# Patient Record
Sex: MALE | Race: WHITE | Employment: OTHER | URBAN - METROPOLITAN AREA
[De-identification: names, ages, dates, MRNs, and addresses within clinical notes are randomized per-mention and may not be internally consistent; named-entity substitution may affect disease eponyms.]

---

## 2017-01-29 RX ORDER — METOPROLOL TARTRATE 25 MG/1
TABLET, FILM COATED ORAL
Qty: 30 TAB | Refills: 4 | Status: SHIPPED | OUTPATIENT
Start: 2017-01-29 | End: 2017-06-24 | Stop reason: SDUPTHER

## 2017-04-25 DIAGNOSIS — E78.5 DYSLIPIDEMIA: ICD-10-CM

## 2017-04-29 RX ORDER — PRAVASTATIN SODIUM 20 MG/1
TABLET ORAL
Qty: 90 TAB | Refills: 0 | Status: SHIPPED | OUTPATIENT
Start: 2017-04-29 | End: 2017-07-27 | Stop reason: SDUPTHER

## 2017-05-23 ENCOUNTER — OFFICE VISIT (OUTPATIENT)
Dept: FAMILY MEDICINE CLINIC | Age: 82
End: 2017-05-23

## 2017-05-23 VITALS
WEIGHT: 150.2 LBS | HEIGHT: 63 IN | BODY MASS INDEX: 26.61 KG/M2 | DIASTOLIC BLOOD PRESSURE: 80 MMHG | HEART RATE: 57 BPM | OXYGEN SATURATION: 98 % | TEMPERATURE: 98.3 F | RESPIRATION RATE: 18 BRPM | SYSTOLIC BLOOD PRESSURE: 119 MMHG

## 2017-05-23 DIAGNOSIS — H90.3 SENSORINEURAL HEARING LOSS (SNHL) OF BOTH EARS: ICD-10-CM

## 2017-05-23 DIAGNOSIS — R00.1 BRADYCARDIA WITH 51-60 BEATS PER MINUTE: ICD-10-CM

## 2017-05-23 DIAGNOSIS — R41.3 MEMORY DISTURBANCE: ICD-10-CM

## 2017-05-23 DIAGNOSIS — D69.6 THROMBOCYTOPENIA (HCC): ICD-10-CM

## 2017-05-23 DIAGNOSIS — I25.10 CORONARY ARTERY DISEASE INVOLVING NATIVE CORONARY ARTERY OF NATIVE HEART WITHOUT ANGINA PECTORIS: ICD-10-CM

## 2017-05-23 DIAGNOSIS — G89.29 CHRONIC PAIN OF LEFT KNEE: ICD-10-CM

## 2017-05-23 DIAGNOSIS — I63.89 CEREBROVASCULAR ACCIDENT (CVA) DUE TO OTHER MECHANISM (HCC): ICD-10-CM

## 2017-05-23 DIAGNOSIS — M51.36 DDD (DEGENERATIVE DISC DISEASE), LUMBAR: ICD-10-CM

## 2017-05-23 DIAGNOSIS — H61.23 BILATERAL IMPACTED CERUMEN: ICD-10-CM

## 2017-05-23 DIAGNOSIS — I10 ESSENTIAL HYPERTENSION: ICD-10-CM

## 2017-05-23 DIAGNOSIS — N40.1 BENIGN PROSTATIC HYPERPLASIA WITH LOWER URINARY TRACT SYMPTOMS, UNSPECIFIED MORPHOLOGY: ICD-10-CM

## 2017-05-23 DIAGNOSIS — M79.18 RIGHT BUTTOCK PAIN: ICD-10-CM

## 2017-05-23 DIAGNOSIS — E78.5 DYSLIPIDEMIA: ICD-10-CM

## 2017-05-23 DIAGNOSIS — Z13.31 DEPRESSION SCREENING: ICD-10-CM

## 2017-05-23 DIAGNOSIS — M16.0 BILATERAL HIP JOINT ARTHRITIS: ICD-10-CM

## 2017-05-23 DIAGNOSIS — M25.562 CHRONIC PAIN OF LEFT KNEE: ICD-10-CM

## 2017-05-23 DIAGNOSIS — E55.9 VITAMIN D DEFICIENCY: ICD-10-CM

## 2017-05-23 DIAGNOSIS — Z00.00 PREVENTATIVE HEALTH CARE: Primary | ICD-10-CM

## 2017-05-23 RX ORDER — DICLOFENAC SODIUM 50 MG/1
TABLET, DELAYED RELEASE ORAL
Refills: 0 | COMMUNITY
Start: 2017-02-28 | End: 2017-11-21 | Stop reason: ALTCHOICE

## 2017-05-23 NOTE — PROGRESS NOTES
Date of visit: 5/23/2017       This is an Subsequent Medicare Annual Wellness Visit (AWV), (Performed more than 12 months after effective date of Medicare Part B enrollment and 12 months after last preventive visit)    I have reviewed the patient's medical history in detail and updated the computerized patient record. Geri Ellis is a 80 y.o. male   History obtained from: the patient and his wife, Femi Young    History     Patient Active Problem List   Diagnosis Code    GERD (gastroesophageal reflux disease) K21.9    Slipped intervertebral disc [id number]    Hemorrhoids, internal K64.8    DJD (degenerative joint disease) of cervical spine M47.812    Scoliosis M41.9    Diverticulosis K57.90    BPH (benign prostatic hyperplasia) N40.0    PVD (peripheral vascular disease) ([facility] I73.9    Varicose vein of leg I83.90    History of small bowel obstruction Z87.19    [place] (hard of hearing) H91.90    CAD (coronary artery disease) I25.10    Memory disturbance R41.3    Left acoustic neuroma ([facility] D33.3    Dyslipidemia E78.5    Advance directive in chart Z78.9    Stroke ([facility] I63.9    Essential hypertension I10    Chronic constipation K59.09    Benign prostatic hyperplasia N40.0    Bradycardia with 51-60 beats per minute R00.1     Past Medical History:   Diagnosis Date    Back pain     due to DDD and scoliosis    BPH (benign prostatic hyperplasia)     Dr. Olayinka Horton    CAD (coronary artery disease) 10/30/13    30%LAD, 80% ostal stenosis. Dr. Mason Curling.  Chest pain 10/04/04, 10/30/13    Dr. Francy Marcano. Negative Stress Cardiolite Test.  Dr. Michelle Steward.  Chronic venous insufficiency 06/15/09    Dr. Fallon Moore due to fall    DDD (degenerative disc disease), lumbar     L3-S1    Diverticulosis 02/2004    sigmoid.   Dr. Pratima Patel DJD (degenerative joint disease) of cervical spine     C 4-5 ;5-6    Dyslipidemia     Fracture 1980 facial/nasal    Hearing loss     wears hearing aid    Heartburn     Hemorrhoids, internal 1987    Dr. Rutherford Bias of unspecified site of abdominal cavity without mention of obstruction or gangrene     inguinal  Lt    Hypertension     Impotence     Left [facility] neuroma ([facility] 10/28/14    17 x 9 mm schwannoma.  Pes planus of both feet     PVD (peripheral vascular disease) ([facility] 2009    Scoliosis     LS    Slipped intervertebral disc 1954    chiropractor    SOB (shortness of breath) 08/07/13    Dr. Bianka Ventura.  Stroke ([facility] 10/28/2014    small Left [place]. Dr. Markie Conway. Dr. Holger Buckner.  Urinary incontinence     due to BPH. Dr. Jennifer Bird    Varicose vein 06/15/09    Dr. Gumaro Loredo Vertigo 2003    due to inner ear. Dr. Roldan Greenberg. Dr. Phill Daly. Dr. Love Kruger Visual loss     Dr. Jeana Hunter, [facility]. Past Surgical History:   Procedure Laterality Date    APPENDECTOMY      due to gangrene   [facility], 02/2002    Dr. Jerome Mercedes. benign.  COLONOSCOPY  02/2004    Dr. Justus Lopez. due q 10 yrs    CYSTOSCOPY  10/17/03    Dr. Nicola Kemp  10/30/13    Dr. Jose Ang. 80% ostal stenosis. 30% LAD.  HX HEMORRHOIDECTOMY      HX KNEE ARTHROSCOPY  1990 Left , 2007 Right    Dr. Petty Bruno HX LAPAROTOMY  05/14/07    Dr. Maria Victoria Morales. due to Bowel Obstruction    HX POLYPECTOMY  02/2002    Anal.     HX TONSILLECTOMY      OR COLSC FLX W/RMVL OF TUMOR POLYP LESION SNARE TQ  5/23/2011    Dr. Anna Briceño.      Allergies   Allergen Reactions    Betadine [Povidone-Iodine] Hives    Phenergan [Promethazine] Other (comments)     hallucinations    Seafood [Shellfish Containing Products] Rash and Swelling     crabmeat    Xylocaine [Lidocaine Hcl] Shortness of Breath     Current Outpatient Prescriptions   Medication Sig Dispense Refill    pravastatin (PRAVACHOL) 20 mg tablet take 1 tablet by mouth at bedtime 90 Tab 0    metoprolol tartrate (LOPRESSOR) 25 mg tablet TAKE A HALF TABLET BY MOUTH TWICE A DAY 30 Tab 4    isosorbide mononitrate ER (IMDUR) 30 mg tablet take 1 tablet by mouth once daily 30 Tab 11    GUAIFENESIN (MUCINEX PO) Take 1 Tab by mouth daily.  docusate sodium (COLACE) 100 mg capsule Take 100 mg by mouth daily. Instructed to take daily with plenty of liquid unless otherwise directed- as per 11/7/14 [facility] discharge med list      cholecalciferol, vitamin D3, (VITAMIN D3) 2,000 unit tab Take 1 Tab by mouth daily. As per 11/07/14 [facility] discharge med list      aspirin (ASPIRIN) 325 mg tablet Take 1 tablet by mouth daily. 30 tablet 3    tamsulosin (FLOMAX) 0.4 mg capsule Take 0.4 mg by mouth daily. For prostate       diclofenac EC (VOLTAREN) 50 mg EC tablet take 1 tablet by mouth twice a day if needed for pain  0    HYDROcodone-acetaminophen (LORTAB 5-325) 5-325 mg per tablet Take 1 Tab by mouth every six (6) hours as needed for Pain. Max Daily Amount: 4 Tabs. Indications: causes drowsiness;do not drink,drive, or use machinery while taking; take with a stool softener 12 Tab 0    capsaicin-methyl sal-menthol 0.035-25-10 % crlo 1 Inch by Apply Externally route three (3) times daily as needed. 1 Bottle 0    meclizine (ANTIVERT) 25 mg tablet Take 1 Tab by mouth three (3) times daily as needed for Dizziness. 20 Tab 0    etodolac (LODINE) 400 mg tablet Take 400 mg by mouth daily as needed.   0     Family History   Problem Relation Age of Onset    Heart Attack Mother     Other Mother      brain aneurysm/AAA/varicose veins    Stroke Mother     Heart Attack Sister     Cancer Maternal Grandmother      ovarian     Social History   Substance Use Topics    Smoking status: Never Smoker    Smokeless tobacco: Never Used    Alcohol use No       Specialists/Care Team   Marleny Portillo has established care with the following healthcare providers:  Patient Care Team:  Trevor Sarah DO as PCP - General Radha Gan Jude Noe MD (Physical Medicine and Rehab)  Giselle Parham MD (Urology)  Conception Andersen, [place] (Physician Assistant)  Rosey Stewart MD (Cardiology)  LOVELY Enrique OD (Optometry)  Tobias Carrasco RN as Ambulatory Care Navigator ([facility])      [address]     Demographics   male  80 y.o. General Health Questions   -During the past 4 weeks:   -how would you rate your health in general? Good   -how often have you been bothered by feeling dizzy when standing up? never   -how much have you been bothered by bodily pain? Not at all   -Have you noticed any hearing difficulties? no   -has your physical and emotional health limited your social activities with family or friends? no    Emotional Health Questions   -Do you have a history of depression, anxiety, or emotional problems? no  -Over the past 2 weeks, have you felt down, depressed or hopeless? no  -Over the past 2 weeks, have you felt little interest or pleasure in doing things? no    Health Habits   Please describe your diet habits: healthy  Do you get 5 servings of fruits or vegetables daily? yes  Do you exercise regularly? Yes - goes to gym 3x week    Activities of Daily Living and Functional Status   -Do you need help with eating, walking, dressing, bathing, toileting, the phone, transportation, shopping, preparing meals, housework, laundry, medications or managing money? no  -In the past four weeks, was someone available to help you if you needed and wanted help with anything? Yes - wife  -Are you confident are you that you can control and manage most of your health problems? yes  -Have you been given information to help you keep track of your medications? Yes - wife puts in weekly med box and he takes them  -How often do you have trouble taking your medications as prescribed? never    Fall Risk and Home Safety   Have you fallen 2 or more times in the past year?  no  Does your home have rugs in the hallway, lack grab bars in the bathroom, lack handrails on the stairs or have poor lighting? no  Do you have smoke detectors and check them regularly? yes  Do you have difficulties driving a car? No - DMV has restricted pt from driving on highway or at night  Do you always fasten your seat belt when you are in a car? yes      Vitals:    05/23/17 0838   BP: 119/80   Pulse: (!) 57   Resp: 18   Temp: 98.3 °F (36.8 °C)   TempSrc: Oral   SpO2: 98%   Weight: 150 lb 3.2 oz (68.1 kg)   Height: 5' 2.99\" (1.6 m)     Body mass index is 26.61 kg/(m^2). No exam data present  Was the patient's timed Up & Go test unsteady or longer than 30 seconds? no    Evaluation of Cognitive Function   Mood/affect:  happy  Orientation: Person, Place, Time and Situation  Appearance: age appropriate and casually dressed  Family member/caregiver input: wife      Preventive Services     (Preventive care checklist to be included in patient instructions)  Discussed today Done Previously Not Needed     X  Pneumococcal vaccines    8/12/2016  Flu vaccine      Hepatitis B vaccine (if at risk)    9/8/2015  Shingles vaccine    7/2/2016  TDAP vaccine     X Mammogram     X Pap smear     X Abdominal ultrasound for AAA     X Colorectal cancer screening     X Low-dose CT for lung cancer screening     X Bone density test      Glaucoma screening    5/23/2017  Cholesterol test    2014  Diabetes screening test      X Diabetes self-management class     X Nutritionist referral for diabetes or renal disease     Discussion of Advance Directive    Radha Elise verbalized his ACP (signed 10/14/2014 and on file) still reflects his wishes and is in effect. Assessment/Plan   Z00.00    ICD-10-CM ICD-9-CM    1. Preventative health care Z00.00 V70.0    2. Essential hypertension I10 401.9 LIPID PANEL      METABOLIC PANEL, COMPREHENSIVE      TSH 3RD GENERATION      MICROALBUMIN, UR, RAND W/ MICROALBUMIN/CREA RATIO      URINALYSIS W/ RFLX MICROSCOPIC   3.  Chronic pain of left knee M25.562 719.46     G89.29 338.29     intermittently, resolved after Voltaren    4. Memory disturbance R41.3 780.93 TSH 3RD GENERATION      VITAMIN B12 & FOLATE      RPR      REFERRAL TO NEUROPSYCHOLOGY   5. Thrombocytopenia (HCC) D69.6 287.5 CBC W/O DIFF   6. Vitamin D deficiency E55.9 268.9 VITAMIN D, 25 HYDROXY   7. Bilateral impacted cerumen H61.23 380.4    8. Cerebrovascular accident (CVA) due to other mechanism ([facility] I63.8 434.91    9. Depression screening Z13.89 V79.0    10. Dyslipidemia E78.5 272.4 LIPID PANEL      METABOLIC PANEL, COMPREHENSIVE      URINALYSIS W/ RFLX MICROSCOPIC   11. Coronary artery disease involving native coronary artery of native heart without angina pectoris I25.10 414.01 LIPID PANEL      METABOLIC PANEL, COMPREHENSIVE   12. Sensorineural hearing loss (SNHL) of both ears H90.3 389.18    13. DDD (degenerative disc disease), lumbar M51.36 722.52    14. Bilateral hip joint arthritis M16.0 716.95    15. Right buttock pain M79.1 729.1     resolved after Medrol dose jabari   16. Benign prostatic hyperplasia with lower urinary tract symptoms, unspecified morphology N40.1 600.01     stable on Tamsulosin   17. Bradycardia with 51-60 beats per minute R00.1 427.89     Asymptomatic, due to hx of long distance running       Orders Placed This Encounter    LIPID PANEL    METABOLIC PANEL, COMPREHENSIVE    TSH 3RD GENERATION    VITAMIN D, 25 HYDROXY    MICROALBUMIN, UR, RAND W/ MICROALBUMIN/CREA RATIO    URINALYSIS W/ RFLX MICROSCOPIC    CBC W/O DIFF    VITAMIN B12 & FOLATE    RPR    REFERRAL TO NEUROPSYCHOLOGY    diclofenac EC (VOLTAREN) 50 mg EC tablet       Syed Perez NP      The Annual Medicare Wellness Visit was performed by Godfrey Santos NP in our office today. I agree with documentation reflected in the chart. A summary was given to patient.

## 2017-05-23 NOTE — PATIENT INSTRUCTIONS
Schedule of Personalized Health Plan    The best way to stay healthy is to live a healthy lifestyle. A healthy lifestyle includes regular exercise, eating a well-balanced diet, keeping a healthy weight and not smoking. Regular physical exams and screening tests are another important way to take care of yourself. Preventive exams provided by health care providers can find health problems early when treatment works best and can keep you from getting certain diseases or illnesses. Preventive services include exams, lab tests, screening tests, shots, and learning information to help you take care of your own health. The CDC recommends pneumonia vaccines for anyone 72 years and older. These vaccines are usually only needed once in a lifetime unless your healthcare provider decides differently. The 2 pneumonia shots available presently are PCV 13 (Prevnar 13) and PPSV23 (Pneumovax 23). Adults 72 years or older who have not previously received PCV13, should receive a dose of PCV13 first, followed 1 year later by a dose of PPSV23. All people over 65 should have a yearly flu vaccine. People over 65 are at medium to high risk for Hepatitis B. Hepatitis B is transmitted through body fluids with a common source being sexual activity or IV drug use. Three shots are needed for complete protection. Three shots are needed for complete protection. The CDC recommends the herpes zoster (shingles) vaccine for all adults 61 and older, regardless if a prior episode of zoster has been reported. In addition to your physical exam, some screening tests are recommended:    Osteoporosis screening -There are no signs or symptoms of osteoporosis (weakening of bones). You might not know you have the disease until you break a bone. Thats why its so important to get a bone density test to measure your bone strength.  Bone mass measurement is taken with a Dexa scan and is recommended every two years after 72years old or if you have certain risk factors, such as personal history of vertebral fracture or chronic steroid medication use. Diabetes Mellitus screening is recommended every year. This is a blood test, called a hemoglobin A1c, which measures the average blood sugar over a 3 month period. Glaucoma is an eye disease caused by high pressure in the eye. An eye exam is recommended every year. Cardiovascular screening tests that check your cholesterol and other blood fat (lipid) levels are recommended every five years or yearly if you are on medications for cardiovascular disease. Colorectal Cancer screening tests help to find pre-cancerous polyps (growths in the colon) so they can be removed before they turn into cancer. Screening tests are recommended starting at age 48 or earlier if you have a certain risk factors, such as a family history of colon cancer. Prostate Cancer screening is currently not recommended by the [facility] Task Force unless there is a history or symptoms of prostate cancer.   (Symptoms include urinary problems such as weak stream, problem starting or stopping urination, blood in urine, erectile dysfunction)    Here is a list of your current Health Maintenance items including a date when each one is due next:  Health Maintenance   Topic Date Due    MEDICARE YEARLY EXAM  04/28/2017    INFLUENZA AGE 9 TO ADULT  08/01/2017    GLAUCOMA SCREENING Q2Y  12/07/2017    DTaP/Tdap/Td series (2 - Td) 07/02/2026    ZOSTER VACCINE AGE 60>  Completed    Pneumococcal 65+ Low/Medium Risk  Completed

## 2017-05-23 NOTE — MR AVS SNAPSHOT
Visit Information Date & Time Provider Department Dept. Phone Encounter #  
 5/23/2017  8:45 AM Reyes Cho DO [facility] [phone] [id number] Follow-up Instructions Return in about 6 months (around 11/23/2017) for bp, results, referral follow up. Upcoming Health Maintenance Date Due  
 MEDICARE YEARLY EXAM 4/28/2017 INFLUENZA AGE 9 TO ADULT 8/1/2017 GLAUCOMA SCREENING Q2Y 12/7/2017 DTaP/Tdap/Td series (2 - Td) 7/2/2026 Allergies as of 5/23/2017  Review Complete On: 5/23/2017 By: Toñito Nowak Severity Noted Reaction Type Reactions Betadine [Povidone-iodine] High 11/12/2009    Hives Phenergan [Promethazine] High 11/12/2009    Other (comments)  
 hallucinations Seafood [Shellfish Containing Products] High 11/12/2009    Rash, Swelling  
 crabmeat Xylocaine [Lidocaine Hcl] High 01/06/2012    Shortness of Breath Current Immunizations  Reviewed on 5/23/2017 Name Date Influenza High Dose Vaccine PF 8/12/2016, 9/8/2015, 10/29/2013 Influenza Vaccine 10/1/2014 Influenza Vaccine Split 10/8/2012, 10/20/2011, 10/15/2010 Influenza Vaccine Whole 10/1/2009 Pneumococcal Conjugate (PCV-13) 6/3/2015 Pneumococcal Polysaccharide (PPSV-23) 7/2/2016 Pneumococcal Vaccine (Unspecified Type) 2/8/2002 TD Vaccine 4/27/2004 Tdap 7/2/2016 Zoster Vaccine, Live 9/8/2015 Reviewed by Reyes Cho DO on 5/23/2017 at  9:31 AM  
You Were Diagnosed With   
  
 Codes Comments Essential hypertension    -  Primary ICD-10-CM: I10 
ICD-9-CM: 401.9 Chronic pain of left knee     ICD-10-CM: M25.562, G89.29 ICD-9-CM: 719.46, 338.29 intermittently, resolved after Voltaren Memory disturbance     ICD-10-CM: R41.3 ICD-9-CM: 780.93 Thrombocytopenia ([address]     ICD-10-CM: D69.6 ICD-9-CM: 287.5 Vitamin D deficiency     ICD-10-CM: E55.9 ICD-9-CM: 268.9  Bilateral impacted cerumen     ICD-10-CM: S42.31 
 ICD-9-CM: 380.4 Cerebrovascular accident (CVA) due to other mechanism [facility])     ICD-10-CM: I59.8 ICD-9-CM: 434.91 Dyslipidemia     ICD-10-CM: E78.5 ICD-9-CM: 272.4 Coronary artery disease involving native coronary artery of native heart without angina pectoris     ICD-10-CM: I25.10 ICD-9-CM: 414.01 Sensorineural hearing loss (SNHL) of both ears     ICD-10-CM: H90.3 ICD-9-CM: 389.18   
 DDD (degenerative disc disease), lumbar     ICD-10-CM: M51.36 
ICD-9-CM: 722.52 Bilateral hip joint arthritis     ICD-10-CM: M16.0 ICD-9-CM: 716.95 Right buttock pain     ICD-10-CM: M79.1 ICD-9-CM: 729.1 resolved after Medrol dose jabari Benign prostatic hyperplasia with lower urinary tract symptoms, unspecified morphology     ICD-10-CM: N40.1 ICD-9-CM: 600.01 stable on Tamsulosin Bradycardia with 51-60 beats per minute     ICD-10-CM: R00.1 ICD-9-CM: 427.89 Asymptomatic, due to hx of long distance running Vitals BP Pulse Temp Resp Height(growth percentile) Weight(growth percentile) 119/80 (BP 1 Location: Right arm, BP Patient Position: Sitting) (!) 57 98.3 °F (36.8 °C) (Oral) 18 5' 2.99\" (1.6 m) 150 lb 3.2 oz (68.1 kg) SpO2 BMI Smoking Status 98% 26.61 kg/m2 Never Smoker BMI and BSA Data Body Mass Index Body Surface Area  
 26.61 kg/m 2 1.74 m 2 Preferred Pharmacy Pharmacy Name Phone [id number] [facility], [address] [place] [address] Your Updated Medication List  
  
   
This list is accurate as of: 5/23/17  9:48 AM.  Always use your most recent med list.  
  
  
  
  
 aspirin 325 mg tablet Commonly known as:  ASPIRIN Take 1 tablet by mouth daily. capsaicin-methyl sal-menthol 0.035-25-10 % Crlo  
1 Inch by Apply Externally route three (3) times daily as needed. diclofenac EC 50 mg EC tablet Commonly known as:  VOLTAREN  
take 1 tablet by mouth twice a day if needed for pain docusate sodium 100 mg capsule Commonly known as:  Franky Hem Take 100 mg by mouth daily. Instructed to take daily with plenty of liquid unless otherwise directed- as per 11/7/14 [facility] discharge med list  
  
 etodolac 400 mg tablet Commonly known as:  LODINE Take 400 mg by mouth daily as needed. FLOMAX 0.4 mg capsule Generic drug:  tamsulosin Take 0.4 mg by mouth daily. For prostate HYDROcodone-acetaminophen 5-325 mg per tablet Commonly known as:  LORTAB 5-325 Take 1 Tab by mouth every six (6) hours as needed for Pain. Max Daily Amount: 4 Tabs. Indications: causes drowsiness;do not drink,drive, or use machinery while taking; take with a stool softener  
  
 isosorbide mononitrate ER 30 mg tablet Commonly known as:  IMDUR  
take 1 tablet by mouth once daily  
  
 meclizine 25 mg tablet Commonly known as:  ANTIVERT Take 1 Tab by mouth three (3) times daily as needed for Dizziness. metoprolol tartrate 25 mg tablet Commonly known as:  LOPRESSOR  
TAKE A HALF TABLET BY MOUTH TWICE A DAY  
  
 MUCINEX PO Take 1 Tab by mouth daily. pravastatin 20 mg tablet Commonly known as:  PRAVACHOL  
take 1 tablet by mouth at bedtime VITAMIN D3 2,000 unit Tab Generic drug:  cholecalciferol (vitamin D3) Take 1 Tab by mouth daily. As per 11/07/14 [facility] discharge med list  
  
  
  
  
We Performed the Following CBC W/O DIFF [38816 CPT(R)] LIPID PANEL [17221 CPT(R)] METABOLIC PANEL, COMPREHENSIVE [60077 CPT(R)] MICROALBUMIN, UR, RAND W/ MICROALBUMIN/CREA RATIO [id number] CPT(R)] REFERRAL TO NEUROPSYCHOLOGY [JFJ68 Custom] Comments:  
 Please evaluate patient for memory disturbance worsening RPR [91341 CPT(R)] TSH 3RD GENERATION [42263 CPT(R)] URINALYSIS W/ RFLX MICROSCOPIC [44803 CPT(R)] VITAMIN B12 & FOLATE [73964 CPT(R)] VITAMIN D, 25 HYDROXY [id number] CPT(R)] Follow-up Instructions Return in about 6 months (around 11/23/2017) for bp, results, referral follow up. Referral Information Referral ID Referred By Referred To  
  
 [id number] [facility] [place] [address] [facility] [place] [address] [facility] Phone: [phone] Fax: [phone] Visits Status Start Date End Date 1 New Request 5/23/17 5/23/18 If your referral has a status of pending review or denied, additional information will be sent to support the outcome of this decision. Introducing [facility] [place] [facility]! Keily Rubio introduces [employer] patient portal. Now you can access parts of your medical record, email your doctor's office, and request medication refills online. 1. In your internet browser, go to [email or web address] 2. Click on the First Time User? Click Here link in the Sign In box. You will see the New Member Sign Up page. 3. Enter your [employer] Access Code exactly as it appears below. You will not need to use this code after youve completed the sign-up process. If you do not sign up before the expiration date, you must request a new code. · [employer] Access Code: [email or web address] Expires: 8/21/2017  8:19 AM 
 
4. Enter the last four digits of your Social Security Number (xxxx) and Date of Birth (mm/dd/yyyy) as indicated and click Submit. You will be taken to the next sign-up page. 5. Create a [employer] ID. This will be your [employer] login ID and cannot be changed, so think of one that is secure and easy to remember. 6. Create a [employer] password. You can change your password at any time. 7. Enter your Password Reset Question and Answer. This can be used at a later time if you forget your password. 8. Enter your e-mail address. You will receive e-mail notification when new information is available in [address] 9. Click Sign Up.  You can now view and download portions of your medical record. 10. Click the Download Summary menu link to download a portable copy of your medical information. If you have questions, please visit the Frequently Asked Questions section of the [employer] website. Remember, [employer] is NOT to be used for urgent needs. For medical emergencies, dial 911. Now available from your iPhone and Android! Please provide this summary of care documentation to your next provider. Your primary care clinician is listed as Liudmila Eid. If you have any questions after today's visit, please call [phone].

## 2017-05-23 NOTE — PROGRESS NOTES
Chief Complaint   Patient presents with    Blood Pressure Check    Referral Follow Up    Results    ED Follow-up     [facility]; arthritis    Request For New Medication     PRN pain pill for arthritis     1. Have you been to the ER, urgent care clinic since your last visit? Hospitalized since your last visit? Yes, Pt went to ED and [facility], ED note in encounters and [facility] Note in media. 2. Have you seen or consulted any other health care providers outside of the [address] [place] [address] since your last visit? Include any pap smears or colon screening. No      Pt's wife is with him today; wife answered most of questions for pt. Pt's wife brought note from [facility]; note is also in media as well. Per Pt's wife, [facility] stated that pt had arthritis and wanted to know if he could have a PRN pain pill for when pt is hurting.

## 2017-05-23 NOTE — Clinical Note
Super job, John Paul Carter! I just changed 2 of your dates to 2016 and 2015 (typo) hope you don't mind. So happy you are here! Hope you had a great holiday weekend.   Adrien Coronado

## 2017-05-23 NOTE — PROGRESS NOTES
HISTORY OF PRESENT ILLNESS  Marleny Portillo is a 80 y.o. male presents with Blood Pressure Check; Referral Follow Up; ED Follow-up ([facility]; arthritis); Request For New Medication (PRN pain pill for arthritis); Annual Wellness Visit; and Labs    Agree with nurse note. [place] pt with hx of CVA and memory disturbance. His wife is here today and helps provide the hx. His wife has noticed he is forgetting more and he agrees. No recent head trauma, bladder symptoms or other associated symptoms. He has an appointment to see a neurologist in 10/2016. He feels well today and has no complaints. Hypertensive pt with dyslipidemia and CAD presents to the office with a BP of 119/80 and HR of 57 bpm. He was a long distance runner for many years and his wife recalls his HR dropping into the 40s. Patient denies vision changes, headaches, dizziness, chest pain, SOB, or swelling today. He weighs 150 lbs, gained 1 lb since last ov. He drinks about 3 glass of water daily. Pt with Vit D deficiency and hx of thrombocytopenia. On 10/31/14, platelets were 80. On 12/04/16, Platelets were 424. Pt went to the ER on 12/04/06 for hip pain. He had L buttock pain that progressed to the R buttock. D'd low back strain. Tx'd with Norco 5-325 mg 1 po orally and Decadron 8 mg 1 po orally. Rx'd Lortab 5-25 mg x12 tablets and Medrol Dosepak. Xrays showed multilevel DDD in hip joints BL and BL hip OA. He did take the [address] but not Lortab. x     [facility] to [facility] for L knee pain and ears clogged. Dx'd BL impacted cerumen and unilateral primary OA, L knee. Xrays showed joint arthritis. Rx'd Voltaren 50 mg x14 tablets. Pt with SNHL of BL ears. He wears hearing aids which he adjusts depending on his environment. Pt with hx of BPH. He sees urologist, Dr. Vern Kemp yearly for his PSA check. Last visit was on 11/09/16. He follows up in 1 year. He takes Tamsulosin 0.4 mg daily, tolerating well.      Health Maintenance    Pt's most recent colonoscopy was on 05/23/11 with GI, Dr. Criselda Bernstein. Written by lakesha Riggs, as dictated by Dr. Andriy Bardales DO.    ROS    Review of Systems negative except as noted above in HPI. ALLERGIES:    Allergies   Allergen Reactions    Betadine [Povidone-Iodine] Hives    Phenergan [Promethazine] Other (comments)     hallucinations    Seafood [Shellfish Containing Products] Rash and Swelling     crabmeat    Xylocaine [Lidocaine Hcl] Shortness of Breath       CURRENT MEDICATIONS:    Outpatient Prescriptions Marked as Taking for the 5/23/17 encounter (Office Visit) with Lynda Harper DO   Medication Sig Dispense Refill    pravastatin (PRAVACHOL) 20 mg tablet take 1 tablet by mouth at bedtime 90 Tab 0    metoprolol tartrate (LOPRESSOR) 25 mg tablet TAKE A HALF TABLET BY MOUTH TWICE A DAY 30 Tab 4    isosorbide mononitrate ER (IMDUR) 30 mg tablet take 1 tablet by mouth once daily 30 Tab 11    GUAIFENESIN (MUCINEX PO) Take 1 Tab by mouth daily.  docusate sodium (COLACE) 100 mg capsule Take 100 mg by mouth daily. Instructed to take daily with plenty of liquid unless otherwise directed- as per 11/7/14 [facility] discharge med list      cholecalciferol, vitamin D3, (VITAMIN D3) 2,000 unit tab Take 1 Tab by mouth daily. As per 11/07/14 [facility] discharge med list      aspirin (ASPIRIN) 325 mg tablet Take 1 tablet by mouth daily. 30 tablet 3    tamsulosin (FLOMAX) 0.4 mg capsule Take 0.4 mg by mouth daily. For prostate          PAST MEDICAL HISTORY:    Past Medical History:   Diagnosis Date    Back pain     due to DDD and scoliosis    BPH (benign prostatic hyperplasia)     Dr. Shannon Edwards CAD (coronary artery disease) 10/30/13    30%LAD, 80% ostal stenosis. Dr. Loco Pathak.  Chest pain 10/04/04, 10/30/13    Dr. Conrad Mota. Negative Stress Cardiolite Test.  Dr. Jeremi Walker.     Chronic venous insufficiency 06/15/09    Dr. Roland Santos fracture 1981    Lt due to fall    DDD (degenerative disc disease), lumbar     L3-S1    Diverticulosis 02/2004    sigmoid. Dr. Melody Boothe DJD (degenerative joint disease) of cervical spine     C 4-5 ;5-6    Dyslipidemia     Fracture 1980    facial/nasal    Hearing loss     wears hearing aid    Heartburn     Hemorrhoids, internal 1987    Dr. Rutherford Bias of unspecified site of abdominal cavity without mention of obstruction or gangrene     inguinal  Lt    Hypertension     Impotence     Left acoustic neuroma ([facility] [address] 10/28/14    17 x 9 mm schwannoma.  Pes planus of both feet     PVD (peripheral vascular disease) ([facility] 2009    Scoliosis     LS    Slipped intervertebral disc 1954    chiropractor    SOB (shortness of breath) 08/07/13    Dr. Bianka Ventura.  Stroke ([facility] 10/28/2014    small Left Thalamus. Dr. Markie Conway. Dr. Holger Buckner.  Urinary incontinence     due to BPH. Dr. Jennifer Bird    Varicose vein 06/15/09    Dr. Gumaro Loredo Vertigo 2003    due to inner ear. Dr. Roldan Greenberg. Dr. Phill Daly. Dr. Love Kruger Visual loss     Dr. Jeana Hunter, [facility]. PAST SURGICAL HISTORY:    Past Surgical History:   Procedure Laterality Date    APPENDECTOMY      due to gangrene   [facility], 02/2002    Dr. Jerome Mercedes. benign.  COLONOSCOPY  02/2004    Dr. Justus Lopez. due q 10 yrs    CYSTOSCOPY  10/17/03    Dr. Nicola Kemp  10/30/13    Dr. Garcia Factor. 80% ostal stenosis. 30% LAD.  HX HEMORRHOIDECTOMY      HX KNEE ARTHROSCOPY  1990 Left , 2007 Right    Dr. Petty Bruno HX LAPAROTOMY  05/14/07    Dr. Maria Victoria Morales. due to Bowel Obstruction    HX POLYPECTOMY  02/2002    Anal.     HX TONSILLECTOMY      IA COLSC FLX W/RMVL OF TUMOR POLYP LESION SNARE TQ  5/23/2011    Dr. Anna Briceño.        FAMILY HISTORY:    Family History   Problem Relation Age of Onset    Heart Attack Mother     Other Mother      brain aneurysm/AAA/varicose veins    Stroke Mother     Heart Attack Sister     Cancer Maternal Grandmother      ovarian       SOCIAL HISTORY:    Social History     Social History    Marital status:      Spouse name: N/A    Number of children: N/A    Years of education: N/A     Social History Main Topics    Smoking status: Never Smoker    Smokeless tobacco: Never Used    Alcohol use No    Drug use: No    Sexual activity: Not Asked     Other Topics Concern    None     Social History Narrative       IMMUNIZATIONS:    Immunization History   Administered Date(s) Administered    Influenza High Dose Vaccine PF 10/29/2013, 09/08/2015, 08/12/2016    Influenza Vaccine 10/01/2014    Influenza Vaccine Split 10/15/2010, 10/20/2011, 10/08/2012    Influenza Vaccine Whole 10/01/2009    Pneumococcal Conjugate (PCV-13) 06/03/2015    Pneumococcal Polysaccharide (PPSV-23) 07/02/2016    Pneumococcal Vaccine (Unspecified Type) 02/08/2002    TD Vaccine 04/27/2004    Tdap 07/02/2016    Zoster Vaccine, Live 09/08/2015         PHYSICAL EXAMINATION    Vital Signs    Visit Vitals    /80 (BP 1 Location: Right arm, BP Patient Position: Sitting)    Pulse (!) 57    Temp 98.3 °F (36.8 °C) (Oral)    Resp 18    Ht 5' 2.99\" (1.6 m)    Wt 150 lb 3.2 oz (68.1 kg)    SpO2 98%    BMI 26.61 kg/m2       Weight Metrics 5/23/2017 10/7/2016 9/15/2016 8/9/2016 6/7/2016 5/29/2016 4/27/2016   Weight 150 lb 3.2 oz 149 lb 1.6 oz 149 lb 4 oz 147 lb 8 oz 146 lb 1.6 oz 146 lb 13.2 oz 149 lb   BMI 26.61 kg/m2 26.41 kg/m2 26.44 kg/m2 26.14 kg/m2 25.89 kg/m2 26.02 kg/m2 25.56 kg/m2       General appearance - Well nourished. Well appearing. Well developed. No acute distress. Overweight. Present with wife. Head - Normocephalic. Atraumatic. Eyes - pupils equal and reactive. Extraocular eye movements intact. Sclera anicteric. Mildly injected sclera. Ears - Hearing is grossly abnormal bilaterally. Hearing aids intact. Nose - normal and patent. No polyps noted. No erythema. No discharge. Mouth - mucous membranes with adequate moisture. Posterior pharynx normal with cobblestone appearance. No erythema, white exudate or obstruction. Neck - supple. Midline trachea. No carotid bruits noted bilaterally. No thyromegaly noted. Chest - clear to auscultation bilaterally anteriorly and posteriorly. No wheezes. No rales or rhonchi. Breath sounds are symmetrical bilaterally. Unlabored respirations. Heart - normal rate. Regular rhythm. Normal S1, S2. No murmur noted. No rubs, clicks or gallops noted. Abdomen - soft and distended. No masses or organomegaly. No rebound, rigidity or guarding. Bowel sounds normal x 4 quadrants. No tenderness noted. Neurological - awake, alert and oriented to person, place, and time and event. Cranial nerves II through XII intact. Clear speech. Muscle strength is +5/5 x 4 extremities. Sensation is intact to light touch bilaterally. Steady gait. Heme/Lymph - peripheral pulses normal x 4 extremities. No peripheral edema is noted. Musculoskeletal - Intact x 4 extremities. Full ROM x 4 extremities. No pain with movement. Back exam - normal range of motion. No pain on palpation of the spinous processes in the cervical, thoracic, lumbar, sacral regions. No CVA tenderness. Skin - no rashes, erythema, ecchymosis, lacerations, abrasions, suspicious moles noted  Psychological -   normal behavior, dress and thought processes. Occasional good insight. Good eye contact. Normal affect. Witty mood. Normal speech.       DATA REVIEWED    Lab Results   Component Value Date/Time    WBC 6.1 12/04/2016 06:30 AM    Hemoglobin (POC) 12.9 09/15/2016 04:44 PM    HGB 13.5 12/04/2016 06:30 AM    Hematocrit (POC) 38 09/15/2016 04:44 PM    HCT 39.8 12/04/2016 06:30 AM    PLATELET 202 84/50/9852 06:30 AM    MCV 93.6 12/04/2016 06:30 AM     Lab Results   Component Value Date/Time    Sodium 141 12/04/2016 07:29 AM Potassium 4.1 12/04/2016 07:29 AM    Chloride 105 12/04/2016 07:29 AM    CO2 27 12/04/2016 07:29 AM    Anion gap 9 12/04/2016 07:29 AM    Glucose 80 12/04/2016 07:29 AM    BUN 15 12/04/2016 07:29 AM    Creatinine 0.93 12/04/2016 07:29 AM    BUN/Creatinine ratio 16 12/04/2016 07:29 AM    GFR est AA >60 12/04/2016 07:29 AM    GFR est non-AA >60 12/04/2016 07:29 AM    Calcium 8.5 12/04/2016 07:29 AM    Bilirubin, total 0.5 12/04/2016 07:29 AM    AST (SGOT) 18 12/04/2016 07:29 AM    Alk. phosphatase 102 12/04/2016 07:29 AM    Protein, total 6.9 12/04/2016 07:29 AM    Albumin 3.2 12/04/2016 07:29 AM    Globulin 3.7 12/04/2016 07:29 AM    A-G Ratio 0.9 12/04/2016 07:29 AM    ALT (SGPT) 22 12/04/2016 07:29 AM     Lab Results   Component Value Date/Time    Cholesterol, total 138 04/07/2016 10:04 AM    HDL Cholesterol 53 04/07/2016 10:04 AM    LDL, calculated 75 04/07/2016 10:04 AM    VLDL, calculated 10 04/07/2016 10:04 AM    Triglyceride 48 04/07/2016 10:04 AM    CHOL/HDL Ratio 2.2 10/29/2014 03:45 AM     Lab Results   Component Value Date/Time    Vitamin D 25-Hydroxy 21.5 10/31/2014 06:00 AM       Lab Results   Component Value Date/Time    Hemoglobin A1c 5.5 10/29/2014 03:45 AM     Lab Results   Component Value Date/Time    TSH 3.480 04/07/2016 10:04 AM     Lab Results   Component Value Date/Time    Microalb/Creat ratio (ug/mg creat.) 20.1 04/07/2016 10:14 AM    Microalbumin, urine 41.9 04/07/2016 10:14 AM       ASSESSMENT and PLAN      ICD-10-CM ICD-9-CM    1. Preventative health care Z00.00 V70.0    2. Essential hypertension I10 401.9 LIPID PANEL      METABOLIC PANEL, COMPREHENSIVE      TSH 3RD GENERATION      MICROALBUMIN, UR, RAND W/ MICROALBUMIN/CREA RATIO      URINALYSIS W/ RFLX MICROSCOPIC   3. Chronic pain of left knee M25.562 719.46     G89.29 338.29     intermittently, resolved after Voltaren    4.  Memory disturbance R41.3 780.93 TSH 3RD GENERATION      VITAMIN B12 & FOLATE      RPR      REFERRAL TO NEUROPSYCHOLOGY   5. Thrombocytopenia (HCC) D69.6 287.5 CBC W/O DIFF   6. Vitamin D deficiency E55.9 268.9 VITAMIN D, 25 HYDROXY   7. Bilateral impacted cerumen H61.23 380.4    8. Cerebrovascular accident (CVA) due to other mechanism ([facility] [address] I63.8 434.91    9. Depression screening Z13.89 V79.0    10. Dyslipidemia E78.5 272.4 LIPID PANEL      METABOLIC PANEL, COMPREHENSIVE      URINALYSIS W/ RFLX MICROSCOPIC   11. Coronary artery disease involving native coronary artery of native heart without angina pectoris I25.10 414.01 LIPID PANEL      METABOLIC PANEL, COMPREHENSIVE   12. Sensorineural hearing loss (SNHL) of both ears H90.3 389.18    13. DDD (degenerative disc disease), lumbar M51.36 722.52    14. Bilateral hip joint arthritis M16.0 716.95    15. Right buttock pain M79.1 729.1     resolved after Medrol dose jabari   16. Benign prostatic hyperplasia with lower urinary tract symptoms, unspecified morphology N40.1 600.01     stable on Tamsulosin   17. Bradycardia with 51-60 beats per minute R00.1 427.89     Asymptomatic, due to hx of long distance running       Discussed the patient's BMI with him. The BMI follow up plan is as follows: I have counseled this patient on diet and exercise regimens. Addressed weight, diet and exercise with patient. Decrease carbohydrates (white foods, sweet foods, sweet drinks and alcohol) since sugar has been found to contribute to dementia, increase green leafy vegetables and protein (lean meats and beans) with each meal.  Avoid fried foods. Eat 3-5 small meals daily. Do not skip meals. Increase water intake. Increase physical activity to 30 minutes daily for health benefit as tolerated. Get 7-8 hours uninterrupted sleep nightly. Chart reviewed and updated. Continue current medications and care. Most recent tests reviewed from 2016. Recheck pertinent labs today while fasting. Recent office visit notes from Dr. [place] and [facility] reviewed.   Referrals given; patient urged to keep appointments with specialists. Neuropsych. Pt and wife very hesistant to have pt \"put on meds to alter his thinking. \"  Counseled patient on health concerns:  BP, bradycardia, arthritis, and memory changes. Immunizations noted. Offered empathy, support, legitimation, prayers, partnership to patient. Praised patient for progress. Subsequent Medicare Wellness exam kindly performed by NP Katherine Kawasaki today. ACP on file. Follow-up Disposition:  Return in about 6 months (around 11/23/2017) for bp, results, referral follow up. Patient was offered a choice/choices in the treatment plan today. Patient expresses understanding of the plan and agrees with recommendations. Patient declines any additional handouts. Patient is satisfied with previous handouts received from our office      Written by lakesha Ware, as dictated by Dr. Luís Hillman DO. Documentation True and Accepted by Ag Hernández.  Courtney Denton.

## 2017-05-24 LAB
25(OH)D3+25(OH)D2 SERPL-MCNC: 34.2 NG/ML (ref 30–100)
ALBUMIN SERPL-MCNC: 4.4 G/DL (ref 3.5–4.7)
ALBUMIN/CREAT UR: 37.6 MG/G CREAT (ref 0–30)
ALBUMIN/GLOB SERPL: 1.8 {RATIO} (ref 1.2–2.2)
ALP SERPL-CCNC: 97 IU/L (ref 39–117)
ALT SERPL-CCNC: 16 IU/L (ref 0–44)
APPEARANCE UR: CLEAR
AST SERPL-CCNC: 23 IU/L (ref 0–40)
BILIRUB SERPL-MCNC: 0.5 MG/DL (ref 0–1.2)
BILIRUB UR QL STRIP: NEGATIVE
BUN SERPL-MCNC: 14 MG/DL (ref 8–27)
BUN/CREAT SERPL: 12 (ref 10–24)
CALCIUM SERPL-MCNC: 9.6 MG/DL (ref 8.6–10.2)
CHLORIDE SERPL-SCNC: 100 MMOL/L (ref 96–106)
CHOLEST SERPL-MCNC: 166 MG/DL (ref 100–199)
CO2 SERPL-SCNC: 26 MMOL/L (ref 18–29)
COLOR UR: YELLOW
CREAT SERPL-MCNC: 1.17 MG/DL (ref 0.76–1.27)
CREAT UR-MCNC: 128.3 MG/DL
ERYTHROCYTE [DISTWIDTH] IN BLOOD BY AUTOMATED COUNT: 13.9 % (ref 12.3–15.4)
FOLATE SERPL-MCNC: 8.4 NG/ML
GLOBULIN SER CALC-MCNC: 2.4 G/DL (ref 1.5–4.5)
GLUCOSE SERPL-MCNC: 103 MG/DL (ref 65–99)
GLUCOSE UR QL: NEGATIVE
HCT VFR BLD AUTO: 40.6 % (ref 37.5–51)
HDLC SERPL-MCNC: 53 MG/DL
HGB BLD-MCNC: 13.6 G/DL (ref 12.6–17.7)
HGB UR QL STRIP: NEGATIVE
INTERPRETATION, 910389: NORMAL
INTERPRETATION: NORMAL
KETONES UR QL STRIP: NEGATIVE
LDLC SERPL CALC-MCNC: 97 MG/DL (ref 0–99)
LEUKOCYTE ESTERASE UR QL STRIP: NEGATIVE
MCH RBC QN AUTO: 31.3 PG (ref 26.6–33)
MCHC RBC AUTO-ENTMCNC: 33.5 G/DL (ref 31.5–35.7)
MCV RBC AUTO: 94 FL (ref 79–97)
MICRO URNS: NORMAL
MICROALBUMIN UR-MCNC: 48.2 UG/ML
NITRITE UR QL STRIP: NEGATIVE
PDF IMAGE, 910387: NORMAL
PH UR STRIP: 6.5 [PH] (ref 5–7.5)
PLATELET # BLD AUTO: 192 X10E3/UL (ref 150–379)
POTASSIUM SERPL-SCNC: 5.2 MMOL/L (ref 3.5–5.2)
PROT SERPL-MCNC: 6.8 G/DL (ref 6–8.5)
PROT UR QL STRIP: NEGATIVE
RBC # BLD AUTO: 4.34 X10E6/UL (ref 4.14–5.8)
RPR SER QL: NON REACTIVE
SODIUM SERPL-SCNC: 141 MMOL/L (ref 134–144)
SP GR UR: 1.02 (ref 1–1.03)
TRIGL SERPL-MCNC: 78 MG/DL (ref 0–149)
TSH SERPL DL<=0.005 MIU/L-ACNC: 3.43 UIU/ML (ref 0.45–4.5)
UROBILINOGEN UR STRIP-MCNC: 0.2 MG/DL (ref 0.2–1)
VIT B12 SERPL-MCNC: 384 PG/ML (ref 211–946)
VLDLC SERPL CALC-MCNC: 16 MG/DL (ref 5–40)
WBC # BLD AUTO: 5.4 X10E3/UL (ref 3.4–10.8)

## 2017-10-11 ENCOUNTER — OFFICE VISIT (OUTPATIENT)
Dept: NEUROLOGY | Age: 82
End: 2017-10-11

## 2017-10-11 VITALS
OXYGEN SATURATION: 98 % | BODY MASS INDEX: 27.97 KG/M2 | SYSTOLIC BLOOD PRESSURE: 122 MMHG | DIASTOLIC BLOOD PRESSURE: 62 MMHG | HEART RATE: 60 BPM | WEIGHT: 152 LBS | HEIGHT: 62 IN

## 2017-10-11 DIAGNOSIS — R41.3 MEMORY LOSS: Primary | ICD-10-CM

## 2017-10-11 DIAGNOSIS — R41.0 DISORIENTATION: ICD-10-CM

## 2017-10-11 DIAGNOSIS — I10 ESSENTIAL HYPERTENSION: ICD-10-CM

## 2017-10-11 DIAGNOSIS — E53.8 B12 DEFICIENCY: ICD-10-CM

## 2017-10-11 DIAGNOSIS — I63.00 CEREBROVASCULAR ACCIDENT (CVA) DUE TO THROMBOSIS OF PRECEREBRAL ARTERY (HCC): ICD-10-CM

## 2017-10-11 DIAGNOSIS — I25.10 CORONARY ARTERY DISEASE INVOLVING NATIVE CORONARY ARTERY OF NATIVE HEART WITHOUT ANGINA PECTORIS: ICD-10-CM

## 2017-10-11 DIAGNOSIS — E11.51 DIABETES TYPE II WITH ATHEROSCLEROSIS OF ARTERIES OF EXTREMITIES (HCC): ICD-10-CM

## 2017-10-11 DIAGNOSIS — I70.209 DIABETES TYPE II WITH ATHEROSCLEROSIS OF ARTERIES OF EXTREMITIES (HCC): ICD-10-CM

## 2017-10-11 DIAGNOSIS — E03.9 ACQUIRED HYPOTHYROIDISM: ICD-10-CM

## 2017-10-11 NOTE — PATIENT INSTRUCTIONS
[facility]   Statement to Patients  April 1, 2014      In an effort to ensure the large volume of patient prescription refills is processed in the most efficient and expeditious manner, we are asking our patients to assist us by calling your Pharmacy for all prescription refills, this will include also your  Mail Order Pharmacy. The pharmacy will contact our office electronically to continue the refill process. Please do not wait until the last minute to call your pharmacy. We need at least 48 hours (2days) to fill prescriptions. We also encourage you to call your pharmacy before going to  your prescription to make sure it is ready. With regard to controlled substance prescription refill requests (narcotic refills) that need to be picked up at our office, we ask your cooperation by providing us with at least 72 hours (3days) notice that you will need a refill. We will not refill narcotic prescription refill requests after 4:00pm on any weekday, Monday through Thursday, or after 2:00pm on Fridays, or on the weekends. We encourage everyone to explore another way of getting your prescription refill request processed using [employer], our patient web portal through our electronic medical record system. [employer] is an efficient and effective way to communicate your medication request directly to the office and  downloadable as an chintan on your smart phone . [employer] also features a review functionality that allows you to view your medication list as well as leave messages for your physician. Are you ready to get connected? If so please review the attatched instructions or speak to any of our staff to get you set up right away! Thank you so much for your cooperation. Should you have any questions please contact our Practice Administrator.     The Physicians and Staff,  [address] [place] [address] Neurology Clinic          A Healthy Lifestyle: Care Instructions  Your Care Instructions  A healthy lifestyle can help you feel good, stay at a healthy weight, and have plenty of energy for both work and play. A healthy lifestyle is something you can share with your whole family. A healthy lifestyle also can lower your risk for serious health problems, such as high blood pressure, heart disease, and diabetes. You can follow a few steps listed below to improve your health and the health of your family. Follow-up care is a key part of your treatment and safety. Be sure to make and go to all appointments, and call your doctor if you are having problems. Its also a good idea to know your test results and keep a list of the medicines you take. How can you care for yourself at home? · Do not eat too much sugar, fat, or fast foods. You can still have dessert and treats now and then. The goal is moderation. · Start small to improve your eating habits. Pay attention to portion sizes, drink less juice and soda pop, and eat more fruits and vegetables. ¨ Eat a healthy amount of food. A 3-ounce serving of meat, for example, is about the size of a deck of cards. Fill the rest of your plate with vegetables and whole grains. ¨ Limit the amount of soda and sports drinks you have every day. Drink more water when you are thirsty. ¨ Eat at least 5 servings of fruits and vegetables every day. It may seem like a lot, but it is not hard to reach this goal. A serving or helping is 1 piece of fruit, 1 cup of vegetables, or 2 cups of leafy, raw vegetables. Have an apple or some carrot sticks as an afternoon snack instead of a candy bar. Try to have fruits and/or vegetables at every meal.  · Make exercise part of your daily routine. You may want to start with simple activities, such as walking, bicycling, or slow swimming. Try to be active 30 to 60 minutes every day. You do not need to do all 30 to 60 minutes all at once. For example, you can exercise 3 times a day for 10 or 20 minutes.  Moderate exercise is safe for most people, but it is always a good idea to talk to your doctor before starting an exercise program.  · Keep moving. Vreena Knuckles the lawn, work in the garden, or [place]. Take the stairs instead of the elevator at work. · If you smoke, quit. People who smoke have an increased risk for heart attack, stroke, cancer, and other lung illnesses. Quitting is hard, but there are ways to boost your chance of quitting tobacco for good. ¨ Use nicotine gum, patches, or lozenges. ¨ Ask your doctor about stop-smoking programs and medicines. ¨ Keep trying. In addition to reducing your risk of diseases in the future, you will notice some benefits soon after you stop using tobacco. If you have shortness of breath or asthma symptoms, they will likely get better within a few weeks after you quit. · Limit how much alcohol you drink. Moderate amounts of alcohol (up to 2 drinks a day for men, 1 drink a day for women) are okay. But drinking too much can lead to liver problems, high blood pressure, and other health problems. Family health  If you have a family, there are many things you can do together to improve your health. · Eat meals together as a family as often as possible. · Eat healthy foods. This includes fruits, vegetables, lean meats and dairy, and whole grains. · Include your family in your fitness plan. Most people think of activities such as jogging or tennis as the way to fitness, but there are many ways you and your family can be more active. Anything that makes you breathe hard and gets your heart pumping is exercise. Here are some tips:  ¨ Walk to do errands or to take your child to school or the bus. ¨ Go for a family bike ride after dinner instead of watching TV. Where can you learn more? Go to http://diamond-donna.info/. Enter N129 in the search box to learn more about \"A Healthy Lifestyle: Care Instructions. \"  Current as of: July 26, 2016  Content Version: 11.3  © 6886-3366 [employer]. Care instructions adapted under license by [employer] (which disclaims liability or warranty for this information). If you have questions about a medical condition or this instruction, always ask your healthcare professional. [address] any warranty or liability for your use of this information.

## 2017-10-11 NOTE — PROGRESS NOTES
NEUROLOGY HISTORY AND PHYSICAL    Name Ceasar Mckeon Age 80 y.o. MRN [id number]  10/24/1928     Referring Physician: Shabnam Cassidy DO      Chief Complaint:  Memory loss      This is a 80 y.o. with a history of stroke in . His wife believes that after the stroke he had progressive memory loss. He has trouble remembering following a story or completing one. Gets yearly evaluations for a driving. Assessment and Plan     1. Memory loss    - MRI BRAIN WO CONT; Future    2. Cerebrovascular accident (CVA) due to thrombosis of precerebral artery ([facility] [address]      3. Essential hypertension      4. Coronary artery disease involving native coronary artery of native heart without angina pectoris      5. 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 Acquired hypothyroidism    - TSH 3RD GENERATION    7. B12 deficiency    - VITAMIN B12 & FOLATE    8. Disorientation    - EEG; Future            Patient Allergies    Betadine [povidone-iodine]; Phenergan [promethazine]; Seafood [shellfish containing products]; and Xylocaine [lidocaine hcl]     Current Outpatient Prescriptions   Medication Sig    pravastatin (PRAVACHOL) 20 mg tablet take 1 tablet by mouth at bedtime    metoprolol tartrate (LOPRESSOR) 25 mg tablet take 1/2 tablet twice a day    diclofenac EC (VOLTAREN) 50 mg EC tablet take 1 tablet by mouth twice a day if needed for pain    HYDROcodone-acetaminophen (LORTAB 5-325) 5-325 mg per tablet Take 1 Tab by mouth every six (6) hours as needed for Pain. Max Daily Amount: 4 Tabs. Indications: causes drowsiness;do not drink,drive, or use machinery while taking; take with a stool softener    isosorbide mononitrate ER (IMDUR) 30 mg tablet take 1 tablet by mouth once daily    capsaicin-methyl sal-menthol 0.035-25-10 % crlo 1 Inch by Apply Externally route three (3) times daily as needed.     meclizine (ANTIVERT) 25 mg tablet Take 1 Tab by mouth three (3) times daily as needed for Dizziness.  GUAIFENESIN (MUCINEX PO) Take 1 Tab by mouth daily.  etodolac (LODINE) 400 mg tablet Take 400 mg by mouth daily as needed.  docusate sodium (COLACE) 100 mg capsule Take 100 mg by mouth daily. Instructed to take daily with plenty of liquid unless otherwise directed- as per 11/7/14 [facility] discharge med list    cholecalciferol, vitamin D3, (VITAMIN D3) 2,000 unit tab Take 1 Tab by mouth daily. As per 11/07/14 [facility] discharge med list    aspirin (ASPIRIN) 325 mg tablet Take 1 tablet by mouth daily.  tamsulosin (FLOMAX) 0.4 mg capsule Take 0.4 mg by mouth daily. For prostate      No current facility-administered medications for this visit. Past Medical History:   Diagnosis Date    Back pain     due to DDD and scoliosis    BPH (benign prostatic hyperplasia)     Dr. Isaacs Butt CAD (coronary artery disease) 10/30/13    30%LAD, 80% ostal stenosis. Dr. Masoud Green.  Chest pain 10/04/04, 10/30/13    Dr. Nikhil Chou. Negative Stress Cardiolite Test.  Dr. Michael Vitale.  Chronic venous insufficiency 06/15/09    Dr. Darden Section due to fall    DDD (degenerative disc disease), lumbar     L3-S1    Diverticulosis 02/2004    sigmoid. Dr. Alma Montalvo DJD (degenerative joint disease) of cervical spine     C 4-5 ;5-6    Dyslipidemia     Fracture 1980    facial/nasal    Hearing loss     wears hearing aid    Heartburn     Hemorrhoids, internal 1987    Dr. Heriberto Vitale of unspecified site of abdominal cavity without mention of obstruction or gangrene     inguinal  Lt    Hypertension     Impotence     Left acoustic neuroma ([address] 10/28/14    17 x 9 mm schwannoma.  Pes planus of both feet     PVD (peripheral vascular disease) ([address] 2009    Scoliosis     LS    Slipped intervertebral disc 1954    chiropractor    SOB (shortness of breath) 08/07/13    Dr. Marcie Sneed.     Stroke ([facility] [address] 10/28/2014    small Left Thalamus. Dr. Scottie Robbins. Dr. Maxx Pham.  Urinary incontinence     due to BPH. Dr. Christa Rao    Varicose vein 06/15/09    Dr. Marco Antonio Oconnor Vertigo 2003    due to inner ear. Dr. Javad Hearn. Dr. Jaye Melendrez. Dr. Georgia Tamayo Visual loss     Dr. Danna Biggs, ophth. Social History   Substance Use Topics    Smoking status: Never Smoker    Smokeless tobacco: Never Used    Alcohol use No       Family History   Problem Relation Age of Onset    Heart Attack Mother     Other Mother      brain aneurysm/AAA/varicose veins    Stroke Mother     Heart Attack Sister     Cancer Maternal Grandmother      ovarian     Review of Systems   Constitutional: Negative for chills and fever. HENT: Negative for ear pain. Eyes: Negative for pain and discharge. Respiratory: Negative for cough and hemoptysis. Cardiovascular: Negative for chest pain and claudication. Gastrointestinal: Negative for constipation and diarrhea. Genitourinary: Negative for flank pain and hematuria. Musculoskeletal: Negative for back pain and myalgias. Skin: Negative for itching and rash. Neurological: Negative for headaches. Endo/Heme/Allergies: Negative for environmental allergies. Does not bruise/bleed easily. Psychiatric/Behavioral: Positive for memory loss. Negative for depression and hallucinations. The patient has insomnia. Visit Vitals    /62    Pulse 60    Ht 5' 2\" (1.575 m)    Wt 152 lb (68.9 kg)    SpO2 98%    BMI 27.8 kg/m2      Physical Exam   Constitutional: He is oriented to person, place, and time and well-developed, well-nourished, and in no distress. HENT:   Head: Normocephalic and atraumatic. Eyes: EOM are normal. Pupils are equal, round, and reactive to light. Neck: Normal range of motion. Neck supple. No JVD present. Carotid bruit is not present. No thyromegaly present. Cardiovascular: Normal rate, regular rhythm, normal heart sounds and intact distal pulses. Pulmonary/Chest: Effort normal and breath sounds normal.   Abdominal: Soft. Bowel sounds are normal.   Neurological: He is alert and oriented to person, place, and time. He has normal strength and intact cranial nerves. Gait is wide based  Symmetric distal reduction in sensory perception affecting all modalities  +1/4 over the proximal tendons of the upper and lower extremities. +0/4 over distal tendons. Essential tremor. Psychiatric: Affect and judgment normal.         Max Score Patient Score Question   5 4  What is the year? Season? Date? Day? Month?    5 3  Where are we now? State? County? Town/city? Hospital? Floor? 3 3  Repeat names of three objects after 3 seconds   5 5 (four in serial 7s)  Ask patient for serial 7's or spell \"world\" backwards   3 1  Repeat same three objects after three minutes   2 1   Ask patient to name two common objects   1 1  Ask patient to say \"No ifs and or buts\"   3 3  Ask patient to do a 3 step command   1 1  Ask patient to write a sentence   1 1  Copy intersecting polygons   1 1  Read and do what is on the paper \"Close your eyes   24  Imaging    MRI Results (most recent):    Results from Hospital Encounter encounter on 10/28/14   MRA BRAIN WO CONT   Narrative **Final Report**      ICD Codes / Adm. Diagnosis: 434.91  781.2 / Unspecified cerebral artery oc    Abnormality of gait  Examination:  MR HEAD MRA WO CON  - [id number] - Oct 29 2014  7:49PM  Accession No:  [id number]  Reason:  ? stroke      REPORT:  INDICATION:   Abnormal gait    COMPARISON:  None    TECHNIQUE:    3-D time-of-flight MRA of the brain was performed. Multiplanar   reconstructions were obtained. FINDINGS:  The vertebral arteries are codominant. Basilar artery is markedly ectatic. This results in loss of the normal flow-void. The internal carotid,   anterior cerebral, and middle cerebral arteries are patent. There is no   flow-limiting intracranial stenosis. There is no aneurysm.  There are no   sizable posterior communicating arteries. IMPRESSION:    1. No flow-limiting stenosis. 2. Evaluation of the basilar is difficult due to its marked ectatic course. Signing/Reading Doctor: Apolinar Pope ([id number])    ApprovedCarmela Arango ([id number])  Oct 29 2014  8:03PM                                   CT Results (most recent):    Results from [place] encounter on 05/29/16   CT HEAD WO CONT   Narrative **Final Report**      ICD Codes / Adm. Diagnosis: 728273  200558 / Constipation  Dizziness  Examination:  CT HEAD WO CON  - [id number] - May 29 2016  3:19PM  Accession No:  [id number]  Reason:  Pain      REPORT:  HEAD CT WITHOUT CONTRAST: 5/29/2016 3:19 PM    INDICATION: Pain, constipation, dizziness. COMPARISON: 10/28/2014. PROCEDURE: Axial images of the head were obtained without contrast. Coronal   and sagittal reformats were performed. FINDINGS: Periventricular white matter hypodensity is nonspecific, but   consistent with mild chronic small vessel ischemic disease. The ventricles   and sulci are appropriate in size and configuration for age. No loss of   gray-white differentiation to suggest late acute or early subacute   infarction. No mass effect or intracranial hemorrhage. IMPRESSION: No acute intracranial abnormality.            Signing/Reading Doctor: Lynda Velarde ([id number])    Approved: Lynda Velarde ([id number])  May 29 2016  3:32PM                                Lab Review  Lab Results   Component Value Date/Time    WBC 5.4 05/23/2017 11:08 AM    HCT 40.6 05/23/2017 11:08 AM    HGB 13.6 05/23/2017 11:08 AM    PLATELET 115 20/72/8345 11:08 AM     Lab Results   Component Value Date/Time    Sodium 141 05/23/2017 11:08 AM    Potassium 5.2 05/23/2017 11:08 AM    Chloride 100 05/23/2017 11:08 AM    CO2 26 05/23/2017 11:08 AM    Glucose 103 05/23/2017 11:08 AM    BUN 14 05/23/2017 11:08 AM    Creatinine 1.17 05/23/2017 11:08 AM    Calcium 9.6 05/23/2017 11:08 AM     Lab Results   Component Value Date/Time    Vitamin B12 384 05/23/2017 11:08 AM    Folate 8.4 05/23/2017 11:08 AM     Lab Results   Component Value Date/Time    LDL, calculated 97 05/23/2017 11:08 AM     Lab Results   Component Value Date/Time    Hemoglobin A1c 5.5 10/29/2014 03:45 AM     No components found for: TROPQUANT  No results found for: KELI

## 2017-10-11 NOTE — MR AVS SNAPSHOT
Visit Information Date & Time Provider Department Dept. Phone Encounter #  
 10/11/2017  8:00 AM Julián Reyes MD Neurology Clinic at [facility] [phone] [id number] Follow-up Instructions Return in about 2 months (around 12/11/2017) for DEMENTIA. Your Appointments 11/21/2017  9:30 AM  
ROUTINE CARE with DO Jaylen Soriano (AGNES [place]) Appt Note: Routine f/up check., (30 min.) - lp  
 [address] 
[address] [facility] [address]  
[phone]  
  
   
 [address] [facility] [address] [place] [address] Upcoming Health Maintenance Date Due INFLUENZA AGE 9 TO ADULT 8/1/2017 GLAUCOMA SCREENING Q2Y 12/7/2017 MEDICARE YEARLY EXAM 5/24/2018 DTaP/Tdap/Td series (2 - Td) 7/2/2026 Allergies as of 10/11/2017  Review Complete On: 10/11/2017 By: Julián Reyes MD  
  
 Severity Noted Reaction Type Reactions Betadine [Povidone-iodine] High 11/12/2009    Hives Phenergan [Promethazine] High 11/12/2009    Other (comments)  
 hallucinations Seafood [Shellfish Containing Products] High 11/12/2009    Rash, Swelling  
 crabmeat Xylocaine [Lidocaine Hcl] High 01/06/2012    Shortness of Breath Current Immunizations  Reviewed on 5/23/2017 Name Date Influenza High Dose Vaccine PF 8/12/2016, 9/8/2015, 10/29/2013 Influenza Vaccine 10/1/2014 Influenza Vaccine Split 10/8/2012, 10/20/2011, 10/15/2010 Influenza Vaccine Whole 10/1/2009 Pneumococcal Conjugate (PCV-13) 6/3/2015 Pneumococcal Polysaccharide (PPSV-23) 7/2/2016 TD Vaccine 4/27/2004 Tdap 7/2/2016 ZZZ-RETIRED (DO NOT USE) Pneumococcal Vaccine (Unspecified Type) 2/8/2002 Zoster Vaccine, Live 9/8/2015 Not reviewed this visit You Were Diagnosed With   
  
 Codes Comments Memory loss    -  Primary ICD-10-CM: R41.3 ICD-9-CM: 780.93   
 Cerebrovascular accident (CVA) due to thrombosis of precerebral artery ([facility] [address]     ICD-10-CM: I63.00 ICD-9-CM: 433.91 Essential hypertension     ICD-10-CM: I10 
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  
 122/62 60 5' 2\" (1.575 m) 152 lb (68.9 kg) 98% 27.8 kg/m2 Smoking Status Never Smoker BMI and BSA Data Body Mass Index Body Surface Area  
 27.8 kg/m 2 1.74 m 2 Preferred Pharmacy Pharmacy Name Phone [id number] [facility] [address] [facility] Your Updated Medication List  
  
   
This list is accurate as of: 10/11/17  9:25 AM.  Always use your most recent med list.  
  
  
  
  
 aspirin 325 mg tablet Commonly known as:  ASPIRIN Take 1 tablet by mouth daily. capsaicin-methyl sal-menthol 0.035-25-10 % Crlo  
1 Inch by Apply Externally route three (3) times daily as needed. diclofenac EC 50 mg EC tablet Commonly known as:  VOLTAREN  
take 1 tablet by mouth twice a day if needed for pain  
  
 docusate sodium 100 mg capsule Commonly known as:  Federico El Take 100 mg by mouth daily. Instructed to take daily with plenty of liquid unless otherwise directed- as per 11/7/14 [facility] discharge med list  
  
 etodolac 400 mg tablet Commonly known as:  LODINE Take 400 mg by mouth daily as needed. FLOMAX 0.4 mg capsule Generic drug:  tamsulosin Take 0.4 mg by mouth daily. For prostate HYDROcodone-acetaminophen 5-325 mg per tablet Commonly known as:  LORTAB 5-325 Take 1 Tab by mouth every six (6) hours as needed for Pain. Max Daily Amount: 4 Tabs. Indications: causes drowsiness;do not drink,drive, or use machinery while taking; take with a stool softener  
  
 isosorbide mononitrate ER 30 mg tablet Commonly known as:  IMDUR  
take 1 tablet by mouth once daily  
  
 meclizine 25 mg tablet Commonly known as:  ANTIVERT Take 1 Tab by mouth three (3) times daily as needed for Dizziness. metoprolol tartrate 25 mg tablet Commonly known as:  LOPRESSOR  
take 1/2 tablet twice a day MUCINEX PO Take 1 Tab by mouth daily. pravastatin 20 mg tablet Commonly known as:  PRAVACHOL  
take 1 tablet by mouth at bedtime  
  
 vit B12-levomefolate calcium-vit B6 2-1.13-25 mg tablet Commonly known as:  Bernette Schlein Take 1 Tab by mouth daily. Indications: Vitamin Deficiency VITAMIN D3 2,000 unit Tab Generic drug:  cholecalciferol (vitamin D3) Take 1 Tab by mouth daily. As per 11/07/14 [facility] discharge med list  
  
  
  
  
Prescriptions Sent to Pharmacy Refills  
 vit B12-levomefolate calcium-vit B6 (FOLTX) 2-1.13-25 mg tablet 5 Sig: Take 1 Tab by mouth daily. Indications: Vitamin Deficiency Class: Normal  
 [facility] [address] #: [phone] Route: Oral  
  
We Performed the Following HEMOGLOBIN A1C WITH EAG [29868 CPT(R)] TSH 3RD GENERATION [08214 CPT(R)] VITAMIN B12 & FOLATE [38893 CPT(R)] Follow-up Instructions Return in about 2 months (around 12/11/2017) for DEMENTIA. To-Do List   
 10/11/2017 Neurology:  EEG   
  
 10/11/2017 Imaging:  MRI BRAIN WO CONT Patient Instructions PRESCRIPTION REFILL POLICY [facility] Statement to Patients April 1, 2014 In an effort to ensure the large volume of patient prescription refills is processed in the most efficient and expeditious manner, we are asking our patients to assist us by calling your Pharmacy for all prescription refills, this will include also your  Mail Order Pharmacy. The pharmacy will contact our office electronically to continue the refill process. Please do not wait until the last minute to call your pharmacy. We need at least 48 hours (2days) to fill prescriptions. We also encourage you to call your pharmacy before going to  your prescription to make sure it is ready. With regard to controlled substance prescription refill requests (narcotic refills) that need to be picked up at our office, we ask your cooperation by providing us with at least 72 hours (3days) notice that you will need a refill. We will not refill narcotic prescription refill requests after 4:00pm on any weekday, Monday through Thursday, or after 2:00pm on Fridays, or on the weekends. We encourage everyone to explore another way of getting your prescription refill request processed using e-channel, our patient web portal through our electronic medical record system. e-channel is an efficient and effective way to communicate your medication request directly to the office and  downloadable as an chintan on your smart phone . e-channel also features a review functionality that allows you to view your medication list as well as leave messages for your physician. Are you ready to get connected? If so please review the attatched instructions or speak to any of our staff to get you set up right away! Thank you so much for your cooperation. Should you have any questions please contact our Practice Administrator. The Physicians and Staff,  [facility] A Healthy Lifestyle: Care Instructions Your Care Instructions A healthy lifestyle can help you feel good, stay at a healthy weight, and have plenty of energy for both work and play.  A healthy lifestyle is something you can share with your whole family. A healthy lifestyle also can lower your risk for serious health problems, such as high blood pressure, heart disease, and diabetes. You can follow a few steps listed below to improve your health and the health of your family. Follow-up care is a key part of your treatment and safety. Be sure to make and go to all appointments, and call your doctor if you are having problems. Its also a good idea to know your test results and keep a list of the medicines you take. How can you care for yourself at home? · Do not eat too much sugar, fat, or fast foods. You can still have dessert and treats now and then. The goal is moderation. · Start small to improve your eating habits. Pay attention to portion sizes, drink less juice and soda pop, and eat more fruits and vegetables. ¨ Eat a healthy amount of food. A 3-ounce serving of meat, for example, is about the size of a deck of cards. Fill the rest of your plate with vegetables and whole grains. ¨ Limit the amount of soda and sports drinks you have every day. Drink more water when you are thirsty. ¨ Eat at least 5 servings of fruits and vegetables every day. It may seem like a lot, but it is not hard to reach this goal. A serving or helping is 1 piece of fruit, 1 cup of vegetables, or 2 cups of leafy, raw vegetables. Have an apple or some carrot sticks as an afternoon snack instead of a candy bar. Try to have fruits and/or vegetables at every meal. 
· Make exercise part of your daily routine. You may want to start with simple activities, such as walking, bicycling, or slow swimming. Try to be active 30 to 60 minutes every day. You do not need to do all 30 to 60 minutes all at once. For example, you can exercise 3 times a day for 10 or 20 minutes.  Moderate exercise is safe for most people, but it is always a good idea to talk to your doctor before starting an exercise program. 
 · Keep moving. Jaylin Boys the lawn, work in the garden, or LIFT12. Take the stairs instead of the elevator at work. · If you smoke, quit. People who smoke have an increased risk for heart attack, stroke, cancer, and other lung illnesses. Quitting is hard, but there are ways to boost your chance of quitting tobacco for good. ¨ Use nicotine gum, patches, or lozenges. ¨ Ask your doctor about stop-smoking programs and medicines. ¨ Keep trying. In addition to reducing your risk of diseases in the future, you will notice some benefits soon after you stop using tobacco. If you have shortness of breath or asthma symptoms, they will likely get better within a few weeks after you quit. · Limit how much alcohol you drink. Moderate amounts of alcohol (up to 2 drinks a day for men, 1 drink a day for women) are okay. But drinking too much can lead to liver problems, high blood pressure, and other health problems. Family health If you have a family, there are many things you can do together to improve your health. · Eat meals together as a family as often as possible. · Eat healthy foods. This includes fruits, vegetables, lean meats and dairy, and whole grains. · Include your family in your fitness plan. Most people think of activities such as jogging or tennis as the way to fitness, but there are many ways you and your family can be more active. Anything that makes you breathe hard and gets your heart pumping is exercise. Here are some tips: 
¨ Walk to do errands or to take your child to school or the bus. ¨ Go for a family bike ride after dinner instead of watching TV. Where can you learn more? Go to http://diamond-donna.info/. Enter M673 in the search box to learn more about \"A Healthy Lifestyle: Care Instructions. \" Current as of: July 26, 2016 Content Version: 11.3 © 4004-4178 [employer].  Care instructions adapted under license by [address] (which disclaims liability or warranty for this information). If you have questions about a medical condition or this instruction, always ask your healthcare professional. [address] any warranty or liability for your use of this information. Introducing [facility] & HE[facility]! Yanique Escobar introduces [facility] patient portal. Now you can access parts of your medical record, email your doctor's office, and request medication refills online. 1. In your internet browser, go to [email or web address] 2. Click on the First Time User? Click Here link in the Sign In box. You will see the New Member Sign Up page. 3. Enter your [facility] Access Code exactly as it appears below. You will not need to use this code after youve completed the sign-up process. If you do not sign up before the expiration date, you must request a new code. · [facility] Access Code: [email or web address] Expires: 1/9/2018  7:48 AM 
 
4. Enter the last four digits of your Social Security Number (xxxx) and Date of Birth (mm/dd/yyyy) as indicated and click Submit. You will be taken to the next sign-up page. 5. Create a [facility] ID. This will be your [facility] login ID and cannot be changed, so think of one that is secure and easy to remember. 6. Create a [facility] password. You can change your password at any time. 7. Enter your Password Reset Question and Answer. This can be used at a later time if you forget your password. 8. Enter your e-mail address. You will receive e-mail notification when new information is available in [address] 9. Click Sign Up. You can now view and download portions of your medical record. 10. Click the Download Summary menu link to download a portable copy of your medical information. If you have questions, please visit the Frequently Asked Questions section of the [facility] website.  Remember, [facility] is NOT to be used for urgent needs. For medical emergencies, dial 911. Now available from your iPhone and Android! Please provide this summary of care documentation to your next provider. Your primary care clinician is listed as Baltazar Burks. If you have any questions after today's visit, please call [phone].

## 2017-10-12 LAB
EST. AVERAGE GLUCOSE BLD GHB EST-MCNC: 108 MG/DL
FOLATE SERPL-MCNC: 8.6 NG/ML
HBA1C MFR BLD: 5.4 % (ref 4.8–5.6)
TSH SERPL DL<=0.005 MIU/L-ACNC: 3.95 UIU/ML (ref 0.45–4.5)
VIT B12 SERPL-MCNC: 361 PG/ML (ref 211–946)

## 2017-10-23 ENCOUNTER — HOSPITAL ENCOUNTER (OUTPATIENT)
Dept: NEUROLOGY | Age: 82
Discharge: HOME | End: 2017-10-23
Attending: PSYCHIATRY & NEUROLOGY
Payer: MEDICARE

## 2017-10-23 ENCOUNTER — HOSPITAL ENCOUNTER (OUTPATIENT)
Dept: MRI IMAGING | Age: 82
Discharge: HOME | End: 2017-10-23
Attending: PSYCHIATRY & NEUROLOGY
Payer: MEDICARE

## 2017-10-23 DIAGNOSIS — K59.09 CHRONIC CONSTIPATION: ICD-10-CM

## 2017-10-23 DIAGNOSIS — R00.1 BRADYCARDIA WITH 51-60 BEATS PER MINUTE: ICD-10-CM

## 2017-10-23 DIAGNOSIS — R41.0 DISORIENTATION: ICD-10-CM

## 2017-10-23 DIAGNOSIS — E78.5 DYSLIPIDEMIA: ICD-10-CM

## 2017-10-23 DIAGNOSIS — R41.3 MEMORY LOSS: ICD-10-CM

## 2017-10-23 PROCEDURE — 70551 MRI BRAIN STEM W/O DYE: CPT

## 2017-10-23 PROCEDURE — 95816 EEG AWAKE AND DROWSY: CPT

## 2017-10-24 NOTE — PROCEDURES
[facility]   [address]   [place], [address]   EEG       Name:  Cady Marie   MR#:  [id number]   :  10/24/1928   Account #:  [de-identified]    Date of Procedure:  10/23/2017   Date of Adm:  10/23/2017       DATE OF SERVICE: 10/23/2017     EXAM NUMBER: [id number]. INDICATION: Altered mental status    DESCRIPTION OF PROCEDURE: Electrodes were applied in   accordance with the International 10-20 system electrode placement. EEG was reviewed in both bipolar and referential montages. In   addition to EEG data, EKG data was also recorded. DESCRIPTION OF FINDINGS: Background consists of symmetric 9 to   11 Hz activity. This activity attenuated with eye opening. Photic   stimulation, there was symmetric occipital drive. No sleep was   recorded on the study. No seizures or interictal hallmarks of epilepsy   were noted on the study. IMPRESSION: This is a normal electroencephalogram.     The absence of seizures on the study does not exclude the diagnosis   of epilepsy. Clinical correlation is advised.         Amie Lin MD      [facility]   D:  10/24/2017   09:15   T:  10/24/2017   14:12   Job #:  [id number]

## 2017-10-25 ENCOUNTER — TELEPHONE (OUTPATIENT)
Dept: NEUROLOGY | Age: 82
End: 2017-10-25

## 2017-10-25 NOTE — TELEPHONE ENCOUNTER
----- Message from Mike Urbano MD sent at 10/16/2017  6:19 PM EDT -----  B12 is low. Prescription was sent in for the B12.

## 2017-10-25 NOTE — TELEPHONE ENCOUNTER
Provided the results of the labs and also advised that the medication for the B-12 has been sent to the pharmacy

## 2017-10-27 RX ORDER — ISOSORBIDE MONONITRATE 30 MG/1
TABLET, EXTENDED RELEASE ORAL
Qty: 30 TAB | Refills: 11 | Status: SHIPPED | OUTPATIENT
Start: 2017-10-27 | End: 2018-09-10 | Stop reason: SDUPTHER

## 2017-11-21 ENCOUNTER — OFFICE VISIT (OUTPATIENT)
Dept: FAMILY MEDICINE CLINIC | Age: 82
End: 2017-11-21

## 2017-11-21 VITALS
HEART RATE: 60 BPM | OXYGEN SATURATION: 95 % | RESPIRATION RATE: 18 BRPM | WEIGHT: 153.2 LBS | BODY MASS INDEX: 28.19 KG/M2 | SYSTOLIC BLOOD PRESSURE: 128 MMHG | TEMPERATURE: 97.6 F | HEIGHT: 62 IN | DIASTOLIC BLOOD PRESSURE: 78 MMHG

## 2017-11-21 DIAGNOSIS — K59.09 CHRONIC CONSTIPATION: ICD-10-CM

## 2017-11-21 DIAGNOSIS — E78.5 DYSLIPIDEMIA: Primary | ICD-10-CM

## 2017-11-21 DIAGNOSIS — N40.1 BENIGN PROSTATIC HYPERPLASIA WITH LOWER URINARY TRACT SYMPTOMS, SYMPTOM DETAILS UNSPECIFIED: ICD-10-CM

## 2017-11-21 DIAGNOSIS — R00.1 BRADYCARDIA WITH 51-60 BEATS PER MINUTE: ICD-10-CM

## 2017-11-21 DIAGNOSIS — I10 ESSENTIAL HYPERTENSION: ICD-10-CM

## 2017-11-21 DIAGNOSIS — R41.3 MEMORY DISTURBANCE: ICD-10-CM

## 2017-11-21 DIAGNOSIS — I25.10 CORONARY ARTERY DISEASE INVOLVING NATIVE CORONARY ARTERY OF NATIVE HEART WITHOUT ANGINA PECTORIS: ICD-10-CM

## 2017-11-21 DIAGNOSIS — H90.3 SENSORINEURAL HEARING LOSS (SNHL) OF BOTH EARS: ICD-10-CM

## 2017-11-21 DIAGNOSIS — I63.09 CEREBROVASCULAR ACCIDENT (CVA) DUE TO THROMBOSIS OF OTHER PRECEREBRAL ARTERY (HCC): ICD-10-CM

## 2017-11-21 NOTE — PROGRESS NOTES
HISTORY OF PRESENT ILLNESS  Adarsh Tejeda is a 80 y.o. male presents with Blood Pressure Check (follow up); Results; and Referral Follow Up    Agree with nurse note. His wife is present today and provides the history. [place], hypertensive pt with CAD, dyslipidemia, and bradycardia presents to the office with a BP of 148/81 and HR of 60 bpm. For BP, he takes Lopressor 25 mg 1/2 tab BID and Imdur 30 mg daily, tolerating well. He takes Pravachol 20 mg daily and Aspirin 325 mg daily. He weighs 153 lbs, gained 3 lbs since 05/2017. He goes to the gym 3 days weekly. Patient denies vision changes, headaches, dizziness, chest pain, SOB, or swelling. He had labs on 10/11/17 and 05/23/17, TSH 3.95. A1c 5.4. Vit D 34.2. LDL 97. Cr 1.17. CBC WNL. He saw neurologist, Dr. Govind Morrissey on 10/11/17. for memory loss. Dx'd memory loss, CVA, essential HTN, CAD, DM2, acquired hypothyroidism, Vit B12 deficiency, and disorientation. He ordered a brain MRI, labs, and EEG. He was started on Vit B12 last month. Pt with BPH. He saw his urologist, Dr. Hali Hogan last week. His PSA has been stable x5+ years and at this point Dr. Addison Shelley will no longer be checking PSA. Per wife, his urine was checked and came back normal.    His wife reports that he saw Dr. Brie Jeff for his hx of stroke. His license was suspended unless he goes for extensive training. Pt and wife agreed not to go through the training. Constipation has improved with eating prunes daily. Health Maintenance    He has seen optometrist, Dr. Gary Lincoln for a yearly exam exam.     Written by lakesha Sandoval, as dictated by DO. MILDRED Miller    Review of Systems negative except as noted above in HPI.     ALLERGIES:    Allergies   Allergen Reactions    Betadine [Povidone-Iodine] Hives    Phenergan [Promethazine] Other (comments)     hallucinations    Seafood [Shellfish Containing Products] Rash and Swelling     crabmeat    Xylocaine [Lidocaine Hcl] Shortness of Breath       CURRENT MEDICATIONS:    Outpatient Prescriptions Marked as Taking for the 11/21/17 encounter (Office Visit) with Frankyamanda Oliva, DO   Medication Sig Dispense Refill    isosorbide mononitrate ER (IMDUR) 30 mg tablet take 1 tablet by mouth once daily 30 Tab 11    vit B12-levomefolate calcium-vit B6 (FOLTX) 2-1.13-25 mg tablet Take 1 Tab by mouth daily. Indications: Vitamin Deficiency 30 Tab 5    pravastatin (PRAVACHOL) 20 mg tablet take 1 tablet by mouth at bedtime 90 Tab 3    metoprolol tartrate (LOPRESSOR) 25 mg tablet take 1/2 tablet twice a day 30 Tab 11    meclizine (ANTIVERT) 25 mg tablet Take 1 Tab by mouth three (3) times daily as needed for Dizziness. 20 Tab 0    GUAIFENESIN (MUCINEX PO) Take 1 Tab by mouth daily.  docusate sodium (COLACE) 100 mg capsule Take 100 mg by mouth daily. Instructed to take daily with plenty of liquid unless otherwise directed- as per 11/7/14 [address] discharge med list      cholecalciferol, vitamin D3, (VITAMIN D3) 2,000 unit tab Take 1 Tab by mouth daily. As per 11/07/14 [address] discharge med list      aspirin (ASPIRIN) 325 mg tablet Take 1 tablet by mouth daily. 30 tablet 3    tamsulosin (FLOMAX) 0.4 mg capsule Take 0.4 mg by mouth daily. For prostate          PAST MEDICAL HISTORY:    Past Medical History:   Diagnosis Date    Back pain     due to DDD and scoliosis    BPH (benign prostatic hyperplasia)     Dr. Eduardo Schulz CAD (coronary artery disease) 10/30/13    30%LAD, 80% ostal stenosis. Dr. Isak Dumont.  Chest pain 10/04/04, 10/30/13    Dr. Won Bolton. Negative Stress Cardiolite Test.  Dr. Shanelle Ko.  Chronic venous insufficiency 06/15/09    Dr. Janine Ortiz due to fall    DDD (degenerative disc disease), lumbar     L3-S1    Diverticulosis 02/2004    sigmoid.   Dr. Gloria Severance DJD (degenerative joint disease) of cervical spine     C 4-5 ;5-6    Dyslipidemia     Fracture 1980    facial/nasal    Hearing loss     wears hearing aid    Heartburn     Hemorrhoids, internal 1987    Dr. Yo Beck of unspecified site of abdominal cavity without mention of obstruction or gangrene     inguinal  Lt    Hypertension     Impotence     Left acoustic neuroma ([facility] 10/28/14    17 x 9 mm schwannoma.  Pes planus of both feet     PVD (peripheral vascular disease) ([facility] 2009    Scoliosis     LS    Slipped intervertebral disc 1954    chiropractor    SOB (shortness of breath) 08/07/13    Dr. Suzie Rausch.  Stroke ([facility] 10/28/2014    small Left Thalamus. Dr. Maisie Councilman. Dr. Tobi Diallo.  Urinary incontinence     due to BPH. Dr. Almanza Koosharem    Varicose vein 06/15/09    Dr. Lorne Jacobson Vertigo 2003    due to inner ear. Dr. Godfrey Galo. Dr. Stefany Schulte. Dr. Sandra Gonzalez Visual loss     Dr. Jamari Rivera, [facility]. PAST SURGICAL HISTORY:    Past Surgical History:   Procedure Laterality Date    APPENDECTOMY      due to gangrene   [facility], 02/2002    Dr. Sparks Second. benign.  COLONOSCOPY  02/2004    Dr. Tiffanie Allen. due q 10 yrs    CYSTOSCOPY  10/17/03    Dr. Brett Mckenzie  10/30/13    Dr. Estrella Levi. 80% ostal stenosis. 30% LAD.  HX HEMORRHOIDECTOMY      HX KNEE ARTHROSCOPY  1990 Left , 2007 Right    Dr. Rigoberto De La Garza HX LAPAROTOMY  05/14/07    Dr. Siva Rich. due to Bowel Obstruction    HX POLYPECTOMY  02/2002    Anal.     HX TONSILLECTOMY      MS COLSC FLX W/RMVL OF TUMOR POLYP LESION SNARE TQ  5/23/2011    Dr. Kalyn Chou.        FAMILY HISTORY:    Family History   Problem Relation Age of Onset    Heart Attack Mother     Other Mother      brain aneurysm/AAA/varicose veins    Stroke Mother     Heart Attack Sister     Cancer Maternal Grandmother      ovarian       SOCIAL HISTORY:    Social History     Social History    Marital status:      Spouse name: N/A    Number of children: N/A    Years of education: N/A     Social History Main Topics    Smoking status: Never Smoker    Smokeless tobacco: Never Used    Alcohol use No    Drug use: No    Sexual activity: Not Asked     Other Topics Concern    None     Social History Narrative       IMMUNIZATIONS:    Immunization History   Administered Date(s) Administered    Influenza High Dose Vaccine PF 10/29/2013, 09/08/2015, 08/12/2016    Influenza Vaccine 10/01/2014    Influenza Vaccine Split 10/15/2010, 10/20/2011, 10/08/2012    Influenza Vaccine Whole 10/01/2009    Pneumococcal Conjugate (PCV-13) 06/03/2015    Pneumococcal Polysaccharide (PPSV-23) 07/02/2016    TD Vaccine 04/27/2004    Tdap 07/02/2016    ZZZ-RETIRED (DO NOT USE) Pneumococcal Vaccine (Unspecified Type) 02/08/2002    Zoster Vaccine, Live 09/08/2015         PHYSICAL EXAMINATION    Vital Signs    Visit Vitals    /81 (BP 1 Location: Right arm, BP Patient Position: Sitting)    Pulse 60    Temp 97.6 °F (36.4 °C) (Oral)    Resp 18    Ht 5' 2\" (1.575 m)    Wt 153 lb 3.2 oz (69.5 kg)    SpO2 95%    BMI 28.02 kg/m2       Weight Metrics 11/21/2017 10/11/2017 5/23/2017 10/7/2016 9/15/2016 8/9/2016 6/7/2016   Weight 153 lb 3.2 oz 152 lb 150 lb 3.2 oz 149 lb 1.6 oz 149 lb 4 oz 147 lb 8 oz 146 lb 1.6 oz   BMI 28.02 kg/m2 27.8 kg/m2 26.61 kg/m2 26.41 kg/m2 26.44 kg/m2 26.14 kg/m2 25.89 kg/m2       General appearance - Well nourished. Well appearing. Well developed. No acute distress. Overweight. Present with wife. Head - Normocephalic. Atraumatic. Eyes - pupils equal and reactive. Extraocular eye movements intact. Sclera anicteric. Mildly injected sclera. Ears - Hearing is grossly normal with intact BL hearing aids. Nose - normal and patent. No polyps noted. No erythema. No discharge. Mouth - mucous membranes with adequate moisture. Posterior pharynx normal with cobblestone appearance. No erythema, white exudate or obstruction.   Neck - supple. Midline trachea. No carotid bruits noted bilaterally. No thyromegaly noted. Chest - clear to auscultation bilaterally anteriorly and posteriorly. No wheezes. No rales or rhonchi. Breath sounds are symmetrical bilaterally. Unlabored respirations. Heart - Slow rate. Regular rhythm. Normal S1, S2. No murmur noted. No rubs, clicks or gallops noted. Abdomen - soft and distended. No masses or organomegaly. No rebound, rigidity or guarding. Bowel sounds normal x 4 quadrants. No tenderness noted. Neurological - awake, alert and oriented to person, place, and time and event. Cranial nerves II through XII intact. Clear speech. Muscle strength is +5/5 x 4 extremities. Sensation is intact to light touch bilaterally. Steady gait. Heme/Lymph - peripheral pulses normal x 4 extremities. No peripheral edema is noted. Musculoskeletal - Intact x 4 extremities. Full ROM x 4 extremities. No pain with movement. Back exam - normal range of motion. No pain on palpation of the spinous processes in the cervical, thoracic, lumbar, sacral regions. No CVA tenderness. Skin - no rashes, erythema, ecchymosis, lacerations, abrasions, suspicious moles noted  Psychological -   normal behavior, dress and thought processes. Occasional good insight. Good eye contact. Normal affect. Appropriate mood. Normal speech.       DATA REVIEWED    Lab Results   Component Value Date/Time    WBC 5.4 05/23/2017 11:08 AM    Hemoglobin (POC) 12.9 09/15/2016 04:44 PM    HGB 13.6 05/23/2017 11:08 AM    Hematocrit (POC) 38 09/15/2016 04:44 PM    HCT 40.6 05/23/2017 11:08 AM    PLATELET 544 29/32/8451 11:08 AM    MCV 94 05/23/2017 11:08 AM     Lab Results   Component Value Date/Time    Sodium 141 05/23/2017 11:08 AM    Potassium 5.2 05/23/2017 11:08 AM    Chloride 100 05/23/2017 11:08 AM    CO2 26 05/23/2017 11:08 AM    Anion gap 9 12/04/2016 07:29 AM    Glucose 103 05/23/2017 11:08 AM    BUN 14 05/23/2017 11:08 AM Creatinine 1.17 05/23/2017 11:08 AM    BUN/Creatinine ratio 12 05/23/2017 11:08 AM    GFR est AA 64 05/23/2017 11:08 AM    GFR est non-AA 55 05/23/2017 11:08 AM    Calcium 9.6 05/23/2017 11:08 AM    Bilirubin, total 0.5 05/23/2017 11:08 AM    AST (SGOT) 23 05/23/2017 11:08 AM    Alk. phosphatase 97 05/23/2017 11:08 AM    Protein, total 6.8 05/23/2017 11:08 AM    Albumin 4.4 05/23/2017 11:08 AM    Globulin 3.7 12/04/2016 07:29 AM    A-G Ratio 1.8 05/23/2017 11:08 AM    ALT (SGPT) 16 05/23/2017 11:08 AM     Lab Results   Component Value Date/Time    Cholesterol, total 166 05/23/2017 11:08 AM    HDL Cholesterol 53 05/23/2017 11:08 AM    LDL, calculated 97 05/23/2017 11:08 AM    VLDL, calculated 16 05/23/2017 11:08 AM    Triglyceride 78 05/23/2017 11:08 AM    CHOL/HDL Ratio 2.2 10/29/2014 03:45 AM     Lab Results   Component Value Date/Time    Vitamin D 25-Hydroxy 21.5 10/31/2014 06:00 AM    VITAMIN D, 25-HYDROXY 34.2 05/23/2017 11:08 AM       Lab Results   Component Value Date/Time    Hemoglobin A1c 5.4 10/11/2017 10:01 AM     Lab Results   Component Value Date/Time    TSH 3.950 10/11/2017 10:01 AM     Lab Results   Component Value Date/Time    Vitamin B12 361 10/11/2017 10:01 AM    Folate 8.6 10/11/2017 10:01 AM     Lab Results   Component Value Date/Time    Microalb/Creat ratio (ug/mg creat.) 37.6 05/23/2017 11:41 AM       ASSESSMENT and PLAN      ICD-10-CM ICD-9-CM    1. Dyslipidemia E78.5 272.4     improving   2. Cerebrovascular accident (CVA) due to thrombosis of other precerebral artery ([facility] I63.09 433.81    3. Bradycardia with 51-60 beats per minute R00.1 427.89    4. Chronic constipation K59.09 564.00     stable with prunes   5. Essential hypertension I10 401.9    6. Memory disturbance R41.3 780.93     stable without driving license   7. Coronary artery disease involving native coronary artery of native heart without angina pectoris I25.10 414.01     stable on  mg daily   8.  Sensorineural hearing loss (SNHL) of both ears H90.3 389.18     stable with BL hearing aids   9. Benign prostatic hyperplasia with lower urinary tract symptoms, symptom details unspecified N40.1 600.01        Discussed the patient's BMI with him. The BMI follow up plan is as follows: I have counseled this patient on diet and exercise regimens. Decrease carbohydrates (white foods, sweet foods, sweet drinks and alcohol), increase green leafy vegetables and protein (lean meats and beans) with each meal.  Avoid fried foods. Eat 3-5 small meals daily. Do not skip meals. Increase water intake. Increase physical activity to 30 minutes daily for health benefit or 60 minutes daily to prevent weight regain, as tolerated. Get 7-8 hours uninterrupted sleep nightly. Chart reviewed and updated. Continue current medications and care. Most recent tests reviewed from 2017. Recent office visit notes from Dr. Riya Giraldo reviewed. Get recent office visit notes from Dr. Wero Vallecillo, Dr. Haroon Vital, Dr. Leonela Alvarez, and [facility]. Advised pt to sign release. Counseled patient on health concerns: BP. Immunizations noted. Offered empathy, support, legitimation, prayers, partnership to patient. Praised patient for progress. Follow-up Disposition:  Return in about 6 months (around 5/21/2018) for bp, referral follow up. Patient was offered a choice/choices in the treatment plan today. Patient expresses understanding of the plan and agrees with recommendations. Patient declines any additional handouts. Patient is satisfied with previous handouts received from our office    Written by lakesha Jonas, as dictated by Dr. Lavon Rolon DO. Documentation True and Accepted by Mary Ann Norton.  Tata Pires.

## 2017-11-21 NOTE — PROGRESS NOTES
Chief Complaint   Patient presents with    Hypertension     follow up     1. Have you been to the ER, urgent care clinic since your last visit? Hospitalized since your last visit? No    2. Have you seen or consulted any other health care providers outside of the [address] [place] [address] since your last visit? Include any pap smears or colon screening.  No     Patient went to referral appointment

## 2017-11-21 NOTE — MR AVS SNAPSHOT
Visit Information Date & Time Provider Department Dept. Phone Encounter #  
 11/21/2017  9:30 AM Jorge Hinkle DO [place] [phone] [id number] Follow-up Instructions Return in about 6 months (around 5/21/2018) for bp, referral follow up. Your Appointments 1/23/2018 12:00 PM  
Follow Up with Rebecca Tripp MD  
[facility] [address] [facility]) Appt Note: f/u memory loss, lsw,10/11/17  
 [facility], 
[address] [place] [address], [phone]  
[address] [facility], [address] [place] [address], [address] [place] [address] [phone] Upcoming Health Maintenance Date Due  
 GLAUCOMA SCREENING Q2Y 12/7/2017 MEDICARE YEARLY EXAM 5/24/2018 DTaP/Tdap/Td series (2 - Td) 7/2/2026 Allergies as of 11/21/2017  Review Complete On: 11/21/2017 By: Jorge Hinkle DO Severity Noted Reaction Type Reactions Betadine [Povidone-iodine] High 11/12/2009    Hives Phenergan [Promethazine] High 11/12/2009    Other (comments)  
 hallucinations Seafood [Shellfish Containing Products] High 11/12/2009    Rash, Swelling  
 crabmeat Xylocaine [Lidocaine Hcl] High 01/06/2012    Shortness of Breath Current Immunizations  Reviewed on 11/21/2017 Name Date Influenza High Dose Vaccine PF 8/12/2016, 9/8/2015, 10/29/2013 Influenza Vaccine 10/1/2014 Influenza Vaccine Split 10/8/2012, 10/20/2011, 10/15/2010 Influenza Vaccine Whole 10/1/2009 Pneumococcal Conjugate (PCV-13) 6/3/2015 Pneumococcal Polysaccharide (PPSV-23) 7/2/2016 TD Vaccine 4/27/2004 Tdap 7/2/2016 ZZZ-RETIRED (DO NOT USE) Pneumococcal Vaccine (Unspecified Type) 2/8/2002 Zoster Vaccine, Live 9/8/2015 Reviewed by Jorge Hinkle DO on 11/21/2017 at 11:25 AM  
You Were Diagnosed With   
  
 Codes Comments  Cerebrovascular accident (CVA) due to thrombosis of other precerebral artery ([facility]    -  Primary ICD-10-CM: I63.09 
ICD-9-CM: 433.81 Dyslipidemia     ICD-10-CM: E78.5 ICD-9-CM: 272.4 improving Bradycardia with 51-60 beats per minute     ICD-10-CM: R00.1 ICD-9-CM: 427.89 Chronic constipation     ICD-10-CM: K59.09 
ICD-9-CM: 564.00 stable with prunes Essential hypertension     ICD-10-CM: I10 
ICD-9-CM: 401.9 Memory disturbance     ICD-10-CM: R41.3 ICD-9-CM: 780.93 stable without driving license Coronary artery disease involving native coronary artery of native heart without angina pectoris     ICD-10-CM: I25.10 ICD-9-CM: 414.01 stable on  mg daily Sensorineural hearing loss (SNHL) of both ears     ICD-10-CM: H90.3 ICD-9-CM: 389.18 stable with BL hearing aids Benign prostatic hyperplasia with lower urinary tract symptoms, symptom details unspecified     ICD-10-CM: N40.1 ICD-9-CM: 600.01 Vitals BP Pulse Temp Resp Height(growth percentile) Weight(growth percentile) 148/81 (BP 1 Location: Right arm, BP Patient Position: Sitting) 60 97.6 °F (36.4 °C) (Oral) 18 5' 2\" (1.575 m) 153 lb 3.2 oz (69.5 kg) SpO2 BMI Smoking Status 95% 28.02 kg/m2 Never Smoker BMI and BSA Data Body Mass Index Body Surface Area 28.02 kg/m 2 1.74 m 2 Preferred Pharmacy Pharmacy Name Phone [id number] [facility], [address] [facility] Your Updated Medication List  
  
   
This list is accurate as of: 11/21/17 11:43 AM.  Always use your most recent med list.  
  
  
  
  
 aspirin 325 mg tablet Commonly known as:  ASPIRIN Take 1 tablet by mouth daily. docusate sodium 100 mg capsule Commonly known as:  Marleen Lowers Take 100 mg by mouth daily. Instructed to take daily with plenty of liquid unless otherwise directed- as per 11/7/14 [facility] discharge med list  
  
 FLOMAX 0.4 mg capsule Generic drug:  tamsulosin Take 0.4 mg by mouth daily. For prostate isosorbide mononitrate ER 30 mg tablet Commonly known as:  IMDUR  
take 1 tablet by mouth once daily  
  
 meclizine 25 mg tablet Commonly known as:  ANTIVERT Take 1 Tab by mouth three (3) times daily as needed for Dizziness. metoprolol tartrate 25 mg tablet Commonly known as:  LOPRESSOR  
take 1/2 tablet twice a day MUCINEX PO Take 1 Tab by mouth daily. pravastatin 20 mg tablet Commonly known as:  PRAVACHOL  
take 1 tablet by mouth at bedtime  
  
 vit B12-levomefolate calcium-vit B6 2-1.13-25 mg tablet Commonly known as:  Budd Ray Take 1 Tab by mouth daily. Indications: Vitamin Deficiency VITAMIN D3 2,000 unit Tab Generic drug:  cholecalciferol (vitamin D3) Take 1 Tab by mouth daily. As per 11/07/14 [facility] discharge med list  
  
  
  
  
Follow-up Instructions Return in about 6 months (around 5/21/2018) for bp, referral follow up. Introducing [facility]! [employer] introduces [place] patient portal. Now you can access parts of your medical record, email your doctor's office, and request medication refills online. 1. In your internet browser, go to [email or web address] 2. Click on the First Time User? Click Here link in the Sign In box. You will see the New Member Sign Up page. 3. Enter your [place] Access Code exactly as it appears below. You will not need to use this code after youve completed the sign-up process. If you do not sign up before the expiration date, you must request a new code. · [place] Access Code: [email or web address] Expires: 1/9/2018  6:48 AM 
 
4. Enter the last four digits of your Social Security Number (xxxx) and Date of Birth (mm/dd/yyyy) as indicated and click Submit. You will be taken to the next sign-up page. 5. Create a Notifot ID. This will be your [employer] login ID and cannot be changed, so think of one that is secure and easy to remember. 6. Create a [employer] password. You can change your password at any time. 7. Enter your Password Reset Question and Answer. This can be used at a later time if you forget your password. 8. Enter your e-mail address. You will receive e-mail notification when new information is available in [address] 9. Click Sign Up. You can now view and download portions of your medical record. 10. Click the Download Summary menu link to download a portable copy of your medical information. If you have questions, please visit the Frequently Asked Questions section of the [employer] website. Remember, [employer] Information is NOT to be used for urgent needs. For medical emergencies, dial 911. Now available from your iPhone and Android! Please provide this summary of care documentation to your next provider. Your primary care clinician is listed as Alexa Pedesren. If you have any questions after today's visit, please call [phone].

## 2017-11-30 NOTE — ACP (ADVANCE CARE PLANNING)
Discussed ACP with patient. Patient already has an Advanced Directive on file. No changes to the documentation or further support from the [facility] Choices team requested at this time.

## 2017-12-16 ENCOUNTER — APPOINTMENT (OUTPATIENT)
Dept: CT IMAGING | Age: 82
DRG: 640 | End: 2017-12-16
Attending: EMERGENCY MEDICINE
Payer: MEDICARE

## 2017-12-16 ENCOUNTER — HOSPITAL ENCOUNTER (INPATIENT)
Age: 82
LOS: 5 days | Discharge: HOME HEALTH | DRG: 640 | End: 2017-12-21
Attending: EMERGENCY MEDICINE | Admitting: INTERNAL MEDICINE
Payer: MEDICARE

## 2017-12-16 ENCOUNTER — APPOINTMENT (OUTPATIENT)
Dept: GENERAL RADIOLOGY | Age: 82
DRG: 640 | End: 2017-12-16
Attending: EMERGENCY MEDICINE
Payer: MEDICARE

## 2017-12-16 DIAGNOSIS — R53.1 WEAKNESS: ICD-10-CM

## 2017-12-16 DIAGNOSIS — Z71.89 GOALS OF CARE, COUNSELING/DISCUSSION: ICD-10-CM

## 2017-12-16 DIAGNOSIS — F03.91 DEMENTIA WITH BEHAVIORAL DISTURBANCE, UNSPECIFIED DEMENTIA TYPE: ICD-10-CM

## 2017-12-16 DIAGNOSIS — R41.0 CONFUSION: ICD-10-CM

## 2017-12-16 DIAGNOSIS — J18.9 COMMUNITY ACQUIRED PNEUMONIA, UNSPECIFIED LATERALITY: Primary | ICD-10-CM

## 2017-12-16 DIAGNOSIS — F02.818 DEMENTIA ASSOCIATED WITH OTHER UNDERLYING DISEASE WITH BEHAVIORAL DISTURBANCE: ICD-10-CM

## 2017-12-16 PROBLEM — R41.82 ALTERED MENTAL STATE: Status: ACTIVE | Noted: 2017-12-16

## 2017-12-16 LAB
ALBUMIN SERPL-MCNC: 3.5 G/DL (ref 3.5–5)
ALBUMIN/GLOB SERPL: 1 {RATIO} (ref 1.1–2.2)
ALP SERPL-CCNC: 91 U/L (ref 45–117)
ALT SERPL-CCNC: 19 U/L (ref 12–78)
AMPHET UR QL SCN: NEGATIVE
ANION GAP SERPL CALC-SCNC: 7 MMOL/L (ref 5–15)
APPEARANCE UR: CLEAR
AST SERPL-CCNC: 25 U/L (ref 15–37)
BACTERIA URNS QL MICRO: NEGATIVE /HPF
BARBITURATES UR QL SCN: NEGATIVE
BASOPHILS # BLD: 0 K/UL (ref 0–0.1)
BASOPHILS NFR BLD: 0 % (ref 0–1)
BENZODIAZ UR QL: NEGATIVE
BILIRUB SERPL-MCNC: 0.6 MG/DL (ref 0.2–1)
BILIRUB UR QL: NEGATIVE
BNP SERPL-MCNC: 850 PG/ML (ref 0–450)
BUN SERPL-MCNC: 17 MG/DL (ref 6–20)
BUN/CREAT SERPL: 13 (ref 12–20)
CALCIUM SERPL-MCNC: 8.7 MG/DL (ref 8.5–10.1)
CANNABINOIDS UR QL SCN: NEGATIVE
CHLORIDE SERPL-SCNC: 102 MMOL/L (ref 97–108)
CO2 SERPL-SCNC: 28 MMOL/L (ref 21–32)
COCAINE UR QL SCN: NEGATIVE
COLOR UR: ABNORMAL
CREAT SERPL-MCNC: 1.26 MG/DL (ref 0.7–1.3)
DRUG SCRN COMMENT,DRGCM: NORMAL
EOSINOPHIL # BLD: 0 K/UL (ref 0–0.4)
EOSINOPHIL NFR BLD: 0 % (ref 0–7)
EPITH CASTS URNS QL MICRO: ABNORMAL /LPF
ERYTHROCYTE [DISTWIDTH] IN BLOOD BY AUTOMATED COUNT: 13.9 % (ref 11.5–14.5)
FLUAV AG NPH QL IA: NEGATIVE
FLUBV AG NOSE QL IA: NEGATIVE
GLOBULIN SER CALC-MCNC: 3.6 G/DL (ref 2–4)
GLUCOSE SERPL-MCNC: 117 MG/DL (ref 65–100)
GLUCOSE UR STRIP.AUTO-MCNC: NEGATIVE MG/DL
HCT VFR BLD AUTO: 38 % (ref 36.6–50.3)
HGB BLD-MCNC: 12.7 G/DL (ref 12.1–17)
HGB UR QL STRIP: NEGATIVE
HYALINE CASTS URNS QL MICRO: ABNORMAL /LPF (ref 0–5)
INR PPP: 1.2 (ref 0.9–1.1)
KETONES UR QL STRIP.AUTO: NEGATIVE MG/DL
LACTATE SERPL-SCNC: 1.4 MMOL/L (ref 0.4–2)
LEUKOCYTE ESTERASE UR QL STRIP.AUTO: NEGATIVE
LYMPHOCYTES # BLD: 0.8 K/UL (ref 0.8–3.5)
LYMPHOCYTES NFR BLD: 7 % (ref 12–49)
MCH RBC QN AUTO: 31 PG (ref 26–34)
MCHC RBC AUTO-ENTMCNC: 33.4 G/DL (ref 30–36.5)
MCV RBC AUTO: 92.7 FL (ref 80–99)
METHADONE UR QL: NEGATIVE
MONOCYTES # BLD: 1.3 K/UL (ref 0–1)
MONOCYTES NFR BLD: 12 % (ref 5–13)
NEUTS SEG # BLD: 9 K/UL (ref 1.8–8)
NEUTS SEG NFR BLD: 81 % (ref 32–75)
NITRITE UR QL STRIP.AUTO: NEGATIVE
OPIATES UR QL: NEGATIVE
PCP UR QL: NEGATIVE
PH UR STRIP: 5 [PH] (ref 5–8)
PLATELET # BLD AUTO: 170 K/UL (ref 150–400)
POTASSIUM SERPL-SCNC: 4.5 MMOL/L (ref 3.5–5.1)
PROT SERPL-MCNC: 7.1 G/DL (ref 6.4–8.2)
PROT UR STRIP-MCNC: 30 MG/DL
PROTHROMBIN TIME: 12.5 SEC (ref 9–11.1)
RBC # BLD AUTO: 4.1 M/UL (ref 4.1–5.7)
RBC #/AREA URNS HPF: ABNORMAL /HPF (ref 0–5)
SODIUM SERPL-SCNC: 137 MMOL/L (ref 136–145)
SP GR UR REFRACTOMETRY: 1.02 (ref 1–1.03)
UA: UC IF INDICATED,UAUC: ABNORMAL
UROBILINOGEN UR QL STRIP.AUTO: 0.2 EU/DL (ref 0.2–1)
WBC # BLD AUTO: 11.2 K/UL (ref 4.1–11.1)
WBC URNS QL MICRO: ABNORMAL /HPF (ref 0–4)

## 2017-12-16 PROCEDURE — 77030029684 HC NEB SM VOL KT MONA -A

## 2017-12-16 PROCEDURE — 74011250636 HC RX REV CODE- 250/636: Performed by: INTERNAL MEDICINE

## 2017-12-16 PROCEDURE — 87804 INFLUENZA ASSAY W/OPTIC: CPT | Performed by: EMERGENCY MEDICINE

## 2017-12-16 PROCEDURE — 94640 AIRWAY INHALATION TREATMENT: CPT

## 2017-12-16 PROCEDURE — 74011000250 HC RX REV CODE- 250: Performed by: INTERNAL MEDICINE

## 2017-12-16 PROCEDURE — 94664 DEMO&/EVAL PT USE INHALER: CPT

## 2017-12-16 PROCEDURE — 80053 COMPREHEN METABOLIC PANEL: CPT | Performed by: EMERGENCY MEDICINE

## 2017-12-16 PROCEDURE — 74011250636 HC RX REV CODE- 250/636: Performed by: EMERGENCY MEDICINE

## 2017-12-16 PROCEDURE — 74011000250 HC RX REV CODE- 250: Performed by: EMERGENCY MEDICINE

## 2017-12-16 PROCEDURE — 96365 THER/PROPH/DIAG IV INF INIT: CPT

## 2017-12-16 PROCEDURE — 83605 ASSAY OF LACTIC ACID: CPT | Performed by: EMERGENCY MEDICINE

## 2017-12-16 PROCEDURE — 80307 DRUG TEST PRSMV CHEM ANLYZR: CPT | Performed by: EMERGENCY MEDICINE

## 2017-12-16 PROCEDURE — 85610 PROTHROMBIN TIME: CPT | Performed by: EMERGENCY MEDICINE

## 2017-12-16 PROCEDURE — 85025 COMPLETE CBC W/AUTO DIFF WBC: CPT | Performed by: EMERGENCY MEDICINE

## 2017-12-16 PROCEDURE — 83880 ASSAY OF NATRIURETIC PEPTIDE: CPT | Performed by: EMERGENCY MEDICINE

## 2017-12-16 PROCEDURE — 96361 HYDRATE IV INFUSION ADD-ON: CPT

## 2017-12-16 PROCEDURE — 36415 COLL VENOUS BLD VENIPUNCTURE: CPT | Performed by: EMERGENCY MEDICINE

## 2017-12-16 PROCEDURE — 65660000000 HC RM CCU STEPDOWN

## 2017-12-16 PROCEDURE — 71250 CT THORAX DX C-: CPT

## 2017-12-16 PROCEDURE — 81001 URINALYSIS AUTO W/SCOPE: CPT | Performed by: EMERGENCY MEDICINE

## 2017-12-16 PROCEDURE — 99285 EMERGENCY DEPT VISIT HI MDM: CPT

## 2017-12-16 PROCEDURE — 71010 XR CHEST PORT: CPT

## 2017-12-16 PROCEDURE — 74011000258 HC RX REV CODE- 258: Performed by: EMERGENCY MEDICINE

## 2017-12-16 PROCEDURE — 70450 CT HEAD/BRAIN W/O DYE: CPT

## 2017-12-16 RX ORDER — TAMSULOSIN HYDROCHLORIDE 0.4 MG/1
0.4 CAPSULE ORAL DAILY
Status: DISCONTINUED | OUTPATIENT
Start: 2017-12-17 | End: 2017-12-21 | Stop reason: HOSPADM

## 2017-12-16 RX ORDER — ACETAMINOPHEN 325 MG/1
650 TABLET ORAL
Status: DISCONTINUED | OUTPATIENT
Start: 2017-12-16 | End: 2017-12-21 | Stop reason: HOSPADM

## 2017-12-16 RX ORDER — SODIUM CHLORIDE 0.9 % (FLUSH) 0.9 %
5-10 SYRINGE (ML) INJECTION EVERY 8 HOURS
Status: DISCONTINUED | OUTPATIENT
Start: 2017-12-16 | End: 2017-12-21 | Stop reason: HOSPADM

## 2017-12-16 RX ORDER — GUAIFENESIN 600 MG/1
600 TABLET, EXTENDED RELEASE ORAL EVERY 12 HOURS
Status: DISCONTINUED | OUTPATIENT
Start: 2017-12-16 | End: 2017-12-21 | Stop reason: HOSPADM

## 2017-12-16 RX ORDER — ASPIRIN 325 MG
325 TABLET ORAL DAILY
Status: DISCONTINUED | OUTPATIENT
Start: 2017-12-17 | End: 2017-12-21 | Stop reason: HOSPADM

## 2017-12-16 RX ORDER — ISOSORBIDE MONONITRATE 30 MG/1
30 TABLET, EXTENDED RELEASE ORAL DAILY
Status: DISCONTINUED | OUTPATIENT
Start: 2017-12-17 | End: 2017-12-21 | Stop reason: HOSPADM

## 2017-12-16 RX ORDER — MELATONIN
2000 DAILY
Status: DISCONTINUED | OUTPATIENT
Start: 2017-12-17 | End: 2017-12-21 | Stop reason: HOSPADM

## 2017-12-16 RX ORDER — SODIUM CHLORIDE 9 MG/ML
75 INJECTION, SOLUTION INTRAVENOUS CONTINUOUS
Status: DISCONTINUED | OUTPATIENT
Start: 2017-12-16 | End: 2017-12-21 | Stop reason: HOSPADM

## 2017-12-16 RX ORDER — METOPROLOL TARTRATE 25 MG/1
25 TABLET, FILM COATED ORAL 2 TIMES DAILY
Status: DISCONTINUED | OUTPATIENT
Start: 2017-12-16 | End: 2017-12-17

## 2017-12-16 RX ORDER — IPRATROPIUM BROMIDE AND ALBUTEROL SULFATE 2.5; .5 MG/3ML; MG/3ML
3 SOLUTION RESPIRATORY (INHALATION)
Status: COMPLETED | OUTPATIENT
Start: 2017-12-16 | End: 2017-12-16

## 2017-12-16 RX ORDER — AZITHROMYCIN 250 MG/1
500 TABLET, FILM COATED ORAL DAILY
Status: DISCONTINUED | OUTPATIENT
Start: 2017-12-17 | End: 2017-12-21 | Stop reason: HOSPADM

## 2017-12-16 RX ORDER — IPRATROPIUM BROMIDE AND ALBUTEROL SULFATE 2.5; .5 MG/3ML; MG/3ML
3 SOLUTION RESPIRATORY (INHALATION)
Status: DISCONTINUED | OUTPATIENT
Start: 2017-12-17 | End: 2017-12-19

## 2017-12-16 RX ORDER — DOCUSATE SODIUM 100 MG/1
100 CAPSULE, LIQUID FILLED ORAL
Status: DISCONTINUED | OUTPATIENT
Start: 2017-12-16 | End: 2017-12-21 | Stop reason: HOSPADM

## 2017-12-16 RX ORDER — SODIUM CHLORIDE 0.9 % (FLUSH) 0.9 %
5-10 SYRINGE (ML) INJECTION AS NEEDED
Status: DISCONTINUED | OUTPATIENT
Start: 2017-12-16 | End: 2017-12-21 | Stop reason: HOSPADM

## 2017-12-16 RX ORDER — ONDANSETRON 2 MG/ML
4 INJECTION INTRAMUSCULAR; INTRAVENOUS
Status: DISCONTINUED | OUTPATIENT
Start: 2017-12-16 | End: 2017-12-21 | Stop reason: HOSPADM

## 2017-12-16 RX ORDER — PRAVASTATIN SODIUM 10 MG/1
20 TABLET ORAL DAILY
Status: DISCONTINUED | OUTPATIENT
Start: 2017-12-17 | End: 2017-12-21 | Stop reason: HOSPADM

## 2017-12-16 RX ADMIN — AZITHROMYCIN MONOHYDRATE 500 MG: 500 INJECTION, POWDER, LYOPHILIZED, FOR SOLUTION INTRAVENOUS at 18:52

## 2017-12-16 RX ADMIN — Medication 10 ML: at 21:38

## 2017-12-16 RX ADMIN — IPRATROPIUM BROMIDE AND ALBUTEROL SULFATE 3 ML: .5; 3 SOLUTION RESPIRATORY (INHALATION) at 23:45

## 2017-12-16 RX ADMIN — SODIUM CHLORIDE 75 ML/HR: 900 INJECTION, SOLUTION INTRAVENOUS at 21:38

## 2017-12-16 RX ADMIN — SODIUM CHLORIDE 1000 ML: 900 INJECTION, SOLUTION INTRAVENOUS at 16:21

## 2017-12-16 RX ADMIN — IPRATROPIUM BROMIDE AND ALBUTEROL SULFATE 3 ML: .5; 3 SOLUTION RESPIRATORY (INHALATION) at 18:52

## 2017-12-16 RX ADMIN — IPRATROPIUM BROMIDE AND ALBUTEROL SULFATE 3 ML: .5; 3 SOLUTION RESPIRATORY (INHALATION) at 16:57

## 2017-12-16 RX ADMIN — CEFTRIAXONE 1 G: 1 INJECTION, POWDER, FOR SOLUTION INTRAMUSCULAR; INTRAVENOUS at 17:54

## 2017-12-16 NOTE — ED NOTES
Bedside and Verbal shift change report given to Christo Dennis RN (oncoming nurse) by Luis Gonzalez RN (offgoing nurse). Report included the following information SBAR, Kardex, ED Summary, MAR, Med Rec Status and Cardiac Rhythm Sinus Rhythm.

## 2017-12-16 NOTE — ED NOTES
Patient reports to ED via EMS with new onset of AMS. Patient is on day 3 of a sore throat. He went to [facility] yesterday and his strep test was negative. Per wife, patient is usually A&Ox4, however this morning he woke up confused and was \"very slow\" and having a hard time walking around the house. Patient is usually ambulatory with a steady gait on his own (and was able to do so yesterday), however, she states this morning, he was unable to ambulate without support. Wife at bedside. Patient placed on the monitor x3. Call bell within reach.

## 2017-12-16 NOTE — ED PROVIDER NOTES
EMERGENCY DEPARTMENT HISTORY AND PHYSICAL EXAM      Date: 12/16/2017  Patient Name: Adarsh Tejeda    History of Presenting Illness     Chief Complaint   Patient presents with    Altered mental status     New onset upon waking up this morning       History Provided By: Patient and spouse and EMS    HPI: Adarsh Tejeda, 80 y.o. male with PMHx significant for CAD s/p catheterization, stroke, and HTN, presents per EMS to the ED with cc of constant scratchy sore throat x three days and AMS x this morning. Wife reports associated dry cough and generalized weakness. Wife endorses she took the pt to [facility] yesterday where he had a negative strep test and was Rx'd Amoxicillin. She notes she gave the pt his first dose of Amoxicillin last night. Wife states she noticed pt's AMS when she awoke him today, noting he is only alert to himself and his birthday and needed help ambulating, both of which are not baseline. She states he was ambulating without assistance and mentating normally yesterday. Wife notes she has been giving the pt sips of water today. She denies pt has history of UTI or CHF. Wife notes pt's history of HTN, for which he had his medication this morning. She denies other recent medication changes. Wife specifically denies pt has urinary symptoms, N/V, unilateral weakness, diarrhea, or abdominal pain. PCP: Leoncio Ferreira DO    Pt's history is limited secondary to AMS. Current Facility-Administered Medications   Medication Dose Route Frequency Provider Last Rate Last Dose    sodium chloride 0.9 % bolus infusion 1,000 mL  1,000 mL IntraVENous NOW Mukul Sood MD         Current Outpatient Prescriptions   Medication Sig Dispense Refill    AMOXICILLIN PO Take  by mouth.  DEXTROMETHORPHAN HBR (DELSYM PO) Take  by mouth.       isosorbide mononitrate ER (IMDUR) 30 mg tablet take 1 tablet by mouth once daily 30 Tab 11    vit B12-levomefolate calcium-vit B6 (FOLTX) 2-1.13-25 mg tablet Take 1 Tab by mouth daily. Indications: Vitamin Deficiency 30 Tab 5    pravastatin (PRAVACHOL) 20 mg tablet take 1 tablet by mouth at bedtime 90 Tab 3    metoprolol tartrate (LOPRESSOR) 25 mg tablet take 1/2 tablet twice a day 30 Tab 11    docusate sodium (COLACE) 100 mg capsule Take 100 mg by mouth daily. Instructed to take daily with plenty of liquid unless otherwise directed- as per 11/7/14 [facility] discharge med list      cholecalciferol, vitamin D3, (VITAMIN D3) 2,000 unit tab Take 1 Tab by mouth daily. As per 11/07/14 [facility] discharge med list      aspirin (ASPIRIN) 325 mg tablet Take 1 tablet by mouth daily. 30 tablet 3    tamsulosin (FLOMAX) 0.4 mg capsule Take 0.4 mg by mouth daily. For prostate       meclizine (ANTIVERT) 25 mg tablet Take 1 Tab by mouth three (3) times daily as needed for Dizziness. 20 Tab 0    GUAIFENESIN (MUCINEX PO) Take 1 Tab by mouth daily. Past History     Past Medical History:  Past Medical History:   Diagnosis Date    Back pain     due to DDD and scoliosis    BPH (benign prostatic hyperplasia)     Dr. Isaacs Butt CAD (coronary artery disease) 10/30/13    30%LAD, 80% ostal stenosis. Dr. Masoud Green.  Chest pain 10/04/04, 10/30/13    Dr. Nikhil Chou. Negative Stress Cardiolite Test.  Dr. Michael Vitale.  Chronic venous insufficiency 06/15/09    Dr. Darden Section due to fall    DDD (degenerative disc disease), lumbar     L3-S1    Diverticulosis 02/2004    sigmoid. Dr. Alma Montalvo DJD (degenerative joint disease) of cervical spine     C 4-5 ;5-6    Dyslipidemia     Fracture 1980    facial/nasal    Hearing loss     wears hearing aid    Heartburn     Hemorrhoids, internal 1987    Dr. Heriberto Vitale of unspecified site of abdominal cavity without mention of obstruction or gangrene     inguinal  Lt    Hypertension     Impotence     Left acoustic neuroma ([facility] 10/28/14    17 x 9 mm schwannoma.       Pes planus of both feet     PVD (peripheral vascular disease) ([facility] 2009    Scoliosis     LS    Slipped intervertebral disc 1954    chiropractor    SOB (shortness of breath) 08/07/13    Dr. Verito Miles.  Stroke ([facility] 10/28/2014    small Left Thalamus. Dr. Meek Galan. Dr. Gabriele Elizalde.  Urinary incontinence     due to BPH. Dr. Sandrita Berumen    Varicose vein 06/15/09    Dr. Paty Murray Vertigo 2003    due to inner ear. Dr. Jeremie Harley. Dr. Js Mata. Dr. Thompson Coffee Visual loss     Dr. Ingrid Torres, ophth. Past Surgical History:  Past Surgical History:   Procedure Laterality Date    APPENDECTOMY      due to gangrene   [facility], 02/2002    Dr. Debby Pierce. benign.  COLONOSCOPY  02/2004    Dr. Christoph Baires. due q 10 yrs    CYSTOSCOPY  10/17/03    Dr. Askew Norberto  10/30/13    Dr. Nidhi East. 80% ostal stenosis. 30% LAD.  HX HEMORRHOIDECTOMY      HX KNEE ARTHROSCOPY  1990 Left , 2007 Right    Dr. Sagar Gaitan HX LAPAROTOMY  05/14/07    Dr. Kiki Foster. due to Bowel Obstruction    HX POLYPECTOMY  02/2002    Anal.     HX TONSILLECTOMY      VT COLSC FLX W/RMVL OF TUMOR POLYP LESION SNARE TQ  5/23/2011    Dr. Guillermina Pearson. Family History:  Family History   Problem Relation Age of Onset    Heart Attack Mother     Other Mother      brain aneurysm/AAA/varicose veins    Stroke Mother     Heart Attack Sister     Cancer Maternal Grandmother      ovarian       Social History:  Social History   Substance Use Topics    Smoking status: Never Smoker    Smokeless tobacco: Never Used    Alcohol use No       Allergies:   Allergies   Allergen Reactions    Betadine [Povidone-Iodine] Hives    Phenergan [Promethazine] Other (comments)     hallucinations    Seafood [Shellfish Containing Products] Rash and Swelling     crabmeat    Xylocaine [Lidocaine Hcl] Shortness of Breath         Review of Systems   Review of Systems   Unable to perform ROS: Mental status change       Physical Exam   Physical Exam   Constitutional: He appears well-developed and well-nourished. Appears tired   HENT:   Head: Normocephalic and atraumatic. DMM; mild erythema to posterior oropharynx, no exudate or swelling   Eyes: Conjunctivae and EOM are normal.   Neck: Normal range of motion. Neck supple. Cardiovascular: Normal rate and regular rhythm. Pulmonary/Chest: Effort normal. No respiratory distress. Wet cough, Coarse BS   Abdominal: Soft. He exhibits no distension. There is no tenderness. Musculoskeletal: Normal range of motion. Neurological: He is alert. Oriented to self and birthday; following commands   Skin: Skin is warm and dry. Nursing note and vitals reviewed. Diagnostic Study Results     Labs -     Recent Results (from the past 12 hour(s))   CBC WITH AUTOMATED DIFF    Collection Time: 12/16/17  3:51 PM   Result Value Ref Range    WBC 11.2 (H) 4.1 - 11.1 K/uL    RBC 4.10 4. 10 - 5.70 M/uL    HGB 12.7 12.1 - 17.0 g/dL    HCT 38.0 36.6 - 50.3 %    MCV 92.7 80.0 - 99.0 FL    MCH 31.0 26.0 - 34.0 PG    MCHC 33.4 30.0 - 36.5 g/dL    RDW 13.9 11.5 - 14.5 %    PLATELET 773 341 - 436 K/uL    NEUTROPHILS 81 (H) 32 - 75 %    LYMPHOCYTES 7 (L) 12 - 49 %    MONOCYTES 12 5 - 13 %    EOSINOPHILS 0 0 - 7 %    BASOPHILS 0 0 - 1 %    ABS. NEUTROPHILS 9.0 (H) 1.8 - 8.0 K/UL    ABS. LYMPHOCYTES 0.8 0.8 - 3.5 K/UL    ABS. MONOCYTES 1.3 (H) 0.0 - 1.0 K/UL    ABS. EOSINOPHILS 0.0 0.0 - 0.4 K/UL    ABS.  BASOPHILS 0.0 0.0 - 0.1 K/UL   METABOLIC PANEL, COMPREHENSIVE    Collection Time: 12/16/17  3:51 PM   Result Value Ref Range    Sodium 137 136 - 145 mmol/L    Potassium 4.5 3.5 - 5.1 mmol/L    Chloride 102 97 - 108 mmol/L    CO2 28 21 - 32 mmol/L    Anion gap 7 5 - 15 mmol/L    Glucose 117 (H) 65 - 100 mg/dL    BUN 17 6 - 20 MG/DL    Creatinine 1.26 0.70 - 1.30 MG/DL    BUN/Creatinine ratio 13 12 - 20      GFR est AA >60 >60 ml/min/1.73m2    GFR est non-AA 54 (L) >60 ml/min/1.73m2 Calcium 8.7 8.5 - 10.1 MG/DL    Bilirubin, total 0.6 0.2 - 1.0 MG/DL    ALT (SGPT) 19 12 - 78 U/L    AST (SGOT) 25 15 - 37 U/L    Alk. phosphatase 91 45 - 117 U/L    Protein, total 7.1 6.4 - 8.2 g/dL    Albumin 3.5 3.5 - 5.0 g/dL    Globulin 3.6 2.0 - 4.0 g/dL    A-G Ratio 1.0 (L) 1.1 - 2.2     PROTHROMBIN TIME + INR    Collection Time: 12/16/17  3:51 PM   Result Value Ref Range    INR 1.2 (H) 0.9 - 1.1      Prothrombin time 12.5 (H) 9.0 - 11.1 sec   LACTIC ACID    Collection Time: 12/16/17  3:51 PM   Result Value Ref Range    Lactic acid 1.4 0.4 - 2.0 MMOL/L   INFLUENZA A & B AG (RAPID TEST)    Collection Time: 12/16/17  3:51 PM   Result Value Ref Range    Influenza A Antigen NEGATIVE  NEG      Influenza B Antigen NEGATIVE  NEG     URINALYSIS W/ REFLEX CULTURE    Collection Time: 12/16/17  4:56 PM   Result Value Ref Range    Color YELLOW/STRAW      Appearance CLEAR CLEAR      Specific gravity 1.022 1.003 - 1.030      pH (UA) 5.0 5.0 - 8.0      Protein 30 (A) NEG mg/dL    Glucose NEGATIVE  NEG mg/dL    Ketone NEGATIVE  NEG mg/dL    Bilirubin NEGATIVE  NEG      Blood NEGATIVE  NEG      Urobilinogen 0.2 0.2 - 1.0 EU/dL    Nitrites NEGATIVE  NEG      Leukocyte Esterase NEGATIVE  NEG      WBC 0-4 0 - 4 /hpf    RBC 0-5 0 - 5 /hpf    Epithelial cells FEW FEW /lpf    Bacteria NEGATIVE  NEG /hpf    UA:UC IF INDICATED CULTURE NOT INDICATED BY UA RESULT CNI      Hyaline cast 0-2 0 - 5 /lpf   DRUG SCREEN, URINE    Collection Time: 12/16/17  4:56 PM   Result Value Ref Range    AMPHETAMINES NEGATIVE  NEG      BARBITURATES NEGATIVE  NEG      BENZODIAZEPINES NEGATIVE  NEG      COCAINE NEGATIVE  NEG      METHADONE NEGATIVE  NEG      OPIATES NEGATIVE  NEG      PCP(PHENCYCLIDINE) NEGATIVE  NEG      THC (TH-CANNABINOL) NEGATIVE  NEG      Drug screen comment (NOTE)        Radiologic Studies -    INDICATION: Cough.  Altered mental status.     COMPARISON: October 28, 2014     FINDINGS: AP portable imaging of the chest performed at 3:29 PM demonstrates a  stable cardiomediastinal silhouette. Lung volumes are diminished. There is no  new airspace disease or pleural effusion. No significant osseous abnormalities  are seen.     IMPRESSION  IMPRESSION: Diminished lung volumes. No evidence of acute cardiopulmonary  process.             EXAM:  CT HEAD WO CONT     INDICATION:   Confusion/delirium, altered LOC, unexplained; ams     COMPARISON: October 23, 2017.     CONTRAST:  None.     TECHNIQUE: Unenhanced CT of the head was performed using 5 mm images. Brain and  bone windows were generated. CT dose reduction was achieved through use of a  standardized protocol tailored for this examination and automatic exposure  control for dose modulation.       FINDINGS:  There is mild prominence of the ventricles and sulci compatible with the overall  degree of volume loss. Moderate low density within the periventricular white  matter, nonspecific but likely due to small vessel ischemic change. There is no  intracranial hemorrhage, extra-axial collection, mass, mass effect or midline  shift. The basilar cisterns are open. No acute infarct is identified. The bone  windows demonstrate no abnormalities. The visualized portions of the paranasal  sinuses and mastoid air cells are clear.     IMPRESSION  IMPRESSION:     No change, no acute intracranial abnormality         Medical Decision Making   I am the first provider for this patient. I reviewed the vital signs, available nursing notes, past medical history, past surgical history, family history and social history. Vital Signs-Reviewed the patient's vital signs. Patient Vitals for the past 12 hrs:   Temp Pulse Resp BP SpO2   12/16/17 1700 - 73 19 122/69 95 %   12/16/17 1630 - 73 21 123/66 94 %   12/16/17 1453 98 °F (36.7 °C) 74 24 123/70 95 %     Records Reviewed: Nursing Notes and Old Medical Records    Provider Notes (Medical Decision Making):   Patient presenting with altered mental status.  DDx: amoxicillin reaction, medication toxicity, infection, anemia, electrolyte/metabolic anomoly, hypercapnea, stroke/bleed/mass, dehydration, illicit drug intoxication. Will obtain labwork, UA, EKG and imaging. ED Course:   Initial assessment performed. The patients presenting problems have been discussed, and they are in agreement with the care plan formulated and outlined with them. I have encouraged them to ask questions as they arise throughout their visit. Progress Note:  5:24 PM  Had conversation with pt's wife to let her know the UA is negative. Given wet cough, pt's symptoms most likely due to PNA. Discussed outpatient management and PCP F/U vs admission; pts wife states she would prefer admission as she has had a hard time taking care of him today with his AMS as he is more lethargic and not following her commands. Written by ASTRID Lucero, as dictated by Dana Vazquez M.D.    CONSULT NOTE:   5:29 PM  Dana Vazquez M.D. spoke with Dr. Neal Arciniega,   Specialty: Hospitalist  Discussed pt's hx, disposition, and available diagnostic and imaging results. Reviewed care plans. Consultant will evaluate pt for admission. Written by ASTRID Lucero, as dictated by Dana Vazquez M.D. Disposition:    5:29 PM  Patient is being admitted to the hospital by Dr. Neal Arciniega. The results of their tests and reasons for their admission have been discussed with them and/or available family. They convey agreement and understanding for the need to be admitted and for their admission diagnosis. Consultation has been made with the inpatient physician specialist for hospitalization. Written by ASTRID Lucero, as dictated by Dana Vazquez M.D. PLAN:  1. Admit to Hospitalist.     Diagnosis     Clinical Impression:   1. Community acquired pneumonia, unspecified laterality    2. Confusion      Attestations:     This note is prepared by Dariusz Gonzalez, acting as Scribe for Dana Vazquez JOLIE Powell M.D: The scribe's documentation has been prepared under my direction and personally reviewed by me in its entirety. I confirm that the note above accurately reflects all work, treatment, procedures, and medical decision making performed by me.

## 2017-12-16 NOTE — ED NOTES
Bedside and Verbal shift change report given to [address] [facility] (oncoming nurse) by [address] [facility] [facility] (offgoing nurse). Report included the following information SBAR, ED Summary, Intake/Output, MAR and Recent Results. Pt currently DAVID in imaging at this time.

## 2017-12-16 NOTE — IP AVS SNAPSHOT
[address] [facility] 
[phone] Patient: Sergio Zaragoza MRN: [id number] :10/24/1928 About your hospitalization You were admitted on:  2017 You last received care in the:  [facility] ORTHOPEDICS You were discharged on:  2017 Why you were hospitalized Your primary diagnosis was:  Not on File Your diagnoses also included: Altered Mental State Things You Need To Do (next 8 weeks) Thursday Dec 21, 2017 Follow up with [address] [place] [address] This is the provider of your home health needs. If you do not hear from them within 24 hours post discharge, please give them a call. Phone:  [phone] Where:  [address] [facility], [address] [place] [address] Friday Dec 29, 2017 TRANSITIONAL CARE MANAGEMENT with Ronald Oliva DO at 10:30 AM  
Where:  [address] ([facility])  Follow Up with Demetrice Cadena MD at 12:00 PM  
Where:  [facility]) Discharge Orders None A check clint indicates which time of day the medication should be taken. My Medications STOP taking these medications AMOXICILLIN PO  
   
  
 DELSYM PO  
   
  
  
TAKE these medications as instructed Instructions Each Dose to Equal  
 Morning Noon Evening Bedtime  
 amLODIPine 5 mg tablet Commonly known as:  Yariel Adis Your last dose was: Your next dose is: Take 1 Tab by mouth daily. 5 mg  
    
   
   
   
  
 aspirin 325 mg tablet Commonly known as:  ASPIRIN Your last dose was: Your next dose is: Take 1 tablet by mouth daily. 325 mg  
    
   
   
   
  
 docusate sodium 100 mg capsule Commonly known as:  Micki Eisenmenger Your last dose was: Your next dose is: Take 100 mg by mouth daily. Instructed to take daily with plenty of liquid unless otherwise directed- as per 11/7/14 [facility] discharge med list  
 100 mg FLOMAX 0.4 mg capsule Generic drug:  tamsulosin Your last dose was: Your next dose is: Take 0.4 mg by mouth daily. For prostate 0.4 mg  
    
   
   
   
  
 isosorbide mononitrate ER 30 mg tablet Commonly known as:  IMDUR Your last dose was: Your next dose is:    
   
   
 take 1 tablet by mouth once daily  
     
   
   
   
  
 meclizine 25 mg tablet Commonly known as:  ANTIVERT Your last dose was: Your next dose is: Take 1 Tab by mouth three (3) times daily as needed for Dizziness. 25 mg  
    
   
   
   
  
 metoprolol tartrate 25 mg tablet Commonly known as:  LOPRESSOR Your last dose was: Your next dose is:    
   
   
 take 1/2 tablet twice a day MUCINEX PO Your last dose was: Your next dose is: Take 1 Tab by mouth daily. 1 Tab  
    
   
   
   
  
 pravastatin 20 mg tablet Commonly known as:  PRAVACHOL Your last dose was: Your next dose is:    
   
   
 take 1 tablet by mouth at bedtime QUEtiapine 25 mg tablet Commonly known as:  SEROquel Your last dose was: Your next dose is: Take 1 Tab by mouth nightly. 25 mg  
    
   
   
   
  
 vit B12-levomefolate calcium-vit B6 2-1.13-25 mg tablet Commonly known as:  Gely Caban Your last dose was: Your next dose is: Take 1 Tab by mouth daily. Indications: Vitamin Deficiency 1 Tab VITAMIN D3 2,000 unit Tab Generic drug:  cholecalciferol (vitamin D3) Your last dose was: Your next dose is: Take 1 Tab by mouth daily. As per 11/07/14 [facility] discharge med list  
 1 Tab Where to Get Your Medications Information on where to get these meds will be given to you by the nurse or doctor. ! Ask your nurse or doctor about these medications  
  amLODIPine 5 mg tablet QUEtiapine 25 mg tablet Discharge Instructions None ACO Transitions of Care Introducing [employer] [address] Aguilar offers a voluntary care coordination program to provide high quality service and care to [facility] fee-for-service beneficiaries. Rivas Eng was designed to help you enhance your health and well-being through the following services: ? Transitions of Care  support for individuals who are transitioning from one care setting to another (example: Hospital to home). ? Chronic and Complex Care Coordination  support for individuals and caregivers of those with serious or chronic illnesses or with more than one chronic (ongoing) condition and those who take a number of different medications. If you meet specific medical criteria, a [address] [place] [address] Rd may call you directly to coordinate your care with your primary care physician and your other care providers. For questions about the [facility] programs, please, contact your physicians office. For general questions or additional information about Accountable Care Organizations: 
Please visit www.medicare.gov/acos. html or call 1-800-MEDICARE (1-243.318.8539) TTY users should call 9-277.922.7081. ITN Announcement We are excited to announce that we are making your provider's discharge notes available to you in ITN. You will see these notes when they are completed and signed by the physician that discharged you from your recent hospital stay.   If you have any questions or concerns about any information you see in ITN, please call the [facility] Department where you were seen or reach out to your Primary Care Provider for more information about your plan of care. Introducing [facility]! Valeria Luis introduces [employer] patient portal. Now you can access parts of your medical record, email your doctor's office, and request medication refills online. 1. In your internet browser, go to [email or web address] 2. Click on the First Time User? Click Here link in the Sign In box. You will see the New Member Sign Up page. 3. Enter your [employer] Access Code exactly as it appears below. You will not need to use this code after youve completed the sign-up process. If you do not sign up before the expiration date, you must request a new code. · [employer] Access Code: [email or web address] Expires: 1/9/2018  6:48 AM 
 
4. Enter the last four digits of your Social Security Number (xxxx) and Date of Birth (mm/dd/yyyy) as indicated and click Submit. You will be taken to the next sign-up page. 5. Create a [employer] ID. This will be your [employer] login ID and cannot be changed, so think of one that is secure and easy to remember. 6. Create a [employer] password. You can change your password at any time. 7. Enter your Password Reset Question and Answer. This can be used at a later time if you forget your password. 8. Enter your e-mail address. You will receive e-mail notification when new information is available in [address] 9. Click Sign Up. You can now view and download portions of your medical record. 10. Click the Download Summary menu link to download a portable copy of your medical information. If you have questions, please visit the Frequently Asked Questions section of the [employer] website. Remember, [employer] is NOT to be used for urgent needs. For medical emergencies, dial 911. Now available from your iPhone and Android! Unresulted Labs-Please follow up with your PCP about these lab tests Order Current Status CULTURE, BLOOD, PAIRED Preliminary result Providers Seen During Your Hospitalization Provider Specialty Primary office phone Donell Ramsay MD Emergency Medicine [phone] Sami Ralph MD Internal Medicine [phone] Rosey Dunne MD Internal Medicine [phone] Your Primary Care Physician (PCP) Primary Care Physician Office Phone Office Fax Dominga Rosenthal [phone] You are allergic to the following Allergen Reactions Betadine (Povidone-Iodine) Hives Phenergan (Promethazine) Other (comments)  
 hallucinations Seafood (Shellfish Containing Products) Rash Swelling  
 crabmeat Xylocaine (Lidocaine Hcl) Shortness of Breath Recent Documentation Height Weight BMI Smoking Status 1.626 m 68.9 kg 26.07 kg/m2 Never Smoker Emergency Contacts Name Discharge Info Relation Home Work Mobile Nilda Isidro DISCHARGE CAREGIVER [3] Spouse [3] [phone] Patient Belongings The following personal items are in your possession at time of discharge: 
  Dental Appliances: None  Visual Aid: Glasses, With patient   Hearing Aids/Status:  (wife has aides as she does not want to lose them)  Home Medications: None   Jewelry: None  Clothing: None    Other Valuables: None Please provide this summary of care documentation to your next provider. Signatures-by signing, you are acknowledging that this After Visit Summary has been reviewed with you and you have received a copy. Patient Signature:  ____________________________________________________________ Date:  ____________________________________________________________  
  
Althia Kras Provider Signature:  ____________________________________________________________ Date:  ____________________________________________________________

## 2017-12-17 LAB
AMMONIA PLAS-SCNC: 23 UMOL/L
ANION GAP SERPL CALC-SCNC: 5 MMOL/L (ref 5–15)
BUN SERPL-MCNC: 14 MG/DL (ref 6–20)
BUN/CREAT SERPL: 13 (ref 12–20)
CALCIUM SERPL-MCNC: 8 MG/DL (ref 8.5–10.1)
CHLORIDE SERPL-SCNC: 103 MMOL/L (ref 97–108)
CO2 SERPL-SCNC: 27 MMOL/L (ref 21–32)
CREAT SERPL-MCNC: 1.08 MG/DL (ref 0.7–1.3)
ERYTHROCYTE [DISTWIDTH] IN BLOOD BY AUTOMATED COUNT: 14.1 % (ref 11.5–14.5)
FOLATE SERPL-MCNC: 25.3 NG/ML (ref 5–21)
GLUCOSE SERPL-MCNC: 130 MG/DL (ref 65–100)
HCT VFR BLD AUTO: 35.9 % (ref 36.6–50.3)
HGB BLD-MCNC: 12 G/DL (ref 12.1–17)
LACTATE SERPL-SCNC: 1.1 MMOL/L (ref 0.4–2)
MCH RBC QN AUTO: 30.8 PG (ref 26–34)
MCHC RBC AUTO-ENTMCNC: 33.4 G/DL (ref 30–36.5)
MCV RBC AUTO: 92.1 FL (ref 80–99)
PLATELET # BLD AUTO: 150 K/UL (ref 150–400)
POTASSIUM SERPL-SCNC: 3.9 MMOL/L (ref 3.5–5.1)
RBC # BLD AUTO: 3.9 M/UL (ref 4.1–5.7)
SODIUM SERPL-SCNC: 135 MMOL/L (ref 136–145)
VIT B12 SERPL-MCNC: 755 PG/ML (ref 211–911)
WBC # BLD AUTO: 11.2 K/UL (ref 4.1–11.1)

## 2017-12-17 PROCEDURE — 74011000250 HC RX REV CODE- 250: Performed by: INTERNAL MEDICINE

## 2017-12-17 PROCEDURE — 94640 AIRWAY INHALATION TREATMENT: CPT

## 2017-12-17 PROCEDURE — 80048 BASIC METABOLIC PNL TOTAL CA: CPT | Performed by: INTERNAL MEDICINE

## 2017-12-17 PROCEDURE — 74011250637 HC RX REV CODE- 250/637: Performed by: INTERNAL MEDICINE

## 2017-12-17 PROCEDURE — 82140 ASSAY OF AMMONIA: CPT | Performed by: INTERNAL MEDICINE

## 2017-12-17 PROCEDURE — 77010033678 HC OXYGEN DAILY

## 2017-12-17 PROCEDURE — 65660000000 HC RM CCU STEPDOWN

## 2017-12-17 PROCEDURE — 36415 COLL VENOUS BLD VENIPUNCTURE: CPT | Performed by: INTERNAL MEDICINE

## 2017-12-17 PROCEDURE — 87040 BLOOD CULTURE FOR BACTERIA: CPT | Performed by: INTERNAL MEDICINE

## 2017-12-17 PROCEDURE — 85027 COMPLETE CBC AUTOMATED: CPT | Performed by: INTERNAL MEDICINE

## 2017-12-17 PROCEDURE — 82607 VITAMIN B-12: CPT | Performed by: INTERNAL MEDICINE

## 2017-12-17 PROCEDURE — 83605 ASSAY OF LACTIC ACID: CPT | Performed by: INTERNAL MEDICINE

## 2017-12-17 PROCEDURE — 82746 ASSAY OF FOLIC ACID SERUM: CPT | Performed by: INTERNAL MEDICINE

## 2017-12-17 RX ORDER — METOPROLOL TARTRATE 25 MG/1
12.5 TABLET, FILM COATED ORAL 2 TIMES DAILY
Status: DISCONTINUED | OUTPATIENT
Start: 2017-12-18 | End: 2017-12-21 | Stop reason: HOSPADM

## 2017-12-17 RX ADMIN — ACETAMINOPHEN 650 MG: 325 TABLET ORAL at 05:01

## 2017-12-17 RX ADMIN — METOPROLOL TARTRATE 25 MG: 25 TABLET ORAL at 17:42

## 2017-12-17 RX ADMIN — IPRATROPIUM BROMIDE AND ALBUTEROL SULFATE 3 ML: .5; 3 SOLUTION RESPIRATORY (INHALATION) at 23:39

## 2017-12-17 RX ADMIN — IPRATROPIUM BROMIDE AND ALBUTEROL SULFATE 3 ML: .5; 3 SOLUTION RESPIRATORY (INHALATION) at 11:45

## 2017-12-17 RX ADMIN — VITAMIN D, TAB 1000IU (100/BT) 2000 UNITS: 25 TAB at 09:45

## 2017-12-17 RX ADMIN — IPRATROPIUM BROMIDE AND ALBUTEROL SULFATE 3 ML: .5; 3 SOLUTION RESPIRATORY (INHALATION) at 07:59

## 2017-12-17 RX ADMIN — AZITHROMYCIN 500 MG: 250 TABLET, FILM COATED ORAL at 09:45

## 2017-12-17 RX ADMIN — GUAIFENESIN 600 MG: 600 TABLET, EXTENDED RELEASE ORAL at 09:45

## 2017-12-17 RX ADMIN — ACETAMINOPHEN 650 MG: 325 TABLET ORAL at 17:42

## 2017-12-17 RX ADMIN — Medication 10 ML: at 03:45

## 2017-12-17 RX ADMIN — GUAIFENESIN 600 MG: 600 TABLET, EXTENDED RELEASE ORAL at 20:13

## 2017-12-17 RX ADMIN — ISOSORBIDE MONONITRATE 30 MG: 30 TABLET, EXTENDED RELEASE ORAL at 09:45

## 2017-12-17 RX ADMIN — IPRATROPIUM BROMIDE AND ALBUTEROL SULFATE 3 ML: .5; 3 SOLUTION RESPIRATORY (INHALATION) at 04:25

## 2017-12-17 RX ADMIN — ASPIRIN 325 MG: 325 TABLET ORAL at 09:45

## 2017-12-17 RX ADMIN — TAMSULOSIN HYDROCHLORIDE 0.4 MG: 0.4 CAPSULE ORAL at 09:45

## 2017-12-17 RX ADMIN — IPRATROPIUM BROMIDE AND ALBUTEROL SULFATE 3 ML: .5; 3 SOLUTION RESPIRATORY (INHALATION) at 15:01

## 2017-12-17 RX ADMIN — PRAVASTATIN SODIUM 20 MG: 10 TABLET ORAL at 09:45

## 2017-12-17 RX ADMIN — IPRATROPIUM BROMIDE AND ALBUTEROL SULFATE 3 ML: .5; 3 SOLUTION RESPIRATORY (INHALATION) at 20:24

## 2017-12-17 RX ADMIN — METOPROLOL TARTRATE 25 MG: 25 TABLET ORAL at 09:45

## 2017-12-17 NOTE — PROGRESS NOTES
Hospitalist Progress Note    NAME: Shayne Crawford   :  10/24/1928   MRN:  [id number]       Assessment / Plan:  Acute encephalopathy likely metabolic   Suspected Delirium   Possible bronchitis  -sore throat and congestion, CT chest without any infiltrate, does have productive cough  -CT head negative  -continue IV fluids  -continue azithromycin empirically for suspected URTI  -nebs  -mucinex  -B12, folate, ammonia are normal.  He is now AAOX2 and does not have any neck pain/stiffness to suspect meningitis. His mental status is also clearing up. Will check blood cx due to fever. -PT/OT      HTN  -imdur and metoprolol     Dementia  -usually not oriented to time but oriented to person and birthdate  -wife is primary caregiver  -usually active uses cane    BPH  -on flomax     Code Status: DNR  Surrogate Decision Maker: wife     DVT Prophylaxis: SCD  Baseline: usually walks with cane                                 Body mass index is 26.07 kg/(m^2). Recommended Disposition: Home w/Family     Subjective:     Chief Complaint / Reason for Physician Visit  One episode of fever last nite. No headache or neck stiffness. Review of Systems:  Symptom Y/N Comments  Symptom Y/N Comments   Fever/Chills y   Chest Pain n    Poor Appetite    Edema     Cough y   Abdominal Pain n    Sputum y   Joint Pain     SOB/GRECO y   Pruritis/Rash     Nausea/vomit n   Tolerating PT/OT     Diarrhea    Tolerating Diet     Constipation    Other       Could NOT obtain due to:      Objective:     VITALS:   Last 24hrs VS reviewed since prior progress note.  Most recent are:  Patient Vitals for the past 24 hrs:   Temp Pulse Resp BP SpO2   17 1145 - - - - 96 %   17 0950 97.9 °F (36.6 °C) 88 16 134/71 93 %   17 0759 - - - - 94 %   17 0730 - - - - 94 %   17 0600 98.9 °F (37.2 °C) 98 16 132/74 93 %   17 0423 - - - - 93 %   17 0300 (!) 102 °F (38.9 °C) (!) 109 18 147/86 94 %   17 0217 - - - - 92 % 12/16/17 2342 - - - - 92 %   12/16/17 2112 98.9 °F (37.2 °C) 90 19 106/73 94 %   12/16/17 2030 - 88 19 153/64 94 %   12/16/17 2001 - - - - 90 %   12/16/17 2000 - 88 22 143/73 -   12/16/17 1930 - 87 18 132/70 94 %   12/16/17 1900 - 84 21 98/66 95 %   12/16/17 1830 - 78 14 112/65 91 %   12/16/17 1800 - 79 18 122/64 95 %   12/16/17 1730 - 80 21 126/61 94 %   12/16/17 1700 - 73 19 122/69 95 %   12/16/17 1630 - 73 21 123/66 94 %   12/16/17 1600 - 74 22 115/67 93 %   12/16/17 1530 - 75 21 118/63 93 %   12/16/17 1528 - 76 17 - 94 %   12/16/17 1527 - - - 119/68 -   12/16/17 1500 - 73 25 103/68 94 %   12/16/17 1453 98 °F (36.7 °C) 74 24 123/70 95 %       Intake/Output Summary (Last 24 hours) at 12/17/17 1438  Last data filed at 12/17/17 0955   Gross per 24 hour   Intake                0 ml   Output             1025 ml   Net            -1025 ml        PHYSICAL EXAM:  General:  cooperative, no acute distress    EENT:  EOMI. Anicteric sclerae. MMM  Resp:  CTA bilaterally, no wheezing or rales. No accessory muscle use  CV:  Regular  rhythm,  No edema  GI:  Soft, Non distended, Non tender.  +Bowel sounds  Neurologic:  Alert and awake,  normal speech,   Psych:             Not anxious nor agitated  Skin:  No rashes.   No jaundice    Reviewed most current lab test results and cultures  YES  Reviewed most current radiology test results   YES  Review and summation of old records today    NO  Reviewed patient's current orders and MAR    YES  PMH/SH reviewed - no change compared to H&P          Current Facility-Administered Medications:     aspirin (ASPIRIN) tablet 325 mg, 325 mg, Oral, DAILY, Tyler Mckinney MD, 325 mg at 12/17/17 0945    cholecalciferol (VITAMIN D3) tablet 2,000 Units, 2,000 Units, Oral, DAILY, Tyler Mckinney MD, 2,000 Units at 12/17/17 0945    isosorbide mononitrate ER (IMDUR) tablet 30 mg, 30 mg, Oral, DAILY, Tyler Mckinney MD, 30 mg at 12/17/17 0945    metoprolol tartrate (LOPRESSOR) tablet 25 mg, 25 mg, Oral, BID, Susana Louie MD, 25 mg at 12/17/17 0945    pravastatin (PRAVACHOL) tablet 20 mg, 20 mg, Oral, DAILY, Susana Louie MD, 20 mg at 12/17/17 0945    tamsulosin (FLOMAX) capsule 0.4 mg, 0.4 mg, Oral, DAILY, Susana Louie MD, 0.4 mg at 12/17/17 0945    azithromycin (ZITHROMAX) tablet 500 mg, 500 mg, Oral, DAILY, Susana Louie MD, 500 mg at 12/17/17 0945    albuterol-ipratropium (DUO-NEB) 2.5 MG-0.5 MG/3 ML, 3 mL, Nebulization, Q4H RT, Susana Louie MD, 3 mL at 12/17/17 1145    guaiFENesin ER (MUCINEX) tablet 600 mg, 600 mg, Oral, Q12H, Susana Louie MD, 600 mg at 12/17/17 0945    0.9% sodium chloride infusion, 75 mL/hr, IntraVENous, CONTINUOUS, Susana Louie MD, Last Rate: 75 mL/hr at 12/16/17 2138, 75 mL/hr at 12/16/17 2138    sodium chloride (NS) flush 5-10 mL, 5-10 mL, IntraVENous, Q8H, Susana Louie MD, 10 mL at 12/17/17 0345    sodium chloride (NS) flush 5-10 mL, 5-10 mL, IntraVENous, PRN, Susana Louie MD    acetaminophen (TYLENOL) tablet 650 mg, 650 mg, Oral, Q4H PRN, Susana Louie MD, 650 mg at 12/17/17 0501    ondansetron [facility]) injection 4 mg, 4 mg, IntraVENous, Q4H PRN, Susana Louie MD    docusate sodium (COLACE) capsule 100 mg, 100 mg, Oral, BID PRN, Susana Louie MD  ________________________________________________________________________  Care Plan discussed with:    Comments   Patient y    Family      RN y    Care Manager     Consultant                        Multidiciplinary team rounds were held today with , nursing, pharmacist and clinical coordinator. Patient's plan of care was discussed; medications were reviewed and discharge planning was addressed.      ________________________________________________________________________  Total NON critical care TIME:  25    Minutes    Total CRITICAL CARE TIME Spent:   Minutes non procedure based      Comments   >50% of visit spent in counseling and coordination of care ________________________________________________________________________  Neo Renee MD     Procedures: see electronic medical records for all procedures/Xrays and details which were not copied into this note but were reviewed prior to creation of Plan. LABS:  I reviewed today's most current labs and imaging studies.   Pertinent labs include:  Recent Labs      12/17/17   0354  12/16/17   1551   WBC  11.2*  11.2*   HGB  12.0*  12.7   HCT  35.9*  38.0   PLT  150  170     Recent Labs      12/17/17   0354  12/16/17   1551   NA  135*  137   K  3.9  4.5   CL  103  102   CO2  27  28   GLU  130*  117*   BUN  14  17   CREA  1.08  1.26   CA  8.0*  8.7   ALB   --   3.5   TBILI   --   0.6   SGOT   --   25   ALT   --   19   INR   --   1.2*       Signed: Neo Renee MD

## 2017-12-17 NOTE — ROUTINE PROCESS
Bedside and Verbal shift change report given to Sergio Jackson RN (oncoming nurse) by Chiara Whitman(offgoing nurse). Report given with SBAR, Kardex, MAR and Recent Results.

## 2017-12-17 NOTE — PROGRESS NOTES
ADULT PROTOCOL: JET AEROSOL ASSESSMENT    Patient  Ceasar Mckeon     80 y.o.   male     12/17/2017  5:49 PM    Breath Sounds Pre Procedure: Right Breath Sounds: Coarse                               Left Breath Sounds: Coarse    Breath Sounds Post Procedure: Right Breath Sounds: Coarse                                 Left Breath Sounds: Coarse    Breathing pattern: Pre procedure Breathing Pattern: Regular          Post procedure Breathing Pattern: Regular    Heart Rate: Pre procedure Pulse: 84           Post procedure Pulse: 88    Resp Rate: Pre procedure Respirations: 18           Post procedure Respirations: 18    Peak Flow: Pre bronchodilator             Post bronchodilator         Cough: Pre procedure Cough: Non-productive               Post procedure Cough: Non-productive      Oxygen: O2 Device: Nasal cannula   Flow rate (L/min) 2     Changed: NO    SpO2: Pre procedure SpO2: 97 %   with oxygen              Post procedure SpO2: 96 %  with oxygen    Nebulizer Therapy: Current medications Aerosolized Medications: DuoNeb      Changed: NO    Smoking History: never    Problem List:   Patient Active Problem List   Diagnosis Code    GERD (gastroesophageal reflux disease) K21.9    Slipped intervertebral disc [id number]    Hemorrhoids, internal K64.8    DJD (degenerative joint disease) of cervical spine M47.812    Scoliosis M41.9    Diverticulosis K57.90    BPH (benign prostatic hyperplasia) N40.0    PVD (peripheral vascular disease) ([facility]) I73.9    Varicose vein of leg I83.90    History of small bowel obstruction Z87.19    [place] (hard of hearing) H91.90    CAD (coronary artery disease) I25.10    Memory disturbance R41.3    Left acoustic neuroma (HCC) D33.3    Dyslipidemia E78.5    Advance directive in chart Z78.9    Stroke ([facility]) I63.9    Essential hypertension I10    Chronic constipation K59.09    Benign prostatic hyperplasia N40.0    Bradycardia with 51-60 beats per minute R00.1    Altered mental [facility] R41.82       Respiratory Therapist: Nestor Mcleod RT

## 2017-12-17 NOTE — ED NOTES
Pt sleeping comfortably, and pt noted to dip down to 88-89% on RA while sleeping. Pt in NAD. Discussed with Dr. Guanako Nieto, and pt to be placed on oxygen via NC at 2 L.

## 2017-12-17 NOTE — PROGRESS NOTES
Primary Nurse Jada Fischer RN and Dione Khan RN performed a dual skin assessment on this patient No impairment noted

## 2017-12-18 LAB
ERYTHROCYTE [DISTWIDTH] IN BLOOD BY AUTOMATED COUNT: 14.2 % (ref 11.5–14.5)
HCT VFR BLD AUTO: 32.3 % (ref 36.6–50.3)
HGB BLD-MCNC: 10.7 G/DL (ref 12.1–17)
MCH RBC QN AUTO: 30.7 PG (ref 26–34)
MCHC RBC AUTO-ENTMCNC: 33.1 G/DL (ref 30–36.5)
MCV RBC AUTO: 92.6 FL (ref 80–99)
PLATELET # BLD AUTO: 123 K/UL (ref 150–400)
RBC # BLD AUTO: 3.49 M/UL (ref 4.1–5.7)
WBC # BLD AUTO: 8.2 K/UL (ref 4.1–11.1)

## 2017-12-18 PROCEDURE — 74011250636 HC RX REV CODE- 250/636: Performed by: INTERNAL MEDICINE

## 2017-12-18 PROCEDURE — 74011250637 HC RX REV CODE- 250/637: Performed by: INTERNAL MEDICINE

## 2017-12-18 PROCEDURE — 97162 PT EVAL MOD COMPLEX 30 MIN: CPT

## 2017-12-18 PROCEDURE — 94640 AIRWAY INHALATION TREATMENT: CPT

## 2017-12-18 PROCEDURE — 85027 COMPLETE CBC AUTOMATED: CPT | Performed by: INTERNAL MEDICINE

## 2017-12-18 PROCEDURE — 97116 GAIT TRAINING THERAPY: CPT

## 2017-12-18 PROCEDURE — 36415 COLL VENOUS BLD VENIPUNCTURE: CPT | Performed by: INTERNAL MEDICINE

## 2017-12-18 PROCEDURE — G8979 MOBILITY GOAL STATUS: HCPCS

## 2017-12-18 PROCEDURE — 74011000250 HC RX REV CODE- 250: Performed by: INTERNAL MEDICINE

## 2017-12-18 PROCEDURE — 77010033678 HC OXYGEN DAILY

## 2017-12-18 PROCEDURE — 65660000000 HC RM CCU STEPDOWN

## 2017-12-18 PROCEDURE — G8978 MOBILITY CURRENT STATUS: HCPCS

## 2017-12-18 RX ORDER — LORAZEPAM 2 MG/ML
1 INJECTION INTRAMUSCULAR ONCE
Status: COMPLETED | OUTPATIENT
Start: 2017-12-18 | End: 2017-12-18

## 2017-12-18 RX ORDER — LORAZEPAM 2 MG/ML
1 INJECTION INTRAMUSCULAR
Status: COMPLETED | OUTPATIENT
Start: 2017-12-18 | End: 2017-12-19

## 2017-12-18 RX ORDER — HALOPERIDOL 5 MG/ML
2 INJECTION INTRAMUSCULAR
Status: COMPLETED | OUTPATIENT
Start: 2017-12-18 | End: 2017-12-18

## 2017-12-18 RX ADMIN — SODIUM CHLORIDE 75 ML/HR: 900 INJECTION, SOLUTION INTRAVENOUS at 09:58

## 2017-12-18 RX ADMIN — ASPIRIN 325 MG: 325 TABLET ORAL at 10:01

## 2017-12-18 RX ADMIN — AZITHROMYCIN 500 MG: 250 TABLET, FILM COATED ORAL at 10:00

## 2017-12-18 RX ADMIN — IPRATROPIUM BROMIDE AND ALBUTEROL SULFATE 3 ML: .5; 3 SOLUTION RESPIRATORY (INHALATION) at 03:14

## 2017-12-18 RX ADMIN — IPRATROPIUM BROMIDE AND ALBUTEROL SULFATE 3 ML: .5; 3 SOLUTION RESPIRATORY (INHALATION) at 15:11

## 2017-12-18 RX ADMIN — HALOPERIDOL LACTATE 2 MG: 5 INJECTION, SOLUTION INTRAMUSCULAR at 17:04

## 2017-12-18 RX ADMIN — Medication 10 ML: at 13:42

## 2017-12-18 RX ADMIN — GUAIFENESIN 600 MG: 600 TABLET, EXTENDED RELEASE ORAL at 20:29

## 2017-12-18 RX ADMIN — IPRATROPIUM BROMIDE AND ALBUTEROL SULFATE 3 ML: .5; 3 SOLUTION RESPIRATORY (INHALATION) at 20:19

## 2017-12-18 RX ADMIN — IPRATROPIUM BROMIDE AND ALBUTEROL SULFATE 3 ML: .5; 3 SOLUTION RESPIRATORY (INHALATION) at 07:22

## 2017-12-18 RX ADMIN — GUAIFENESIN 600 MG: 600 TABLET, EXTENDED RELEASE ORAL at 10:01

## 2017-12-18 RX ADMIN — Medication 10 ML: at 10:02

## 2017-12-18 RX ADMIN — TAMSULOSIN HYDROCHLORIDE 0.4 MG: 0.4 CAPSULE ORAL at 10:01

## 2017-12-18 RX ADMIN — LORAZEPAM 1 MG: 2 INJECTION INTRAMUSCULAR; INTRAVENOUS at 16:30

## 2017-12-18 RX ADMIN — METOPROLOL TARTRATE 12.5 MG: 25 TABLET ORAL at 10:01

## 2017-12-18 RX ADMIN — IPRATROPIUM BROMIDE AND ALBUTEROL SULFATE 3 ML: .5; 3 SOLUTION RESPIRATORY (INHALATION) at 11:22

## 2017-12-18 RX ADMIN — PRAVASTATIN SODIUM 20 MG: 10 TABLET ORAL at 10:01

## 2017-12-18 RX ADMIN — VITAMIN D, TAB 1000IU (100/BT) 2000 UNITS: 25 TAB at 10:01

## 2017-12-18 RX ADMIN — ISOSORBIDE MONONITRATE 30 MG: 30 TABLET, EXTENDED RELEASE ORAL at 10:01

## 2017-12-18 NOTE — PROGRESS NOTES
Pressure Ulcer Prevention Alert Received for Grady < 14 (moderate risk).        Care Plan/Interventions for Nursin. Complete Grady Pressure Ulcer Risk Scale and use sub scores to identify appropriate interventions. 2. Perform Assessment: skin, changes in LOC, visual cues for pain, monitor skin under medical devices  3. Respond to Reduced Sensory Perception: changes in LOC, check visual cues for pain, float heels, suspension boots, pressure redistribution bed/mattress/chair cushion, turning and reposition approximately every 2 hours (pillows & wedges), pad between skin to skin, turn & reposition  4. Manage Moisture: absorbent under pads, internal / external urinary device, internal /  external fecal device, minimize layers, contain wound drainage, access need for specialty bed, limit adult briefs, maintain skin hydration (lotion/cream), moisture barrier, offer toileting every hour  5. Promote Activity: increase time out of bed, chair cushion, PT/OT evaluation, trapeze to reposition, pressure redistribution bed/mattress/chair  6. Address Reduced Mobility: float heels / suspension boot, HOB 30 degrees or less, pressure redistribution bed/mattress/cushion, PT / OT evaluation, turn and reposition approximately every 2 hours (pillows & wedges)  7. Promote Nutrition: document food / fluid / supplement intake, encourage/assist with meals as needed  8. Reduce Friction and Shear: transferring/repositioning devices (lift/draw sheet), lift team/ patient mobility team, feet elevated on foot rest, minimize layers, foam dressing / transparent film / skin sealants, protective barrier creams and emollients, transfer aides (board, Steve lift, ceiling lift, stand assist), HOB 30 degrees or less, trapeze to reposition.   Wound Care Team

## 2017-12-18 NOTE — PROGRESS NOTES
.Bedside and Verbal shift change report given to Gisela Yeh (oncoming nurse) by Cristhian Lao RN (offgoing nurse). Report included the following information SBAR, Kardex, Procedure Summary, Intake/Output, MAR and Recent Results.

## 2017-12-18 NOTE — PROGRESS NOTES
Bedside and Verbal shift change report given to Chaparro ANDREWS (oncoming nurse) by Marifer Juarez (offgoing nurse). Report included the following information SBAR, Kardex, ED Summary, Procedure Summary, Intake/Output, MAR, Accordion and Cardiac Rhythm nsr.

## 2017-12-18 NOTE — PROGRESS NOTES
Hospitalist Progress Note    NAME: Oly Clay   :  10/24/1928   MRN:  [id number]       Assessment / Plan:  Acute encephalopathy likely metabolic   Suspected Delirium   Possible bronchitis  -sore throat and congestion, CT chest without any infiltrate, does have productive cough  -CT head negative  -continue azithromycin empirically for suspected URTI  -nebs  -mucinex  -B12, folate, ammonia are normal.  He is now Alert and awake  and does not have any neck pain/stiffness to suspect meningitis. His mental status is also clearing up. No leucocytosis as well.  -Blood cx NGTD  -Fever curve is better but did have one episode of 100.9 and due to unclear cause will check US doppler to r/o dvt.     HTN  -imdur and metoprolol     Dementia  -dementia started on  but has been getting worse since last 6 month per wife  -wife is primary caregiver  -usually active uses cane  -has scheduled follow up with neurologist     BPH  -on flomax     Code Status: DNR  Surrogate Decision Maker: wife     DVT Prophylaxis: SCD  Baseline: usually walks with cane                                 Body mass index is 26.07 kg/(m^2). Recommended Disposition: Home w/Family     Subjective:     Chief Complaint / Reason for Physician Visit  One episode of fever 100.9. He is sitting up in chair today. He is AAXO. Per his wife he has been more forget full  in the last 6 months  Has mild cough with clear phlegm. Mild  sob and  chest pain. Review of Systems:  Symptom Y/N Comments  Symptom Y/N Comments   Fever/Chills y   Chest Pain n    Poor Appetite    Edema     Cough y   Abdominal Pain n    Sputum y   Joint Pain     SOB/GRECO y   Pruritis/Rash     Nausea/vomit n   Tolerating PT/OT     Diarrhea    Tolerating Diet     Constipation    Other       Could NOT obtain due to:      Objective:     VITALS:   Last 24hrs VS reviewed since prior progress note.  Most recent are:  Patient Vitals for the past 24 hrs:   Temp Pulse Resp BP SpO2   17 1511 - - - - 94 %   12/18/17 1418 98 °F (36.7 °C) 76 16 131/69 96 %   12/18/17 1122 - - - - 95 %   12/18/17 1016 98.1 °F (36.7 °C) 86 16 134/86 96 %   12/18/17 0722 - - - - 98 %   12/18/17 0655 99.1 °F (37.3 °C) 88 16 146/80 97 %   12/18/17 0313 - - - - 99 %   12/18/17 0230 99 °F (37.2 °C) 86 16 137/76 96 %   12/17/17 2337 - - - - 98 %   12/17/17 2119 98.1 °F (36.7 °C) 86 16 121/63 97 %   12/17/17 2021 - - - - 97 %   12/17/17 1932 98.6 °F (37 °C) - - - -   12/17/17 1712 (!) 100.9 °F (38.3 °C) 93 16 124/67 99 %       Intake/Output Summary (Last 24 hours) at 12/18/17 1543  Last data filed at 12/18/17 1538   Gross per 24 hour   Intake          1848. 75 ml   Output              250 ml   Net          1598.75 ml        PHYSICAL EXAM:  General:  cooperative, no acute distress    EENT:  EOMI. Anicteric sclerae. MMM  Resp:  CTA bilaterally, no wheezing or rales. No accessory muscle use  CV:  Regular  rhythm,  No edema  GI:  Soft, Non distended, Non tender.  +Bowel sounds  Neurologic:  Alert and awake,  normal speech,   Psych:             Not anxious nor agitated  Skin:  No rashes.   No jaundice    Reviewed most current lab test results and cultures  YES  Reviewed most current radiology test results   YES  Review and summation of old records today    NO  Reviewed patient's current orders and MAR    YES  PMH/SH reviewed - no change compared to H&P          Current Facility-Administered Medications:     metoprolol tartrate (LOPRESSOR) tablet 12.5 mg, 12.5 mg, Oral, BID, Fifi Brothers MD, 12.5 mg at 12/18/17 1001    aspirin (ASPIRIN) tablet 325 mg, 325 mg, Oral, DAILY, Geoffrey Strange MD, 325 mg at 12/18/17 1001    cholecalciferol (VITAMIN D3) tablet 2,000 Units, 2,000 Units, Oral, DAILY, Geoffrey Strange MD, 2,000 Units at 12/18/17 1001    isosorbide mononitrate ER (IMDUR) tablet 30 mg, 30 mg, Oral, DAILY, Geoffrey Strange MD, 30 mg at 12/18/17 1001    pravastatin (PRAVACHOL) tablet 20 mg, 20 mg, Oral, DAILY, Geoffrey Strange MD, 20 mg at 12/18/17 1001    tamsulosin (FLOMAX) capsule 0.4 mg, 0.4 mg, Oral, DAILY, Tyler Mckinney MD, 0.4 mg at 12/18/17 1001    azithromycin (ZITHROMAX) tablet 500 mg, 500 mg, Oral, DAILY, Tyler Mckinney MD, 500 mg at 12/18/17 1000    albuterol-ipratropium (DUO-NEB) 2.5 MG-0.5 MG/3 ML, 3 mL, Nebulization, Q4H RT, Tyler Mckinney MD, 3 mL at 12/18/17 1511    guaiFENesin ER (MUCINEX) tablet 600 mg, 600 mg, Oral, Q12H, Tyler Mckinney MD, 600 mg at 12/18/17 1001    0.9% sodium chloride infusion, 75 mL/hr, IntraVENous, CONTINUOUS, Tyler Mckinney MD, Last Rate: 75 mL/hr at 12/18/17 0958, 75 mL/hr at 12/18/17 0958    sodium chloride (NS) flush 5-10 mL, 5-10 mL, IntraVENous, Q8H, Tyler Mckinney MD, 10 mL at 12/18/17 1342    sodium chloride (NS) flush 5-10 mL, 5-10 mL, IntraVENous, PRN, Tyler Mckinney MD    acetaminophen (TYLENOL) tablet 650 mg, 650 mg, Oral, Q4H PRN, Tyler Mckinney MD, 650 mg at 12/17/17 1742    ondansetron [facility]) injection 4 mg, 4 mg, IntraVENous, Q4H PRN, Tyler Mckinney MD    docusate sodium (COLACE) capsule 100 mg, 100 mg, Oral, BID PRN, Tyler Mckinney MD  ________________________________________________________________________  Care Plan discussed with:    Comments   Patient y    Family  y wife   RN y    Care Manager     Consultant                        Multidiciplinary team rounds were held today with , nursing, pharmacist and clinical coordinator. Patient's plan of care was discussed; medications were reviewed and discharge planning was addressed.      ________________________________________________________________________  Total NON critical care TIME:  25    Minutes    Total CRITICAL CARE TIME Spent:   Minutes non procedure based      Comments   >50% of visit spent in counseling and coordination of care     ________________________________________________________________________  Shi Ashley MD     Procedures: see electronic medical records for all procedures/Xrays and details which were not copied into this note but were reviewed prior to creation of Plan. LABS:  I reviewed today's most current labs and imaging studies.   Pertinent labs include:  Recent Labs      12/18/17   0444  12/17/17   0354  12/16/17   1551   WBC  8.2  11.2*  11.2*   HGB  10.7*  12.0*  12.7   HCT  32.3*  35.9*  38.0   PLT  123*  150  170     Recent Labs      12/17/17   0354  12/16/17   1551   NA  135*  137   K  3.9  4.5   CL  103  102   CO2  27  28   GLU  130*  117*   BUN  14  17   CREA  1.08  1.26   CA  8.0*  8.7   ALB   --   3.5   TBILI   --   0.6   SGOT   --   25   ALT   --   19   INR   --   1.2*       Signed: Ross Garcia MD

## 2017-12-18 NOTE — PROGRESS NOTES
Patient became increasingly more agitated and was not able to be redirected. Telesitter was not sucessful. Patient became very agitated and would not sit down. Code steven was called. Supervisor, Nurse manager, security and  responded with nursing staff. Patient did not want to speak with nurses. Responded well to security team. Dr Erica Zhu gave a telephone order for 1mg ativan IV once. Patient seemed to calm down but became more agitated when staff tried to direct patient to the bed. Ativan dose was given at 1640. Patient was not calmed after 30 minutes. Dr Erica Zhu was paged again. Gave a telephone order to give 2mg IM haldol. Dr Erica Zhu was reminded that patient had a working IV. Dr Erica Zhu repeated 2mg Im haldol one time. Haldol given at 1710. Patient's wife arrived at [phone]. Patient began to calm down around 1730. Placed patient on continuous pulse ox on 3L NC to maintain 96% oxygen saturations.

## 2017-12-18 NOTE — PROGRESS NOTES
Patient very confused, combative. Code Coats called. Telephone order received from Dr. Don Ashby for Ativan 1mg IV x 1 for agitation.   Wife on her way to hospital.

## 2017-12-18 NOTE — PROGRESS NOTES
Bedside and Verbal shift change report given to Ramona Raymundo RN (oncoming nurse) by ZEYNEP Maya (offgoing nurse). Report included the following information SBAR, Kardex, Intake/Output, MAR and Recent Results.

## 2017-12-18 NOTE — INTERDISCIPLINARY ROUNDS
Bedside interdisciplinary rounds were held today to discuss patient plan of care and outcomes. The following members were present: Nurse, Clinical Care Leader, Pharmacy, Physical Therapy, and Case Management. Plan:  Continue current care. Possible discharge to home with family tomorrow.

## 2017-12-18 NOTE — PROGRESS NOTES
Problem: Mobility Impaired (Adult and Pediatric)  Goal: *Acute Goals and Plan of Care (Insert Text)  Physical Therapy Goals  Initiated 12/18/2017  1. Patient will move from supine to sit and sit to supine , scoot up and down and roll side to side in bed with independence within 7 day(s). 2.  Patient will transfer from bed to chair and chair to bed with independence using the least restrictive device within 7 day(s). 3.  Patient will perform sit to stand with independence within 7 day(s). 4.  Patient will ambulate with independence for 150 feet with the least restrictive device within 7 day(s). 5.  Patient will ascend/descend 5 stairs with 2 handrail(s) with supervision/set-up within 7 day(s). physical Therapy EVALUATION  Patient: Emiliana Coronado (89 y.o. male)  Date: 12/18/2017  Primary Diagnosis: Altered mental state        Precautions:  Fall    ASSESSMENT :  Based on the objective data described below, the patient presents with impaired functional mobility secondary to impaired standing balance, moderate gait impairments, decreased activity tolerance, baseline memory impairments, generalized weakness, and decreased AROM following admission for AMS. A & O x 1 (self). Pt received supine in bed with wife present and agreeable to PT evaluation. Pt cleared by nursing for mobility. Pt on 0.5 L/min O2 upon arrival, however removed for mobility as pt does not wear O2 at baseline and SaO2 remained 96-98% on RA during activity. Bed mobility performed with SBA, requiring increased time with HOB elevated. Sit<>stand transfers performed with CGA-min A x 1. Demonstrated fair standing balance throughout activity. He ambulated 79' with min A x 1, demonstrating accelerated gait speed, path deviations, and unsteady gait overall. No overt LOB noted. Pt was assisted into bedside chair and left with all needs met, RN aware, and wife present following therapy session.  Recommend pt discharge home with 24/7 supervision and HHPT/OT pending progress in acute PT. PT will continue to follow to address functional impairments as listed above. Patient will benefit from skilled intervention to address the above impairments. Patients rehabilitation potential is considered to be Good  Factors which may influence rehabilitation potential include:   []         None noted  [x]         Mental ability/status  []         Medical condition  []         Home/family situation and support systems  [x]         Safety awareness  []         Pain tolerance/management  []         Other:      PLAN :  Recommendations and Planned Interventions:  [x]           Bed Mobility Training             []    Neuromuscular Re-Education  [x]           Transfer Training                   []    Orthotic/Prosthetic Training  [x]           Gait Training                         []    Modalities  [x]           Therapeutic Exercises           []    Edema Management/Control  [x]           Therapeutic Activities            [x]    Patient and Family Training/Education  []           Other (comment):    Frequency/Duration: Patient will be followed by physical therapy  5 times a week to address goals. Discharge Recommendations: Home Health and 24/7 supervision  Further Equipment Recommendations for Discharge: TBD based on progress in acute PT     SUBJECTIVE:   Patient stated Dontituse Dom is it better to be in the chair versus the bed?     OBJECTIVE DATA SUMMARY:   HISTORY:    Past Medical History:   Diagnosis Date    Back pain     due to DDD and scoliosis    BPH (benign prostatic hyperplasia)     Dr. Lima Adjutant CAD (coronary artery disease) 10/30/13    30%LAD, 80% ostal stenosis. Dr. Fracisco Guallpa.  Chest pain 10/04/04, 10/30/13    Dr. Latricia Saleh. Negative Stress Cardiolite Test.  Dr. Ulysses oMre.     Chronic venous insufficiency 06/15/09    Dr. Ramírez Case due to fall    DDD (degenerative disc disease), lumbar     L3-S1    Diverticulosis 02/2004    sigmoid. Dr. Sarah Awad DJD (degenerative joint disease) of cervical spine     C 4-5 ;5-6    Dyslipidemia     Fracture 1980    facial/nasal    Hearing loss     wears hearing aid    Heartburn     Hemorrhoids, internal 1987    Dr. Cody Avendano of unspecified site of abdominal cavity without mention of obstruction or gangrene     inguinal  Lt    Hypertension     Impotence     Left acoustic neuroma ([facility] 10/28/14    17 x 9 mm schwannoma.  Pes planus of both feet     PVD (peripheral vascular disease) ([facility] 2009    Scoliosis     LS    Slipped intervertebral disc 1954    chiropractor    SOB (shortness of breath) 08/07/13    Dr. Armida Kevin.  Stroke ([facility] 10/28/2014    small Left Thalamus. Dr. Barry Rae. Dr. Emmanuel Murphy.  Urinary incontinence     due to BPH. Dr. Garald Spatz    Varicose vein 06/15/09    Dr. Francisco Dempsey Vertigo 2003    due to inner ear. Dr. Julio C Navas. Dr. Ally Joshua. Dr. Verdene Severin Visual loss     Dr. Praveen Gomez, ophth. Past Surgical History:   Procedure Laterality Date    APPENDECTOMY      due to gangrene   [facility], 02/2002    Dr. Soniya Silverio. benign.  COLONOSCOPY  02/2004    Dr. Channing Chavez. due q 10 yrs    CYSTOSCOPY  10/17/03    Dr. Abe Floyd  10/30/13    Dr. Eric Real. 80% ostal stenosis. 30% LAD.  HX HEMORRHOIDECTOMY      HX KNEE ARTHROSCOPY  1990 Left , 2007 Right    Dr. Murphy Precise HX LAPAROTOMY  05/14/07    Dr. Caro Tran. due to Bowel Obstruction    HX POLYPECTOMY  02/2002    Anal.     HX TONSILLECTOMY      MS COLSC FLX W/RMVL OF TUMOR POLYP LESION SNARE TQ  5/23/2011    Dr. Deven Santana. Prior Level of Function/Home Situation: Pt is independent for mobility and ADLs at baseline. Lives with wife. Does not drive. Denies fall history. Works out 3x/week at [employer].   Personal factors and/or comorbidities impacting plan of care: h/o CVA with memory impairments    Home Situation  Home Environment: Private residence  # Steps to Enter: 5  Rails to Enter: Yes  Hand Rails : Bilateral  Wheelchair Ramp: No  One/Two Story Residence: One story  Living Alone: No  Support Systems: Spouse/Significant Other/Partner  Patient Expects to be Discharged to[de-identified] Private residence  Current DME Used/Available at Home: Grab bars, Shower chair  Tub or Shower Type: Shower    EXAMINATION/PRESENTATION/DECISION MAKING:   Critical Behavior:  Neurologic State: Alert, Confused  Orientation Level: Oriented to person  Cognition: Follows commands     Hearing: Auditory  Auditory Impairment: Hard of hearing, bilateral  Hearing Aids/Status: Other (comment) (with wife)  Skin:  Intact  Edema: None  Range Of Motion:  AROM: Generally decreased, functional           PROM: Generally decreased, functional           Strength:    Strength: Generally decreased, functional                    Tone & Sensation:   Tone: Normal              Sensation: Intact               Coordination:  Coordination: Generally decreased, functional (BUE tremors L>R)  Vision:      Functional Mobility:  Bed Mobility:  Rolling: Stand-by asssistance; Additional time  Supine to Sit: Stand-by asssistance; Additional time     Scooting: Stand-by asssistance; Additional time  Transfers:  Sit to Stand: Minimum assistance  Stand to Sit: Contact guard assistance                       Balance:   Sitting: Intact  Standing: Impaired  Standing - Static: Fair  Standing - Dynamic : Fair  Ambulation/Gait Training:  Distance (ft): 70 Feet (ft)  Assistive Device: Gait belt  Ambulation - Level of Assistance: Minimal assistance;Assist x1;Additional time     Gait Description (WDL): Exceptions to WDL  Gait Abnormalities: Decreased step clearance;Shuffling gait        Base of Support: Narrowed     Speed/Alayna: Pace decreased (<100 feet/min); Shuffled  Step Length: Left shortened;Right shortened      Functional Measure:  Barthel Index:    Bathing: 0  Bladder: 10  Bowels: 10  Groomin  Dressing: 10  Feeding: 10  Mobility: 5  Stairs: 5  Toilet Use: 5  Transfer (Bed to Chair and Back): 5  Total: 60       Barthel and G-code impairment scale:  Percentage of impairment CH  0% CI  1-19% CJ  20-39% CK  40-59% CL  60-79% CM  80-99% CN  100%   Barthel Score 0-100 100 99-80 79-60 59-40 20-39 1-19   0   Barthel Score 0-20 20 17-19 13-16 9-12 5-8 1-4 0      The Barthel ADL Index: Guidelines  1. The index should be used as a record of what a patient does, not as a record of what a patient could do. 2. The main aim is to establish degree of independence from any help, physical or verbal, however minor and for whatever reason. 3. The need for supervision renders the patient not independent. 4. A patient's performance should be established using the best available evidence. Asking the patient, friends/relatives and nurses are the usual sources, but direct observation and common sense are also important. However direct testing is not needed. 5. Usually the patient's performance over the preceding 24-48 hours is important, but occasionally longer periods will be relevant. 6. Middle categories imply that the patient supplies over 50 per cent of the effort. 7. Use of aids to be independent is allowed. Carter Chanel., Barthel, DHennyW. ([id number]). Functional evaluation: the Barthel Index. [facility] (14)2. Sagrario Akhtar, TONYA, [facility] [place], [address] [place] [address] (). Measuring the change indisability after inpatient rehabilitation; comparison of the responsiveness of the Barthel Index and Functional Austin Measure. Journal of Neurology, Neurosurgery, and Psychiatry, 66(4), 235-445. Kayleen Lackey, N.J.A, KRIS RodgersJ.M, & Kandice Romeo MTIMOTEO. (2004.) Assessment of post-stroke quality of life in cost-effectiveness studies: The usefulness of the Barthel Index and the EuroQoL-5D. Quality of Life Research, 13, 448-97       G codes:   In compliance with CMSs Claims Based Outcome Reporting, the following G-code set was chosen for this patient based on their primary functional limitation being treated: The outcome measure chosen to determine the severity of the functional limitation was the Barthel Index with a score of 60/100 which was correlated with the impairment scale. ? Mobility - Walking and Moving Around:     - CURRENT STATUS: CJ - 20%-39% impaired, limited or restricted    - GOAL STATUS: CI - 1%-19% impaired, limited or restricted    - D/C STATUS:  ---------------To be determined---------------      Physical Therapy Evaluation Charge Determination   History Examination Presentation Decision-Making   MEDIUM  Complexity : 1-2 comorbidities / personal factors will impact the outcome/ POC  HIGH Complexity : 4+ Standardized tests and measures addressing body structure, function, activity limitation and / or participation in recreation  MEDIUM Complexity : Evolving with changing characteristics  Other outcome measures barthel index  MEDIUM      Based on the above components, the patient evaluation is determined to be of the following complexity level: MEDIUM    Pain:  Pain Scale 1: Numeric (0 - 10)  Pain Intensity 1: 0              Activity Tolerance:   Good - VSS on RA with all activity (HR mildly high with activity); no pain complaints  Please refer to the flowsheet for vital signs taken during this treatment. After treatment:   [x]         Patient left in no apparent distress sitting up in chair  []         Patient left in no apparent distress in bed  [x]         Call bell left within reach  [x]         Nursing notified  [x]         Caregiver present  []         Bed alarm activated    COMMUNICATION/EDUCATION:   The patients plan of care was discussed with: Physical Therapist and Registered Nurse. [x]         Fall prevention education was provided and the patient/caregiver indicated understanding.   [x]         Patient/family have participated as able in goal setting and plan of care. [x]         Patient/family agree to work toward stated goals and plan of care. []         Patient understands intent and goals of therapy, but is neutral about his/her participation. []         Patient is unable to participate in goal setting and plan of care.     Thank you for this referral.  Angel Luis Camacho, PT, DPT   Time Calculation: 27 mins

## 2017-12-18 NOTE — PROGRESS NOTES
Occupational Therapy  Orders received and medical record reviewed. Code Lapel called as pt is confused and combative at this time. Will defer initiating OT Evaluation and continue to follow as appropriate.

## 2017-12-18 NOTE — PROGRESS NOTES
ADULT PROTOCOL: JET AEROSOL ASSESSMENT    Patient  Dayday Schneider     80 y.o.   male     12/18/2017  11:07 AM    Breath Sounds Pre Procedure: Right Breath Sounds: Coarse                               Left Breath Sounds: Coarse    Breath Sounds Post Procedure: Right Breath Sounds: Coarse                                 Left Breath Sounds: Coarse    Breathing pattern: Pre procedure Breathing Pattern: Regular          Post procedure Breathing Pattern: Regular    Heart Rate: Pre procedure Pulse: 85           Post procedure Pulse: 86    Resp Rate: Pre procedure Respirations: 20           Post procedure Respirations: 20      Cough: Pre procedure Cough: Non-productive               Post procedure Cough: Non-productive    Oxygen: O2 Device: Room air       Changed: no    SpO2: Pre procedure SpO2: 98 %                 Post procedure SpO2: 97 %     Nebulizer Therapy: Current medications Aerosolized Medications: DuoNeb      Changed: no        Problem List:   Patient Active Problem List   Diagnosis Code    GERD (gastroesophageal reflux disease) K21.9    Slipped intervertebral disc [id number]    Hemorrhoids, internal K64.8    DJD (degenerative joint disease) of cervical spine M47.812    Scoliosis M41.9    Diverticulosis K57.90    BPH (benign prostatic hyperplasia) N40.0    PVD [facility]peripheral vascular disease) ([facility] I73.9    Varicose vein of leg I83.90    History of small bowel obstruction Z87.19    [place] (hard of hearing[place] H91.90    CAD (coronary artery disease) I25.10    Memory disturbance R41.3    Left acoustic neuroma ([facility] [address] [facility] D33.3    Dyslipidemia E78.5    Advance directive in chart Z78.9    Stroke ([facility]) I63.9    Essential hypertension I10    Chronic constipation K59.09    Benign prostatic hyperplasia N40.0    Bradycardia with 51-60 beats per minute R00.1    Altered mental state R41.82       Respiratory Therapist: Lyndon Payne RT

## 2017-12-19 ENCOUNTER — APPOINTMENT (OUTPATIENT)
Dept: ULTRASOUND IMAGING | Age: 82
DRG: 640 | End: 2017-12-19
Attending: INTERNAL MEDICINE
Payer: MEDICARE

## 2017-12-19 PROCEDURE — 65660000000 HC RM CCU STEPDOWN

## 2017-12-19 PROCEDURE — 74011250637 HC RX REV CODE- 250/637: Performed by: INTERNAL MEDICINE

## 2017-12-19 PROCEDURE — 74011000250 HC RX REV CODE- 250: Performed by: INTERNAL MEDICINE

## 2017-12-19 PROCEDURE — 77010033678 HC OXYGEN DAILY

## 2017-12-19 PROCEDURE — 74011250636 HC RX REV CODE- 250/636: Performed by: INTERNAL MEDICINE

## 2017-12-19 PROCEDURE — G8988 SELF CARE GOAL STATUS: HCPCS | Performed by: OCCUPATIONAL THERAPIST

## 2017-12-19 PROCEDURE — 93970 EXTREMITY STUDY: CPT

## 2017-12-19 PROCEDURE — 94640 AIRWAY INHALATION TREATMENT: CPT

## 2017-12-19 PROCEDURE — 94762 N-INVAS EAR/PLS OXIMTRY CONT: CPT

## 2017-12-19 PROCEDURE — 97535 SELF CARE MNGMENT TRAINING: CPT | Performed by: OCCUPATIONAL THERAPIST

## 2017-12-19 PROCEDURE — 74011250636 HC RX REV CODE- 250/636: Performed by: EMERGENCY MEDICINE

## 2017-12-19 PROCEDURE — 97165 OT EVAL LOW COMPLEX 30 MIN: CPT | Performed by: OCCUPATIONAL THERAPIST

## 2017-12-19 PROCEDURE — 94761 N-INVAS EAR/PLS OXIMETRY MLT: CPT

## 2017-12-19 PROCEDURE — G8987 SELF CARE CURRENT STATUS: HCPCS | Performed by: OCCUPATIONAL THERAPIST

## 2017-12-19 RX ORDER — IPRATROPIUM BROMIDE AND ALBUTEROL SULFATE 2.5; .5 MG/3ML; MG/3ML
3 SOLUTION RESPIRATORY (INHALATION)
Status: DISCONTINUED | OUTPATIENT
Start: 2017-12-20 | End: 2017-12-20

## 2017-12-19 RX ORDER — HALOPERIDOL 5 MG/ML
5 INJECTION INTRAMUSCULAR ONCE
Status: COMPLETED | OUTPATIENT
Start: 2017-12-20 | End: 2017-12-19

## 2017-12-19 RX ADMIN — METOPROLOL TARTRATE 12.5 MG: 25 TABLET ORAL at 10:39

## 2017-12-19 RX ADMIN — GUAIFENESIN 600 MG: 600 TABLET, EXTENDED RELEASE ORAL at 21:53

## 2017-12-19 RX ADMIN — IPRATROPIUM BROMIDE AND ALBUTEROL SULFATE 3 ML: .5; 3 SOLUTION RESPIRATORY (INHALATION) at 04:29

## 2017-12-19 RX ADMIN — HALOPERIDOL LACTATE 5 MG: 5 INJECTION, SOLUTION INTRAMUSCULAR at 23:39

## 2017-12-19 RX ADMIN — LORAZEPAM 1 MG: 2 INJECTION INTRAMUSCULAR; INTRAVENOUS at 21:53

## 2017-12-19 RX ADMIN — IPRATROPIUM BROMIDE AND ALBUTEROL SULFATE 3 ML: .5; 3 SOLUTION RESPIRATORY (INHALATION) at 11:44

## 2017-12-19 RX ADMIN — AZITHROMYCIN 500 MG: 250 TABLET, FILM COATED ORAL at 10:39

## 2017-12-19 RX ADMIN — IPRATROPIUM BROMIDE AND ALBUTEROL SULFATE 3 ML: .5; 3 SOLUTION RESPIRATORY (INHALATION) at 15:13

## 2017-12-19 RX ADMIN — ISOSORBIDE MONONITRATE 30 MG: 30 TABLET, EXTENDED RELEASE ORAL at 10:45

## 2017-12-19 RX ADMIN — Medication 10 ML: at 21:54

## 2017-12-19 RX ADMIN — SODIUM CHLORIDE 75 ML/HR: 900 INJECTION, SOLUTION INTRAVENOUS at 14:18

## 2017-12-19 RX ADMIN — VITAMIN D, TAB 1000IU (100/BT) 2000 UNITS: 25 TAB at 10:45

## 2017-12-19 RX ADMIN — GUAIFENESIN 600 MG: 600 TABLET, EXTENDED RELEASE ORAL at 10:45

## 2017-12-19 RX ADMIN — Medication 10 ML: at 00:24

## 2017-12-19 RX ADMIN — ASPIRIN 325 MG: 325 TABLET ORAL at 10:39

## 2017-12-19 RX ADMIN — Medication 10 ML: at 14:18

## 2017-12-19 RX ADMIN — TAMSULOSIN HYDROCHLORIDE 0.4 MG: 0.4 CAPSULE ORAL at 10:45

## 2017-12-19 RX ADMIN — IPRATROPIUM BROMIDE AND ALBUTEROL SULFATE 3 ML: .5; 3 SOLUTION RESPIRATORY (INHALATION) at 00:42

## 2017-12-19 RX ADMIN — LORAZEPAM 1 MG: 2 INJECTION INTRAMUSCULAR; INTRAVENOUS at 00:24

## 2017-12-19 RX ADMIN — IPRATROPIUM BROMIDE AND ALBUTEROL SULFATE 3 ML: .5; 3 SOLUTION RESPIRATORY (INHALATION) at 20:30

## 2017-12-19 RX ADMIN — PRAVASTATIN SODIUM 20 MG: 10 TABLET ORAL at 10:39

## 2017-12-19 RX ADMIN — METOPROLOL TARTRATE 12.5 MG: 25 TABLET ORAL at 17:56

## 2017-12-19 RX ADMIN — IPRATROPIUM BROMIDE AND ALBUTEROL SULFATE 3 ML: .5; 3 SOLUTION RESPIRATORY (INHALATION) at 07:33

## 2017-12-19 NOTE — PROGRESS NOTES
SW met with pt spouse in regards to discharge planning. SW inquired if spouse was agreeable to [facility] post discharge. Spouse in agreement. Spouse stated pt is very independent at home and does not like for others to assist him. She also stated pt would not do good if he had to go to SNF at discharge. Spouse in agreement with pt having his HH with [facility] as she stated she would like to keep all of his medical info within the [employer]. Referral sent to [facility] for their review. SW informed pt spouse of possible discharge tomorrow. Spouse in agreement. SW will continue to follow and assist with additional discharge needs.     Severo Saint, MSW  Ext [phone]

## 2017-12-19 NOTE — PROGRESS NOTES
Problem: Self Care Deficits Care Plan (Adult)  Goal: *Acute Goals and Plan of Care (Insert Text)  Occupational Therapy Goals  Initiated 12/19/2017  1. Patient will perform grooming in standing with supervision/set-up within 7 day(s). 2.  Patient will perform bathing with minimal assistance/contact guard assist within 7 day(s). 3.  Patient will perform upper body dressing and lower body dressing with supervision/set-up within 7 day(s). 4.  Patient will perform toilet transfers with supervision/set-up within 7 day(s). 5.  Patient will perform all aspects of toileting with supervision/set-up within 7 day(s). 6.  Patient will participate in upper extremity therapeutic exercise/activities with supervision/set-up for 10 minutes within 7 day(s). Occupational Therapy EVALUATION  Patient: Graham Severin (89 y.o. male)  Date: 12/19/2017  Primary Diagnosis: Altered mental state        Precautions:   Fall    ASSESSMENT :  Based on the objective data described below, the patient presents with baseline dementia, initial lethargy that improved with activity, decreased balance, generalized weakness, and decreased endurance impairing adls and mobility. Pt is functioning below his independent baseline at minimal to moderate assistance requiring additional time for all tasks. Pt will need 24 hour support from his wife at discharge as well as [address] [place] [address] Paulino Mcnally OT and PT. Tashia Ontiveros Patient will benefit from skilled intervention to address the above impairments.   Patients rehabilitation potential is considered to be Good  Factors which may influence rehabilitation potential include:   []             None noted  [x]             Mental ability/status  [x]             Medical condition  []             Home/family situation and support systems  []             Safety awareness  []             Pain tolerance/management  []             Other:      PLAN :  Recommendations and Planned Interventions:  [x]               Self Care Training [x]        Therapeutic Activities  [x]               Functional Mobility Training    []        Cognitive Retraining  [x]               Therapeutic Exercises           [x]        Endurance Activities  [x]               Balance Training                   []        Neuromuscular Re-Education  []               Visual/Perceptual Training     [x]   Home Safety Training  [x]               Patient Education                 []        Family Training/Education  []               Other (comment):    Frequency/Duration: Patient will be followed by occupational therapy 5 times a week to address goals. Discharge Recommendations: Home Health OT and PT with 24 hour assistance for adls and IADls from his wife. If wife cannot provide, then recommend short term rehab  Further Equipment Recommendations for Discharge: BSC to place over commode for safety     SUBJECTIVE:   Patient stated I haven't read it yet.    (re: [place] news story, pt reading newspaper at end of tx session)    OBJECTIVE DATA SUMMARY:   HISTORY:   Past Medical History:   Diagnosis Date    Back pain     due to DDD and scoliosis    BPH (benign prostatic hyperplasia)     Dr. Jewels Puckett CAD (coronary artery disease) 10/30/13    30%LAD, 80% ostal stenosis. Dr. Julian Villafuerte.  Chest pain 10/04/04, 10/30/13    Dr. Orquidea Schafer. Negative Stress Cardiolite Test.  Dr. Mariella Lynne.  Chronic venous insufficiency 06/15/09    Dr. Nnamdi Hinson due to fall    DDD (degenerative disc disease), lumbar     L3-S1    Diverticulosis 02/2004    sigmoid.   Dr. Darryl De Leon DJD (degenerative joint disease) of cervical spine     C 4-5 ;5-6    Dyslipidemia     Fracture 1980    facial/nasal    Hearing loss     wears hearing aid    Heartburn     Hemorrhoids, internal 1987    Dr. Dozier Revels of unspecified site of abdominal cavity without mention of obstruction or gangrene     inguinal  Lt    Hypertension     Impotence     Left acoustic neuroma ([facility] 10/28/14    17 x 9 mm schwannoma.  Pes planus of both feet     PVD (peripheral vascular disease) ([facility] 2009    Scoliosis     LS    Slipped intervertebral disc 1954    chiropractor    SOB (shortness of breath) 08/07/13    Dr. Lisa Patten.  Stroke [facility] 10/28/2014    small Left [place]. Dr. Christelle Toussaint. Dr. Gene Velasco.  Urinary incontinence     due to BPH. Dr. Jackie Townsend    Varicose vein 06/15/09    Dr. Gautam Londoris Vertigo 2003    due to inner ear. Dr. Cory Mei. Dr. Eleazar Gonzales. Dr. Lg Jerry Visual loss     Dr. Rose Ward, ophth. Past Surgical History:   Procedure Laterality Date    APPENDECTOMY      due to gangrene   [facility], 02/2002    Dr. Nona Max. benign.  COLONOSCOPY  02/2004    Dr. Johanne Freeman. due q 10 yrs    CYSTOSCOPY  10/17/03    Dr. Jamila Reyes  10/30/13    Dr. Charly Day. 80% ostal stenosis. 30% LAD.  HX HEMORRHOIDECTOMY      HX KNEE ARTHROSCOPY  1990 Left , 2007 Right    Dr. Jesus Nuno HX LAPAROTOMY  05/14/07    Dr. Rina Mireles. due to Bowel Obstruction    HX POLYPECTOMY  02/2002    Anal.     HX TONSILLECTOMY      OR COLSC FLX W/RMVL OF TUMOR POLYP LESION SNARE TQ  5/23/2011    Dr. Justus Díaz. Prior Level of Function/Environment/Context: Pt was independent in adls, IADLs, did not drive, went to exercises at gym 3X/week.     Occupations in which the patient is/was successful, what are the barriers preventing that success: dementia per wife, encephalopathy  Performance Patterns (routines, roles, habits, and rituals):   Personal Interests and/or values:   Expanded or extensive additional review of patient history: DDD, c ardiac issues, cva (thalamic) in 2014    [address]: Private residence  # Steps to Enter: 5  Rails to Enter: Yes  Hand Rails : Bilateral  Wheelchair Ramp: No  One/Two Story Residence: One story  Living Alone: No  Support Systems: Spouse/Significant Other/Partner  Patient Expects to be Discharged to[de-identified] Private residence  Current DME Used/Available at Home: Grab bars, Shower chair  Tub or Shower Type: Shower  [x]  Right hand dominant   []  Left hand dominant    EXAMINATION OF PERFORMANCE DEFICITS:  Cognitive/Behavioral Status:  Neurologic State: Alert;Drowsy (drwosy initially; improved with increased upright)  Orientation Level: Disoriented to place; Disoriented to situation;Disoriented to time;Oriented to person  Cognition: Decreased attention/concentration; Follows commands;Poor safety awareness  Perception:  (cues for scanning environment and incr. awareness)  Perseveration: No perseveration noted  Safety/Judgement: Decreased awareness of environment;Decreased awareness of need for assistance;Decreased awareness of need for safety; Fall prevention    Skin: generally intact, red areas on back     Edema: none observed    Hearing: Auditory  Auditory Impairment: Hard of hearing, bilateral  Hearing Aids/Status:  (wife has aides as she does not want to lose them)    Vision/Perceptual:    Tracking:  (not formally tested - decreased scanning of environment)                      Acuity: Impaired near vision; Impaired far vision (not formally tested)    Corrective Lenses: Glasses    Range of Motion:  BUEs:  AROM: Within functional limits  PROM: Within functional limits                      Strength:  BUEs:  Strength: Generally decreased, functional (generalilzed weakness)                Coordination:  Coordination: Generally decreased, functional (decreased LUE coordination)  Fine Motor Skills-Upper: Left Impaired;Right Intact    Gross Motor Skills-Upper: Left Impaired;Right Intact (functional)    Tone & Sensation:    Tone: Normal  Sensation: Intact                      Balance:  Sitting: Intact (able to reach to feet with lean to right for adjusting RLE s)  Standing: Impaired (needs phys cues for upright)  Standing - Static: Constant support; Fair  Standing - Dynamic : Fair (trialed RW - pt unfamiliar with maneuvering.)    Functional Mobility and Transfers for ADLs:  Bed Mobility:  Supine to Sit: Stand-by asssistance  Scooting: Stand-by asssistance    Transfers:  Sit to Stand: Contact guard assistance  Stand to Sit: Contact guard assistance  Bed to Chair: Contact guard assistance;Minimum assistance  Toilet Transfer : Minimum assistance (using RW with assist to maneuver it)    ADL Assessment:  Feeding: Minimum assistance; Additional time    Oral Facial Hygiene/Grooming: Minimum assistance; Additional time    Bathing: Moderate assistance; Additional time    Upper Body Dressing: Minimum assistance; Additional time    Lower Body Dressing: Minimum assistance; Additional time    Toileting: Minimum assistance; Additional time                ADL Intervention and task modifications:     Pt educated in safety during transfers, utilized the RW that pt is unfamiliar with, requiring maximal assistance to manage the RW. Ambulated into bathroom and then to chair. No LOB during functional mobility. All self care required significant additional time. Cognitive Retraining  Safety/Judgement: Decreased awareness of environment;Decreased awareness of need for assistance;Decreased awareness of need for safety; Fall prevention    Therapeutic Exercise:  Encouraged sitting upright in chair for about an hour--at least to eat his meal   Functional Measure:  Barthel Index:    Bathin  Bladder: 10  Bowels: 10  Groomin  Dressing: 10  Feedin  Mobility: 0  Stairs: 0  Toilet Use: 5  Transfer (Bed to Chair and Back): 10  Total: 50       Barthel and G-code impairment scale:  Percentage of impairment CH  0% CI  1-19% CJ  20-39% CK  40-59% CL  60-79% CM  80-99% CN  100%   Barthel Score 0-100 100 99-80 79-60 59-40 20-39 1-19   0   Barthel Score 0-20 20 17-19 13-16 9-12 5-8 1-4 0      The Barthel ADL Index: Guidelines  1.  The index should be used as a record of what a patient does, not as a record of what a patient could do. 2. The main aim is to establish degree of independence from any help, physical or verbal, however minor and for whatever reason. 3. The need for supervision renders the patient not independent. 4. A patient's performance should be established using the best available evidence. Asking the patient, friends/relatives and nurses are the usual sources, but direct observation and common sense are also important. However direct testing is not needed. 5. Usually the patient's performance over the preceding 24-48 hours is important, but occasionally longer periods will be relevant. 6. Middle categories imply that the patient supplies over 50 per cent of the effort. 7. Use of aids to be independent is allowed. Toño Barrera., Barthel, D.W. (437). Functional evaluation: the Barthel Index. [facility] (14)2. SAJI NicoleF, [place] Art., Henrik Dwight., [place], [address] [place] [address] (1999). Measuring the change indisability after inpatient rehabilitation; comparison of the responsiveness of the Barthel Index and Functional [place] Measure. Journal of Neurology, Neurosurgery, and Psychiatry, 66(4), 202-808. Andre Ewing, N.J.A, CHAR Rodgers, & Alba Paiz, M.A. (2004.) Assessment of post-stroke quality of life in cost-effectiveness studies: The usefulness of the Barthel Index and the EuroQoL-5D. Quality of Life Research, 13, 661-62       G codes: In compliance with CMSs Claims Based Outcome Reporting, the following G-code set was chosen for this patient based on their primary functional limitation being treated: The outcome measure chosen to determine the severity of the functional limitation was the Barthel index with a score of 50/100 which was correlated with the impairment scale. ?  Self Care:     - CURRENT STATUS: CK - 40%-59% impaired, limited or restricted    - GOAL STATUS: CJ - 20%-39% impaired, limited or restricted    - D/C STATUS:  ---------------To be determined---------------     Occupational Therapy Evaluation Charge Determination   History Examination Decision-Making   LOW Complexity : Brief history review  MEDIUM Complexity : 3-5 performance deficits relating to physical, cognitive , or psychosocial skils that result in activity limitations and / or participation restrictions MEDIUM Complexity : Patient may present with comorbidities that affect occupational performnce. Miniml to moderate modification of tasks or assistance (eg, physical or verbal ) with assesment(s) is necessary to enable patient to complete evaluation       Based on the above components, the patient evaluation is determined to be of the following complexity level: LOW   Pain:  Pain Scale 1: Numeric (0 - 10)  Pain Intensity 1: 0              Activity Tolerance:   Fair. No complaints  After treatment:   [x] Patient left in no apparent distress sitting up in chair  [] Patient left in no apparent distress in bed  [x] Call bell left within reach  [x] Nursing notified  [x] Caregiver present  [x] Bed alarm activated    COMMUNICATION/EDUCATION:   The patients plan of care was discussed with: Registered Nurse, KAY. [x] Home safety education was provided and the caregiver indicated understanding. [x] Patient/family have participated as able in goal setting and plan of care. [] Patient/family agree to work toward stated goals and plan of care. [] Patient understands intent and goals of therapy, but is neutral about his/her participation. [] Patient is unable to participate in goal setting and plan of care. This patients plan of care is appropriate for delegation to [facility].     Thank you for this referral.  Lalo Nixon, OTR/L   48 minutes

## 2017-12-19 NOTE — PROGRESS NOTES
Problem: Falls - Risk of  Goal: *Absence of Falls  Document Rodolfo Fall Risk and appropriate interventions in the flowsheet.    Outcome: Progressing Towards Goal  Fall Risk Interventions:  Mobility Interventions: Communicate number of staff needed for ambulation/transfer    Mentation Interventions: Door open when patient unattended, Family/sitter at bedside    Medication Interventions: Assess postural VS orthostatic hypotension    Elimination Interventions: Call light in reach, Toileting schedule/hourly rounds

## 2017-12-19 NOTE — PROGRESS NOTES
Bedside and Verbal shift change report given to Ronnie Domínguez (oncoming nurse) by Ophelia Salmon (offgoing nurse). Report included the following information SBAR, Kardex, Intake/Output, MAR and Recent Results.

## 2017-12-19 NOTE — PROGRESS NOTES
JET AEROSOL PROTOCOL - REASSESS    Patient: Carlos Alberto Frost, 12/19/2017  4:41 PM     Breath Sounds:  Clear    Breathing Pattern:  Regular    Respiratory Rate: 18    Cough:  Non productive    Suctioned:  No    Sputum:  None    Heart Rate:  Pre:  87,  Post:      Repiratory Rate:  Pre:  18,  Post:  18    Oxygen:  2L NC     Oxygen changed:  No    SPO2:  With oxygen:  98%,  Without oxygen:  NA    Nebulizer Therapy:    Current:  Q4 2.5mg Duoneb    Changed:  No    Respiratory Therapist: Shailesh Senior RT

## 2017-12-19 NOTE — PROGRESS NOTES
Hospitalist Progress Note    NAME: Lori Bush   :  10/24/1928   MRN:  [id number]       Assessment / Plan:  Acute encephalopathy likely metabolic   Suspected Delirium   Possible bronchitis  -sore throat and congestion, CT chest without any infiltrate, does have productive cough  -CT head negative  -Continue azithromycin empirically for suspected URTI  -nebs  -mucinex  -B12, folate, ammonia are normal - as per previous Hospitalist - pt's mental status has continued to improve and he has been more conversational   -Blood cx NGTD  -Afebrile > 30 hrs, needs DVT US completed   -Pt also had episode of agitation and delirium requiring IV Ativan and Haldol yesterday evening - Code Jayess was called - hopeful that the situation does not repeat today  -Likely for early AM DC tmr    HTN  -imdur and metoprolol      Dementia  -dementia started on  but has been getting worse since last 6 month per wife  -wife is primary caregiver  -usually active uses cane  -has scheduled follow up with neurologist      BPH  -on flomax    Body mass index is 26.07 kg/(m^2). Code status: DNR  Prophylaxis: SCD's  Recommended Disposition: Home w/Family     Subjective:     Chief Complaint / Reason for Physician Visit  \"I am fine\". Discussed with RN events overnight. Review of Systems:  Symptom Y/N Comments  Symptom Y/N Comments   Fever/Chills    Chest Pain     Poor Appetite    Edema     Cough    Abdominal Pain     Sputum    Joint Pain     SOB/GRECO    Pruritis/Rash     Nausea/vomit    Tolerating PT/OT     Diarrhea    Tolerating Diet     Constipation    Other       Could NOT obtain due to: dementia     Objective:     VITALS:   Last 24hrs VS reviewed since prior progress note.  Most recent are:  Patient Vitals for the past 24 hrs:   Temp Pulse Resp BP SpO2   17 1143 - - - - 96 %   17 1037 97.7 °F (36.5 °C) 99 16 (!) 154/93 97 %   17 0723 - - - - 98 %   17 0635 98.8 °F (37.1 °C) 81 18 140/77 98 %   17 0427 - - - - 97 %   12/19/17 0230 97.7 °F (36.5 °C) 91 18 143/78 97 %   12/19/17 0041 - - - - 94 %   12/18/17 2018 - - - - 98 %   12/18/17 1825 97.8 °F (36.6 °C) 77 - 108/68 98 %   12/18/17 1511 - - - - 94 %   12/18/17 1418 98 °F (36.7 °C) 76 16 131/69 96 %       Intake/Output Summary (Last 24 hours) at 12/19/17 1306  Last data filed at 12/19/17 0741   Gross per 24 hour   Intake                0 ml   Output              450 ml   Net             -450 ml        PHYSICAL EXAM:  General: Cooperative, frail, NAD  EENT:  EOMI. Anicteric sclerae. MMM  Resp:  CTA bilaterally, no wheezing or rales. No accessory muscle use  CV:  Regular  rhythm,  No edema  GI:  Soft, Non distended, Non tender.  +Bowel sounds  Neurologic:  Alert and oriented X 0  Psych:   No insight. Not anxious nor agitated  Skin:  No rashes. No jaundice    Reviewed most current lab test results and cultures  YES  Reviewed most current radiology test results   YES  Review and summation of old records today    NO  Reviewed patient's current orders and MAR    YES  PMH/ reviewed - no change compared to H&P  ________________________________________________________________________  Care Plan discussed with:    Comments   Patient x    Family      RN x    Care Manager x    Consultant                        Multidiciplinary team rounds were held today with , nursing, pharmacist and clinical coordinator. Patient's plan of care was discussed; medications were reviewed and discharge planning was addressed.      ________________________________________________________________________  Total NON critical care TIME:  25   Minutes    Total CRITICAL CARE TIME Spent:   Minutes non procedure based      Comments   >50% of visit spent in counseling and coordination of care     ________________________________________________________________________  Fariha Morgan MD     Procedures: see electronic medical records for all procedures/Xrays and details which were not copied into this note but were reviewed prior to creation of Plan. LABS:  I reviewed today's most current labs and imaging studies.   Pertinent labs include:  Recent Labs      12/18/17   0444  12/17/17   0354  12/16/17   1551   WBC  8.2  11.2*  11.2*   HGB  10.7*  12.0*  12.7   HCT  32.3*  35.9*  38.0   PLT  123*  150  170     Recent Labs      12/17/17   0354  12/16/17   1551   NA  135*  137   K  3.9  4.5   CL  103  102   CO2  27  28   GLU  130*  117*   BUN  14  17   CREA  1.08  1.26   CA  8.0*  8.7   ALB   --   3.5   TBILI   --   0.6   SGOT   --   25   ALT   --   19   INR   --   1.2*       Signed: Galdino Vaca MD

## 2017-12-20 ENCOUNTER — HOME HEALTH ADMISSION (OUTPATIENT)
Dept: HOME HEALTH SERVICES | Facility: HOME HEALTH | Age: 82
End: 2017-12-20
Payer: MEDICARE

## 2017-12-20 PROCEDURE — 97530 THERAPEUTIC ACTIVITIES: CPT

## 2017-12-20 PROCEDURE — 77010033678 HC OXYGEN DAILY

## 2017-12-20 PROCEDURE — 74011250636 HC RX REV CODE- 250/636: Performed by: EMERGENCY MEDICINE

## 2017-12-20 PROCEDURE — 74011250637 HC RX REV CODE- 250/637: Performed by: INTERNAL MEDICINE

## 2017-12-20 PROCEDURE — 94640 AIRWAY INHALATION TREATMENT: CPT

## 2017-12-20 PROCEDURE — 74011000250 HC RX REV CODE- 250: Performed by: EMERGENCY MEDICINE

## 2017-12-20 PROCEDURE — 94761 N-INVAS EAR/PLS OXIMETRY MLT: CPT

## 2017-12-20 PROCEDURE — 65660000000 HC RM CCU STEPDOWN

## 2017-12-20 PROCEDURE — 74011250636 HC RX REV CODE- 250/636: Performed by: INTERNAL MEDICINE

## 2017-12-20 PROCEDURE — 94762 N-INVAS EAR/PLS OXIMTRY CONT: CPT

## 2017-12-20 PROCEDURE — 74011000250 HC RX REV CODE- 250: Performed by: GENERAL ACUTE CARE HOSPITAL

## 2017-12-20 RX ORDER — IPRATROPIUM BROMIDE AND ALBUTEROL SULFATE 2.5; .5 MG/3ML; MG/3ML
3 SOLUTION RESPIRATORY (INHALATION)
Status: DISCONTINUED | OUTPATIENT
Start: 2017-12-20 | End: 2017-12-21 | Stop reason: HOSPADM

## 2017-12-20 RX ORDER — QUETIAPINE FUMARATE 25 MG/1
25 TABLET, FILM COATED ORAL
Status: DISCONTINUED | OUTPATIENT
Start: 2017-12-20 | End: 2017-12-21 | Stop reason: HOSPADM

## 2017-12-20 RX ADMIN — IPRATROPIUM BROMIDE AND ALBUTEROL SULFATE 3 ML: .5; 3 SOLUTION RESPIRATORY (INHALATION) at 07:43

## 2017-12-20 RX ADMIN — ASPIRIN 325 MG: 325 TABLET ORAL at 12:51

## 2017-12-20 RX ADMIN — GUAIFENESIN 600 MG: 600 TABLET, EXTENDED RELEASE ORAL at 12:52

## 2017-12-20 RX ADMIN — PRAVASTATIN SODIUM 20 MG: 10 TABLET ORAL at 12:52

## 2017-12-20 RX ADMIN — QUETIAPINE FUMARATE 25 MG: 25 TABLET ORAL at 19:23

## 2017-12-20 RX ADMIN — Medication 10 ML: at 17:13

## 2017-12-20 RX ADMIN — WATER 20 MG: 1 INJECTION INTRAMUSCULAR; INTRAVENOUS; SUBCUTANEOUS at 01:40

## 2017-12-20 RX ADMIN — SODIUM CHLORIDE 75 ML/HR: 900 INJECTION, SOLUTION INTRAVENOUS at 17:15

## 2017-12-20 RX ADMIN — METOPROLOL TARTRATE 12.5 MG: 25 TABLET ORAL at 12:51

## 2017-12-20 RX ADMIN — METOPROLOL TARTRATE 12.5 MG: 25 TABLET ORAL at 17:13

## 2017-12-20 RX ADMIN — ISOSORBIDE MONONITRATE 30 MG: 30 TABLET, EXTENDED RELEASE ORAL at 12:52

## 2017-12-20 RX ADMIN — TAMSULOSIN HYDROCHLORIDE 0.4 MG: 0.4 CAPSULE ORAL at 12:51

## 2017-12-20 RX ADMIN — ACETAMINOPHEN 650 MG: 325 TABLET ORAL at 21:30

## 2017-12-20 RX ADMIN — IPRATROPIUM BROMIDE AND ALBUTEROL SULFATE 3 ML: .5; 3 SOLUTION RESPIRATORY (INHALATION) at 00:46

## 2017-12-20 RX ADMIN — GUAIFENESIN 600 MG: 600 TABLET, EXTENDED RELEASE ORAL at 20:57

## 2017-12-20 RX ADMIN — Medication 10 ML: at 06:21

## 2017-12-20 RX ADMIN — AZITHROMYCIN 500 MG: 250 TABLET, FILM COATED ORAL at 12:51

## 2017-12-20 NOTE — INTERDISCIPLINARY ROUNDS
Bedside interdisciplinary rounds were held today to discuss patient plan of care and outcomes. The following members were present: Physician, Nurse, Clinical Care Leader, Pharmacy, Physical Therapy, and Case Management. Plan:  Continue current care.

## 2017-12-20 NOTE — PROGRESS NOTES
JET AEROSOL PROTOCOL - REASSESS    Patient: Wero Huang, 12/20/2017  9:14 AM     Breath Sounds:  Clear    Breathing Pattern:  Regular    Respiratory Rate: 16    Cough:  Non productive    Suctioned:  No    Sputum:  None    Heart Rate:  Pre:  98,  Post:  86    Repiratory Rate:  Pre:  18,  Post:  18    Oxygen:  2L NC     Oxygen changed:  No    SPO2:  With oxygen:  97%,  Without oxygen:  NA    Nebulizer Therapy:    Current:  Q6 2.5mg Duoneb    Changed:  Yes    Respiratory Therapist: RT Ramon

## 2017-12-20 NOTE — PROGRESS NOTES
Bedside shift change report given to Valerio De La Paz (oncoming nurse) by Ran Larson (offgoing nurse). Report included the following information SBAR, Kardex, MAR, Accordion and Recent Results.

## 2017-12-20 NOTE — PROGRESS NOTES
Problem: Mobility Impaired (Adult and Pediatric)  Goal: *Acute Goals and Plan of Care (Insert Text)  Physical Therapy Goals  Initiated 12/18/2017  1. Patient will move from supine to sit and sit to supine , scoot up and down and roll side to side in bed with independence within 7 day(s). 2.  Patient will transfer from bed to chair and chair to bed with independence using the least restrictive device within 7 day(s). 3.  Patient will perform sit to stand with independence within 7 day(s). 4.  Patient will ambulate with independence for 150 feet with the least restrictive device within 7 day(s). 5.  Patient will ascend/descend 5 stairs with 2 handrail(s) with supervision/set-up within 7 day(s). physical Therapy TREATMENT  Patient: Raul Monson (21 y.o. male)  Date: 12/20/2017  Diagnosis: Altered mental state <principal problem not specified>       Precautions: Fall  Chart, physical therapy assessment, plan of care and goals were reviewed. ASSESSMENT:  Pt cleared by nurse to mobilize. Pt received in bed supine with sitter in room. Per nurse patient very drowsy. Pt awaken and agreeable to mobility. Pt performed supine to sit at mod A x2 with cueing for hand placment. Pt requiring initial mod A to sit EOB. Pts sitting balance improved after trunk flexion to target with each hand. Pt requiring  cueing to keep eyes open when sitting up. Pt able to performed for short periods of time. Pt performed x3 sit to stand transfer at mod A x2. Pt with heavy posterior lean in standing but able to improve when cued to lean more on RW. Pt able to side step 2ft to [facility] with mod A x2. Pt performed sit to supine at max A. Once pt in bed pt falling asleep immediatly. Pt with decreased mobility from last session. Will continue to follow to improve mobility. Pt could benefit from SNF rehab to improve mobility and strength. Pts family would prefer HHPT with 24/7 supervision at this time.       Progression toward goals:  [] Improving appropriately and progressing toward goals  [x]      Improving slowly and progressing toward goals  []      Not making progress toward goals and plan of care will be adjusted     PLAN:  Patient continues to benefit from skilled intervention to address the above impairments. Continue treatment per established plan of care. Discharge Recommendations:  Home Health with 24/7 supervision   Further Equipment Recommendations for Discharge:  TBD     SUBJECTIVE:   Patient stated I'll walk.     OBJECTIVE DATA SUMMARY:   Critical Behavior:  Neurologic State: Lethargic  Orientation Level: Oriented to person, Disoriented to place, Disoriented to situation, Disoriented to time  Cognition: Poor safety awareness  Safety/Judgement: Decreased awareness of environment, Decreased awareness of need for assistance, Decreased awareness of need for safety, Fall prevention  Functional Mobility Training:  Bed Mobility:     Supine to Sit: Moderate assistance     Scooting: Moderate assistance;Assist x1         Transfers:  Sit to Stand: Moderate assistance;Assist x2  Stand to Sit: Minimum assistance;Assist x2                             Balance:  Sitting: Impaired  Sitting - Static: Fair (occasional)  Sitting - Dynamic: Fair (occasional)  Standing: Impaired  Standing - Static: Constant support;Poor  Standing - Dynamic : Poor  Ambulation/Gait Training:                                                  Therapeutic Exercises:   Trunk flexion x10  Standing   Marching x5  Pain:  Pain Scale 1: Numeric (0 - 10)  Pain Intensity 1: 0              Activity Tolerance:   Pt very drowsy with all mobility. After treatment: Sitter in room.    [] Patient left in no apparent distress sitting up in chair  [x] Patient left in no apparent distress in bed  [x] Call bell left within reach  [x] Nursing notified  [] Caregiver present  [] Bed alarm activated    COMMUNICATION/COLLABORATION:   The patients plan of care was discussed with: Afshan Nurse    Jer Solis   Time Calculation: 23 mins

## 2017-12-20 NOTE — PROGRESS NOTES
Hospitalist Progress Note    NAME: Lori Bush   :  10/24/1928   MRN:  [id number]       Assessment / Plan:  Acute encephalopathy likely metabolic, improving  Suspected Delirium   Possible bronchitis  -sore throat and congestion, CT chest without any infiltrate, does have productive cough  -CT head negative  -Continue azithromycin empirically for suspected URTI  -nebs  -mucinex  -B12, folate, ammonia are normal - as per previous Hospitalist - pt's mental status has continued to improve and he has been more conversational   -Blood cx NGTD  -Afebrile > 48 hrs, DVT US negative   -Pt has been difficulty and combative the previous two nights. Last night he required Ativan, Geodon and Haldol  -Therefore I am concerned about pt's elderly wife being able to take care of him when he is home. I agree that once he is back to known surroundings, he will likely improve, but he may require some sedative prior to DC  -Have asked for Palliative Consult for help with above - Seroquel 25mg qHS to be started  -If pt remains calm with this medication, then likely for DC tmr AM    HTN  -Imdur and metoprolol      Dementia  -dementia started on  but has been getting worse since last 6 month per wife  -wife is primary caregiver  -usually active uses cane  -has scheduled follow up with neurologist   -As noted above, started on Seroquel 25mg qHS      BPH  -on flomax     Body mass index is 26.07 kg/(m^2).     Code status: DNR  Prophylaxis: SCD's  Recommended Disposition: Home w/Family     Subjective:     Chief Complaint / Reason for Physician Visit  Drowsy due to IV sedatives received last night. Discussed with RN events overnight.      Review of Systems:  Symptom Y/N Comments  Symptom Y/N Comments   Fever/Chills    Chest Pain     Poor Appetite    Edema     Cough    Abdominal Pain     Sputum    Joint Pain     SOB/GRECO    Pruritis/Rash     Nausea/vomit    Tolerating PT/OT     Diarrhea    Tolerating Diet     Constipation    Other Could NOT obtain due to: Mental status     Objective:     VITALS:   Last 24hrs VS reviewed since prior progress note. Most recent are:  Patient Vitals for the past 24 hrs:   Temp Pulse Resp BP SpO2   12/20/17 1410 98.8 °F (37.1 °C) 69 18 169/75 95 %   12/20/17 1057 99 °F (37.2 °C) 80 16 143/84 99 %   12/20/17 0742 - - - - 97 %   12/20/17 0618 97.8 °F (36.6 °C) 88 18 (!) 171/94 91 %   12/20/17 0200 98.5 °F (36.9 °C) 82 18 165/86 96 %   12/20/17 0048 - - - - 98 %   12/19/17 2204 98.1 °F (36.7 °C) 68 18 163/83 96 %   12/19/17 2030 - - - - 97 %   12/19/17 1751 98.1 °F (36.7 °C) 71 16 123/72 95 %     No intake or output data in the 24 hours ending 12/20/17 1607     PHYSICAL EXAM:  General: Sedated, arousable to painful stimuli  EENT:  EOMI. Anicteric sclerae. MMM  Resp:  CTA bilaterally, no wheezing or rales. No accessory muscle use  CV:  Regular  rhythm,  No edema  GI:  Soft, Non distended, Non tender.  +Bowel sounds  Neurologic:  Unable to assess  Psych:   No insight  Skin:  No rashes. No jaundice    Reviewed most current lab test results and cultures  YES  Reviewed most current radiology test results   YES  Review and summation of old records today    NO  Reviewed patient's current orders and MAR    YES  PMH/ reviewed - no change compared to H&P  ________________________________________________________________________  Care Plan discussed with:    Comments   Patient x    Family  x    RN x    Care Manager x    Consultant                        Multidiciplinary team rounds were held today with , nursing, pharmacist and clinical coordinator. Patient's plan of care was discussed; medications were reviewed and discharge planning was addressed.      ________________________________________________________________________  Total NON critical care TIME:  30   Minutes    Total CRITICAL CARE TIME Spent:   Minutes non procedure based      Comments   >50% of visit spent in counseling and coordination of care x    ________________________________________________________________________  Derrick Deleon MD     Procedures: see electronic medical records for all procedures/Xrays and details which were not copied into this note but were reviewed prior to creation of Plan. LABS:  I reviewed today's most current labs and imaging studies. Pertinent labs include:  Recent Labs      12/18/17   0444   WBC  8.2   HGB  10.7*   HCT  32.3*   PLT  123*     No results for input(s): NA, K, CL, CO2, GLU, BUN, CREA, CA, MG, PHOS, ALB, TBIL, TBILI, SGOT, ALT, INR in the last 72 hours.     No lab exists for component: INREXT    Signed: Derrick Deleon MD

## 2017-12-20 NOTE — CONSULTS
Palliative Medicine Consult  David: [phone] ([phone])    Patient Name: Nilson Salas  YOB: 1928    Date of Initial Consult: 12/20/17  Reason for Consult: care decisions  Requesting Provider: Dr. Dione White   Primary Care Physician: Camryn Bryson DO     SUMMARY:   Nilson Salas is a 80 y.o. 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 Current medical issues leading to Palliative Medicine involvement include: dehydration, metabolic encephalopathy/AMS with underlying early dementia, now with sundowning/combative behavior last night requiring multiple medications (haldol 5mg, ativan 1mg, geodon 20mg). Patient lives with his wife, [facility]. She is not interested in SNF temporary or LTC, she wants patient to return home, agrees to Home health referral.     PALLIATIVE DIAGNOSES:   1. Dementia with behavioral disturbance likely related to acute illness and new environment. 2. Dehydration, resolved  3. Sedation secondary to medications given overnight for combativeness       PLAN:   1. Case discussed with bedside nurse. Patient's wife was up all night here with patient and has just gone home to get some rest. I don't plan on calling her this afternoon, our team will check in with her tomorrow. 2. Recommend starting Seroquel 25mg PO QHS. 3. Likely that his behavior will improve when he is at home. Will plan to gather more history when wife available to provide. 4. He does not meet criteria for hospice. At baseline he is ambulatory with a cane at home and has fairly fluent speech. 5. Patient signed his own DDNR in 2014, and appropriately has an active DNR order in [facility]. 6. Initial consult note routed to primary continuity provider  7.  Communicated plan of care with: Palliative IDT       GOALS OF CARE / TREATMENT PREFERENCES:     GOALS OF CARE:     To be discussed with wife    TREATMENT PREFERENCES:   Code Status: DNR    Advance Care Planning:  Advance Care Planning 12/16/2017   Patient's Healthcare Decision Maker is: Legal Next of Kin   Confirm Advance Directive Yes, on file           Other Instructions: Other:    As far as possible, the palliative care team has discussed with patient / health care proxy about goals of care / treatment preferences for patient.            HISTORY:     History obtained from: chart, nurse    CHIEF COMPLAINT: admitted with AMS    HPI/SUBJECTIVE:    The patient is:   [] Verbal and participatory  [x] Non-participatory due to:     80year old male with known dementia since 2014, who is admitted with report of AMS for past day at home, dry cough  (Patient is unable to provide history and family not currently at bedside)    Clinical Pain Assessment (nonverbal scale for severity on nonverbal patients):   Clinical Pain Assessment  Severity: 0     Activity (Movement): Lying quietly, normal position    Duration: for how long has pt been experiencing pain (e.g., 2 days, 1 month, years)  Frequency: how often pain is an issue (e.g., several times per day, once every few days, constant)     FUNCTIONAL ASSESSMENT:     Palliative Performance Scale (PPS):  PPS: 30       PSYCHOSOCIAL/SPIRITUAL SCREENING:     Palliative IDT has assessed this patient for cultural preferences / practices and a referral made as appropriate to needs (Cultural Services, Patient Advocacy, Ethics, etc.)    Advance Care Planning:  Advance Care Planning 12/16/2017   Patient's Healthcare Decision Maker is: Legal Next of Kin   Confirm Advance Directive Yes, on file       Any spiritual / Yazidism concerns:  [] Yes /  [x] No    Caregiver Burnout:  [] Yes /  [x] No /  [x] No Caregiver Present      Anticipatory grief assessment:   [x] Normal  / [] Maladaptive       ESAS Anxiety:   uanble to assess    ESAS Depression: Unable to assess     REVIEW OF SYSTEMS:     Positive and pertinent negative findings in ROS are noted above in HPI. The following systems were [] reviewed / [x] unable to be reviewed as noted in HPI  Other findings are noted below. Systems: constitutional, ears/nose/mouth/throat, respiratory, gastrointestinal, genitourinary, musculoskeletal, integumentary, neurologic, psychiatric, endocrine. Positive findings noted below. Modified ESAS Completed by: provider   Fatigue: 5 Drowsiness: 5     Pain: 0                       Stool Occurrence(s): 0        PHYSICAL EXAM:     From RN flowsheet:  Wt Readings from Last 3 Encounters:   12/16/17 151 lb 14.4 oz (68.9 kg)   11/21/17 153 lb 3.2 oz (69.5 kg)   10/11/17 152 lb (68.9 kg)     Blood pressure 143/84, pulse 80, temperature 99 °F (37.2 °C), resp. rate 16, height 5' 4\" (1.626 m), weight 151 lb 14.4 oz (68.9 kg), SpO2 99 %. Pain Scale 1: Numeric (0 - 10)  Pain Intensity 1: 0                 Last bowel movement, if known:     Constitutional: elderly male, who is sleepy appearing  Speech is slow  He is eating some applesauce with assistance  No respiratory distress. Eyes: pupils equal, anicteric  Able to indicate when he doesn't want anymore applesauce. Follows simple commands       HISTORY:     Active Problems:    Altered mental state (12/16/2017)      Past Medical History:   Diagnosis Date    Back pain     due to DDD and scoliosis    BPH (benign prostatic hyperplasia)     Dr. Christal Pink CAD (coronary artery disease) 10/30/13    30%LAD, 80% ostal stenosis. Dr. Louie Rodriguez.  Chest pain 10/04/04, 10/30/13    Dr. Carolina Melendez. Negative Stress Cardiolite Test.  Dr. Marcos Tucker.  Chronic venous insufficiency 06/15/09    Dr. Luciana Thomas due to fall    DDD (degenerative disc disease), lumbar     L3-S1    Diverticulosis 02/2004    sigmoid.   Dr. Marco Galo DJD (degenerative joint disease) of cervical spine     C 4-5 ;5-6    Dyslipidemia     Fracture 1980    facial/nasal    Hearing loss     wears hearing aid    Heartburn     Hemorrhoids, internal 1987    Dr. Neena Munoz of unspecified site of abdominal cavity without mention of obstruction or gangrene     inguinal  Lt    Hypertension     Impotence     Left acoustic neuroma ([facility] [address] 10/28/14    17 x 9 mm schwannoma.  Pes planus of both feet     PVD (peripheral vascular disease) ([facility] [address] 2009    Scoliosis     LS    Slipped intervertebral disc 1954    chiropractor    SOB (shortness of breath) 08/07/13    Dr. Anay Lang.  Stroke [facility] 10/28/2014    small Left Thalamus. Dr. Melia Perez. Dr. Zoya Vora.  Urinary incontinence     due to BPH. Dr. Garzon Comfort    Varicose vein 06/15/09    Dr. Sammie Navas Vertigo 2003    due to inner ear. Dr. Minh Kam. Dr. Yosvany Couch. Dr. Downs Ivory Visual loss     Dr. Jaylin Keene, ophth. 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 Manuela Lewis. benign.  COLONOSCOPY  02/2004    Dr. Lidia Hernandez. due q 10 yrs    CYSTOSCOPY  10/17/03    Dr. Conrad Louie  10/30/13    Dr. Lauren Higgins. 80% ostal stenosis. 30% LAD.  HX HEMORRHOIDECTOMY      HX KNEE ARTHROSCOPY  1990 Left , 2007 Right    Dr. Rodriguez Fearing HX LAPAROTOMY  05/14/07    Dr. Sheets [place]. due to Bowel Obstruction    HX POLYPECTOMY  02/2002    Anal.     HX TONSILLECTOMY      CA COLSC FLX W/RMVL OF TUMOR POLYP LESION SNARE TQ  5/23/2011    Dr. Sudhakar Sheth. Family History   Problem Relation Age of Onset    Heart Attack Mother     Other Mother      brain aneurysm/AAA/varicose veins    Stroke Mother     Heart Attack Sister     Cancer Maternal Grandmother      ovarian      History reviewed, no pertinent family history.   Social History   Substance Use Topics    Smoking status: Never Smoker    Smokeless tobacco: Never Used   [address] [place] [address] Alcohol use No     Allergies   Allergen Reactions    Betadine [Povidone-Iodine] Hives    Phenergan [Promethazine] Other (comments)     hallucinations    Seafood [Shellfish Containing Products] Rash and Swelling     crabmeat    Xylocaine [Lidocaine Hcl] Shortness of Breath      Current Facility-Administered Medications   Medication Dose Route Frequency    albuterol-ipratropium (DUO-NEB) 2.5 MG-0.5 MG/3 ML  3 mL Nebulization Q6H PRN    QUEtiapine (SEROquel) tablet 25 mg  25 mg Oral QHS    metoprolol tartrate (LOPRESSOR) tablet 12.5 mg  12.5 mg Oral BID    aspirin (ASPIRIN) tablet 325 mg  325 mg Oral DAILY    cholecalciferol (VITAMIN D3) tablet 2,000 Units  2,000 Units Oral DAILY    isosorbide mononitrate ER (IMDUR) tablet 30 mg  30 mg Oral DAILY    pravastatin (PRAVACHOL) tablet 20 mg  20 mg Oral DAILY    tamsulosin (FLOMAX) capsule 0.4 mg  0.4 mg Oral DAILY    azithromycin (ZITHROMAX) tablet 500 mg  500 mg Oral DAILY    guaiFENesin ER (MUCINEX) tablet 600 mg  600 mg Oral Q12H    0.9% sodium chloride infusion  75 mL/hr IntraVENous CONTINUOUS    sodium chloride (NS) flush 5-10 mL  5-10 mL IntraVENous Q8H    sodium chloride (NS) flush 5-10 mL  5-10 mL IntraVENous PRN    acetaminophen (TYLENOL) tablet 650 mg  650 mg Oral Q4H PRN    ondansetron (ZOFRAN) injection 4 mg  4 mg IntraVENous Q4H PRN    docusate sodium (COLACE) capsule 100 mg  100 mg Oral BID PRN          LAB AND IMAGING FINDINGS:     Lab Results   Component Value Date/Time    WBC 8.2 12/18/2017 04:44 AM    HGB 10.7 12/18/2017 04:44 AM    PLATELET 970 53/33/3253 04:44 AM     Lab Results   Component Value Date/Time    Sodium 135 12/17/2017 03:54 AM    Potassium 3.9 12/17/2017 03:54 AM    Chloride 103 12/17/2017 03:54 AM    CO2 27 12/17/2017 03:54 AM    BUN 14 12/17/2017 03:54 AM    Creatinine 1.08 12/17/2017 03:54 AM    Calcium 8.0 12/17/2017 03:54 AM    Magnesium 1.6 10/28/2014 04:56 PM      Lab Results   Component Value Date/Time    AST (SGOT) 25 12/16/2017 03:51 PM    Alk. phosphatase 91 12/16/2017 03:51 PM    Protein, total 7.1 12/16/2017 03:51 PM    Albumin 3.5 12/16/2017 03:51 PM    Globulin 3.6 12/16/2017 03:51 PM     Lab Results   Component Value Date/Time    INR 1.2 12/16/2017 03:51 PM    Prothrombin time 12.5 12/16/2017 03:51 PM      No results found for: IRON, FE, TIBC, IBCT, PSAT, FERR   No results found for: PH, PCO2, PO2  No components found for: [place]   Lab Results   Component Value Date/Time    CK 32 10/28/2014 04:56 PM    CK - MB 1.2 10/28/2014 04:56 PM                Total time:   Counseling / coordination time, spent as noted above:   > 50% counseling / coordination?:     Prolonged service was provided for  []30 min   []75 min in face to face time in the presence of the patient, spent as noted above. Time Start:   Time End:   Note: this can only be billed with 13082 (initial) or 65334 (follow up). If multiple start / stop times, list each separately.

## 2017-12-20 NOTE — PROGRESS NOTES
Problem: Falls - Risk of  Goal: *Absence of Falls  Document Rodolfo Fall Risk and appropriate interventions in the flowsheet. Outcome: Progressing Towards Goal  Fall Risk Interventions:  Mobility Interventions: Bed/chair exit alarm    Mentation Interventions:  Toileting rounds, Bed/chair exit alarm, Adequate sleep, hydration, pain control    Medication Interventions: Bed/chair exit alarm    Elimination Interventions: Call light in reach

## 2017-12-20 NOTE — ROUTINE PROCESS
Bedside and Verbal shift change report given to Parris Gudino (oncoming nurse) by Ruthy Llanes (offgoing nurse). Report included the following information SBAR, Kardex, Intake/Output, MAR and Recent Results.

## 2017-12-21 VITALS
HEIGHT: 64 IN | WEIGHT: 151.9 LBS | OXYGEN SATURATION: 95 % | HEART RATE: 92 BPM | DIASTOLIC BLOOD PRESSURE: 68 MMHG | RESPIRATION RATE: 20 BRPM | TEMPERATURE: 98.9 F | BODY MASS INDEX: 25.93 KG/M2 | SYSTOLIC BLOOD PRESSURE: 119 MMHG

## 2017-12-21 PROCEDURE — 74011250637 HC RX REV CODE- 250/637: Performed by: INTERNAL MEDICINE

## 2017-12-21 PROCEDURE — 74011250636 HC RX REV CODE- 250/636: Performed by: INTERNAL MEDICINE

## 2017-12-21 PROCEDURE — 76450000000

## 2017-12-21 PROCEDURE — 74011250636 HC RX REV CODE- 250/636: Performed by: EMERGENCY MEDICINE

## 2017-12-21 PROCEDURE — 77010033678 HC OXYGEN DAILY

## 2017-12-21 RX ORDER — AMLODIPINE BESYLATE 5 MG/1
5 TABLET ORAL DAILY
Qty: 30 TAB | Refills: 1 | Status: SHIPPED | OUTPATIENT
Start: 2017-12-21 | End: 2018-02-17 | Stop reason: SDUPTHER

## 2017-12-21 RX ORDER — QUETIAPINE FUMARATE 25 MG/1
25 TABLET, FILM COATED ORAL
Qty: 30 TAB | Refills: 1 | Status: SHIPPED | OUTPATIENT
Start: 2017-12-21 | End: 2018-03-30 | Stop reason: ALTCHOICE

## 2017-12-21 RX ORDER — HYDRALAZINE HYDROCHLORIDE 20 MG/ML
10 INJECTION INTRAMUSCULAR; INTRAVENOUS
Status: DISCONTINUED | OUTPATIENT
Start: 2017-12-21 | End: 2017-12-21 | Stop reason: HOSPADM

## 2017-12-21 RX ADMIN — METOPROLOL TARTRATE 12.5 MG: 25 TABLET ORAL at 07:51

## 2017-12-21 RX ADMIN — ISOSORBIDE MONONITRATE 30 MG: 30 TABLET, EXTENDED RELEASE ORAL at 09:40

## 2017-12-21 RX ADMIN — SODIUM CHLORIDE 75 ML/HR: 900 INJECTION, SOLUTION INTRAVENOUS at 07:32

## 2017-12-21 RX ADMIN — HYDRALAZINE HYDROCHLORIDE 10 MG: 20 INJECTION INTRAMUSCULAR; INTRAVENOUS at 04:17

## 2017-12-21 RX ADMIN — VITAMIN D, TAB 1000IU (100/BT) 2000 UNITS: 25 TAB at 09:40

## 2017-12-21 RX ADMIN — ASPIRIN 325 MG: 325 TABLET ORAL at 09:40

## 2017-12-21 RX ADMIN — PRAVASTATIN SODIUM 20 MG: 10 TABLET ORAL at 09:40

## 2017-12-21 RX ADMIN — AZITHROMYCIN 500 MG: 250 TABLET, FILM COATED ORAL at 09:41

## 2017-12-21 RX ADMIN — TAMSULOSIN HYDROCHLORIDE 0.4 MG: 0.4 CAPSULE ORAL at 09:40

## 2017-12-21 RX ADMIN — GUAIFENESIN 600 MG: 600 TABLET, EXTENDED RELEASE ORAL at 09:40

## 2017-12-21 NOTE — ACP (ADVANCE CARE PLANNING)
Patient has Advance directive on file. Primary MPOA is wife Catrachito Duque # [phone]    Len Hemp is niece Gloris Goldberg. Patient / Family goal is  to go home with health , if fails home physical therapy then transition to skilled nursing facility for rehab .   he has a DDNR.

## 2017-12-21 NOTE — CONSULTS
Palliative Medicine Consult  David: [phone] ([phone])    Patient Name: Aston Lowery  YOB: 1928    Date of Initial Consult: 12/20/17  Reason for Consult: care decisions  Requesting Provider: Dr. Isaac Schumacher   Primary Care Physician: Candy Short DO     SUMMARY:   Aston Lowery is a 80 y.o. 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 Current medical issues leading to Palliative Medicine involvement include: dehydration, metabolic encephalopathy/AMS with underlying early dementia, now with sundowning/combative behavior last night requiring multiple medications (haldol 5mg, ativan 1mg, geodon 20mg). Patient lives with his wife, Tejas Walker.  for 13 years, no childern. 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 At base line he is ambulatory . Independent for ADLS, wife drops him at gym three times a week. She is not interested in SNF temporary or LTC, she wants patient to return home, agrees to Home health referral.     PALLIATIVE DIAGNOSES:   1. Dementia with behavioral disturbance likely related to acute illness, bronchitis? and new environment. 2. Dehydration, resolved  3. Weakness  4. Goals of care       PLAN:   Visited patient who is alert , oriented x 2 person and place , pleasant, he is able to hold menaingful conversation , however unable to provide information about his medical issue. Met with his wife/mpoa, Ms Tejas Walker. 1. Introduce palliative care service. 2. Wife spent the night at the hospital, she shared patient was less confused, slept better with medication (seroquel ), however currently his mental status is off base line .     3. We talked about dementia in general and what to expect as it progresses, including more dependance with  Adls, sundowning , behavioral issues and placement in LTC. 4. 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 5. She is concerned if he is not able to walk and get strong to walk with home health PT,  it may be difficult for her to manage him at home by herself , she may consider transition to Skilled nursing care, I spoke to our  Maile Perkins, and she will provide her contact number to patient wife for transition to SNF, if he fails home health PT. 6.Patient signed his own DDNR in 2014, and appropriately has an active DNR order in [facility]. 7 . Advance directive on chart. Initial consult note routed to primary continuity provider  Communicated plan of care with: Palliative IDT       GOALS OF CARE / TREATMENT PREFERENCES:     GOALS OF CARE:  Patient/Health Care Proxy Stated Goals: Rehabilitation  To be discussed with wife    TREATMENT PREFERENCES:   Code Status: DNR    Advance Care Planning:  Advance Care Planning 12/21/2017   Patient's Healthcare Decision Maker is: Named in scanned ACP document   Primary Decision Maker Name Anai Higgins   Primary Decision Maker Phone Number [phone]   Primary Decision Maker Relationship to Patient Spouse   Confirm Advance Directive Yes, on file   Does the patient have other document types Do Not Resuscitate       Medical Interventions: Full interventions   Other Instructions: Other:    As far as possible, the palliative care team has discussed with patient / health care proxy about goals of care / treatment preferences for patient. HISTORY:     History obtained from: chart, nurse    CHIEF COMPLAINT: admitted with AMS    HPI/SUBJECTIVE:    The patient is:   [] Verbal and participatory  [x] Non-participatory due to:     80year old male with known dementia since 2014, who is admitted with report of AMS for past day at home, dry cough  (Patient is unable to provide history and family not currently at bedside)    Per wife patient had a sore throat, oneday prior to admission , she was seen at urgent care, throat swab and cxr was negative, he was prescribed erythromycin and cough syrup, next day wife found him lethargic and weak not able to get up from bed, he intimally refused to come to Er, she waited 2 hrs to bring him to Er.     Wife notes he was agitated in hospital, getting better, however his mental status is not back to base line(alert orientedx 3, conversant , walks without any device)    Clinical Pain Assessment (nonverbal scale for severity on nonverbal patients):   Clinical Pain Assessment  Severity: 0     Activity (Movement): Lying quietly, normal position    Duration: for how long has pt been experiencing pain (e.g., 2 days, 1 month, years)  Frequency: how often pain is an issue (e.g., several times per day, once every few days, constant)     FUNCTIONAL ASSESSMENT:     Palliative Performance Scale (PPS):  PPS: 40       PSYCHOSOCIAL/SPIRITUAL SCREENING:     Palliative IDT has assessed this patient for cultural preferences / practices and a referral made as appropriate to needs (Cultural Services, Patient Advocacy, Ethics, etc.)    Advance Care Planning:  Advance Care Planning 12/21/2017   Patient's Healthcare Decision Maker is: Named in scanned ACP document   Primary Decision Maker Name Deon Callas   Primary Decision Maker Phone Number [phone]   Primary Decision Maker Relationship to Patient Spouse   Confirm Advance Directive Yes, on file   Does the patient have other document types Do Not Resuscitate       Any spiritual / Jainism concerns:  [] Yes /  [x] No    Caregiver Burnout:  [] Yes /  [x] No /  [x] No Caregiver Present      Anticipatory grief assessment:   [x] Normal  / [] Maladaptive       ESAS Anxiety:   unable to assess because of patient condition. ESAS Depression:   unable to assess because of patient condition . REVIEW OF SYSTEMS:     Positive and pertinent negative findings in ROS are noted above in HPI. The following systems were [x] reviewed limited [] unable to be reviewed as noted in HPI  Other findings are noted below. Systems: constitutional, ears/nose/mouth/throat, respiratory, gastrointestinal, genitourinary, musculoskeletal, integumentary, neurologic, psychiatric, endocrine. Positive findings noted below. Modified ESAS Completed by: provider   Fatigue: 4 Drowsiness: 0     Pain: 0     Nausea: 0   Anorexia: 4 Dyspnea: 0     Constipation: No     Stool Occurrence(s): 0        PHYSICAL EXAM:     From RN flowsheet:  Wt Readings from Last 3 Encounters:   12/16/17 151 lb 14.4 oz (68.9 kg)   11/21/17 153 lb 3.2 oz (69.5 kg)   10/11/17 152 lb (68.9 kg)     Blood pressure 119/68, pulse 92, temperature 98.9 °F (37.2 °C), resp. rate 20, height 5' 4\" (1.626 m), weight 151 lb 14.4 oz (68.9 kg), SpO2 95 %. Pain Scale 1: Numeric (0 - 10)  Pain Intensity 1: 0                 Last bowel movement, if known:     Constitutional: elderly male, who is alert, oriented x2 , able to hold conversation. Speech is slow  No respiratory distress. Eyes: pupils equal, anicteric  Heart regular rhythm  Extremities no edema  Abdomen : soft bowel sounds positive . HISTORY:     Active Problems:    Altered mental state (12/16/2017)      Past Medical History:   Diagnosis Date    Back pain     due to DDD and scoliosis    BPH (benign prostatic hyperplasia)     Dr. Walter Lorenzo CAD (coronary artery disease) 10/30/13    30%LAD, 80% ostal stenosis. Dr. Concepcion Smiley.  Chest pain 10/04/04, 10/30/13    Dr. Slime Cutler Negative Stress Cardiolite Test.  Dr. Estrella Levi.  Chronic venous insufficiency 06/15/09    Dr. Arvin Emmanuel due to fall    DDD (degenerative disc disease), lumbar     L3-S1    Diverticulosis 02/2004    sigmoid. Dr. Adrienne Shen DJD (degenerative joint disease) of cervical spine     C 4-5 ;5-6    Dyslipidemia     Fracture 1980    facial/nasal    Hearing loss     wears hearing aid    Heartburn     Hemorrhoids, internal 1987    Dr. Yo Beck of unspecified site of abdominal cavity without mention of obstruction or gangrene     inguinal  Lt    Hypertension     Impotence     Left acoustic neuroma ([address] 10/28/14    17 x 9 mm schwannoma.  Pes planus of both feet     PVD (peripheral vascular disease) ([address] 2009    Scoliosis     LS    Slipped intervertebral disc 1954    chiropractor    SOB (shortness of breath) 08/07/13    Dr. Suzie Rausch.  Stroke [address] 10/28/2014    small Left Thalamus. Dr. Maisie Councilman. Dr. Tobi Diallo.  Urinary incontinence     due to BPH. Dr. Almanza Scandia    Varicose vein 06/15/09    Dr. Lorne Jacobson Vertigo 2003    due to inner ear. Dr. Godfrey Galo. Dr. Stefany Schulte. Dr. Sandra Gonzalez [facility] loss     [facility] Past [facility] History:   Procedure Laterality Date    APPENDECTOMY      due to gangrene   [facility], 02/2002    Dr. Claer Zapata. benign.  COLONOSCOPY  02/2004    Dr. Tiffanie Allen. due q 10 yrs    CYSTOSCOPY  10/17/03    Dr. Brett Mckenzie  10/30/13    Dr. Estrella Levi. 80% ostal stenosis. 30% LAD.  HX HEMORRHOIDECTOMY      HX KNEE ARTHROSCOPY  1990 Left , 2007 Right    Dr. Rigoberto De La Garza HX LAPAROTOMY  05/14/07    Dr. Siva Rich. due to Bowel Obstruction    HX POLYPECTOMY  02/2002    Anal.     HX TONSILLECTOMY      DE COLSC FLX W/RMVL OF TUMOR POLYP LESION SNARE TQ  5/23/2011    Dr. Kalyn Chou.       Family History   Problem Relation Age of Onset    Heart Attack Mother     Other Mother      brain aneurysm/AAA/varicose veins    Stroke Mother     Heart Attack Sister     Cancer Maternal Grandmother      ovarian      History reviewed, no pertinent family history.   Social History   Substance Use Topics    Smoking status: Never Smoker    Smokeless tobacco: Never Used    Alcohol use No     Allergies   Allergen Reactions    Betadine [Povidone-Iodine] Hives    Phenergan [Promethazine] Other (comments)     hallucinations    Seafood [Shellfish Containing Products] Rash and Swelling     crabmeat    Xylocaine [Lidocaine Hcl] Shortness of Breath      Current Facility-Administered Medications   Medication Dose Route Frequency    hydrALAZINE (APRESOLINE) 20 mg/mL injection 10 mg  10 mg IntraVENous Q6H PRN    albuterol-ipratropium (DUO-NEB) 2.5 MG-0.5 MG/3 ML  3 mL Nebulization Q6H PRN    QUEtiapine (SEROquel) tablet 25 mg  25 mg Oral QHS    metoprolol tartrate (LOPRESSOR) tablet 12.5 mg  12.5 mg Oral BID    aspirin (ASPIRIN) tablet 325 mg  325 mg Oral DAILY    cholecalciferol (VITAMIN D3) tablet 2,000 Units  2,000 Units Oral DAILY    isosorbide mononitrate ER (IMDUR) tablet 30 mg  30 mg Oral DAILY    pravastatin (PRAVACHOL) tablet 20 mg  20 mg Oral DAILY    tamsulosin (FLOMAX) capsule 0.4 mg  0.4 mg Oral DAILY    azithromycin (ZITHROMAX) tablet 500 mg  500 mg Oral DAILY    guaiFENesin ER (MUCINEX) tablet 600 mg  600 mg Oral Q12H    0.9% sodium chloride infusion  75 mL/hr IntraVENous CONTINUOUS    sodium chloride (NS) flush 5-10 mL  5-10 mL IntraVENous Q8H    sodium chloride (NS) flush 5-10 mL  5-10 mL IntraVENous PRN    acetaminophen (TYLENOL) tablet 650 mg  650 mg Oral Q4H PRN    ondansetron (ZOFRAN) injection 4 mg  4 mg IntraVENous Q4H PRN    docusate sodium (COLACE) capsule 100 mg  100 mg Oral BID PRN          LAB AND IMAGING FINDINGS:     Lab Results   Component Value Date/Time    WBC 8.2 12/18/2017 04:44 AM    HGB 10.7 12/18/2017 04:44 AM    PLATELET 104 91/07/2949 04:44 AM     Lab Results   Component Value Date/Time    Sodium 135 12/17/2017 03:54 AM    Potassium 3.9 12/17/2017 03:54 AM    Chloride 103 12/17/2017 03:54 AM    CO2 27 12/17/2017 03:54 AM    BUN 14 12/17/2017 03:54 AM    Creatinine 1.08 12/17/2017 03:54 AM    Calcium 8.0 12/17/2017 03:54 AM    Magnesium 1.6 10/28/2014 04:56 PM      Lab Results   Component Value Date/Time    AST (SGOT) 25 12/16/2017 03:51 PM    Alk. phosphatase 91 12/16/2017 03:51 PM    Protein, total 7.1 12/16/2017 03:51 PM    Albumin 3.5 12/16/2017 03:51 PM    Globulin 3.6 12/16/2017 03:51 PM     Lab Results   Component Value Date/Time    INR 1.2 12/16/2017 03:51 PM    Prothrombin time 12.5 12/16/2017 03:51 PM      No results found for: IRON, FE, TIBC, IBCT, PSAT, FERR   No results found for: PH, PCO2, PO2  No components found for: [place]   Lab Results   Component Value Date/Time    CK 32 10/28/2014 04:56 PM    CK - MB 1.2 10/28/2014 04:56 PM                Total time: 45 mins  Counseling / coordination time, spent as noted above: 35 mins  > 50% counseling / coordination?: yes    Prolonged service was provided for  []30 min   []75 min in face to face time in the presence of the patient, spent as noted above. Time Start:   Time End:   Note: this can only be billed with 07031 (initial) or 51623 (follow up). If multiple start / stop times, list each separately.

## 2017-12-21 NOTE — ACP (ADVANCE CARE PLANNING)
The Palliative Medicine team was consulted as part of your / your loved one's care in the hospital. Our team is a supportive service; we strive to relieve suffering and improve quality of life. You identified the following goal(s) as your main focus for healthcare: Patient/Health Care Proxy Stated Goals: Rehabilitation with home PT, may consider transition to skilled nursing for rehab , if he fails home physical therapy . We reviewed advance care planning information, which includes the following:  Advance Care Planning 12/21/2017   Patient's Healthcare Decision Maker is: Named in scanned ACP document   Primary Decision Maker Name Emigdio Gr   Primary Decision Maker Phone Number [phone]   Primary Decision Maker Relationship to Patient Spouse   Confirm Advance Directive Yes, on file   Does the patient have other document types Do Not Resuscitate       We reviewed / discussed your code status as: DNR     Full Code means perform CPR in the event of cardiac arrest     [facility] means do NOT perform CPR in the event of cardiac arrest     Partial Code means you have specific preferences, please discuss with your health care team     No Order means this issue was not addressed / resolved during your stay    Because of the importance of this information, we are providing you with a printed copy to share with other healthcare providers after this hospitalization is complete. If any of the above information is incomplete or incorrect, please contact the Palliative Medicine team at [phone].

## 2017-12-21 NOTE — PROGRESS NOTES
Pt discharged home today and was transported home via private vehicle by his spouse. Pt was set up with [facility] provided with Richar Wilson. All info entered on pt AVS.  Pt spouse provided SNF list in the event she gets home and can not manage his care in the home within the next 30 days. Care Management Interventions  PCP Verified by CM: Yes  Mode of Transport at Discharge:  Other (see comment)  Discharge Durable Medical Equipment:  (Pt has canes at home)  Physical Therapy Consult: Yes  Occupational Therapy Consult: Yes  Current Support Network: Lives with Spouse  Confirm Follow Up Transport: Family  Plan discussed with Pt/Family/Caregiver: Yes  Discharge Location  Discharge Placement: Home with home health    BELA Sin  Ext [phone]

## 2017-12-21 NOTE — PROGRESS NOTES
Bedside shift change report given to Katelyn Chavarria RN (oncoming nurse) by Maren Lau RN (offgoing nurse). Report included the following information SBAR, Kardex, ED Summary, Procedure Summary, MAR, Recent Results and Cardiac Rhythm NSR.

## 2017-12-21 NOTE — PROGRESS NOTES
Speech pathology note  Consult received and appreciated, however order entered per protocol, which is a nursing order. SLP requires a physician's order to complete swallowing evaluation. If formal evaluation is desired, please consult with MD order. Thank you.   Madhav Mendez, SLP

## 2017-12-21 NOTE — DISCHARGE SUMMARY
Hospitalist Discharge Summary     Patient ID:  Ailyn Ocampo  [id number]  12 y.o.  10/24/1928    PCP on record: Robina Goodman DO    Admit date: 12/16/2017  Discharge date and time: 12/21/2017      DISCHARGE DIAGNOSIS:  Acute encephalopathy likely metabolic, improving - back at baseline  Suspected Delirium   Possible bronchitis, resolved  HTN, uncontrolled  Dementia  BPH      CONSULTATIONS:  IP CONSULT TO PALLIATIVE CARE - PROVIDER    Excerpted HPI from H&P of Caridad Kapoor MD:  Ailyn Ocampo is a 80 y.o.  male with history of CAD, stroke, HTN who presents with one day history of altered mental status. Patient has not been feeling well, had gone to [facility] for a sore throat where he was given amoxicillin. Wife noted he has a dry cough, no SOB. This morning he was very lethargic and could not take his pills very well. He also did not eat much and she had to feed him. He was unable to walk on his own. Usually he is more alert and talkative and walks with a cane. He is usually oriented to himself and birthday but today he has not been oriented at all. No fevers or chills. No sick contacts.   ______________________________________________________________________  DISCHARGE SUMMARY/HOSPITAL COURSE:  for full details see H&P, daily progress notes, labs, consult notes. Acute encephalopathy likely metabolic, improving - back at baseline  Suspected Delirium   Possible bronchitis, resolved  -sore throat and congestion, CT chest without any infiltrate, does have productive cough  -CT head negative  -Finized course of Azithromycin - therefore not given an rx for it  -B12, folate, ammonia are normal - as per previous Hospitalist - pt's mental status has continued to improve and he has been more conversational   -Blood cx NGTD  -Afebrile > 48 hrs, DVT US negative   -Pt has been difficulty and combative the previous two nights.  Last night he required Ativan, Geodon and Haldol  -Therefore I am concerned about pt's elderly wife being able to take care of him when he is home. I agree that once he is back to known surroundings, he will likely improve, but he may require some sedative prior to DC  -Have asked for Palliative Consult for help with above - Seroquel 25mg qHS to be started    As noted above, pt had been difficult to reorient and becoming combative particularly in the evening and then had trouble falling and staying asleep. Palliative recommended Seroquel 25mg qHs which was started last night. Pt did well with this and had a much calmer night. Pt's wife was at bedside all night and when spoken to this morning, she reports an improvement in his agitation as well. Therefore will discharge on Seroquel. HTN, uncontrolled  -Imdur and metoprolol are home meds - these are to be continued  -Also added Norvasc 5mg       Dementia  -dementia started on 2014 but has been getting worse since last 6 month per wife  -wife is primary caregiver  -usually active uses cane  -has scheduled follow up with neurologist   -As noted above, started on Seroquel 25mg qHS      BPH  -on flomax     _______________________________________________________________________  Patient seen and examined by me on discharge day. Pertinent Findings:  Gen:    Not in distress  Chest: Clear lungs  CVS:   Regular rhythm. No edema  Abd:  Soft, not distended, not tender  Neuro:  Alert, oriented x1 baseline - pleasant  _______________________________________________________________________  DISCHARGE MEDICATIONS:   Current Discharge Medication List      START taking these medications    Details   QUEtiapine (SEROQUEL) 25 mg tablet Take 1 Tab by mouth nightly. Qty: 30 Tab, Refills: 1      amLODIPine (NORVASC) 5 mg tablet Take 1 Tab by mouth daily.   Qty: 30 Tab, Refills: 1         CONTINUE these medications which have NOT CHANGED    Details   isosorbide mononitrate ER (IMDUR) 30 mg tablet take 1 tablet by mouth once daily  Qty: 30 Tab, Refills: 11      vit B12-levomefolate calcium-vit B6 (FOLTX) 2-1.13-25 mg tablet Take 1 Tab by mouth daily. Indications: Vitamin Deficiency  Qty: 30 Tab, Refills: 5      pravastatin (PRAVACHOL) 20 mg tablet take 1 tablet by mouth at bedtime  Qty: 90 Tab, Refills: 3    Associated Diagnoses: Dyslipidemia      metoprolol tartrate (LOPRESSOR) 25 mg tablet take 1/2 tablet twice a day  Qty: 30 Tab, Refills: 11      meclizine (ANTIVERT) 25 mg tablet Take 1 Tab by mouth three (3) times daily as needed for Dizziness. Qty: 20 Tab, Refills: 0      GUAIFENESIN (MUCINEX PO) Take 1 Tab by mouth daily. Associated Diagnoses: Chest congestion      docusate sodium (COLACE) 100 mg capsule Take 100 mg by mouth daily. Instructed to take daily with plenty of liquid unless otherwise directed- as per 11/7/14 [facility] discharge med list      cholecalciferol, vitamin D3, (VITAMIN D3) 2,000 unit tab Take 1 Tab by mouth daily. As per 11/07/14 [facility] discharge med list      aspirin (ASPIRIN) 325 mg tablet Take 1 tablet by mouth daily. Qty: 30 tablet, Refills: 3      tamsulosin (FLOMAX) 0.4 mg capsule Take 0.4 mg by mouth daily. For prostate          STOP taking these medications       AMOXICILLIN PO Comments:   Reason for Stopping:         DEXTROMETHORPHAN HBR (DELSYM PO) Comments:   Reason for Stopping:               My Recommended Diet, Activity, Wound Care, and follow-up labs are listed in the patient's Discharge Insturctions which I have personally completed and reviewed.     ______________________________________________________________________    Risk of deterioration: Moderate    Condition at Discharge:  Stable  ______________________________________________________________________    Disposition  Home with family and home health services  ______________________________________________________________________    Care Plan discussed with:   Patient, Family, RN, Care Manager, Consultant    Comment: ______________________________________________________________________    Code Status: DNR/DNI  ______________________________________________________________________      Follow up with:   PCP : Gill Painter, DO  Follow-up Information     Follow up With Details Comments [address] [place] [address]  [phone]                Total time in minutes spent coordinating this discharge (includes going over instructions, follow-up, prescriptions, and preparing report for sign off to her PCP) :  40 minutes    Signed:  Hansel Bronson MD

## 2017-12-22 LAB
BACTERIA SPEC CULT: NORMAL
SERVICE CMNT-IMP: NORMAL

## 2017-12-23 ENCOUNTER — HOME CARE VISIT (OUTPATIENT)
Dept: SCHEDULING | Facility: HOME HEALTH | Age: 82
End: 2017-12-23
Payer: MEDICARE

## 2017-12-23 VITALS — WEIGHT: 151.9 LBS | BODY MASS INDEX: 26.07 KG/M2

## 2017-12-23 PROCEDURE — 400013 HH SOC

## 2017-12-23 PROCEDURE — 3331090002 HH PPS REVENUE DEBIT

## 2017-12-23 PROCEDURE — G0299 HHS/HOSPICE OF RN EA 15 MIN: HCPCS

## 2017-12-23 PROCEDURE — 3331090001 HH PPS REVENUE CREDIT

## 2017-12-24 PROCEDURE — 3331090001 HH PPS REVENUE CREDIT

## 2017-12-24 PROCEDURE — 3331090002 HH PPS REVENUE DEBIT

## 2017-12-25 PROCEDURE — 3331090001 HH PPS REVENUE CREDIT

## 2017-12-25 PROCEDURE — 3331090002 HH PPS REVENUE DEBIT

## 2017-12-26 ENCOUNTER — HOME CARE VISIT (OUTPATIENT)
Dept: SCHEDULING | Facility: HOME HEALTH | Age: 82
End: 2017-12-26
Payer: MEDICARE

## 2017-12-26 ENCOUNTER — TELEPHONE (OUTPATIENT)
Dept: FAMILY MEDICINE CLINIC | Age: 82
End: 2017-12-26

## 2017-12-26 ENCOUNTER — PATIENT OUTREACH (OUTPATIENT)
Dept: FAMILY MEDICINE CLINIC | Age: 82
End: 2017-12-26

## 2017-12-26 VITALS
OXYGEN SATURATION: 97 % | DIASTOLIC BLOOD PRESSURE: 68 MMHG | HEART RATE: 75 BPM | TEMPERATURE: 98.6 F | RESPIRATION RATE: 16 BRPM | SYSTOLIC BLOOD PRESSURE: 135 MMHG

## 2017-12-26 PROCEDURE — 3331090002 HH PPS REVENUE DEBIT

## 2017-12-26 PROCEDURE — G0151 HHCP-SERV OF PT,EA 15 MIN: HCPCS

## 2017-12-26 PROCEDURE — 3331090001 HH PPS REVENUE CREDIT

## 2017-12-26 RX ORDER — DEXTROMETHORPHAN HYDROBROMIDE, GUAIFENESIN 5; 100 MG/5ML; MG/5ML
650 LIQUID ORAL EVERY 8 HOURS
COMMUNITY
End: 2020-01-01

## 2017-12-26 NOTE — PROGRESS NOTES
Hospital Discharge DARIANA FAITH Initial Note    Lanny Bauer is a 80 y.o. male     This patient was received as a referral from Hospital ADT message     Patient hospitalized @ [facility] 17-17      Inpatient RRAT Score: 20    Diagnoses:  Acute encephalopathy likely metabolic, improving - back at baseline  Suspected Delirium   Possible bronchitis, resolved  HTN, uncontrolled  Dementia  BPH      Consult(s):  Palliative Care    Imaging:  Head CT- IMPRESSION: No change, no acute intracranial abnormality  XR CHEST PORT - IMPRESSION: Diminished lung volumes. No evidence of acute cardiopulmonary process. CT CHEST WO CONT- IMPRESSION:Bilateral dependent atelectasis. No evidence of pneumonia or pleural effusion. DUPLEX LOWER EXT VENOUS BILAT- IMPRESSION: No evidence for acute lower extremity DVT. Discharge Instructions/Plans:  - Disposition- home with [facility] SN/PT/OT  - Rxs-   START taking these medications     Details   QUEtiapine (SEROQUEL) 25 mg tablet Take 1 Tab by mouth nightly. Qty: 30 Tab, Refills: 1       amLODIPine (NORVASC) 5 mg tablet Take 1 Tab by mouth daily. Qty: 30 Tab, Refills: 1         STOP taking these medications         AMOXICILLIN PO Comments:   Reason for Stopping:            DEXTROMETHORPHAN HBR (DELSYM PO)    - PCP f/u 17 @ 10:30 AM w/ Dr Romie Mast  - Neuro f/u 18 @ 12:00 PM with Dr Mildred VELIZ post hospital interactive contact done by telephone within 2 business days of discharge (17)    Spoke with wife Gutierrez Hyman ([facility]). Pt ID verified with 2 identifiers, name and . NN introduction. Reason for call stated. Patient's challenges to self management identified: AMS, Memory Disturbance, Weakness of lower extremities    Medication Management: good adherence, good management. Wife fills pill box weekly. Sets meds in front of pt. Pt takes with water.       Support System:  Wife, neighbors, friends, [facility] staff    Summary of patients top three problems:     Problem 1: AMS- related to progression of dementia. Alert & oriented x2- person & place     Problem 2: Agitation- related to sundowning, change of environment      Problem 3: Weakness- difficulty walking. HH PT. Wife declined rehab @ SNF preferred to trial of [facility]    Advance Care Planning:   Patient was offered the opportunity to discuss advance care planning:  no     Does patient have an Advance Directive:  yes   If no, did you provide information on Advance Care Planning?  n/a     AMD dated 10/14/14. Wife primary agent, raisa Edmondson 2nd. DDNR dated 11/14/2014    [employer], Referrals, and Durable Medical Equipment:   [facility] SN/PT. Walker, urinal    Follow up appointments:    12/29/17- PCP  1/23/18- Neuro    Goals Addressed             Most Recent     Attends follow-up appointments as directed. On track (11/12/2014)             12/27/17- vmm from [address] [place] [address]. Reported pt would be able to make his PCP appt on 12/29/17. During today's visit worked on stair transfer & getting out of the house. Will meet pt & wife at their home on Fri (12/29./17) before the appt to make sure things go smoothly-ID. 12/26/17- spoke with wife. Reminded of 12/29/17 PCP f/u. Wife reported not sure pt will be strong enough to walk outside house to attend appt. Discussed importance of f/u. Plan- NN requested wife have [facility] PT contact NN during 12/27/17 visit to discuss probability of pt's ability to ambulate outside of home- ID         Identification of barriers to adherence to a plan of care such as inability to afford medications, lack of insurance, lack of transportation, etc.   On track (11/11/2014)             12/26/17- monitor wife's ability to manage pt home by herself. Wife reported [facility] visited today. Pt ambulated with walker & gait belt. PT to return 12/27/17. Will attempt pt to ambulate with gait belt & w/o walker.  Plan- if wife unable to manage pt @ home assist with transition to SNF. Wife given list of local SNFs by hospital CM prior to d/c. Wife confirmed receipt of list & verbalized understanding of possible transition-ID        Prevent complications post hospitalization. 12/26/17- NN ANGELA f/u as needed during 30 day post hosp ANGELA period. Collaboration with pt's wife, [facility] staff & specialist office as needed for care coordination-ID       Resume Going to GYM 3x/week                  12/26/17- spoke with wife. Pt was able to go to gym 3x/week prior to recent hospitalization. Since hospitalization pt weak, re lower extremities. Unable to ambulate alone with cane as he was prior to hospitalization. Plan- BS  PT with goal to build up strength for pt to return to baseline of ambulating with cane- ID           Patient's wife verbalized understanding of all information discussed. Patient's wife has this Nurse Navigators contact information for any further questions, concerns, or needs.

## 2017-12-27 ENCOUNTER — HOME CARE VISIT (OUTPATIENT)
Dept: SCHEDULING | Facility: HOME HEALTH | Age: 82
End: 2017-12-27
Payer: MEDICARE

## 2017-12-27 VITALS
OXYGEN SATURATION: 96 % | SYSTOLIC BLOOD PRESSURE: 120 MMHG | TEMPERATURE: 97.3 F | RESPIRATION RATE: 16 BRPM | DIASTOLIC BLOOD PRESSURE: 80 MMHG | HEART RATE: 76 BPM

## 2017-12-27 VITALS
HEART RATE: 76 BPM | SYSTOLIC BLOOD PRESSURE: 120 MMHG | TEMPERATURE: 97.3 F | DIASTOLIC BLOOD PRESSURE: 80 MMHG | OXYGEN SATURATION: 96 % | RESPIRATION RATE: 16 BRPM

## 2017-12-27 PROCEDURE — 3331090002 HH PPS REVENUE DEBIT

## 2017-12-27 PROCEDURE — G0152 HHCP-SERV OF OT,EA 15 MIN: HCPCS

## 2017-12-27 PROCEDURE — 3331090001 HH PPS REVENUE CREDIT

## 2017-12-27 PROCEDURE — G0300 HHS/HOSPICE OF LPN EA 15 MIN: HCPCS

## 2017-12-27 PROCEDURE — G0151 HHCP-SERV OF PT,EA 15 MIN: HCPCS

## 2017-12-28 ENCOUNTER — HOME CARE VISIT (OUTPATIENT)
Dept: SCHEDULING | Facility: HOME HEALTH | Age: 82
End: 2017-12-28
Payer: MEDICARE

## 2017-12-28 VITALS
HEART RATE: 72 BPM | DIASTOLIC BLOOD PRESSURE: 95 MMHG | SYSTOLIC BLOOD PRESSURE: 135 MMHG | OXYGEN SATURATION: 97 % | TEMPERATURE: 96.8 F | RESPIRATION RATE: 16 BRPM

## 2017-12-28 VITALS
SYSTOLIC BLOOD PRESSURE: 118 MMHG | DIASTOLIC BLOOD PRESSURE: 62 MMHG | OXYGEN SATURATION: 98 % | TEMPERATURE: 98.5 F | HEART RATE: 77 BPM | RESPIRATION RATE: 16 BRPM

## 2017-12-28 PROCEDURE — 3331090001 HH PPS REVENUE CREDIT

## 2017-12-28 PROCEDURE — G0152 HHCP-SERV OF OT,EA 15 MIN: HCPCS

## 2017-12-28 PROCEDURE — 3331090002 HH PPS REVENUE DEBIT

## 2017-12-29 ENCOUNTER — HOME CARE VISIT (OUTPATIENT)
Dept: SCHEDULING | Facility: HOME HEALTH | Age: 82
End: 2017-12-29
Payer: MEDICARE

## 2017-12-29 ENCOUNTER — OFFICE VISIT (OUTPATIENT)
Dept: FAMILY MEDICINE CLINIC | Age: 82
End: 2017-12-29

## 2017-12-29 VITALS
HEIGHT: 64 IN | HEART RATE: 74 BPM | OXYGEN SATURATION: 97 % | DIASTOLIC BLOOD PRESSURE: 74 MMHG | SYSTOLIC BLOOD PRESSURE: 116 MMHG | BODY MASS INDEX: 25.01 KG/M2 | RESPIRATION RATE: 16 BRPM | TEMPERATURE: 97.4 F | WEIGHT: 146.5 LBS

## 2017-12-29 DIAGNOSIS — R35.89 POLYURIA: ICD-10-CM

## 2017-12-29 DIAGNOSIS — F02.818 DEMENTIA ASSOCIATED WITH OTHER UNDERLYING DISEASE WITH BEHAVIORAL DISTURBANCE: ICD-10-CM

## 2017-12-29 DIAGNOSIS — R46.89 COGNITIVE AND BEHAVIORAL CHANGES: Primary | ICD-10-CM

## 2017-12-29 DIAGNOSIS — D72.828 OTHER ELEVATED WHITE BLOOD CELL (WBC) COUNT: ICD-10-CM

## 2017-12-29 DIAGNOSIS — D64.89 ANEMIA DUE TO OTHER CAUSE, NOT CLASSIFIED: ICD-10-CM

## 2017-12-29 DIAGNOSIS — D69.6 THROMBOCYTOPENIA (HCC): ICD-10-CM

## 2017-12-29 DIAGNOSIS — E78.5 DYSLIPIDEMIA: ICD-10-CM

## 2017-12-29 DIAGNOSIS — R53.83 OTHER FATIGUE: ICD-10-CM

## 2017-12-29 DIAGNOSIS — Z86.73 HISTORY OF STROKE: ICD-10-CM

## 2017-12-29 DIAGNOSIS — R05.9 COUGH: ICD-10-CM

## 2017-12-29 DIAGNOSIS — R63.4 WEIGHT LOSS: ICD-10-CM

## 2017-12-29 DIAGNOSIS — I10 ESSENTIAL HYPERTENSION: ICD-10-CM

## 2017-12-29 DIAGNOSIS — H90.3 SENSORINEURAL HEARING LOSS (SNHL) OF BOTH EARS: ICD-10-CM

## 2017-12-29 DIAGNOSIS — I25.10 CORONARY ARTERY DISEASE INVOLVING NATIVE CORONARY ARTERY OF NATIVE HEART WITHOUT ANGINA PECTORIS: ICD-10-CM

## 2017-12-29 DIAGNOSIS — R79.89 ELEVATED BRAIN NATRIURETIC PEPTIDE (BNP) LEVEL: ICD-10-CM

## 2017-12-29 DIAGNOSIS — R41.89 COGNITIVE AND BEHAVIORAL CHANGES: Primary | ICD-10-CM

## 2017-12-29 PROCEDURE — G0151 HHCP-SERV OF PT,EA 15 MIN: HCPCS

## 2017-12-29 PROCEDURE — 3331090001 HH PPS REVENUE CREDIT

## 2017-12-29 PROCEDURE — 3331090002 HH PPS REVENUE DEBIT

## 2017-12-29 NOTE — MR AVS SNAPSHOT
Visit Information Date & Time Provider Department Dept. Phone Encounter #  
 12/29/2017 10:30 AM DO Clarice Omalley [phone] [id number] Follow-up Instructions Return in about 3 months (around 3/29/2018) for bp. Routing History Your Appointments 1/23/2018 12:00 PM  
Follow Up with Toni Warner MD  
Neurology Clinic at [facility] Appt Note: f/u memory loss, lsw,10/11/17  
 [address] [place] [address], 
[address], [address] [address]  
[address], [address], [address] [address]  
  
    
 5/21/2018 10:00 AM  
ROUTINE CARE with DO Clarice Omalley (AGN[facility] [place]) Appt Note: 6 MO F/U BP, Referral F/U  
 [address] 
[address] [facility] [id number]  
[phone]  
  
   
 [address] [facility] [address] [place], [place] [address]way [address] [place] [address] Upcoming Health Maintenance Date Due  
 GLAUCOMA SCREENING Q2Y 12/7/2017 MEDICARE YEARLY EXAM 5/24/2018 DTaP/Tdap/Td series (2 - Td) 7/2/2026 Allergies as of 12/29/2017  Review Complete On: 12/29/2017 By: Carmine CarsonELENA Severity Noted Reaction Type Reactions Betadine [Povidone-iodine] High 11/12/2009    Hives Phenergan [Promethazine] High 11/12/2009    Other (comments)  
 hallucinations Seafood [Shellfish Containing Products] High 11/12/2009    Rash, Swelling  
 crabmeat Xylocaine [Lidocaine Hcl] High 01/06/2012    Shortness of Breath Current Immunizations  Reviewed on 12/17/2017 Name Date Influenza High Dose Vaccine PF 8/12/2016, 9/8/2015, 10/29/2013 Influenza Vaccine 10/1/2017, 10/1/2014 Influenza Vaccine Split 10/8/2012, 10/20/2011, 10/15/2010 Influenza Vaccine Whole 10/1/2009 Pneumococcal Conjugate (PCV-13) 6/3/2015 Pneumococcal Polysaccharide (PPSV-23) 7/2/2016 TD Vaccine 4/27/2004 Tdap 7/2/2016 ZZZ-RETIRED (DO NOT USE) Pneumococcal Vaccine (Unspecified Type) 2/8/2002 Zoster Vaccine, Live 9/8/2015 Not reviewed this visit You Were Diagnosed With   
  
 Codes Comments Cognitive and behavioral changes    -  Primary ICD-10-CM: R41.89, R46.89 
ICD-9-CM: 799.59, 312.9 due to dementia vs Viral pneumonia vs other, slowly improving Essential hypertension     ICD-10-CM: I10 
ICD-9-CM: 401.9 stable Dementia associated with other underlying disease with behavioral disturbance     ICD-10-CM: F02.81 ICD-9-CM: 294.8, 294.11 improving with Seroquel 25 mg q hs Polyuria     ICD-10-CM: R35.8 ICD-9-CM: 788.42 improving after IV antibiotics Other fatigue     ICD-10-CM: R53.83 ICD-9-CM: 780.79 due to Viral pneumonia vs dementia, slightly better Anemia due to other cause, not classified     ICD-10-CM: D64.89 ICD-9-CM: 285.8 Weight loss     ICD-10-CM: R63.4 ICD-9-CM: 783.21 7# since 11/21/17 due to not eating vs other Cough     ICD-10-CM: R05 ICD-9-CM: 786.2 History of stroke     ICD-10-CM: Z86.73 
ICD-9-CM: V12.54 Sensorineural hearing loss (SNHL) of both ears     ICD-10-CM: H90.3 ICD-9-CM: 389.18 Coronary artery disease involving native coronary artery of native heart without angina pectoris     ICD-10-CM: I25.10 ICD-9-CM: 414.01 Dyslipidemia     ICD-10-CM: E78.5 ICD-9-CM: 272.4 Other elevated white blood cell (WBC) count     ICD-10-CM: E32.976 ICD-9-CM: 288.69 resolved after IV antibiotics Elevated brain natriuretic peptide (BNP) level     ICD-10-CM: R79.89 ICD-9-CM: 790.99 Vitals BP Pulse Temp Resp Height(growth percentile) Weight(growth percentile) 116/74 (BP 1 Location: Left arm, BP Patient Position: Sitting) 74 97.4 °F (36.3 °C) (Oral) 16 5' 4\" (1.626 m) 146 lb 8 oz (66.5 kg) SpO2 BMI Smoking Status 97% 25.15 kg/m2 Never Smoker Vitals History BMI and BSA Data Body Mass Index Body Surface Area 25.15 kg/m 2 1.73 m 2 Preferred Pharmacy Pharmacy Name Phone [id number] [facility], [address] [facility] Your Updated Medication List  
  
   
This list is accurate as of: 12/29/17 11:49 AM.  Always use your most recent med list. amLODIPine 5 mg tablet Commonly known as:  Dick Ji Take 1 Tab by mouth daily. aspirin 325 mg tablet Commonly known as:  ASPIRIN Take 1 tablet by mouth daily. docusate sodium 100 mg capsule Commonly known as:  Symone Dilling Take 100 mg by mouth daily. Instructed to take daily with plenty of liquid unless otherwise directed- as per 11/7/14 [facility] discharge med list  
  
 FLOMAX 0.4 mg capsule Generic drug:  tamsulosin Take 0.4 mg by mouth daily. For prostate  
  
 isosorbide mononitrate ER 30 mg tablet Commonly known as:  IMDUR  
take 1 tablet by mouth once daily  
  
 meclizine 25 mg tablet Commonly known as:  ANTIVERT Take 1 Tab by mouth three (3) times daily as needed for Dizziness. metoprolol tartrate 25 mg tablet Commonly known as:  LOPRESSOR  
take 1/2 tablet twice a day MUCINEX PO Take 1 Tab by mouth daily. pravastatin 20 mg tablet Commonly known as:  PRAVACHOL  
take 1 tablet by mouth at bedtime QUEtiapine 25 mg tablet Commonly known as:  SEROquel Take 1 Tab by mouth nightly. TYLENOL ARTHRITIS PAIN 650 mg Celestia Cornet Generic drug:  acetaminophen Take 650 mg by mouth every eight (8) hours. vit B12-levomefolate calcium-vit B6 2-1.13-25 mg tablet Commonly known as:  Karson Stalls Take 1 Tab by mouth daily. Indications: Vitamin Deficiency VITAMIN D3 2,000 unit Tab Generic drug:  cholecalciferol (vitamin D3) Take 1 Tab by mouth daily. As per 11/07/14 [facility] discharge med list  
  
  
  
  
We Performed the Following CULTURE, URINE [id number] CPT(R)] URINALYSIS W/ RFLX MICROSCOPIC [39475 CPT(R)] Follow-up Instructions Return in about 3 months (around 3/29/2018) for bp. To-Do List   
 12/30/2017 To Be Determined Appointment with Paulette Mu at [place] [address]  
  
 01/01/2018 To Be Determined Appointment with Brent Alejandro PT at [place] [address]  
  
 01/02/2018 To Be Determined Appointment with Ardeth Cousins at [place] [address]  
  
 01/02/2018 To Be Determined Appointment with Paulette Mu at [place] [address]  
  
 01/03/2018 To Be Determined Appointment with Brent Alejandro PT at [place] [address]  
  
 01/04/2018 To Be Determined Appointment with Ardeth Cousins at [place] [address]  
  
 01/05/2018 To Be Determined Appointment with Brent Alejandro PT at [place] [address]  
  
 01/05/2018 To Be Determined Appointment with Paulette Mu at [place] [address]  
  
 01/08/2018 To Be Determined Appointment with Brent Alejandro PT at [place] [address]  
  
 01/08/2018 To Be Determined Appointment with Paulette Mu at [place] [address]  
  
 01/10/2018 To Be Determined Appointment with Brent Alejandro PT at [place] [address]  
  
 01/12/2018 To Be Determined Appointment with Brent Alejandro PT at [place] [address]  
  
 01/12/2018 To Be Determined Appointment with Mina Benitez RN at [place] [address]  
  
 01/15/2018 To Be Determined Appointment with Brent Alejandro PT at [place] [address]  
  
 01/17/2018 To Be Determined Appointment with Brent Alejandro PT at [place] [address]  
  
 01/19/2018 To Be Determined Appointment with Sosa Gamboa PT at [place] [address] Introducing [facility]! [facility] introduces Flyr patient portal. Now you can access parts of your medical record, email your doctor's office, and request medication refills online. 1. In your internet browser, go to [email or web address] 2. Click on the First Time User? Click Here link in the Sign In box. You will see the New Member Sign Up page. 3. Enter your Flyr Access Code exactly as it appears below. You will not need to use this code after youve completed the sign-up process. If you do not sign up before the expiration date, you must request a new code. · Flyr Access Code: [email or web address] Expires: 1/9/2018  6:48 AM 
 
4. Enter the last four digits of your Social Security Number (xxxx) and Date of Birth (mm/dd/yyyy) as indicated and click Submit. You will be taken to the next sign-up page. 5. Create a Flyr ID. This will be your Flyr login ID and cannot be changed, so think of one that is secure and easy to remember. 6. Create a Flyr password. You can change your password at any time. 7. Enter your Password Reset Question and Answer. This can be used at a later time if you forget your password. 8. Enter your e-mail address. You will receive e-mail notification when new information is available in [address] 9. Click Sign Up. You can now view and download portions of your medical record. 10. Click the Download Summary menu link to download a portable copy of your medical information. If you have questions, please visit the Frequently Asked Questions section of the Flyr website. Remember, Flyr is NOT to be used for urgent needs. For medical emergencies, dial 911. Now available from your iPhone and Android! Please provide this summary of care documentation to your next provider. Your primary care clinician is listed as Roosevelt Gregg. If you have any questions after today's visit, please call [phone].

## 2017-12-29 NOTE — Clinical Note
Bob Clancy! This dear young man was recently hospitalized for MS change with some combativeness. A little better since starting Seroquel 25 mg q hs. He is scheduled to see you 01/23/18. Would you mind checking on him sooner since his mental status is still not at baseline completely? Happy New Year.   María Garrison

## 2017-12-29 NOTE — PROGRESS NOTES
HISTORY OF PRESENT ILLNESS  El Ty is a 80 y.o. male presents with Hospital Follow Up [facility] 6 days inpatient for URI)    Agree with nurse note. Pt is pleasantly demented. His wife is present today and provides most of the history. Patient presents today for the face to face physician transitional care office visit within 9 days from discharge. Patient was hospitalized at [facility] from 12/16/17 to 12/21/17 and was diagnosed with acute encephalopathy likely metabolic, suspected delirium, possible bronchitis, HTN, dementia, and BPH. Per chart review, our nurse navigator spoke to patient on 12/26/17 within 2 business days of discharge regarding the hospital stay. His wife tells me she took him to [facility] on 12/15/17 for cough. Per wife, CXR negative. Tx'd with Azithromycin. Later that night he had urinary frequency and the following morning, he was unable to sit up to take his pills. She crushed his pills and gave them to him in apple sauce. He was not talking much. His fatigue worsened so he did not finish the Azithromycin since his cough resolved She then dialed 911 and EMS took him to the [facility] ER. Chest CT and head CT were negative. Jc Meyer His BP was uncontrolled while hospitalized so Norvasc 5 mg was added to Imdur and Metoprolol. He had a couple combative and disoriented nights. His wife is unsure of where his confusion came from while hospitalized. It was difficult to orient him and he required doses of Ativan, Geodon, and Haldol. Palliative was consulted and he was started on Seroquel 25 mg qhs. His wife tells me he has been tolerating Seroquel well with some improvement. No aggression at home but he has been disoriented some. Per chart review, it appears he was anemic during his hospitalization. His wife has noticed an improvement in his energy since returning home. She is feeding him more often with smaller meals and feels this has helped.      Pt does not remember being in the hospital and wife notes this is a change for him. Today, he is oriented to his name and his age. He is not oriented to place or year. When give 3 choices, he was able to identify our current \"crazy, not worth a piece of. ..president. He has a follow up scheduled with his neurologist toward the end of 01/2018. Hypertensive pt with CAD, dyslipidemia, hx of stroke, dementia, and BL SNHL presents to the office with a BP of 116/74. He weighs 146 lbs, down 7 lbs since last visit on 11/21/17. Written by lakesha Alonzo, as dictated by Dr. Claire Nielsen DO.    ROS    Review of Systems negative except as noted above in HPI. ALLERGIES:    Allergies   Allergen Reactions    Betadine [Povidone-Iodine] Hives    Phenergan [Promethazine] Other (comments)     hallucinations    Seafood [Shellfish Containing Products] Rash and Swelling     crabmeat    Xylocaine [Lidocaine Hcl] Shortness of Breath       CURRENT MEDICATIONS:    Outpatient Prescriptions Marked as Taking for the 12/29/17 encounter (Office Visit) with Teetee Stephens DO   Medication Sig Dispense Refill    acetaminophen (TYLENOL ARTHRITIS PAIN) 650 mg TbER Take 650 mg by mouth every eight (8) hours.  QUEtiapine (SEROQUEL) 25 mg tablet Take 1 Tab by mouth nightly. 30 Tab 1    amLODIPine (NORVASC) 5 mg tablet Take 1 Tab by mouth daily. 30 Tab 1    isosorbide mononitrate ER (IMDUR) 30 mg tablet take 1 tablet by mouth once daily 30 Tab 11    vit B12-levomefolate calcium-vit B6 (FOLTX) 2-1.13-25 mg tablet Take 1 Tab by mouth daily. Indications: Vitamin Deficiency 30 Tab 5    pravastatin (PRAVACHOL) 20 mg tablet take 1 tablet by mouth at bedtime 90 Tab 3    metoprolol tartrate (LOPRESSOR) 25 mg tablet take 1/2 tablet twice a day 30 Tab 11    meclizine (ANTIVERT) 25 mg tablet Take 1 Tab by mouth three (3) times daily as needed for Dizziness. 20 Tab 0    GUAIFENESIN (MUCINEX PO) Take 1 Tab by mouth daily.       docusate sodium (COLACE) 100 mg capsule Take 100 mg by mouth daily. Instructed to take daily with plenty of liquid unless otherwise directed- as per 11/7/14 [facility] discharge med list      cholecalciferol, vitamin D3, (VITAMIN D3) 2,000 unit tab Take 1 Tab by mouth daily. As per 11/07/14 [facility] discharge med list      aspirin (ASPIRIN) 325 mg tablet Take 1 tablet by mouth daily. 30 tablet 3    tamsulosin (FLOMAX) 0.4 mg capsule Take 0.4 mg by mouth daily. For prostate          PAST MEDICAL HISTORY:    Past Medical History:   Diagnosis Date    Back pain     due to DDD and scoliosis    BPH (benign prostatic hyperplasia)     Dr. Renate Rangel CAD (coronary artery disease) 10/30/13    30%LAD, 80% ostal stenosis. Dr. Falguni Narayan.  Chest pain 10/04/04, 10/30/13    Dr. Digna Lua. Negative Stress Cardiolite Test.  Dr. Nidhi East.  Chronic venous insufficiency 06/15/09    Dr. Elena Webster due to fall    DDD (degenerative disc disease), lumbar     L3-S1    Diverticulosis 02/2004    sigmoid. Dr. Sandy Porter DJD (degenerative joint disease) of cervical spine     C 4-5 ;5-6    Dyslipidemia     Fracture 1980    facial/nasal    Hearing loss     wears hearing aid    Heartburn     Hemorrhoids, internal 1987    Dr. Luis Gan of unspecified site of abdominal cavity without mention of obstruction or gangrene     inguinal  Lt    Hypertension     Impotence     Left acoustic neuroma ([address] 10/28/14    17 x 9 mm schwannoma.  Pes planus of both feet     PVD (peripheral vascular disease) ([address] 2009    Scoliosis     LS    Slipped intervertebral disc 1954    chiropractor    SOB (shortness of breath) 08/07/13    Dr. Verito Miles.  Stroke ([address] 10/28/2014    small Left Thalamus. Dr. Meek Galan. Dr. Gabriele Elizalde.  Urinary incontinence     due to BPH. Dr. Remy Europe    Varicose vein 06/15/09    Dr. Paty Murray Vertigo 2003    due to inner ear. Dr. Jeremie Harley. Dr. Js Mata.   Liset Lacy Visual loss     Dr. Cathy Sifuentes, [facility]. PAST SURGICAL HISTORY:    Past Surgical History:   Procedure Laterality Date    APPENDECTOMY      due to gangrene   [address], 02/2002    Dr. Yahaira Christensen. benign.  COLONOSCOPY  02/2004    Dr. Rose Lozano. due q 10 yrs    CYSTOSCOPY  10/17/03    Dr. Amy Hull  10/30/13    Dr. Vitaliy Tavarez. 80% ostal stenosis. 30% LAD.  HX HEMORRHOIDECTOMY      HX KNEE ARTHROSCOPY  1990 Left , 2007 Right    Dr. Derrick Sommer HX LAPAROTOMY  05/14/07    Dr. Sharda Waldron. due to Bowel Obstruction    HX POLYPECTOMY  02/2002    Anal.     HX TONSILLECTOMY      OH COLSC FLX W/RMVL OF TUMOR POLYP LESION SNARE TQ  5/23/2011    Dr. Jerryl Siemens.        FAMILY HISTORY:    Family History   Problem Relation Age of Onset    Heart Attack Mother     Other Mother      brain aneurysm/AAA/varicose veins    Stroke Mother     Heart Attack Sister     Cancer Maternal Grandmother      ovarian       SOCIAL HISTORY:    Social History     Social History    Marital status:      Spouse name: N/A    Number of children: N/A    Years of education: N/A     Social History Main Topics    Smoking status: Never Smoker    Smokeless tobacco: Never Used    Alcohol use No    Drug use: No    Sexual activity: Not Asked     Other Topics Concern    None     Social History Narrative       IMMUNIZATIONS:    Immunization History   Administered Date(s) Administered    Influenza High Dose Vaccine PF 10/29/2013, 09/08/2015, 08/12/2016    Influenza Vaccine 10/01/2014, 10/01/2017    Influenza Vaccine Split 10/15/2010, 10/20/2011, 10/08/2012    Influenza Vaccine Whole 10/01/2009    Pneumococcal Conjugate (PCV-13) 06/03/2015    Pneumococcal Polysaccharide (PPSV-23) 07/02/2016    TD Vaccine 04/27/2004    Tdap 07/02/2016    ZZZ-RETIRED (DO NOT USE) Pneumococcal Vaccine (Unspecified Type) 02/08/2002    Zoster Vaccine, Live 09/08/2015 PHYSICAL EXAMINATION    Vital Signs    Visit Vitals    /74 (BP 1 Location: Left arm, BP Patient Position: Sitting)    Pulse 74    Temp 97.4 °F (36.3 °C) (Oral)    Resp 16    Ht 5' 4\" (1.626 m)    Wt 146 lb 8 oz (66.5 kg)    SpO2 97%    BMI 25.15 kg/m2       Weight Metrics 12/29/2017 12/23/2017 12/16/2017 11/21/2017 10/11/2017 5/23/2017 10/7/2016   Weight 146 lb 8 oz 151 lb 14.4 oz 151 lb 14.4 oz 153 lb 3.2 oz 152 lb 150 lb 3.2 oz 149 lb 1.6 oz   BMI 25.15 kg/m2 26.07 kg/m2 26.07 kg/m2 28.02 kg/m2 27.8 kg/m2 26.61 kg/m2 26.41 kg/m2       General appearance - Well nourished. Well appearing. Well developed. No acute distress. Present with wife. Head - Normocephalic. Atraumatic. Eyes - pupils equal and reactive. Extraocular eye movements intact. Sclera anicteric. Mildly injected sclera. Ears - Hearing is grossly abnormal bilaterally with BL hearing aids intact. Nose - normal and patent. No polyps noted. No erythema. No discharge. Mouth - mucous membranes with decreased oral moisture. Posterior pharynx normal with cobblestone appearance. No erythema, white exudate or obstruction. Neck - supple. Midline trachea. No carotid bruits noted bilaterally. No thyromegaly noted. Chest - clear to auscultation bilaterally anteriorly and posteriorly. No wheezes. No rales or rhonchi. Breath sounds are symmetrical bilaterally. Unlabored respirations. Heart - normal rate. Regular rhythm. Normal S1, S2. No murmur noted. No rubs, clicks or gallops noted. Abdomen - soft and nondistended. No masses or organomegaly. No rebound, rigidity or guarding. Bowel sounds normal x 4 quadrants. No tenderness noted. Neurological - awake, alert and oriented to person. Thinks he is in a \"hospital\" today. Year is Benja Haskinsval. Not oriented to place or time. Cranial nerves II through XII intact. Clear speech. Muscle strength is +5/5 x 4 extremities.   Sensation is intact to light touch bilaterally. Slow, steady gait. Heme/Lymph - peripheral pulses normal x 4 extremities. No peripheral edema is noted. Musculoskeletal - Intact x 4 extremities. Full ROM x 4 extremities. No pain with movement. Back exam - normal range of motion. No pain on palpation of the spinous processes in the cervical, thoracic, lumbar, sacral regions. No CVA tenderness. Skin - no rashes, erythema, ecchymosis, lacerations, abrasions, suspicious moles noted  Psychological -   normal behavior, dress and thought processes. Good insight. Good eye contact. Normal affect. Appropriate mood. Normal speech. DATA REVIEWED    Lab Results   Component Value Date/Time    WBC 8.2 12/18/2017 04:44 AM    Hemoglobin (POC) 12.9 09/15/2016 04:44 PM    HGB 10.7 12/18/2017 04:44 AM    Hematocrit (POC) 38 09/15/2016 04:44 PM    HCT 32.3 12/18/2017 04:44 AM    PLATELET 864 06/21/0127 04:44 AM    MCV 92.6 12/18/2017 04:44 AM     Lab Results   Component Value Date/Time    Sodium 135 12/17/2017 03:54 AM    Potassium 3.9 12/17/2017 03:54 AM    Chloride 103 12/17/2017 03:54 AM    CO2 27 12/17/2017 03:54 AM    Anion gap 5 12/17/2017 03:54 AM    Glucose 130 12/17/2017 03:54 AM    BUN 14 12/17/2017 03:54 AM    Creatinine 1.08 12/17/2017 03:54 AM    BUN/Creatinine ratio 13 12/17/2017 03:54 AM    GFR est AA >60 12/17/2017 03:54 AM    GFR est non-AA >60 12/17/2017 03:54 AM    Calcium 8.0 12/17/2017 03:54 AM    Bilirubin, total 0.6 12/16/2017 03:51 PM    AST (SGOT) 25 12/16/2017 03:51 PM    Alk.  phosphatase 91 12/16/2017 03:51 PM    Protein, total 7.1 12/16/2017 03:51 PM    Albumin 3.5 12/16/2017 03:51 PM    Globulin 3.6 12/16/2017 03:51 PM    A-G Ratio 1.0 12/16/2017 03:51 PM    ALT (SGPT) 19 12/16/2017 03:51 PM     Lab Results   Component Value Date/Time    Cholesterol, total 166 05/23/2017 11:08 AM    HDL Cholesterol 53 05/23/2017 11:08 AM    LDL, calculated 97 05/23/2017 11:08 AM    VLDL, calculated 16 05/23/2017 11:08 AM Triglyceride 78 05/23/2017 11:08 AM    CHOL/HDL Ratio 2.2 10/29/2014 03:45 AM     Lab Results   Component Value Date/Time    Vitamin D 25-Hydroxy 21.5 10/31/2014 06:00 AM    VITAMIN D, 25-HYDROXY 34.2 05/23/2017 11:08 AM       Lab Results   Component Value Date/Time    Hemoglobin A1c 5.4 10/11/2017 10:01 AM     Lab Results   Component Value Date/Time    TSH 3.950 10/11/2017 10:01 AM     Lab Results   Component Value Date/Time    Microalb/Creat ratio (ug/mg creat.) 37.6 05/23/2017 11:41 AM       ASSESSMENT and PLAN      ICD-10-CM ICD-9-CM    1. Cognitive and behavioral changes U88.25 631.15 METABOLIC PANEL, COMPREHENSIVE    R46.89 312.9 RPR    due to dementia vs Viral pneumonia vs other, slowly improving   2. Essential hypertension Q38 629.9 METABOLIC PANEL, COMPREHENSIVE    stable   3. Dementia associated with other underlying disease with behavioral disturbance F02.81 294.8      294.11     improving with Seroquel 25 mg q hs   4. Polyuria R35.8 788.42 URINALYSIS W/ RFLX MICROSCOPIC      CULTURE, URINE    improving after IV antibiotics   5. Other fatigue R53.83 780.79 CBC WITH AUTOMATED DIFF      METABOLIC PANEL, COMPREHENSIVE    due to Viral pneumonia vs dementia, slightly better   6. Anemia due to other cause, not classified D64.89 285.8 CBC WITH AUTOMATED DIFF      METABOLIC PANEL, COMPREHENSIVE   7. Weight loss R63.4 783.21 CBC WITH AUTOMATED DIFF    7# since 11/21/17 due to not eating vs other   8. Cough R05 786.2    9. History of stroke Z86.73 V12.54    10. Sensorineural hearing loss (SNHL) of both ears H90.3 389.18    11. Coronary artery disease involving native coronary artery of native heart without angina pectoris I25.10 414.01    12. Dyslipidemia E78.5 272.4    13. Other elevated white blood cell (WBC) count D72.828 288.69 CBC WITH AUTOMATED DIFF    resolved after IV antibiotics   14. Elevated brain natriuretic peptide (BNP) level R79.89 790.99 BNP   15.  Thrombocytopenia (HCC) D69.6 287.5 CBC WITH AUTOMATED DIFF       Discussed the patient's BMI with him. The BMI follow up plan is as follows: Pt not eligible for BMI calculation due to normal weight. Decrease carbohydrates (white foods, sweet foods, sweet drinks and alcohol), increase green leafy vegetables and protein (lean meats and beans) with each meal.  Avoid fried foods. Eat 3-5 small meals daily. Do not skip meals. Increase water intake. Increase physical activity to 30 minutes daily for health benefit or 60 minutes daily to prevent weight regain, as tolerated. Get 7-8 hours uninterrupted sleep nightly. Chart reviewed and updated. Staff message sent to his neurologist, Dr. Cathie Díaz regarding recent hospitalization. Continue current medications and care. Most recent tests reviewed from [facility]. Recheck labs today. Recent office visit notes from [facility] reviewed. Counseled patient on health concerns: Altered mental status, anemia, and BP. Immunizations noted. Offered empathy, support, legitimation, prayers, partnership to patient. Praised patient for progress. Follow-up Disposition:  Return in about 3 months (around 3/29/2018) for bp. Patient was offered a choice/choices in the treatment plan today. Patient expresses understanding of the plan and agrees with recommendations. Patient declines any additional handouts. Patient is satisfied with previous handouts received from our office    Written by lakesha Warren, as dictated by Dr. Han Barrow DO. Documentation True and Accepted by Chantal Dietz.  Alexandria Gentile.

## 2017-12-29 NOTE — PROGRESS NOTES
Ceasar Mckeon is a 80 y.o. male  Chief Complaint   Patient presents with   [facility] Follow Up     ED [facility] 6 days inpatient for URI     1. Have you been to the ER, urgent care clinic since your last visit? Hospitalized since your last visit? Yes, ED [facility] 12/2017 for URI    2. Have you seen or consulted any other health care providers outside of the [address] [place] [address] since your last visit? Include any pap smears or colon screening.  No

## 2017-12-30 ENCOUNTER — HOME CARE VISIT (OUTPATIENT)
Dept: HOME HEALTH SERVICES | Facility: HOME HEALTH | Age: 82
End: 2017-12-30
Payer: MEDICARE

## 2017-12-30 LAB
ALBUMIN SERPL-MCNC: 3.9 G/DL (ref 3.5–4.7)
ALBUMIN/GLOB SERPL: 1.2 {RATIO} (ref 1.2–2.2)
ALP SERPL-CCNC: 95 IU/L (ref 39–117)
ALT SERPL-CCNC: 31 IU/L (ref 0–44)
APPEARANCE UR: CLEAR
AST SERPL-CCNC: 39 IU/L (ref 0–40)
BACTERIA UR CULT: NORMAL
BASOPHILS # BLD AUTO: 0.1 X10E3/UL (ref 0–0.2)
BASOPHILS NFR BLD AUTO: 1 %
BILIRUB SERPL-MCNC: 0.3 MG/DL (ref 0–1.2)
BILIRUB UR QL STRIP: NEGATIVE
BNP SERPL-MCNC: 39.1 PG/ML (ref 0–100)
BUN SERPL-MCNC: 20 MG/DL (ref 8–27)
BUN/CREAT SERPL: 20 (ref 10–24)
CALCIUM SERPL-MCNC: 9.6 MG/DL (ref 8.6–10.2)
CHLORIDE SERPL-SCNC: 100 MMOL/L (ref 96–106)
CO2 SERPL-SCNC: 25 MMOL/L (ref 18–29)
COLOR UR: YELLOW
CREAT SERPL-MCNC: 0.98 MG/DL (ref 0.76–1.27)
EOSINOPHIL # BLD AUTO: 0.4 X10E3/UL (ref 0–0.4)
EOSINOPHIL NFR BLD AUTO: 4 %
ERYTHROCYTE [DISTWIDTH] IN BLOOD BY AUTOMATED COUNT: 13.7 % (ref 12.3–15.4)
GLOBULIN SER CALC-MCNC: 3.2 G/DL (ref 1.5–4.5)
GLUCOSE SERPL-MCNC: 104 MG/DL (ref 65–99)
GLUCOSE UR QL: NEGATIVE
HCT VFR BLD AUTO: 39.3 % (ref 37.5–51)
HGB BLD-MCNC: 13.3 G/DL (ref 13–17.7)
HGB UR QL STRIP: NEGATIVE
IMM GRANULOCYTES # BLD: 0 X10E3/UL (ref 0–0.1)
IMM GRANULOCYTES NFR BLD: 0 %
KETONES UR QL STRIP: NEGATIVE
LEUKOCYTE ESTERASE UR QL STRIP: NEGATIVE
LYMPHOCYTES # BLD AUTO: 1.8 X10E3/UL (ref 0.7–3.1)
LYMPHOCYTES NFR BLD AUTO: 22 %
MCH RBC QN AUTO: 31.6 PG (ref 26.6–33)
MCHC RBC AUTO-ENTMCNC: 33.8 G/DL (ref 31.5–35.7)
MCV RBC AUTO: 93 FL (ref 79–97)
MICRO URNS: NORMAL
MONOCYTES # BLD AUTO: 1 X10E3/UL (ref 0.1–0.9)
MONOCYTES NFR BLD AUTO: 13 %
NEUTROPHILS # BLD AUTO: 4.9 X10E3/UL (ref 1.4–7)
NEUTROPHILS NFR BLD AUTO: 60 %
NITRITE UR QL STRIP: NEGATIVE
PH UR STRIP: 5.5 [PH] (ref 5–7.5)
PLATELET # BLD AUTO: 412 X10E3/UL (ref 150–379)
POTASSIUM SERPL-SCNC: 4.8 MMOL/L (ref 3.5–5.2)
PROT SERPL-MCNC: 7.1 G/DL (ref 6–8.5)
PROT UR QL STRIP: NEGATIVE
RBC # BLD AUTO: 4.21 X10E6/UL (ref 4.14–5.8)
RPR SER QL: NON REACTIVE
SODIUM SERPL-SCNC: 141 MMOL/L (ref 134–144)
SP GR UR: 1.02 (ref 1–1.03)
UROBILINOGEN UR STRIP-MCNC: 0.2 MG/DL (ref 0.2–1)
WBC # BLD AUTO: 8.1 X10E3/UL (ref 3.4–10.8)

## 2017-12-30 PROCEDURE — 3331090002 HH PPS REVENUE DEBIT

## 2017-12-30 PROCEDURE — 3331090001 HH PPS REVENUE CREDIT

## 2017-12-31 PROCEDURE — 3331090001 HH PPS REVENUE CREDIT

## 2017-12-31 PROCEDURE — 3331090002 HH PPS REVENUE DEBIT

## 2018-01-01 ENCOUNTER — HOME CARE VISIT (OUTPATIENT)
Dept: SCHEDULING | Facility: HOME HEALTH | Age: 83
End: 2018-01-01
Payer: MEDICARE

## 2018-01-01 VITALS
RESPIRATION RATE: 16 BRPM | SYSTOLIC BLOOD PRESSURE: 138 MMHG | OXYGEN SATURATION: 97 % | TEMPERATURE: 97.6 F | HEART RATE: 75 BPM | DIASTOLIC BLOOD PRESSURE: 73 MMHG

## 2018-01-01 PROCEDURE — 3331090001 HH PPS REVENUE CREDIT

## 2018-01-01 PROCEDURE — G0151 HHCP-SERV OF PT,EA 15 MIN: HCPCS

## 2018-01-01 PROCEDURE — 3331090002 HH PPS REVENUE DEBIT

## 2018-01-02 ENCOUNTER — HOME CARE VISIT (OUTPATIENT)
Dept: SCHEDULING | Facility: HOME HEALTH | Age: 83
End: 2018-01-02
Payer: MEDICARE

## 2018-01-02 VITALS
HEART RATE: 71 BPM | SYSTOLIC BLOOD PRESSURE: 129 MMHG | OXYGEN SATURATION: 98 % | DIASTOLIC BLOOD PRESSURE: 84 MMHG | TEMPERATURE: 97.1 F | RESPIRATION RATE: 16 BRPM

## 2018-01-02 PROCEDURE — 3331090001 HH PPS REVENUE CREDIT

## 2018-01-02 PROCEDURE — 3331090002 HH PPS REVENUE DEBIT

## 2018-01-02 PROCEDURE — G0152 HHCP-SERV OF OT,EA 15 MIN: HCPCS

## 2018-01-03 ENCOUNTER — HOME CARE VISIT (OUTPATIENT)
Dept: SCHEDULING | Facility: HOME HEALTH | Age: 83
End: 2018-01-03
Payer: MEDICARE

## 2018-01-03 PROCEDURE — 3331090001 HH PPS REVENUE CREDIT

## 2018-01-03 PROCEDURE — G0300 HHS/HOSPICE OF LPN EA 15 MIN: HCPCS

## 2018-01-03 PROCEDURE — G0151 HHCP-SERV OF PT,EA 15 MIN: HCPCS

## 2018-01-03 PROCEDURE — 3331090002 HH PPS REVENUE DEBIT

## 2018-01-04 VITALS
RESPIRATION RATE: 16 BRPM | OXYGEN SATURATION: 98 % | HEART RATE: 86 BPM | SYSTOLIC BLOOD PRESSURE: 120 MMHG | TEMPERATURE: 98.6 F | DIASTOLIC BLOOD PRESSURE: 78 MMHG

## 2018-01-04 PROCEDURE — 3331090002 HH PPS REVENUE DEBIT

## 2018-01-04 PROCEDURE — 3331090001 HH PPS REVENUE CREDIT

## 2018-01-05 ENCOUNTER — HOME CARE VISIT (OUTPATIENT)
Dept: SCHEDULING | Facility: HOME HEALTH | Age: 83
End: 2018-01-05
Payer: MEDICARE

## 2018-01-05 ENCOUNTER — HOME CARE VISIT (OUTPATIENT)
Dept: HOME HEALTH SERVICES | Facility: HOME HEALTH | Age: 83
End: 2018-01-05
Payer: MEDICARE

## 2018-01-05 VITALS
DIASTOLIC BLOOD PRESSURE: 70 MMHG | TEMPERATURE: 97 F | OXYGEN SATURATION: 98 % | HEART RATE: 74 BPM | SYSTOLIC BLOOD PRESSURE: 129 MMHG

## 2018-01-05 PROCEDURE — 3331090002 HH PPS REVENUE DEBIT

## 2018-01-05 PROCEDURE — 3331090001 HH PPS REVENUE CREDIT

## 2018-01-05 PROCEDURE — G0152 HHCP-SERV OF OT,EA 15 MIN: HCPCS

## 2018-01-05 PROCEDURE — G0151 HHCP-SERV OF PT,EA 15 MIN: HCPCS

## 2018-01-06 VITALS
HEART RATE: 72 BPM | OXYGEN SATURATION: 99 % | RESPIRATION RATE: 16 BRPM | SYSTOLIC BLOOD PRESSURE: 115 MMHG | DIASTOLIC BLOOD PRESSURE: 65 MMHG | TEMPERATURE: 98.3 F | TEMPERATURE: 97.8 F | HEART RATE: 62 BPM | OXYGEN SATURATION: 98 % | DIASTOLIC BLOOD PRESSURE: 62 MMHG | SYSTOLIC BLOOD PRESSURE: 121 MMHG | RESPIRATION RATE: 16 BRPM

## 2018-01-06 PROCEDURE — 3331090002 HH PPS REVENUE DEBIT

## 2018-01-06 PROCEDURE — 3331090001 HH PPS REVENUE CREDIT

## 2018-01-07 PROCEDURE — 3331090002 HH PPS REVENUE DEBIT

## 2018-01-07 PROCEDURE — 3331090001 HH PPS REVENUE CREDIT

## 2018-01-08 ENCOUNTER — HOME CARE VISIT (OUTPATIENT)
Dept: HOME HEALTH SERVICES | Facility: HOME HEALTH | Age: 83
End: 2018-01-08
Payer: MEDICARE

## 2018-01-08 ENCOUNTER — HOME CARE VISIT (OUTPATIENT)
Dept: SCHEDULING | Facility: HOME HEALTH | Age: 83
End: 2018-01-08
Payer: MEDICARE

## 2018-01-08 PROCEDURE — 3331090002 HH PPS REVENUE DEBIT

## 2018-01-08 PROCEDURE — G0151 HHCP-SERV OF PT,EA 15 MIN: HCPCS

## 2018-01-08 PROCEDURE — 3331090001 HH PPS REVENUE CREDIT

## 2018-01-09 VITALS
DIASTOLIC BLOOD PRESSURE: 62 MMHG | TEMPERATURE: 97.8 F | OXYGEN SATURATION: 99 % | SYSTOLIC BLOOD PRESSURE: 130 MMHG | HEART RATE: 65 BPM | RESPIRATION RATE: 16 BRPM

## 2018-01-09 PROCEDURE — 3331090001 HH PPS REVENUE CREDIT

## 2018-01-09 PROCEDURE — 3331090002 HH PPS REVENUE DEBIT

## 2018-01-10 ENCOUNTER — HOME CARE VISIT (OUTPATIENT)
Dept: SCHEDULING | Facility: HOME HEALTH | Age: 83
End: 2018-01-10
Payer: MEDICARE

## 2018-01-10 PROCEDURE — 3331090001 HH PPS REVENUE CREDIT

## 2018-01-10 PROCEDURE — G0151 HHCP-SERV OF PT,EA 15 MIN: HCPCS

## 2018-01-10 PROCEDURE — 3331090002 HH PPS REVENUE DEBIT

## 2018-01-11 VITALS
DIASTOLIC BLOOD PRESSURE: 60 MMHG | SYSTOLIC BLOOD PRESSURE: 118 MMHG | HEART RATE: 69 BPM | TEMPERATURE: 98.3 F | OXYGEN SATURATION: 97 %

## 2018-01-11 PROCEDURE — 3331090001 HH PPS REVENUE CREDIT

## 2018-01-11 PROCEDURE — 3331090002 HH PPS REVENUE DEBIT

## 2018-01-12 ENCOUNTER — HOME CARE VISIT (OUTPATIENT)
Dept: SCHEDULING | Facility: HOME HEALTH | Age: 83
End: 2018-01-12
Payer: MEDICARE

## 2018-01-12 PROCEDURE — 3331090001 HH PPS REVENUE CREDIT

## 2018-01-12 PROCEDURE — 3331090002 HH PPS REVENUE DEBIT

## 2018-01-12 PROCEDURE — G0151 HHCP-SERV OF PT,EA 15 MIN: HCPCS

## 2018-01-13 ENCOUNTER — HOME CARE VISIT (OUTPATIENT)
Dept: SCHEDULING | Facility: HOME HEALTH | Age: 83
End: 2018-01-13
Payer: MEDICARE

## 2018-01-13 VITALS
TEMPERATURE: 98.3 F | OXYGEN SATURATION: 99 % | SYSTOLIC BLOOD PRESSURE: 121 MMHG | RESPIRATION RATE: 16 BRPM | HEART RATE: 65 BPM | DIASTOLIC BLOOD PRESSURE: 62 MMHG

## 2018-01-13 VITALS
OXYGEN SATURATION: 98 % | DIASTOLIC BLOOD PRESSURE: 72 MMHG | HEART RATE: 64 BPM | TEMPERATURE: 97.8 F | WEIGHT: 147 LBS | HEIGHT: 66 IN | RESPIRATION RATE: 12 BRPM | SYSTOLIC BLOOD PRESSURE: 112 MMHG | BODY MASS INDEX: 23.63 KG/M2

## 2018-01-13 PROCEDURE — 3331090001 HH PPS REVENUE CREDIT

## 2018-01-13 PROCEDURE — G0299 HHS/HOSPICE OF RN EA 15 MIN: HCPCS

## 2018-01-13 PROCEDURE — 3331090002 HH PPS REVENUE DEBIT

## 2018-01-14 PROCEDURE — 3331090002 HH PPS REVENUE DEBIT

## 2018-01-14 PROCEDURE — 3331090001 HH PPS REVENUE CREDIT

## 2018-01-15 ENCOUNTER — HOME CARE VISIT (OUTPATIENT)
Dept: SCHEDULING | Facility: HOME HEALTH | Age: 83
End: 2018-01-15
Payer: MEDICARE

## 2018-01-15 VITALS
HEART RATE: 57 BPM | RESPIRATION RATE: 16 BRPM | OXYGEN SATURATION: 99 % | SYSTOLIC BLOOD PRESSURE: 125 MMHG | DIASTOLIC BLOOD PRESSURE: 62 MMHG | TEMPERATURE: 98.7 F

## 2018-01-15 PROCEDURE — G0151 HHCP-SERV OF PT,EA 15 MIN: HCPCS

## 2018-01-15 PROCEDURE — 3331090001 HH PPS REVENUE CREDIT

## 2018-01-15 PROCEDURE — 3331090002 HH PPS REVENUE DEBIT

## 2018-01-16 PROCEDURE — 3331090002 HH PPS REVENUE DEBIT

## 2018-01-16 PROCEDURE — 3331090001 HH PPS REVENUE CREDIT

## 2018-01-17 PROCEDURE — 3331090002 HH PPS REVENUE DEBIT

## 2018-01-17 PROCEDURE — 3331090001 HH PPS REVENUE CREDIT

## 2018-01-18 ENCOUNTER — HOME CARE VISIT (OUTPATIENT)
Dept: SCHEDULING | Facility: HOME HEALTH | Age: 83
End: 2018-01-18
Payer: MEDICARE

## 2018-01-18 PROCEDURE — G0151 HHCP-SERV OF PT,EA 15 MIN: HCPCS

## 2018-01-18 PROCEDURE — 3331090001 HH PPS REVENUE CREDIT

## 2018-01-18 PROCEDURE — 3331090002 HH PPS REVENUE DEBIT

## 2018-01-19 ENCOUNTER — HOME CARE VISIT (OUTPATIENT)
Dept: SCHEDULING | Facility: HOME HEALTH | Age: 83
End: 2018-01-19
Payer: MEDICARE

## 2018-01-19 VITALS
HEART RATE: 58 BPM | RESPIRATION RATE: 16 BRPM | OXYGEN SATURATION: 99 % | SYSTOLIC BLOOD PRESSURE: 121 MMHG | TEMPERATURE: 98.5 F | DIASTOLIC BLOOD PRESSURE: 62 MMHG

## 2018-01-19 PROCEDURE — 3331090001 HH PPS REVENUE CREDIT

## 2018-01-19 PROCEDURE — 3331090002 HH PPS REVENUE DEBIT

## 2018-01-19 PROCEDURE — G0151 HHCP-SERV OF PT,EA 15 MIN: HCPCS

## 2018-01-20 PROCEDURE — 3331090002 HH PPS REVENUE DEBIT

## 2018-01-20 PROCEDURE — 3331090001 HH PPS REVENUE CREDIT

## 2018-01-21 PROCEDURE — 3331090002 HH PPS REVENUE DEBIT

## 2018-01-21 PROCEDURE — 3331090001 HH PPS REVENUE CREDIT

## 2018-01-22 ENCOUNTER — HOME CARE VISIT (OUTPATIENT)
Dept: SCHEDULING | Facility: HOME HEALTH | Age: 83
End: 2018-01-22
Payer: MEDICARE

## 2018-01-22 ENCOUNTER — PATIENT OUTREACH (OUTPATIENT)
Dept: FAMILY MEDICINE CLINIC | Age: 83
End: 2018-01-22

## 2018-01-22 VITALS
DIASTOLIC BLOOD PRESSURE: 62 MMHG | RESPIRATION RATE: 16 BRPM | OXYGEN SATURATION: 98 % | TEMPERATURE: 97.9 F | SYSTOLIC BLOOD PRESSURE: 112 MMHG | HEART RATE: 67 BPM

## 2018-01-22 VITALS
OXYGEN SATURATION: 97 % | RESPIRATION RATE: 16 BRPM | SYSTOLIC BLOOD PRESSURE: 118 MMHG | HEART RATE: 61 BPM | DIASTOLIC BLOOD PRESSURE: 62 MMHG | TEMPERATURE: 97.8 F

## 2018-01-22 PROCEDURE — 3331090001 HH PPS REVENUE CREDIT

## 2018-01-22 PROCEDURE — G0151 HHCP-SERV OF PT,EA 15 MIN: HCPCS

## 2018-01-22 PROCEDURE — 3331090002 HH PPS REVENUE DEBIT

## 2018-01-22 NOTE — PROGRESS NOTES
NN Progress Note    - Call from pt's wife [facility]. Pt ID verified with 2 identifiers, name and . - Wife verbalized concern hasn't received new [facility] form. Wife reported she signed form on 18 on [facility] visit. Realized afterwards form had incorrect pt name & . Has informed nurses who have come after 17 but has yet to receive a new form. This NN advised would f/u with [facility] office.    - This NN spoke with Nursing Clinical Coordinator Guanako De Leon. Above discussed. Denae to contact pt's wife to advise new form will be sent. Call back to wife to inform. Wife to contact this NN if new form not provided within 1 week. - Patient is greater than 30 days post episode. Pt has attended follow up appointment(s). No hospital re-admissions within 30 days post discharge. Wife reported pt \"doing much, much better\". Pt managing ADLs with minimal assistance. Goals met and completed. Wife reported filled pt's Seroquel & Amlodipine last week. No more refills left. Wife was advised to contact pharm when refill due. Pharm will f/u with this office re getting refills. Wife verbalized understanding. No further needs identified. Wife agreeable with resolving episode @ this time. Will follow as needed.

## 2018-01-23 ENCOUNTER — TELEPHONE (OUTPATIENT)
Dept: NEUROLOGY | Age: 83
End: 2018-01-23

## 2018-01-23 ENCOUNTER — OFFICE VISIT (OUTPATIENT)
Dept: NEUROLOGY | Age: 83
End: 2018-01-23

## 2018-01-23 VITALS
WEIGHT: 154.2 LBS | HEART RATE: 65 BPM | HEIGHT: 66 IN | OXYGEN SATURATION: 97 % | SYSTOLIC BLOOD PRESSURE: 122 MMHG | DIASTOLIC BLOOD PRESSURE: 74 MMHG | BODY MASS INDEX: 24.78 KG/M2

## 2018-01-23 DIAGNOSIS — F02.80 EARLY ONSET ALZHEIMER'S DEMENTIA WITHOUT BEHAVIORAL DISTURBANCE (HCC): Primary | ICD-10-CM

## 2018-01-23 DIAGNOSIS — G25.0 ESSENTIAL TREMOR: ICD-10-CM

## 2018-01-23 DIAGNOSIS — G30.0 EARLY ONSET ALZHEIMER'S DEMENTIA WITHOUT BEHAVIORAL DISTURBANCE (HCC): Primary | ICD-10-CM

## 2018-01-23 DIAGNOSIS — I10 ESSENTIAL HYPERTENSION: ICD-10-CM

## 2018-01-23 PROCEDURE — 3331090002 HH PPS REVENUE DEBIT

## 2018-01-23 PROCEDURE — 3331090001 HH PPS REVENUE CREDIT

## 2018-01-23 RX ORDER — DONEPEZIL HYDROCHLORIDE 5 MG/1
5 TABLET, FILM COATED ORAL
Qty: 30 TAB | Refills: 0 | Status: SHIPPED | OUTPATIENT
Start: 2018-01-23 | End: 2018-01-25

## 2018-01-23 RX ORDER — DONEPEZIL HYDROCHLORIDE 10 MG/1
10 TABLET, FILM COATED ORAL
Qty: 30 TAB | Refills: 3 | Status: SHIPPED | OUTPATIENT
Start: 2018-02-23 | End: 2018-01-25

## 2018-01-23 NOTE — TELEPHONE ENCOUNTER
----- Message from [place] sent at 1/23/2018 12:47 PM EST -----  Regarding: /Rx  [facility] pharmacy wanted to know if the pt takes donepezil 5 mg and 10 mg tablet. Best contact number is [phone].

## 2018-01-23 NOTE — TELEPHONE ENCOUNTER
Called the pharmacy and explained that patient starts on the 5mg first for the donepezil and moves on to the 10mg

## 2018-01-23 NOTE — PROGRESS NOTES
Name: Aaron Lauren    Chief Complaint:     Returns for fa follow up visit. He was admitted to the hospital for a viral infection. He was having trouble breathing. He was sundowning and he needed to use seroqel after failing ativan. He was discharged with difficulty walking. He is no longer as confused. Assesment and Plan  1. Alzheimer's dementia  Start aricept and namenda  Follow up  In three month. 2. Hyperlipidemia  Continue pravastatin    3. Hypertension  Continue amlodipine    Allergies  Betadine [povidone-iodine]; Phenergan [promethazine]; Seafood [shellfish containing products]; and Xylocaine [lidocaine hcl]     Medications  Current Outpatient Prescriptions   Medication Sig    acetaminophen (TYLENOL ARTHRITIS PAIN) 650 mg TbER Take 650 mg by mouth every eight (8) hours.  QUEtiapine (SEROQUEL) 25 mg tablet Take 1 Tab by mouth nightly.  amLODIPine (NORVASC) 5 mg tablet Take 1 Tab by mouth daily.  isosorbide mononitrate ER (IMDUR) 30 mg tablet take 1 tablet by mouth once daily    vit B12-levomefolate calcium-vit B6 (FOLTX) 2-1.13-25 mg tablet Take 1 Tab by mouth daily. Indications: Vitamin Deficiency    pravastatin (PRAVACHOL) 20 mg tablet take 1 tablet by mouth at bedtime    metoprolol tartrate (LOPRESSOR) 25 mg tablet take 1/2 tablet twice a day    meclizine (ANTIVERT) 25 mg tablet Take 1 Tab by mouth three (3) times daily as needed for Dizziness.  GUAIFENESIN (MUCINEX PO) Take 1 Tab by mouth daily.  docusate sodium (COLACE) 100 mg capsule Take 100 mg by mouth daily. Instructed to take daily with plenty of liquid unless otherwise directed- as per 11/7/14 [address] discharge med list    cholecalciferol, vitamin D3, (VITAMIN D3) 2,000 unit tab Take 1 Tab by mouth daily. As per 11/07/14 [address]  discharge med list    aspirin (ASPIRIN) 325 mg tablet Take 1 tablet by mouth daily.  tamsulosin (FLOMAX) 0.4 mg capsule Take 0.4 mg by mouth daily.  For prostate      No current facility-administered medications for this visit. Medical History  Past Medical History:   Diagnosis Date    Back pain     due to DDD and scoliosis    BPH (benign prostatic hyperplasia)     Dr. Glenn Pires CAD (coronary artery disease) 10/30/13    30%LAD, 80% ostal stenosis. Dr. Vinita Cabrera.  Chest pain 10/04/04, 10/30/13    Dr. Edmund Gaona. Negative Stress Cardiolite Test.  Dr. Mariluz Low.  Chronic venous insufficiency 06/15/09    Dr. Guanako Cochran due to fall    DDD (degenerative disc disease), lumbar     L3-S1    Diverticulosis 02/2004    sigmoid. Dr. Ki Aguirre DJD (degenerative joint disease) of cervical spine     C 4-5 ;5-6    Dyslipidemia     Fracture 1980    facial/nasal    Hearing loss     wears hearing aid    Heartburn     Hemorrhoids, internal 1987    Dr. Velna Cooks of unspecified site of abdominal cavity without mention of obstruction or gangrene     inguinal  Lt    Hypertension     Impotence     Left acoustic neuroma ([address] 10/28/14    17 x 9 mm schwannoma.  Pes planus of both feet     PVD (peripheral vascular disease) ([address] 2009    Scoliosis     LS    Slipped intervertebral disc 1954    chiropractor    SOB (shortness of breath) 08/07/13    Dr. Jorge A Albert.  Stroke ([address] 10/28/2014    small Left Thalamus. Dr. Denys Yap. Dr. Taylor Solorzano.  Urinary incontinence     due to BPH. Dr. Val Ford    Varicose vein 06/15/09    Dr. Anup Hines Vertigo 2003    due to inner ear. Dr. Barbara Herrera. Dr. Blank Marshall. Dr. Rose Keto Visual loss     Dr. Jean Villa, [facility]. Review of Systems   Constitutional: Negative for chills and fever. HENT: Negative for ear pain. Eyes: Negative for pain and discharge. Respiratory: Negative for cough and hemoptysis. Cardiovascular: Negative for chest pain and claudication. Gastrointestinal: Negative for constipation and diarrhea.    Genitourinary: Negative for flank pain and hematuria. Musculoskeletal: Negative for back pain and myalgias. Skin: Negative for itching and rash. Neurological: Negative for headaches. Endo/Heme/Allergies: Negative for environmental allergies. Does not bruise/bleed easily. Psychiatric/Behavioral: Positive for memory loss. Negative for depression and hallucinations. Exam:    Visit Vitals    /74    Pulse 65    Ht 5' 6\" (1.676 m)    Wt 154 lb 3.2 oz (69.9 kg)    SpO2 97%    BMI 24.89 kg/m2            Max Score Patient Score Question   5 1/5  What is the year? Season? Date? Day? Month?    5 2/5  Where are we now? State? County? Town/city? Hospital? Floor? 3 3  Repeat names of three objects after 3 seconds   5 5  Ask patient for serial 7's or spell \"world\" backwards   3 0  Repeat same three objects after three minutes   2 2  Ask patient to name two common objects   1 1  Ask patient to say \"No ifs and or buts\"   3 3  Ask patient to do a 3 step command   1 1  Ask patient to write a sentence   1 1  Copy intersecting polygons   1 1  Read and do what is on the paper \"Close your eyes   20/30    Imaging    CT Results (most recent):    Results from Hospital Encounter encounter on 12/16/17   CT CHEST WO CONT   Narrative INDICATION: Wet cough. Altered mental status. Suspected pneumonia. COMPARISON: Chest radiograph from December 16, 2017    CONTRAST: None. TECHNIQUE:  5 mm axial images were obtained through the chest. Coronal and  sagittal reconstructions were generated. CT dose reduction was achieved through  use of a standardized protocol tailored for this examination and automatic  exposure control for dose modulation. The absence of intravenous contrast reduces the sensitivity for evaluation of  the mediastinum and upper abdominal organs. FINDINGS:    THYROID: No nodule. MEDIASTINUM: No mass or lymphadenopathy. MIKE: No mass or lymphadenopathy. THORACIC AORTA: No aneurysm.   MAIN PULMONARY ARTERY: Normal in caliber. TRACHEA/BRONCHI: Patent. ESOPHAGUS: No wall thickening or dilatation. HEART: Normal in size. Moderate coronary artery calcifications. PLEURA: No effusion or pneumothorax. LUNGS: Bilateral dependent atelectasis. No evidence of pneumonia. INCIDENTALLY IMAGED UPPER ABDOMEN: No focal abnormality. BONES: No destructive bone lesion. Impression IMPRESSION:  Bilateral dependent atelectasis. No evidence of pneumonia or pleural effusion. MRI Results (most recent):    Results from [place] encounter on 10/23/17   MRI BRAIN WO CONT   Narrative EXAM:  MRI BRAIN WO CONT      HISTORY: Dementia  INDICATION:  Dementia    COMPARISON: 10/29/2014. CONTRAST:  None. TECHNIQUE: Sagittal T1, axial FLAIR, T2,T1 and gradient echo images as well as  coronal T2 weighted images and axial diffusion weighted images of the head were  obtained. FINDINGS:     Stable left acoustic schwannoma. Sulcal and ventricular prominence. Confluent periventricular scattered foci of  increased T2 signal intensity in the corona radiata and centrum semiovale. Temporal pole prominence is increased. There is no evidence of hemorrhage, acute  infarct or abnormal extra-axial fluid collection. Normal appearing flow-voids  are present in the vertebral, basilar and carotid artery systems. The  craniocervical junction is normal. .         Impression IMPRESSION:   Moderate to severe chronic microvascular ischemic change with severe temporal  predominant cerebral atrophy. No intracranial hemorrhage or evidence of acute infarction. Stable left acoustic schwannoma.         .  Lab Review    Lab Results   Component Value Date/Time    WBC 8.1 12/29/2017 12:06 PM    HCT 39.3 12/29/2017 12:06 PM    HGB 13.3 12/29/2017 12:06 PM    PLATELET 185 82/37/3911 12:06 PM       Lab Results   Component Value Date/Time    Sodium 141 12/29/2017 12:06 PM    Potassium 4.8 12/29/2017 12:06 PM    Chloride 100 12/29/2017 12:06 PM CO2 25 12/29/2017 12:06 PM    Glucose 104 12/29/2017 12:06 PM    BUN 20 12/29/2017 12:06 PM    Creatinine 0.98 12/29/2017 12:06 PM    Calcium 9.6 12/29/2017 12:06 PM           Lab Results   Component Value Date/Time    Hemoglobin A1c 5.4 10/11/2017 10:01 AM        Lab Results   Component Value Date/Time    Vitamin B12 755 12/17/2017 03:54 AM    Folate 25.3 12/17/2017 03:54 AM         Lab Results   Component Value Date/Time    Cholesterol, total 166 05/23/2017 11:08 AM    HDL Cholesterol 53 05/23/2017 11:08 AM    LDL, calculated 97 05/23/2017 11:08 AM    VLDL, calculated 16 05/23/2017 11:08 AM    Triglyceride 78 05/23/2017 11:08 AM    CHOL/HDL Ratio 2.2 10/29/2014 03:45 AM

## 2018-01-23 NOTE — MR AVS SNAPSHOT
[address], 
[id number], [address] [facility] 
[phone] Patient: Fidel Corral MRN:  [id number] Visit Information Date & Time Provider Department Dept. Phone Encounter #  
 2018 12:00 PM Scott Flores MD [facility] [phone] [id number] Follow-up Instructions Return in about 3 months (around 2018) for DEMENTIA start namenda. Your Appointments 3/30/2018 10:00 AM  
ROUTINE CARE with Susie Mercado DO [employer] ([facility]) Appt Note: 3 MO F/U BP  
 [address] 
[address] [facility] [address]  
[phone]  
  
   
 [address]  
  
    
 2018 10:00 AM  
ROUTINE CARE with Susie Mercado DO [employer] ([facility] [place] Appt Note: 6 MO F/U BP, Referral F/U  
 [address] 
[address] [facility] [address]  
[phone] Upcoming Health Maintenance Date Due  
 GLAUCOMA SCREENING Q2Y 3/23/2018* MEDICARE YEARLY EXAM 2018 DTaP/Tdap/Td series (2 - Td) 2026 *Topic was postponed. The date shown is not the original due date. Allergies as of 2018  Review Complete On: 2018 By: Scott Flores MD  
  
 Severity Noted Reaction Type Reactions Betadine [Povidone-iodine] High 2009    Hives Phenergan [Promethazine] High 2009    Other (comments)  
 hallucinations Seafood [Shellfish Containing Products] High 2009    Rash, Swelling  
 crabmeat Xylocaine [Lidocaine Hcl] High 2012    Shortness of Breath Current Immunizations  Reviewed on 2017 Name Date Influenza High Dose Vaccine PF 2016, 2015, 10/29/2013 Influenza Vaccine 10/1/2017, 10/1/2014 Influenza Vaccine Split 10/8/2012, 10/20/2011, 10/15/2010 Influenza Vaccine Whole 10/1/2009 Pneumococcal Conjugate (PCV-13) 6/3/2015 Pneumococcal Polysaccharide (PPSV-23) 7/2/2016 TD Vaccine 4/27/2004 Tdap 7/2/2016 ZZZ-RETIRED (DO NOT USE) Pneumococcal Vaccine (Unspecified Type) 2/8/2002 Zoster Vaccine, Live 9/8/2015 Not reviewed this visit You Were Diagnosed With   
  
 Codes Comments Early onset Alzheimer's dementia without behavioral disturbance    -  Primary ICD-10-CM: G30.0, F02.80 ICD-9-CM: 331.0, 294.10 Essential hypertension     ICD-10-CM: I10 
ICD-9-CM: 401.9 Essential tremor     ICD-10-CM: G25.0 ICD-9-CM: 333.1 Vitals BP Pulse Height(growth percentile) Weight(growth percentile) SpO2 BMI  
 122/74 65 5' 6\" (1.676 m) 154 lb 3.2 oz (69.9 kg) 97% 24.89 kg/m2 Smoking Status Never Smoker BMI and BSA Data Body Mass Index Body Surface Area  
 24.89 kg/m 2 1.8 m 2 Preferred Pharmacy Pharmacy Name Phone [phone] [facility], [address] [facility] Your Updated Medication List  
  
   
This list is accurate as of: 1/23/18 12:47 PM.  Always use your most recent med list. amLODIPine 5 mg tablet Commonly known as:  Jonny Mayers Take 1 Tab by mouth daily. aspirin 325 mg tablet Commonly known as:  ASPIRIN Take 1 tablet by mouth daily. docusate sodium 100 mg capsule Commonly known as:  Serafin Joseph Take 100 mg by mouth daily. Instructed to take daily with plenty of liquid unless otherwise directed- as per 11/7/14 [facility] discharge med list  
  
 * donepezil 5 mg tablet Commonly known as:  ARICEPT Take 1 Tab by mouth nightly. * donepezil 10 mg tablet Commonly known as:  ARICEPT Take 1 Tab by mouth nightly. Start taking on:  2/23/2018 FLOMAX 0.4 mg capsule Generic drug:  tamsulosin Take 0.4 mg by mouth daily. For prostate  
  
 isosorbide mononitrate ER 30 mg tablet Commonly known as:  IMDUR  
 take 1 tablet by mouth once daily  
  
 meclizine 25 mg tablet Commonly known as:  ANTIVERT Take 1 Tab by mouth three (3) times daily as needed for Dizziness. metoprolol tartrate 25 mg tablet Commonly known as:  LOPRESSOR  
take 1/2 tablet twice a day MUCINEX PO Take 1 Tab by mouth daily. pravastatin 20 mg tablet Commonly known as:  PRAVACHOL  
take 1 tablet by mouth at bedtime QUEtiapine 25 mg tablet Commonly known as:  SEROquel Take 1 Tab by mouth nightly. TYLENOL ARTHRITIS PAIN 650 mg Wanda Homans Generic drug:  acetaminophen Take 650 mg by mouth every eight (8) hours. vit B12-levomefolate calcium-vit B6 2-1.13-25 mg tablet Commonly known as:  Suann Height Take 1 Tab by mouth daily. Indications: Vitamin Deficiency VITAMIN D3 2,000 unit Tab Generic drug:  cholecalciferol (vitamin D3) Take 1 Tab by mouth daily. As per 11/07/14 [facility] discharge med list  
  
 * Notice: This list has 2 medication(s) that are the same as other medications prescribed for you. Read the directions carefully, and ask your doctor or other care provider to review them with you. Prescriptions Sent to Pharmacy Refills  
 donepezil (ARICEPT) 5 mg tablet 0 Sig: Take 1 Tab by mouth nightly. Class: Normal  
 Pharmacy: [address] Ph #: [phone] Route: Oral  
 donepezil (ARICEPT) 10 mg tablet 3 Starting on: 2/23/2018 Sig: Take 1 Tab by mouth nightly. Class: Normal  
 Pharmacy: [address] Ph #: [phone] Route: Oral  
  
Follow-up Instructions Return in about 3 months (around 4/23/2018) for DEMENTIA start [place]. To-Do List   
 01/24/2018 To Be Determined Appointment with Fredy Ervin PT at [place] [address]  
  
 01/26/2018 To Be Determined Appointment with Antwan Hernandez RN at [place] [address]  
  
 01/26/2018 To Be Determined Appointment with Олег Huffman PT at [place] [address]  
  
 01/29/2018 To Be Determined Appointment with Олег Huffman PT at [place] [address]  
  
 01/31/2018 To Be Determined Appointment with Олег Huffman PT at [place] [address]  
  
 02/02/2018 To Be Determined Appointment with Олег Huffman PT at [place] [address] Patient Instructions PRESCRIPTION REFILL POLICY [facility] Statement to Patients April 1, 2014 In an effort to ensure the large volume of patient prescription refills is processed in the most efficient and expeditious manner, we are asking our patients to assist us by calling your Pharmacy for all prescription refills, this will include also your  Mail Order Pharmacy. The pharmacy will contact our office electronically to continue the refill process. Please do not wait until the last minute to call your pharmacy. We need at least 48 hours (2days) to fill prescriptions. We also encourage you to call your pharmacy before going to  your prescription to make sure it is ready. With regard to controlled substance prescription refill requests (narcotic refills) that need to be picked up at our office, we ask your cooperation by providing us with at least 72 hours (3days) notice that you will need a refill. We will not refill narcotic prescription refill requests after 4:00pm on any weekday, Monday through Thursday, or after 2:00pm on Fridays, or on the weekends. We encourage everyone to explore another way of getting your prescription refill request processed using [employer], our patient web portal through our electronic medical record system.  [employer] is an efficient and effective way to communicate your medication request directly to the office and  downloadable as an chintan on your smart phone . [employer] also features a review functionality that allows you to view your medication list as well as leave messages for your physician. Are you ready to get connected? If so please review the attatched instructions or speak to any of our staff to get you set up right away! Thank you so much for your cooperation. Should you have any questions please contact our Practice Administrator. The Physicians and Staff,  [facility] Please be advised there is a $25 fee for all paperwork to be completed from our  providers. This is to be paid by the patient prior to picking up the completed forms. A Healthy Lifestyle: Care Instructions Your Care Instructions A healthy lifestyle can help you feel good, stay at a healthy weight, and have plenty of energy for both work and play. A healthy lifestyle is something you can share with your whole family. A healthy lifestyle also can lower your risk for serious health problems, such as high blood pressure, heart disease, and diabetes. You can follow a few steps listed below to improve your health and the health of your family. Follow-up care is a key part of your treatment and safety. Be sure to make and go to all appointments, and call your doctor if you are having problems. It's also a good idea to know your test results and keep a list of the medicines you take. How can you care for yourself at home? · Do not eat too much sugar, fat, or fast foods. You can still have dessert and treats now and then. The goal is moderation. · Start small to improve your eating habits. Pay attention to portion sizes, drink less juice and soda pop, and eat more fruits and vegetables. ¨ Eat a healthy amount of food. A 3-ounce serving of meat, for example, is about the size of a deck of cards.  Fill the rest of your plate with vegetables and whole grains. ¨ Limit the amount of soda and sports drinks you have every day. Drink more water when you are thirsty. ¨ Eat at least 5 servings of fruits and vegetables every day. It may seem like a lot, but it is not hard to reach this goal. A serving or helping is 1 piece of fruit, 1 cup of vegetables, or 2 cups of leafy, raw vegetables. Have an apple or some carrot sticks as an afternoon snack instead of a candy bar. Try to have fruits and/or vegetables at every meal. 
· Make exercise part of your daily routine. You may want to start with simple activities, such as walking, bicycling, or slow swimming. Try to be active 30 to 60 minutes every day. You do not need to do all 30 to 60 minutes all at once. For example, you can exercise 3 times a day for 10 or 20 minutes. Moderate exercise is safe for most people, but it is always a good idea to talk to your doctor before starting an exercise program. 
· Keep moving. Nelson Hailey the lawn, work in the garden, or Clinithink. Take the stairs instead of the elevator at work. · If you smoke, quit. People who smoke have an increased risk for heart attack, stroke, cancer, and other lung illnesses. Quitting is hard, but there are ways to boost your chance of quitting tobacco for good. ¨ Use nicotine gum, patches, or lozenges. ¨ Ask your doctor about stop-smoking programs and medicines. ¨ Keep trying. In addition to reducing your risk of diseases in the future, you will notice some benefits soon after you stop using tobacco. If you have shortness of breath or asthma symptoms, they will likely get better within a few weeks after you quit. · Limit how much alcohol you drink. Moderate amounts of alcohol (up to 2 drinks a day for men, 1 drink a day for women) are okay. But drinking too much can lead to liver problems, high blood pressure, and other health problems. Family health If you have a family, there are many things you can do together to improve your health. · Eat meals together as a family as often as possible. · Eat healthy foods. This includes fruits, vegetables, lean meats and dairy, and whole grains. · Include your family in your fitness plan. Most people think of activities such as jogging or tennis as the way to fitness, but there are many ways you and your family can be more active. Anything that makes you breathe hard and gets your heart pumping is exercise. Here are some tips: 
¨ Walk to do errands or to take your child to school or the bus. ¨ Go for a family bike ride after dinner instead of watching TV. Where can you learn more? Go to http://diamondAppZerodonna.info/. Enter P515 in the search box to learn more about \"A Healthy Lifestyle: Care Instructions. \" Current as of: May 12, 2017 Content Version: 11.4 © 1739-0571 [employer]. Care instructions adapted under license by [employer] (which disclaims liability or warranty for this information). If you have questions about a medical condition or this instruction, always ask your healthcare professional. [address] any warranty or liability for your use of this information. Introducing [facility]! [place] introduces [employer] patient portal. Now you can access parts of your medical record, email your doctor's office, and request medication refills online. 1. In your internet browser, go to [email or web address] 2. Click on the First Time User? Click Here link in the Sign In box. You will see the New Member Sign Up page. 3. Enter your [employer] Access Code exactly as it appears below. You will not need to use this code after youve completed the sign-up process. If you do not sign up before the expiration date, you must request a new code. · [employer] Access Code: [email or web address] Expires: 4/9/2018  7:55 AM 
 
4.  Enter the last four digits of your Social Security Number (xxxx) and Date of Birth (mm/dd/yyyy) as indicated and click Submit. You will be taken to the next sign-up page. 5. Create a [employer] ID. This will be your [employer] login ID and cannot be changed, so think of one that is secure and easy to remember. 6. Create a [employer] password. You can change your password at any time. 7. Enter your Password Reset Question and Answer. This can be used at a later time if you forget your password. 8. Enter your e-mail address. You will receive e-mail notification when new information is available in [address] 9. Click Sign Up. You can now view and download portions of your medical record. 10. Click the Download Summary menu link to download a portable copy of your medical information. If you have questions, please visit the Frequently Asked Questions section of the [employer] website. Remember, [employer] is NOT to be used for urgent needs. For medical emergencies, dial 911. Now available from your iPhone and Android! Please provide this summary of care documentation to your next provider. Your primary care clinician is listed as Magnolia Lee. If you have any questions after today's visit, please call [phone].

## 2018-01-23 NOTE — PATIENT INSTRUCTIONS
[facility]   Statement to Patients  April 1, 2014      In an effort to ensure the large volume of patient prescription refills is processed in the most efficient and expeditious manner, we are asking our patients to assist us by calling your Pharmacy for all prescription refills, this will include also your  Mail Order Pharmacy. The pharmacy will contact our office electronically to continue the refill process. Please do not wait until the last minute to call your pharmacy. We need at least 48 hours (2days) to fill prescriptions. We also encourage you to call your pharmacy before going to  your prescription to make sure it is ready. With regard to controlled substance prescription refill requests (narcotic refills) that need to be picked up at our office, we ask your cooperation by providing us with at least 72 hours (3days) notice that you will need a refill. We will not refill narcotic prescription refill requests after 4:00pm on any weekday, Monday through Thursday, or after 2:00pm on Fridays, or on the weekends. We encourage everyone to explore another way of getting your prescription refill request processed using [employer], our patient web portal through our electronic medical record system. [employer] is an efficient and effective way to communicate your medication request directly to the office and  downloadable as an chintan on your smart phone . [employer] also features a review functionality that allows you to view your medication list as well as leave messages for your physician. Are you ready to get connected? If so please review the attatched instructions or speak to any of our staff to get you set up right away! Thank you so much for your cooperation. Should you have any questions please contact our Practice Administrator.     The Physicians and Staff,  [facility]     Please be advised there is a $25 fee for all paperwork to be completed from our  providers. This is to be paid by the patient prior to picking up the completed forms. A Healthy Lifestyle: Care Instructions  Your Care Instructions    A healthy lifestyle can help you feel good, stay at a healthy weight, and have plenty of energy for both work and play. A healthy lifestyle is something you can share with your whole family. A healthy lifestyle also can lower your risk for serious health problems, such as high blood pressure, heart disease, and diabetes. You can follow a few steps listed below to improve your health and the health of your family. Follow-up care is a key part of your treatment and safety. Be sure to make and go to all appointments, and call your doctor if you are having problems. It's also a good idea to know your test results and keep a list of the medicines you take. How can you care for yourself at home? · Do not eat too much sugar, fat, or fast foods. You can still have dessert and treats now and then. The goal is moderation. · Start small to improve your eating habits. Pay attention to portion sizes, drink less juice and soda pop, and eat more fruits and vegetables. ¨ Eat a healthy amount of food. A 3-ounce serving of meat, for example, is about the size of a deck of cards. Fill the rest of your plate with vegetables and whole grains. ¨ Limit the amount of soda and sports drinks you have every day. Drink more water when you are thirsty. ¨ Eat at least 5 servings of fruits and vegetables every day. It may seem like a lot, but it is not hard to reach this goal. A serving or helping is 1 piece of fruit, 1 cup of vegetables, or 2 cups of leafy, raw vegetables. Have an apple or some carrot sticks as an afternoon snack instead of a candy bar. Try to have fruits and/or vegetables at every meal.  · Make exercise part of your daily routine. You may want to start with simple activities, such as walking, bicycling, or slow swimming.  Try to be active 30 to 60 minutes every day. You do not need to do all 30 to 60 minutes all at once. For example, you can exercise 3 times a day for 10 or 20 minutes. Moderate exercise is safe for most people, but it is always a good idea to talk to your doctor before starting an exercise program.  · Keep moving. Teodora Leonel the lawn, work in the garden, or [employer]. Take the stairs instead of the elevator at work. · If you smoke, quit. People who smoke have an increased risk for heart attack, stroke, cancer, and other lung illnesses. Quitting is hard, but there are ways to boost your chance of quitting tobacco for good. ¨ Use nicotine gum, patches, or lozenges. ¨ Ask your doctor about stop-smoking programs and medicines. ¨ Keep trying. In addition to reducing your risk of diseases in the future, you will notice some benefits soon after you stop using tobacco. If you have shortness of breath or asthma symptoms, they will likely get better within a few weeks after you quit. · Limit how much alcohol you drink. Moderate amounts of alcohol (up to 2 drinks a day for men, 1 drink a day for women) are okay. But drinking too much can lead to liver problems, high blood pressure, and other health problems. Family health  If you have a family, there are many things you can do together to improve your health. · Eat meals together as a family as often as possible. · Eat healthy foods. This includes fruits, vegetables, lean meats and dairy, and whole grains. · Include your family in your fitness plan. Most people think of activities such as jogging or tennis as the way to fitness, but there are many ways you and your family can be more active. Anything that makes you breathe hard and gets your heart pumping is exercise. Here are some tips:  ¨ Walk to do errands or to take your child to school or the bus. ¨ Go for a family bike ride after dinner instead of watching TV. Where can you learn more?   Go to http://diamond-donna.info/. Enter T447 in the search box to learn more about \"A Healthy Lifestyle: Care Instructions. \"  Current as of: May 12, 2017  Content Version: 11.4  © 0726-2710 [employer], [employer]. Care instructions adapted under license by [employer] (which disclaims liability or warranty for this information). If you have questions about a medical condition or this instruction, always ask your healthcare professional. [place] [address] any warranty or liability for your use of this information.

## 2018-01-24 ENCOUNTER — HOME CARE VISIT (OUTPATIENT)
Dept: SCHEDULING | Facility: HOME HEALTH | Age: 83
End: 2018-01-24
Payer: MEDICARE

## 2018-01-24 VITALS
RESPIRATION RATE: 16 BRPM | SYSTOLIC BLOOD PRESSURE: 110 MMHG | HEART RATE: 61 BPM | DIASTOLIC BLOOD PRESSURE: 60 MMHG | OXYGEN SATURATION: 98 % | TEMPERATURE: 97.6 F

## 2018-01-24 PROCEDURE — G0151 HHCP-SERV OF PT,EA 15 MIN: HCPCS

## 2018-01-24 PROCEDURE — 3331090001 HH PPS REVENUE CREDIT

## 2018-01-24 PROCEDURE — 3331090002 HH PPS REVENUE DEBIT

## 2018-01-25 ENCOUNTER — TELEPHONE (OUTPATIENT)
Dept: NEUROLOGY | Age: 83
End: 2018-01-25

## 2018-01-25 ENCOUNTER — HOSPITAL ENCOUNTER (EMERGENCY)
Age: 83
Discharge: HOME | End: 2018-01-25
Attending: EMERGENCY MEDICINE
Payer: MEDICARE

## 2018-01-25 VITALS
OXYGEN SATURATION: 97 % | TEMPERATURE: 98.3 F | RESPIRATION RATE: 18 BRPM | DIASTOLIC BLOOD PRESSURE: 72 MMHG | BODY MASS INDEX: 24.59 KG/M2 | HEART RATE: 65 BPM | HEIGHT: 66 IN | SYSTOLIC BLOOD PRESSURE: 116 MMHG | WEIGHT: 153 LBS

## 2018-01-25 DIAGNOSIS — R11.2 NAUSEA AND VOMITING, INTRACTABILITY OF VOMITING NOT SPECIFIED, UNSPECIFIED VOMITING TYPE: Primary | ICD-10-CM

## 2018-01-25 DIAGNOSIS — T50.905A ADVERSE EFFECT OF DRUG, INITIAL ENCOUNTER: ICD-10-CM

## 2018-01-25 LAB
ANION GAP SERPL CALC-SCNC: 4 MMOL/L (ref 5–15)
BUN SERPL-MCNC: 15 MG/DL (ref 6–20)
BUN/CREAT SERPL: 16 (ref 12–20)
CALCIUM SERPL-MCNC: 9 MG/DL (ref 8.5–10.1)
CHLORIDE SERPL-SCNC: 102 MMOL/L (ref 97–108)
CO2 SERPL-SCNC: 31 MMOL/L (ref 21–32)
CREAT SERPL-MCNC: 0.91 MG/DL (ref 0.7–1.3)
ERYTHROCYTE [DISTWIDTH] IN BLOOD BY AUTOMATED COUNT: 13.7 % (ref 11.5–14.5)
GLUCOSE SERPL-MCNC: 110 MG/DL (ref 65–100)
HCT VFR BLD AUTO: 34.5 % (ref 36.6–50.3)
HGB BLD-MCNC: 11.7 G/DL (ref 12.1–17)
MCH RBC QN AUTO: 31.5 PG (ref 26–34)
MCHC RBC AUTO-ENTMCNC: 33.9 G/DL (ref 30–36.5)
MCV RBC AUTO: 92.7 FL (ref 80–99)
NRBC # BLD: 0 K/UL (ref 0–0.01)
NRBC BLD-RTO: 0 PER 100 WBC
PLATELET # BLD AUTO: 188 K/UL (ref 150–400)
PMV BLD AUTO: 9.9 FL (ref 8.9–12.9)
POTASSIUM SERPL-SCNC: 4.4 MMOL/L (ref 3.5–5.1)
RBC # BLD AUTO: 3.72 M/UL (ref 4.1–5.7)
SODIUM SERPL-SCNC: 137 MMOL/L (ref 136–145)
TROPONIN I SERPL-MCNC: 0.09 NG/ML
WBC # BLD AUTO: 7.1 K/UL (ref 4.1–11.1)

## 2018-01-25 PROCEDURE — 84484 ASSAY OF TROPONIN QUANT: CPT | Performed by: EMERGENCY MEDICINE

## 2018-01-25 PROCEDURE — 74011250636 HC RX REV CODE- 250/636: Performed by: EMERGENCY MEDICINE

## 2018-01-25 PROCEDURE — 36415 COLL VENOUS BLD VENIPUNCTURE: CPT | Performed by: EMERGENCY MEDICINE

## 2018-01-25 PROCEDURE — 85027 COMPLETE CBC AUTOMATED: CPT | Performed by: EMERGENCY MEDICINE

## 2018-01-25 PROCEDURE — 3331090001 HH PPS REVENUE CREDIT

## 2018-01-25 PROCEDURE — 99284 EMERGENCY DEPT VISIT MOD MDM: CPT

## 2018-01-25 PROCEDURE — 80048 BASIC METABOLIC PNL TOTAL CA: CPT | Performed by: EMERGENCY MEDICINE

## 2018-01-25 PROCEDURE — 96361 HYDRATE IV INFUSION ADD-ON: CPT

## 2018-01-25 PROCEDURE — 96374 THER/PROPH/DIAG INJ IV PUSH: CPT

## 2018-01-25 PROCEDURE — 3331090002 HH PPS REVENUE DEBIT

## 2018-01-25 PROCEDURE — 93005 ELECTROCARDIOGRAM TRACING: CPT

## 2018-01-25 RX ORDER — ONDANSETRON 4 MG/1
4 TABLET, FILM COATED ORAL
Qty: 20 TAB | Refills: 0 | Status: SHIPPED | OUTPATIENT
Start: 2018-01-25 | End: 2018-05-24 | Stop reason: ALTCHOICE

## 2018-01-25 RX ORDER — DONEPEZIL HYDROCHLORIDE 5 MG/1
TABLET, FILM COATED ORAL
COMMUNITY
End: 2018-01-25

## 2018-01-25 RX ORDER — ONDANSETRON 2 MG/ML
4 INJECTION INTRAMUSCULAR; INTRAVENOUS
Status: COMPLETED | OUTPATIENT
Start: 2018-01-25 | End: 2018-01-25

## 2018-01-25 RX ADMIN — ONDANSETRON HYDROCHLORIDE 4 MG: 2 INJECTION, SOLUTION INTRAMUSCULAR; INTRAVENOUS at 15:09

## 2018-01-25 RX ADMIN — SODIUM CHLORIDE 500 ML: 900 INJECTION, SOLUTION INTRAVENOUS at 15:09

## 2018-01-25 NOTE — CONSULTS
DATE OF CONSULTATION: 1/25/2018    CONSULTED BY: No admitting provider for patient encounter. Chief Complaint   Patient presents with    Dizziness     patient having dizziness and nausea for the past 2 days, started taking aricept 2 days ago, havent felt well since.  Fatigue       Reason for Consult  I have been asked to see the patient in neurological consultation to render advice and opinion regarding malaise and nausea    HISTORY OF PRESENT ILLNESS  Patient was started on Aricept 5mg 5 days ago, felt nauseated an generally poorly last 48 hours, otherwise ok. He is Natalie Cervantes's patient. Wife is in charge, denies fever or other symptoms. ROS  A ten system review of constitutional, cardiovascular, respiratory, musculoskeletal, endocrine, skin, SHEENT, genitourinary, psychiatric and neurologic systems was obtained and is unremarkable except as stated in HPI. PMH as below. PMH  Past Medical History:   Diagnosis Date    Back pain     due to DDD and scoliosis    BPH (benign prostatic hyperplasia)     Dr. Teddy King CAD (coronary artery disease) 10/30/13    30%LAD, 80% ostal stenosis. Dr. Wilbur Kanner.  Chest pain 10/04/04, 10/30/13    Dr. Jeffrey Ybarra. Negative Stress Cardiolite Test.  Dr. Elda Pepper.  Chronic venous insufficiency 06/15/09    Dr. Danyel Carrasco due to fall    DDD (degenerative disc disease), lumbar     L3-S1    Diverticulosis 02/2004    sigmoid. Dr. Triston Sanderson DJD (degenerative joint disease) of cervical spine     C 4-5 ;5-6    Dyslipidemia     Fracture 1980    facial/nasal    Hearing loss     wears hearing aid    Heartburn     Hemorrhoids, internal 1987    Dr. Zach Vuong of unspecified site of abdominal cavity without mention of obstruction or gangrene     inguinal  Lt    Hypertension     Impotence     Left acoustic neuroma ([facility] [address] 10/28/14    17 x 9 mm schwannoma.       Pes planus of both feet     PVD (peripheral vascular disease) ([facility] 2009    Scoliosis     LS    Slipped intervertebral disc 1954    chiropractor    SOB (shortness of breath) 08/07/13    Dr. Yesi Vuong.  Stroke ([facility] 10/28/2014    small Left Thalamus. Dr. Melvin Rodriguez. Dr. Kalen Echavarria.  Urinary incontinence     due to BPH. Dr. Steven Jenkins    Varicose vein 06/15/09    Dr. Amelia Carrizales Vertigo 2003    due to inner ear. Dr. Yemi Porter. Dr. Poly Cedeno. Dr. John Lazaro Visual loss     Dr. Ten Wang, [facility]. FH  Family History   Problem Relation Age of Onset    Heart Attack Mother     Other Mother      brain aneurysm/AAA/varicose veins    Stroke Mother     Heart Attack Sister     Cancer Maternal Grandmother      ovarian       SH  Social History     Social History    Marital status:      Spouse name: N/A    Number of children: N/A    Years of education: N/A     Social History Main Topics    Smoking status: Never Smoker    Smokeless tobacco: Never Used    Alcohol use No    Drug use: No    Sexual activity: Not Asked     Other Topics Concern    None     Social History Narrative       ALLERGIES  Allergies   Allergen Reactions    Betadine [Povidone-Iodine] Hives    Phenergan [Promethazine] Other (comments)     hallucinations    Seafood [Shellfish Containing Products] Rash and Swelling     crabmeat    Xylocaine [Lidocaine Hcl] Shortness of Breath    Aricept [Donepezil] Nausea and Vomiting       PHYSICAL EXAM  EXAMINATION:   Patient Vitals for the past 24 hrs:   Temp Pulse Resp BP SpO2   01/25/18 1451 98.3 °F (36.8 °C) - - - -   01/25/18 1422 - 65 16 143/84 99 %        General:   Physical Exam   CONSTITUTIONAL: Oriented to person, \"Hospital\" and January 2018   Head: Normocephalic and atraumatic. ENT: Oropharynx is clear and moist. No oropharyngeal exudate. EYES: Conjunctivae and EOM are normal. Pupils are equal, round, and reactive to light. Fundoscopic exam normal, No scleral icterus. NECK: Normal range of motion. Neck supple. No thyromegaly present. Carotids w/o bruit    Neurological Examination:   Mental Status:  AAO x2 1/2. Speech is fluent. Follows commands     Cranial Nerves: Visual fields are full. PERRL, Extraocular movements are full. Facial movement intact, symmetric. Hearing intact to conversation. Palate elevates symmetrically. . Tongue midline. Motor: Strength is 5/5 in all 4 ext. Normal tone. No atrophy. Sensation: Normal to light touch    Reflexes: DTRs 2+ throughout. Plantar responses downgoing.      Coordination/Cerebellar: Intact to finger-nose-finger     Gait: NT       LAB DATA REVIEWED:    Results for orders placed or performed during the hospital encounter of 01/25/18   CBC W/O DIFF   Result Value Ref Range    WBC 7.1 4.1 - 11.1 K/uL    RBC 3.72 (L) 4.10 - 5.70 M/uL    HGB 11.7 (L) 12.1 - 17.0 g/dL    HCT 34.5 (L) 36.6 - 50.3 %    MCV 92.7 80.0 - 99.0 FL    MCH 31.5 26.0 - 34.0 PG    MCHC 33.9 30.0 - 36.5 g/dL    RDW 13.7 11.5 - 14.5 %    PLATELET 658 081 - 654 K/uL    MPV 9.9 8.9 - 12.9 FL    NRBC 0.0 0  WBC    ABSOLUTE NRBC 0.00 0.00 - 8.95 K/uL   METABOLIC PANEL, BASIC   Result Value Ref Range    Sodium 137 136 - 145 mmol/L    Potassium 4.4 3.5 - 5.1 mmol/L    Chloride 102 97 - 108 mmol/L    CO2 31 21 - 32 mmol/L    Anion gap 4 (L) 5 - 15 mmol/L    Glucose 110 (H) 65 - 100 mg/dL    BUN 15 6 - 20 MG/DL    Creatinine 0.91 0.70 - 1.30 MG/DL    BUN/Creatinine ratio 16 12 - 20      GFR est AA >60 >60 ml/min/1.73m2    GFR est non-AA >60 >60 ml/min/1.73m2    Calcium 9.0 8.5 - 10.1 MG/DL   TROPONIN I   Result Value Ref Range    Troponin-I, Qt. 0.09 (H) <0.05 ng/mL   EKG, 12 LEAD, INITIAL   Result Value Ref Range    Ventricular Rate 64 BPM    Atrial Rate 64 BPM    P-R Interval 198 ms    QRS Duration 92 ms    Q-T Interval 412 ms    QTC Calculation (Bezet) 425 ms    Calculated P Axis 63 degrees    Calculated R Axis -22 degrees    Calculated T Axis 9 degrees    Diagnosis       Normal sinus rhythm  Incomplete right bundle branch block  Nonspecific ST and T wave abnormality  When compared with ECG of 28-OCT-2014 17:21,  Nonspecific T wave abnormality now evident in Anterior leads              HOME MEDS  Prior to Admission Medications   Prescriptions Last Dose Informant Patient Reported? Taking? GUAIFENESIN (MUCINEX PO)   Yes Yes   Sig: Take 1 Tab by mouth daily. QUEtiapine (SEROQUEL) 25 mg tablet   No Yes   Sig: Take 1 Tab by mouth nightly. acetaminophen (TYLENOL ARTHRITIS PAIN) 650 mg TbER   Yes Yes   Sig: Take 650 mg by mouth every eight (8) hours. amLODIPine (NORVASC) 5 mg tablet   No Yes   Sig: Take 1 Tab by mouth daily. aspirin (ASPIRIN) 325 mg tablet   No Yes   Sig: Take 1 tablet by mouth daily. cholecalciferol, vitamin D3, (VITAMIN D3) 2,000 unit tab   Yes Yes   Sig: Take 1 Tab by mouth daily. As per 11/07/14 [facility] discharge med list   docusate sodium (COLACE) 100 mg capsule  Self Yes Yes   Sig: Take 100 mg by mouth daily. Instructed to take daily with plenty of liquid unless otherwise directed- as per 11/7/14 [facility] discharge med list   donepezil (ARICEPT) 10 mg tablet   No Yes   Sig: Take 1 Tab by mouth nightly. donepezil (ARICEPT) 5 mg tablet   Yes Yes   Sig: Take  by mouth nightly. isosorbide mononitrate ER (IMDUR) 30 mg tablet   No Yes   Sig: take 1 tablet by mouth once daily   meclizine (ANTIVERT) 25 mg tablet   No Yes   Sig: Take 1 Tab by mouth three (3) times daily as needed for Dizziness. metoprolol tartrate (LOPRESSOR) 25 mg tablet   No Yes   Sig: take 1/2 tablet twice a day   pravastatin (PRAVACHOL) 20 mg tablet   No Yes   Sig: take 1 tablet by mouth at bedtime   tamsulosin (FLOMAX) 0.4 mg capsule   Yes Yes   Sig: Take 0.4 mg by mouth daily. For prostate    vit B12-levomefolate calcium-vit B6 (FOLTX) 2-1.13-25 mg tablet   No Yes   Sig: Take 1 Tab by mouth daily.  Indications: Vitamin Deficiency      Facility-Administered Medications: None       CURRENT MEDS  No current facility-administered medications for this encounter. IMPRESSION:RECOMMENDATIONS:  I suspect hi symptoms are related to the Aricept, no way to know for sure, if he returns to normal in the next 2 days then it likely is the case. They will talk to Dr. Maura Maxwell next week about whether or not to try the Menantine      Thank you very much for this consultation. We will follow up on the above studies and give further recommendations as indicated. Quinton Hyman. Dom Jeff MD  Neurologist    This note will not be viewable in [address]

## 2018-01-25 NOTE — ED NOTES
Per wife, patient was started on Aricept by neurologist on Tuesday, which he started his first dose Tuesday night. After last night's dosage, patient began complaining of nausea, vomiting (x1 episode), and dizziness. Patient has also been having a decrease in appetite and wife states he seems to be weaker than normal.    MD Mccall Clos a bedside to evaluate patient.

## 2018-01-25 NOTE — ED NOTES
Bedside and Verbal shift change report given to Sherren Honour, RN (oncoming nurse) by Donell Jimenez RN (offgoing nurse). Report included the following information SBAR, Kardex, ED Summary, Intake/Output, MAR, Recent Results, Med Rec Status and Cardiac Rhythm NSR.

## 2018-01-25 NOTE — ED NOTES
Dr Saima Reid reviewed discharge instructions with the spouse and caregiver. The spouse and caregiver verbalized understanding. Patient discharged home in stable condition via wheelchair with family with discharge papers and prescriptions.

## 2018-01-25 NOTE — ED PROVIDER NOTES
EMERGENCY DEPARTMENT HISTORY AND PHYSICAL EXAM      Date: 1/25/2018  Patient Name: Brendon Moore    History of Presenting Illness     Chief Complaint   Patient presents with    Dizziness     patient having dizziness and nausea for the past 2 days, started taking aricept 2 days ago, havent felt well since.  Fatigue     History Provided By: Patient and Patient's Wife    HPI: Brendon Moore, 80 y.o. male with PMHx significant for dementia, CAD, and hyperlipidemia, presents ambulatory to the ED with cc of generalized fatigue, abdominal pain, and nausea with associated emesis. Per wife, pt began Aricept 5 mg x two days per Neurology (Dr. Radha Chambers) and began to present with adverse symptoms following medication administration per wife. Wife reports that the pt was given the medication around 2100 last night after which he began to complain of diffuse moderate intensity abdominal pain to the suprapubic region alongside constant nausea. Wife reports one onset of emesis prior to 0600 today. Wife reports that the pt has been complaining of generalized weakness/fatgiue reporting he feels unwell. Wife and pt additionally report a mild headache with an associated dull, heavy sensation. Pt and wife report no modifying factors for the discomfort. Pt and wife specifically denies any fevers, chills, diarrhea, urinary complications, chest pain, or SOB. PMHx: DJD, PVD  PSHx: tonsillectomy, appendectomy  Social Hx: - EtOH; - Smoker; - Illicit Drugs    PCP: Yony Santoro DO    There are no other complaints, changes, or physical findings at this time. Current Outpatient Prescriptions   Medication Sig Dispense Refill    acetaminophen (TYLENOL ARTHRITIS PAIN) 650 mg TbER Take 650 mg by mouth every eight (8) hours.  QUEtiapine (SEROQUEL) 25 mg tablet Take 1 Tab by mouth nightly. 30 Tab 1    amLODIPine (NORVASC) 5 mg tablet Take 1 Tab by mouth daily.  30 Tab 1    isosorbide mononitrate ER (IMDUR) 30 mg tablet take 1 tablet by mouth once daily 30 Tab 11    vit B12-levomefolate calcium-vit B6 (FOLTX) 2-1.13-25 mg tablet Take 1 Tab by mouth daily. Indications: Vitamin Deficiency 30 Tab 5    pravastatin (PRAVACHOL) 20 mg tablet take 1 tablet by mouth at bedtime 90 Tab 3    metoprolol tartrate (LOPRESSOR) 25 mg tablet take 1/2 tablet twice a day 30 Tab 11    meclizine (ANTIVERT) 25 mg tablet Take 1 Tab by mouth three (3) times daily as needed for Dizziness. 20 Tab 0    GUAIFENESIN (MUCINEX PO) Take 1 Tab by mouth daily.  docusate sodium (COLACE) 100 mg capsule Take 100 mg by mouth daily. Instructed to take daily with plenty of liquid unless otherwise directed- as per 11/7/14 [facility] discharge med list      cholecalciferol, vitamin D3, (VITAMIN D3) 2,000 unit tab Take 1 Tab by mouth daily. As per 11/07/14 [facility] discharge med list      aspirin (ASPIRIN) 325 mg tablet Take 1 tablet by mouth daily. 30 tablet 3    tamsulosin (FLOMAX) 0.4 mg capsule Take 0.4 mg by mouth daily. For prostate        Past History     Past Medical History:  Past Medical History:   Diagnosis Date    Back pain     due to DDD and scoliosis    BPH (benign prostatic hyperplasia)     Dr. Deonte Gonzales CAD (coronary artery disease) 10/30/13    30%LAD, 80% ostal stenosis. Dr. Samreen Darnell.  Chest pain 10/04/04, 10/30/13    Dr. Estela Oneill. Negative Stress Cardiolite Test.  Dr. Jamison Douglas.  Chronic venous insufficiency 06/15/09    Dr. Abhinav Beyer due to fall    DDD (degenerative disc disease), lumbar     L3-S1    Diverticulosis 02/2004    sigmoid.   Dr. Wendy Germain DJD (degenerative joint disease) of cervical spine     C 4-5 ;5-6    Dyslipidemia     Fracture 1980    facial/nasal    Hearing loss     wears hearing aid    Heartburn     Hemorrhoids, internal 1987    Dr. Asia Magana of unspecified site of abdominal cavity without mention of obstruction or gangrene     inguinal  Lt    Hypertension     Impotence     Left acoustic neuroma ([facility] 10/28/14    17 x 9 mm schwannoma.  Pes planus of both feet     PVD (peripheral vascular disease) ([facility] 2009    Scoliosis     LS    Slipped intervertebral disc 1954    chiropractor    SOB (shortness of breath) 08/07/13    Dr. Shellie Singh.  [facility] 10/28/2014    small Left Thalamus. Dr. Sandy Higgins. Dr. Alondra James.  Urinary incontinence     due to BPH. Dr. Wilbert Green    Varicose vein 06/15/09    Dr. Gaspar Service Vertigo 2003    due to inner ear. Dr. Alexis Mesa. Dr. Mock Eye. Dr. Argentina Khalil Visual loss     Dr. Levi De Santiago, ophth. Past Surgical History:  Past Surgical History:   Procedure Laterality Date    APPENDECTOMY      due to gangrene   [facility], 02/2002    Dr. Toño Romero. benign.  COLONOSCOPY  02/2004    Dr. Kurt Austin. due q 10 yrs    CYSTOSCOPY  10/17/03    Dr. Shakira Haney  10/30/13    Dr. Rocio Burch. 80% ostal stenosis. 30% LAD.  HX HEMORRHOIDECTOMY      HX KNEE ARTHROSCOPY  1990 Left , 2007 Right    Dr. Spero Holstein HX LAPAROTOMY  05/14/07    Dr. Jose Luis March. due to Bowel Obstruction    HX POLYPECTOMY  02/2002    Anal.     HX TONSILLECTOMY      [place] COLSC FLX W/RMVL OF TUMOR POLYP LESION SNARE TQ  5/23/2011    Dr. Alyssia Marie. Family History:  Family History   Problem Relation Age of Onset    Heart Attack Mother     Other Mother      brain aneurysm/AAA/varicose veins    Stroke Mother     Heart Attack Sister     Cancer Maternal Grandmother      ovarian     Social History:  Social History   Substance Use Topics    Smoking status: Never Smoker    Smokeless tobacco: Never Used    Alcohol use No     Allergies:   Allergies   Allergen Reactions    Betadine [Povidone-Iodine] Hives    Phenergan [Promethazine] Other (comments)     hallucinations    Seafood [Shellfish Containing Products] Rash and Swelling     crabmeat    Xylocaine [Lidocaine Hcl] Shortness of Breath    Aricept [Donepezil] Nausea and Vomiting     Review of Systems   Review of Systems   Constitutional: Positive for fatigue. Negative for chills and fever. Respiratory: Negative for cough and shortness of breath. Cardiovascular: Negative for chest pain. Gastrointestinal: Positive for abdominal pain, nausea and vomiting. Negative for constipation and diarrhea. Genitourinary: Negative for dysuria, frequency, hematuria and urgency. Neurological: Positive for headaches. Negative for dizziness, weakness and numbness. All other systems reviewed and are negative. Physical Exam   Physical Exam   Constitutional: He is oriented to person, place, and time. He appears well-developed and well-nourished. HENT:   Head: Normocephalic and atraumatic. Hard of hearing    Eyes: Conjunctivae and EOM are normal.   Neck: Normal range of motion. Neck supple. Cardiovascular: Normal rate and regular rhythm. Pulmonary/Chest: Effort normal and breath sounds normal. No respiratory distress. Abdominal: Soft. He exhibits no distension. There is no tenderness. Musculoskeletal: Normal range of motion. Neurological: He is alert and oriented to person, place, and time. Unable to verbalize symptoms    Skin: Skin is warm and dry. Psychiatric: He has a normal mood and affect. Nursing note and vitals reviewed.     Diagnostic Study Results     Labs -     Recent Results (from the past 12 hour(s))   EKG, 12 LEAD, INITIAL    Collection Time: 01/25/18  2:41 PM   Result Value Ref Range    Ventricular Rate 64 BPM    Atrial Rate 64 BPM    P-R Interval 198 ms    QRS Duration 92 ms    Q-T Interval 412 ms    QTC Calculation (Bezet) 425 ms    Calculated P Axis 63 degrees    Calculated R Axis -22 degrees    Calculated T Axis 9 degrees    Diagnosis       Normal sinus rhythm  Incomplete right bundle branch block  Nonspecific ST and T wave abnormality  When compared with ECG of 28-OCT-2014 17:21,  Nonspecific T wave abnormality now evident in Anterior leads     CBC W/O DIFF    Collection Time: 01/25/18  2:54 PM   Result Value Ref Range    WBC 7.1 4.1 - 11.1 K/uL    RBC 3.72 (L) 4.10 - 5.70 M/uL    HGB 11.7 (L) 12.1 - 17.0 g/dL    HCT 34.5 (L) 36.6 - 50.3 %    MCV 92.7 80.0 - 99.0 FL    MCH 31.5 26.0 - 34.0 PG    MCHC 33.9 30.0 - 36.5 g/dL    RDW 13.7 11.5 - 14.5 %    PLATELET 382 447 - 836 K/uL    MPV 9.9 8.9 - 12.9 FL    NRBC 0.0 0  WBC    ABSOLUTE NRBC 0.00 0.00 - 4.18 K/uL   METABOLIC PANEL, BASIC    Collection Time: 01/25/18  2:54 PM   Result Value Ref Range    Sodium 137 136 - 145 mmol/L    Potassium 4.4 3.5 - 5.1 mmol/L    Chloride 102 97 - 108 mmol/L    CO2 31 21 - 32 mmol/L    Anion gap 4 (L) 5 - 15 mmol/L    Glucose 110 (H) 65 - 100 mg/dL    BUN 15 6 - 20 MG/DL    Creatinine 0.91 0.70 - 1.30 MG/DL    BUN/Creatinine ratio 16 12 - 20      GFR est AA >60 >60 ml/min/1.73m2    GFR est non-AA >60 >60 ml/min/1.73m2    Calcium 9.0 8.5 - 10.1 MG/DL   TROPONIN I    Collection Time: 01/25/18  2:54 PM   Result Value Ref Range    Troponin-I, Qt. 0.09 (H) <0.05 ng/mL       Radiologic Studies -   No orders to display     CT Results  (Last 48 hours)    None        CXR Results  (Last 48 hours)    None        Medical Decision Making   I am the first provider for this patient. I reviewed the vital signs, available nursing notes, past medical history, past surgical history, family history and social history. Vital Signs-Reviewed the patient's vital signs. Patient Vitals for the past 12 hrs:   Temp Pulse Resp BP SpO2   01/25/18 1451 98.3 °F (36.8 °C) - - - -   01/25/18 1422 - 65 16 143/84 99 %     Pulse Oximetry Analysis - 99% on RA    Cardiac Monitor:   Rate: 65 bpm  Rhythm: Normal Sinus Rhythm      EKG interpretation: (Preliminary) 1441  Rhythm: normal sinus rhythm and incomplete RBBB; and regular .  Rate (approx.): 64; Axis: normal; MI interval: normal; QRS interval: normal ; ST/T wave: nonspecific ST T wave abnormality;   Written by Davide Nogueira, ED Scribe, as dictated by Stephania Leong MD.    Records Reviewed: Nursing Notes and Old Medical Records    Provider Notes (Medical Decision Making):   Patient presents with nausea and vomiting. Differential includes gastritis/GERD, pancreatitis cholelithiasis, cholecystitis, hepatitis, renal pathology, ACS, gastroenteritis, infection such as UTI/PNA. Will obtain EKG, labs and possibly imaging. Medication side effect from Donepezil. ED Course:   Initial assessment performed. The patients presenting problems have been discussed, and they are in agreement with the care plan formulated and outlined with them. I have encouraged them to ask questions as they arise throughout their visit. CONSULT NOTE:   3:48 PM  Stephania Leong MD spoke with Dr. Mulugeta Mclain,  Specialty: Neurology  Discussed pt's hx, disposition, and available diagnostic and imaging results. Reviewed care plans. Consultant agrees with plans as outlined. Advises to discontinue the Donepezil and discharge. Written by Davide Nogueira, ED Scribe, as dictated by Stephania Leong MD.    Disposition:  DISCHARGE NOTE:    3:50 PM  The patient is ready for discharge. The patient signs, symptoms, diagnosis, and discharge instructions have been discussed and the patient has conveyed their understanding. The patient is to follow-up as reccommended or returned to the ER should their symptoms worsen. Plan has been discussed and patient is in agreement. PLAN:  1.    Current Discharge Medication List      STOP taking these medications       donepezil (ARICEPT) 5 mg tablet Comments:   Reason for Stopping:         donepezil (ARICEPT) 10 mg tablet Comments:   Reason for Stoppin.   Follow-up Information     Follow up With Details Comments [facility], DO  As needed [address]  [address]  [phone]          Return to ED if worse Diagnosis     Clinical Impression:   1. Nausea and vomiting, intractability of vomiting not specified, unspecified vomiting type    2. Adverse effect of drug, initial encounter      Attestations: This note is prepared by Yang Cardenas, acting as Scribe for Tiffanie Garza MD.    Tiffanie Garza MD: The scribe's documentation has been prepared under my direction and personally reviewed by me in its entirety. I confirm that the note above accurately reflects all work, treatment, procedures, and medical decision making performed by me.

## 2018-01-26 ENCOUNTER — HOME CARE VISIT (OUTPATIENT)
Dept: SCHEDULING | Facility: HOME HEALTH | Age: 83
End: 2018-01-26
Payer: MEDICARE

## 2018-01-26 LAB
ATRIAL RATE: 64 BPM
CALCULATED P AXIS, ECG09: 63 DEGREES
CALCULATED R AXIS, ECG10: -22 DEGREES
CALCULATED T AXIS, ECG11: 9 DEGREES
DIAGNOSIS, 93000: NORMAL
P-R INTERVAL, ECG05: 198 MS
Q-T INTERVAL, ECG07: 412 MS
QRS DURATION, ECG06: 92 MS
QTC CALCULATION (BEZET), ECG08: 425 MS
VENTRICULAR RATE, ECG03: 64 BPM

## 2018-01-26 PROCEDURE — 3331090001 HH PPS REVENUE CREDIT

## 2018-01-26 PROCEDURE — G0151 HHCP-SERV OF PT,EA 15 MIN: HCPCS

## 2018-01-26 PROCEDURE — 3331090002 HH PPS REVENUE DEBIT

## 2018-01-26 PROCEDURE — G0299 HHS/HOSPICE OF RN EA 15 MIN: HCPCS

## 2018-01-27 PROCEDURE — 3331090001 HH PPS REVENUE CREDIT

## 2018-01-27 PROCEDURE — 3331090002 HH PPS REVENUE DEBIT

## 2018-01-28 PROCEDURE — 3331090001 HH PPS REVENUE CREDIT

## 2018-01-28 PROCEDURE — 3331090002 HH PPS REVENUE DEBIT

## 2018-01-29 ENCOUNTER — HOME CARE VISIT (OUTPATIENT)
Dept: SCHEDULING | Facility: HOME HEALTH | Age: 83
End: 2018-01-29
Payer: MEDICARE

## 2018-01-29 VITALS
SYSTOLIC BLOOD PRESSURE: 105 MMHG | OXYGEN SATURATION: 98 % | RESPIRATION RATE: 16 BRPM | TEMPERATURE: 97.8 F | DIASTOLIC BLOOD PRESSURE: 62 MMHG | HEART RATE: 58 BPM

## 2018-01-29 VITALS
DIASTOLIC BLOOD PRESSURE: 58 MMHG | TEMPERATURE: 97.8 F | RESPIRATION RATE: 16 BRPM | OXYGEN SATURATION: 98 % | SYSTOLIC BLOOD PRESSURE: 105 MMHG | HEART RATE: 62 BPM

## 2018-01-29 PROCEDURE — 3331090002 HH PPS REVENUE DEBIT

## 2018-01-29 PROCEDURE — G0151 HHCP-SERV OF PT,EA 15 MIN: HCPCS

## 2018-01-29 PROCEDURE — 3331090001 HH PPS REVENUE CREDIT

## 2018-01-30 VITALS
DIASTOLIC BLOOD PRESSURE: 62 MMHG | OXYGEN SATURATION: 98 % | TEMPERATURE: 98.1 F | HEART RATE: 78 BPM | SYSTOLIC BLOOD PRESSURE: 114 MMHG

## 2018-01-30 PROCEDURE — 3331090002 HH PPS REVENUE DEBIT

## 2018-01-30 PROCEDURE — 3331090001 HH PPS REVENUE CREDIT

## 2018-01-31 ENCOUNTER — HOME CARE VISIT (OUTPATIENT)
Dept: SCHEDULING | Facility: HOME HEALTH | Age: 83
End: 2018-01-31
Payer: MEDICARE

## 2018-01-31 VITALS
TEMPERATURE: 97.8 F | HEART RATE: 66 BPM | SYSTOLIC BLOOD PRESSURE: 105 MMHG | DIASTOLIC BLOOD PRESSURE: 58 MMHG | RESPIRATION RATE: 16 BRPM | OXYGEN SATURATION: 99 %

## 2018-01-31 PROCEDURE — 3331090001 HH PPS REVENUE CREDIT

## 2018-01-31 PROCEDURE — 3331090002 HH PPS REVENUE DEBIT

## 2018-01-31 PROCEDURE — G0151 HHCP-SERV OF PT,EA 15 MIN: HCPCS

## 2018-02-01 PROCEDURE — 3331090002 HH PPS REVENUE DEBIT

## 2018-02-01 PROCEDURE — 3331090001 HH PPS REVENUE CREDIT

## 2018-02-02 ENCOUNTER — HOME CARE VISIT (OUTPATIENT)
Dept: SCHEDULING | Facility: HOME HEALTH | Age: 83
End: 2018-02-02
Payer: MEDICARE

## 2018-02-02 VITALS
OXYGEN SATURATION: 98 % | SYSTOLIC BLOOD PRESSURE: 105 MMHG | HEART RATE: 52 BPM | TEMPERATURE: 97.6 F | DIASTOLIC BLOOD PRESSURE: 55 MMHG | RESPIRATION RATE: 16 BRPM

## 2018-02-02 PROCEDURE — 3331090001 HH PPS REVENUE CREDIT

## 2018-02-02 PROCEDURE — 3331090003 HH PPS REVENUE ADJ

## 2018-02-02 PROCEDURE — G0151 HHCP-SERV OF PT,EA 15 MIN: HCPCS

## 2018-02-02 PROCEDURE — 3331090002 HH PPS REVENUE DEBIT

## 2018-02-03 PROCEDURE — 3331090001 HH PPS REVENUE CREDIT

## 2018-02-03 PROCEDURE — 3331090002 HH PPS REVENUE DEBIT

## 2018-02-04 PROCEDURE — 3331090001 HH PPS REVENUE CREDIT

## 2018-02-04 PROCEDURE — 3331090002 HH PPS REVENUE DEBIT

## 2018-02-19 NOTE — TELEPHONE ENCOUNTER
PCP: Reji Alston DO    Last appt: 12/29/2017  Future Appointments  Date Time Provider Santana Anisa   3/30/2018 10:00 AM DO ELIEZER Simon SCHED   4/24/2018 11:40 AM Damian Pearson MD [address]   5/24/2018 10:00 AM DO ELIEZER Simon AGNES SCHED       Requested Prescriptions     Pending Prescriptions Disp Refills    amLODIPine (NORVASC) 5 mg tablet [Pharmacy Med Name: AMLODIPINE BESYLATE 5 MG TAB] 30 Tab 1     Sig: take 1 tablet by mouth once daily     Lab Results   Component Value Date/Time    Sodium 137 01/25/2018 02:54 PM    Potassium 4.4 01/25/2018 02:54 PM    Chloride 102 01/25/2018 02:54 PM    CO2 31 01/25/2018 02:54 PM    Anion gap 4 (L) 01/25/2018 02:54 PM    Glucose 110 (H) 01/25/2018 02:54 PM    BUN 15 01/25/2018 02:54 PM    Creatinine 0.91 01/25/2018 02:54 PM    BUN/Creatinine ratio 16 01/25/2018 02:54 PM    GFR est AA >60 01/25/2018 02:54 PM    GFR est non-AA >60 01/25/2018 02:54 PM    Calcium 9.0 01/25/2018 02:54 PM     Lab Results   Component Value Date/Time    Hemoglobin A1c 5.4 10/11/2017 10:01 AM     Lab Results   Component Value Date/Time    Cholesterol, total 166 05/23/2017 11:08 AM    HDL Cholesterol 53 05/23/2017 11:08 AM    LDL, calculated 97 05/23/2017 11:08 AM    VLDL, calculated 16 05/23/2017 11:08 AM    Triglyceride 78 05/23/2017 11:08 AM    CHOL/HDL Ratio 2.2 10/29/2014 03:45 AM     Lab Results   Component Value Date/Time    TSH 3.950 10/11/2017 10:01 AM

## 2018-02-20 RX ORDER — AMLODIPINE BESYLATE 5 MG/1
TABLET ORAL
Qty: 30 TAB | Refills: 1 | Status: SHIPPED | OUTPATIENT
Start: 2018-02-20 | End: 2018-04-18 | Stop reason: SDUPTHER

## 2018-03-30 ENCOUNTER — OFFICE VISIT (OUTPATIENT)
Dept: FAMILY MEDICINE CLINIC | Age: 83
End: 2018-03-30

## 2018-03-30 VITALS
TEMPERATURE: 97.4 F | WEIGHT: 154.6 LBS | OXYGEN SATURATION: 96 % | HEART RATE: 60 BPM | RESPIRATION RATE: 12 BRPM | SYSTOLIC BLOOD PRESSURE: 96 MMHG | HEIGHT: 66 IN | BODY MASS INDEX: 24.85 KG/M2 | DIASTOLIC BLOOD PRESSURE: 62 MMHG

## 2018-03-30 DIAGNOSIS — I10 ESSENTIAL HYPERTENSION: Primary | ICD-10-CM

## 2018-03-30 DIAGNOSIS — R26.81 UNSTEADY GAIT: ICD-10-CM

## 2018-03-30 DIAGNOSIS — R41.89 COGNITIVE AND BEHAVIORAL CHANGES: ICD-10-CM

## 2018-03-30 DIAGNOSIS — R46.89 COGNITIVE AND BEHAVIORAL CHANGES: ICD-10-CM

## 2018-03-30 DIAGNOSIS — D69.6 THROMBOCYTOPENIA (HCC): ICD-10-CM

## 2018-03-30 DIAGNOSIS — H90.3 SENSORINEURAL HEARING LOSS (SNHL) OF BOTH EARS: ICD-10-CM

## 2018-03-30 DIAGNOSIS — G30.9 ALZHEIMER'S DEMENTIA WITHOUT BEHAVIORAL DISTURBANCE, UNSPECIFIED TIMING OF DEMENTIA ONSET: ICD-10-CM

## 2018-03-30 DIAGNOSIS — D72.828 OTHER ELEVATED WHITE BLOOD CELL (WBC) COUNT: ICD-10-CM

## 2018-03-30 DIAGNOSIS — Z78.9 MEDICATION INTOLERANCE: ICD-10-CM

## 2018-03-30 DIAGNOSIS — Z86.73 HISTORY OF STROKE: ICD-10-CM

## 2018-03-30 DIAGNOSIS — F02.80 ALZHEIMER'S DEMENTIA WITHOUT BEHAVIORAL DISTURBANCE, UNSPECIFIED TIMING OF DEMENTIA ONSET: ICD-10-CM

## 2018-03-30 DIAGNOSIS — R29.898 WEAKNESS OF BOTH LEGS: ICD-10-CM

## 2018-03-30 DIAGNOSIS — M25.552 HIP PAIN, ACUTE, LEFT: ICD-10-CM

## 2018-03-30 DIAGNOSIS — I73.9 PVD (PERIPHERAL VASCULAR DISEASE) (HCC): ICD-10-CM

## 2018-03-30 DIAGNOSIS — Z87.19 HISTORY OF SMALL BOWEL OBSTRUCTION: ICD-10-CM

## 2018-03-30 DIAGNOSIS — I25.10 CORONARY ARTERY DISEASE INVOLVING NATIVE CORONARY ARTERY OF NATIVE HEART WITHOUT ANGINA PECTORIS: ICD-10-CM

## 2018-03-30 DIAGNOSIS — R00.1 BRADYCARDIA WITH 51-60 BEATS PER MINUTE: ICD-10-CM

## 2018-03-30 DIAGNOSIS — E78.5 DYSLIPIDEMIA: ICD-10-CM

## 2018-03-30 RX ORDER — DONEPEZIL HYDROCHLORIDE 10 MG/1
TABLET, FILM COATED ORAL
Refills: 0 | COMMUNITY
Start: 2018-01-23 | End: 2018-04-24

## 2018-03-30 RX ORDER — DONEPEZIL HYDROCHLORIDE 5 MG/1
TABLET, FILM COATED ORAL
Refills: 0 | COMMUNITY
Start: 2018-01-23 | End: 2018-04-24

## 2018-03-30 RX ORDER — ETODOLAC 400 MG/1
TABLET, FILM COATED ORAL
COMMUNITY
Start: 2018-02-04 | End: 2018-03-30 | Stop reason: SDUPTHER

## 2018-03-30 RX ORDER — CYANOCOBALAMIN/FOLIC AC/VIT B6 1-2.5-25MG
TABLET ORAL
Refills: 0 | COMMUNITY
Start: 2018-03-12 | End: 2018-04-09 | Stop reason: SDUPTHER

## 2018-03-30 RX ORDER — ETODOLAC 400 MG/1
400 TABLET, FILM COATED ORAL
Qty: 30 TAB | Refills: 2 | Status: SHIPPED | OUTPATIENT
Start: 2018-03-30 | End: 2018-12-10 | Stop reason: SDUPTHER

## 2018-03-30 NOTE — PATIENT INSTRUCTIONS
DASH Diet: Care Instructions  Your Care Instructions    The DASH diet is an eating plan that can help lower your blood pressure. DASH stands for Dietary Approaches to Stop Hypertension. Hypertension is high blood pressure. The DASH diet focuses on eating foods that are high in calcium, potassium, and magnesium. These nutrients can lower blood pressure. The foods that are highest in these nutrients are fruits, vegetables, low-fat dairy products, nuts, seeds, and legumes. But taking calcium, potassium, and magnesium supplements instead of eating foods that are high in those nutrients does not have the same effect. The DASH diet also includes whole grains, fish, and poultry. The DASH diet is one of several lifestyle changes your doctor may recommend to lower your high blood pressure. Your doctor may also want you to decrease the amount of sodium in your diet. Lowering sodium while following the DASH diet can lower blood pressure even further than just the DASH diet alone. Follow-up care is a key part of your treatment and safety. Be sure to make and go to all appointments, and call your doctor if you are having problems. It's also a good idea to know your test results and keep a list of the medicines you take. How can you care for yourself at home? Following the DASH diet  · Eat 4 to 5 servings of fruit each day. A serving is 1 medium-sized piece of fruit, ½ cup chopped or canned fruit, 1/4 cup dried fruit, or 4 ounces (½ cup) of fruit juice. Choose fruit more often than fruit juice. · Eat 4 to 5 servings of vegetables each day. A serving is 1 cup of lettuce or raw leafy vegetables, ½ cup of chopped or cooked vegetables, or 4 ounces (½ cup) of vegetable juice. Choose vegetables more often than vegetable juice. · Get 2 to 3 servings of low-fat and fat-free dairy each day. A serving is 8 ounces of milk, 1 cup of yogurt, or 1 ½ ounces of cheese. · Eat 6 to 8 servings of grains each day.  A serving is 1 slice of bread, 1 ounce of dry cereal, or ½ cup of cooked rice, pasta, or cooked cereal. Try to choose whole-grain products as much as possible. · Limit lean meat, poultry, and fish to 2 servings each day. A serving is 3 ounces, about the size of a deck of cards. · Eat 4 to 5 servings of nuts, seeds, and legumes (cooked dried beans, lentils, and split peas) each week. A serving is 1/3 cup of nuts, 2 tablespoons of seeds, or ½ cup of cooked beans or peas. · Limit fats and oils to 2 to 3 servings each day. A serving is 1 teaspoon of vegetable oil or 2 tablespoons of salad dressing. · Limit sweets and added sugars to 5 servings or less a week. A serving is 1 tablespoon jelly or jam, ½ cup sorbet, or 1 cup of lemonade. · Eat less than 2,300 milligrams (mg) of sodium a day. If you limit your sodium to 1,500 mg a day, you can lower your blood pressure even more. Tips for success  · Start small. Do not try to make dramatic changes to your diet all at once. You might feel that you are missing out on your favorite foods and then be more likely to not follow the plan. Make small changes, and stick with them. Once those changes become habit, add a few more changes. · Try some of the following:  ¨ Make it a goal to eat a fruit or vegetable at every meal and at snacks. This will make it easy to get the recommended amount of fruits and vegetables each day. ¨ Try yogurt topped with fruit and nuts for a snack or healthy dessert. ¨ Add lettuce, tomato, cucumber, and onion to sandwiches. ¨ Combine a ready-made pizza crust with low-fat mozzarella cheese and lots of vegetable toppings. Try using tomatoes, squash, spinach, broccoli, carrots, cauliflower, and onions. ¨ Have a variety of cut-up vegetables with a low-fat dip as an appetizer instead of chips and dip. ¨ Sprinkle sunflower seeds or chopped almonds over salads. Or try adding chopped walnuts or almonds to cooked vegetables.   ¨ Try some vegetarian meals using beans and peas. Add garbanzo or kidney beans to salads. Make burritos and tacos with mashed pino beans or black beans. Where can you learn more? Go to http://diamond-donna.info/. Enter A956 in the search box to learn more about \"DASH Diet: Care Instructions. \"  Current as of: September 21, 2016  Content Version: 11.4  © 5699-3575 [employer]. Care instructions adapted under license by [employer] (which disclaims liability or warranty for this information). If you have questions about a medical condition or this instruction, always ask your healthcare professional. N[employer] Prim any warranty or liability for your use of this information. Check BP twice weekly. Notify me if it consistently is above 140/90 or below 90/60. Bring your blood pressure log to your next office visit. Decrease salt, fats, caffeine, alcohol in your diet. Increase water, fiber. Exercise 5 times weekly for 30 minutes daily as tolerated. Continue current medications, care and subspecialty follow up. Visit eye doctor yearly to check your eyes blood pressure related changes. DASH Diet: Care Instructions  Your Care Instructions    The DASH diet is an eating plan that can help lower your blood pressure. DASH stands for Dietary Approaches to Stop Hypertension. Hypertension is high blood pressure. The DASH diet focuses on eating foods that are high in calcium, potassium, and magnesium. These nutrients can lower blood pressure. The foods that are highest in these nutrients are fruits, vegetables, low-fat dairy products, nuts, seeds, and legumes. But taking calcium, potassium, and magnesium supplements instead of eating foods that are high in those nutrients does not have the same effect. The DASH diet also includes whole grains, fish, and poultry. The DASH diet is one of several lifestyle changes your doctor may recommend to lower your high blood pressure.  Your doctor may also want you to decrease the amount of sodium in your diet. Lowering sodium while following the DASH diet can lower blood pressure even further than just the DASH diet alone. Follow-up care is a key part of your treatment and safety. Be sure to make and go to all appointments, and call your doctor if you are having problems. It's also a good idea to know your test results and keep a list of the medicines you take. How can you care for yourself at home? Following the DASH diet  · Eat 4 to 5 servings of fruit each day. A serving is 1 medium-sized piece of fruit, ½ cup chopped or canned fruit, 1/4 cup dried fruit, or 4 ounces (½ cup) of fruit juice. Choose fruit more often than fruit juice. · Eat 4 to 5 servings of vegetables each day. A serving is 1 cup of lettuce or raw leafy vegetables, ½ cup of chopped or cooked vegetables, or 4 ounces (½ cup) of vegetable juice. Choose vegetables more often than vegetable juice. · Get 2 to 3 servings of low-fat and fat-free dairy each day. A serving is 8 ounces of milk, 1 cup of yogurt, or 1 ½ ounces of cheese. · Eat 6 to 8 servings of grains each day. A serving is 1 slice of bread, 1 ounce of dry cereal, or ½ cup of cooked rice, pasta, or cooked cereal. Try to choose whole-grain products as much as possible. · Limit lean meat, poultry, and fish to 2 servings each day. A serving is 3 ounces, about the size of a deck of cards. · Eat 4 to 5 servings of nuts, seeds, and legumes (cooked dried beans, lentils, and split peas) each week. A serving is 1/3 cup of nuts, 2 tablespoons of seeds, or ½ cup of cooked beans or peas. · Limit fats and oils to 2 to 3 servings each day. A serving is 1 teaspoon of vegetable oil or 2 tablespoons of salad dressing. · Limit sweets and added sugars to 5 servings or less a week. A serving is 1 tablespoon jelly or jam, ½ cup sorbet, or 1 cup of lemonade. · Eat less than 2,300 milligrams (mg) of sodium a day.  If you limit your sodium to 1,500 mg a day, you can lower your blood pressure even more. Tips for success  · Start small. Do not try to make dramatic changes to your diet all at once. You might feel that you are missing out on your favorite foods and then be more likely to not follow the plan. Make small changes, and stick with them. Once those changes become habit, add a few more changes. · Try some of the following:  ¨ Make it a goal to eat a fruit or vegetable at every meal and at snacks. This will make it easy to get the recommended amount of fruits and vegetables each day. ¨ Try yogurt topped with fruit and nuts for a snack or healthy dessert. ¨ Add lettuce, tomato, cucumber, and onion to sandwiches. ¨ Combine a ready-made pizza crust with low-fat mozzarella cheese and lots of vegetable toppings. Try using tomatoes, squash, spinach, broccoli, carrots, cauliflower, and onions. ¨ Have a variety of cut-up vegetables with a low-fat dip as an appetizer instead of chips and dip. ¨ Sprinkle sunflower seeds or chopped almonds over salads. Or try adding chopped walnuts or almonds to cooked vegetables. ¨ Try some vegetarian meals using beans and peas. Add garbanzo or kidney beans to salads. Make burritos and tacos with mashed pino beans or black beans. Where can you learn more? Go to http://diamond-donna.info/. Enter G101 in the search box to learn more about \"DASH Diet: Care Instructions. \"  Current as of: September 21, 2016  Content Version: 11.4  © 0594-9216 [employer]. Care instructions adapted under license by [employer] (which disclaims liability or warranty for this information). If you have questions about a medical condition or this instruction, always ask your healthcare professional. [address] any warranty or liability for your use of this information.

## 2018-03-30 NOTE — MR AVS SNAPSHOT
[address] [facility] 
 
 
 [address] 
[address] [place] [address] 
[phone] Patient: Montana Umanzor MRN:  [id number] Visit Information Date & Time Provider Department Dept. Phone Encounter #  
 3/30/2018 10:00 AM DO Clarice Geronimo [phone] [id number] Follow-up Instructions Return in about 2 months (around 2018) for bp. Your Appointments 2018 11:40 AM  
Follow Up with Evie Núñez MD  
[facility] at [facility]) Appt Note: f/u memory loss, jrb 18  
 [address], [address] [address]  
[address], [address] [place] [address] [address] [address]  
  
    
 2018 10:00 AM  
ROUTINE CARE with DO Clarice Geronimo (AGNES [place]) Appt Note: 6 MO F/U BP, Referral F/U; 6 month follow up bp,referral follow up  
 [address] 
[address] [facility] [address]  
[phone]  
  
   
 [address] Upcoming Health Maintenance Date Due  
 GLAUCOMA SCREENING Q2Y 6/15/2018* MEDICARE YEARLY EXAM 2018 DTaP/Tdap/Td series (2 - Td) 2026 *Topic was postponed. The date shown is not the original due date. Allergies as of 3/30/2018  Review Complete On: 3/30/2018 By: Kala Nielson DO Severity Noted Reaction Type Reactions Aricept [Donepezil] High 2018    Nausea and Vomiting Mild depression Betadine [Povidone-iodine] High 2009    Hives Phenergan [Promethazine] High 2009    Other (comments)  
 hallucinations Seafood [Shellfish Containing Products] High 2009    Rash, Swelling  
 crabmeat Xylocaine [Lidocaine Hcl] High 2012    Shortness of Breath Current Immunizations  Reviewed on 2017 Name Date Influenza High Dose Vaccine PF 8/12/2016, 9/8/2015, 10/29/2013 Influenza Vaccine 10/1/2017, 10/1/2014 Influenza Vaccine Split 10/8/2012, 10/20/2011, 10/15/2010 Influenza Vaccine Whole 10/1/2009 Pneumococcal Conjugate (PCV-13) 6/3/2015 Pneumococcal Polysaccharide (PPSV-23) 7/2/2016 TD Vaccine 4/27/2004 Tdap 7/2/2016 ZZZ-RETIRED (DO NOT USE) Pneumococcal Vaccine (Unspecified Type) 2/8/2002 Zoster Vaccine, Live 9/8/2015 Not reviewed this visit You Were Diagnosed With   
  
 Codes Comments Essential hypertension    -  Primary ICD-10-CM: I10 
ICD-9-CM: 401.9 with low readings Cognitive and behavioral changes     ICD-10-CM: R41.89, R46.89 
ICD-9-CM: 799.59, 312.9 improving, stable Alzheimer's dementia without behavioral disturbance, unspecified timing of dementia onset     ICD-10-CM: G30.9, F02.80 ICD-9-CM: 331.0, 294.10 stable Unsteady gait     ICD-10-CM: R26.81 
ICD-9-CM: 781.2 improved since home PT Weakness of both legs     ICD-10-CM: R29.898 ICD-9-CM: 729.89 improved since home PT Thrombocytopenia ([facility].)     ICD-10-CM: D69.6 ICD-9-CM: 287.5 resolved Medication intolerance     ICD-10-CM: Z78.9 ICD-9-CM: 995.27 Aricept x 2 doses caused n/v/depression, resolved Other elevated white blood cell (WBC) count     ICD-10-CM: K27.739 ICD-9-CM: 288.69 resolved PVD (peripheral vascular disease) ([facility]     ICD-10-CM: I73.9 ICD-9-CM: 443.9 History of small bowel obstruction     ICD-10-CM: Z87.19 ICD-9-CM: V12.79 Sensorineural hearing loss (SNHL) of both ears     ICD-10-CM: H90.3 ICD-9-CM: 389.18 Coronary artery disease involving native coronary artery of native heart without angina pectoris     ICD-10-CM: I25.10 ICD-9-CM: 414.01 Dyslipidemia     ICD-10-CM: E78.5 ICD-9-CM: 272.4 Bradycardia with 51-60 beats per minute     ICD-10-CM: R00.1 ICD-9-CM: 427.89 stable  History of stroke     ICD-10-CM: Z86.73 
ICD-9-CM: V12.54   
 Hip pain, acute, left     ICD-10-CM: M25.552 ICD-9-CM: 719.45 resolved Vitals BP Pulse Temp Resp Height(growth percentile) Weight(growth percentile) 96/62 (BP 1 Location: Left arm, BP Patient Position: Sitting) 60 97.4 °F (36.3 °C) (Oral) 12 5' 6\" (1.676 m) 154 lb 9.6 oz (70.1 kg) SpO2 BMI Smoking Status 96% 24.95 kg/m2 Never Smoker BMI and BSA Data Body Mass Index Body Surface Area 24.95 kg/m 2 1.81 m 2 Preferred Pharmacy Pharmacy Name Phone [facility] [facility], [address] [facility] Your Updated Medication List  
  
   
This list is accurate as of 3/30/18 11:30 AM.  Always use your most recent med list. amLODIPine 5 mg tablet Commonly known as:  NORVASC  
take 1 tablet by mouth once daily  
  
 aspirin 325 mg tablet Commonly known as:  ASPIRIN Take 1 tablet by mouth daily. docusate sodium 100 mg capsule Commonly known as:  Eleonore Darlene Take 100 mg by mouth daily. Instructed to take daily with plenty of liquid unless otherwise directed- as per 11/7/14 [facility] discharge med list  
  
 * donepezil 5 mg tablet Commonly known as:  ARICEPT  
take 1 tablet by mouth at bedtime * donepezil 10 mg tablet Commonly known as:  ARICEPT  
take 1 tablet by mouth at bedtime  
  
 etodolac 400 mg tablet Commonly known as:  LODINE Take 400 mg by mouth daily as needed. FLOMAX 0.4 mg capsule Generic drug:  tamsulosin Take 0.4 mg by mouth daily. For prostate FOLBEE 2.5-25-1 mg tablet Generic drug:  folic acid-vit [id number]  
  
 isosorbide mononitrate ER 30 mg tablet Commonly known as:  IMDUR  
take 1 tablet by mouth once daily  
  
 meclizine 25 mg tablet Commonly known as:  ANTIVERT Take 1 Tab by mouth three (3) times daily as needed for Dizziness. metoprolol tartrate 25 mg tablet Commonly known as:  LOPRESSOR  
take 1/2 tablet twice a day  MUCINEX PO  
 Take 1 Tab by mouth daily. ondansetron hcl 4 mg tablet Commonly known as:  ZOFRAN (AS HYDROCHLORIDE) Take 1 Tab by mouth every eight (8) hours as needed for Nausea. pravastatin 20 mg tablet Commonly known as:  PRAVACHOL  
take 1 tablet by mouth at bedtime TYLENOL ARTHRITIS PAIN 650 mg Graydon Husbands Generic drug:  acetaminophen Take 650 mg by mouth every eight (8) hours. vit B12-levomefolate calcium-vit B6 2-1.13-25 mg tablet Commonly known as:  Myrl Snide Take 1 Tab by mouth daily. Indications: Vitamin Deficiency VITAMIN D3 2,000 unit Tab Generic drug:  cholecalciferol (vitamin D3) Take 1 Tab by mouth daily. As per 11/07/14 [facility] discharge med list  
  
 * Notice: This list has 2 medication(s) that are the same as other medications prescribed for you. Read the directions carefully, and ask your doctor or other care provider to review them with you. Prescriptions Sent to Pharmacy Refills  
 etodolac (LODINE) 400 mg tablet 2 Sig: Take 400 mg by mouth daily as needed. Class: Normal  
 [facility] [address] #: [phone] Route: Oral  
  
Follow-up Instructions Return in about 2 months (around 5/30/2018) for bp. Patient Instructions DASH Diet: Care Instructions Your Care Instructions The DASH diet is an eating plan that can help lower your blood pressure. DASH stands for Dietary Approaches to Stop Hypertension. Hypertension is high blood pressure. The DASH diet focuses on eating foods that are high in calcium, potassium, and magnesium. These nutrients can lower blood pressure. The foods that are highest in these nutrients are fruits, vegetables, low-fat dairy products, nuts, seeds, and legumes. But taking calcium, potassium, and magnesium supplements instead of eating foods that are high in those nutrients does not have the same effect.  The DASH diet also includes whole grains, fish, and poultry. The DASH diet is one of several lifestyle changes your doctor may recommend to lower your high blood pressure. Your doctor may also want you to decrease the amount of sodium in your diet. Lowering sodium while following the DASH diet can lower blood pressure even further than just the DASH diet alone. Follow-up care is a key part of your treatment and safety. Be sure to make and go to all appointments, and call your doctor if you are having problems. It's also a good idea to know your test results and keep a list of the medicines you take. How can you care for yourself at home? Following the DASH diet · Eat 4 to 5 servings of fruit each day. A serving is 1 medium-sized piece of fruit, ½ cup chopped or canned fruit, 1/4 cup dried fruit, or 4 ounces (½ cup) of fruit juice. Choose fruit more often than fruit juice. · Eat 4 to 5 servings of vegetables each day. A serving is 1 cup of lettuce or raw leafy vegetables, ½ cup of chopped or cooked vegetables, or 4 ounces (½ cup) of vegetable juice. Choose vegetables more often than vegetable juice. · Get 2 to 3 servings of low-fat and fat-free dairy each day. A serving is 8 ounces of milk, 1 cup of yogurt, or 1 ½ ounces of cheese. · Eat 6 to 8 servings of grains each day. A serving is 1 slice of bread, 1 ounce of dry cereal, or ½ cup of cooked rice, pasta, or cooked cereal. Try to choose whole-grain products as much as possible. · Limit lean meat, poultry, and fish to 2 servings each day. A serving is 3 ounces, about the size of a deck of cards. · Eat 4 to 5 servings of nuts, seeds, and legumes (cooked dried beans, lentils, and split peas) each week. A serving is 1/3 cup of nuts, 2 tablespoons of seeds, or ½ cup of cooked beans or peas. · Limit fats and oils to 2 to 3 servings each day. A serving is 1 teaspoon of vegetable oil or 2 tablespoons of salad dressing. · Limit sweets and added sugars to 5 servings or less a week.  A serving is 1 tablespoon jelly or jam, ½ cup sorbet, or 1 cup of lemonade. · Eat less than 2,300 milligrams (mg) of sodium a day. If you limit your sodium to 1,500 mg a day, you can lower your blood pressure even more. Tips for success · Start small. Do not try to make dramatic changes to your diet all at once. You might feel that you are missing out on your favorite foods and then be more likely to not follow the plan. Make small changes, and stick with them. Once those changes become habit, add a few more changes. · Try some of the following: ¨ Make it a goal to eat a fruit or vegetable at every meal and at snacks. This will make it easy to get the recommended amount of fruits and vegetables each day. ¨ Try yogurt topped with fruit and nuts for a snack or healthy dessert. ¨ Add lettuce, tomato, cucumber, and onion to sandwiches. ¨ Combine a ready-made pizza crust with low-fat mozzarella cheese and lots of vegetable toppings. Try using tomatoes, squash, spinach, broccoli, carrots, cauliflower, and onions. ¨ Have a variety of cut-up vegetables with a low-fat dip as an appetizer instead of chips and dip. ¨ Sprinkle sunflower seeds or chopped almonds over salads. Or try adding chopped walnuts or almonds to cooked vegetables. ¨ Try some vegetarian meals using beans and peas. Add garbanzo or kidney beans to salads. Make burritos and tacos with mashed pino beans or black beans. Where can you learn more? Go to http://diamond-donna.info/. Enter M172 in the search box to learn more about \"DASH Diet: Care Instructions. \" Current as of: September 21, 2016 Content Version: 11.4 © 4140-6372 [employer]. Care instructions adapted under license by [employer] (which disclaims liability or warranty for this information).  If you have questions about a medical condition or this instruction, always ask your healthcare professional. [employer] disclaims any warranty or liability for your use of this information. Check BP twice weekly. Notify me if it consistently is above 140/90 or below 90/60. Bring your blood pressure log to your next office visit. Decrease salt, fats, caffeine, alcohol in your diet. Increase water, fiber. Exercise 5 times weekly for 30 minutes daily as tolerated. Continue current medications, care and subspecialty follow up. Visit eye doctor yearly to check your eyes blood pressure related changes. DASH Diet: Care Instructions Your Care Instructions The DASH diet is an eating plan that can help lower your blood pressure. DASH stands for Dietary Approaches to Stop Hypertension. Hypertension is high blood pressure. The DASH diet focuses on eating foods that are high in calcium, potassium, and magnesium. These nutrients can lower blood pressure. The foods that are highest in these nutrients are fruits, vegetables, low-fat dairy products, nuts, seeds, and legumes. But taking calcium, potassium, and magnesium supplements instead of eating foods that are high in those nutrients does not have the same effect. The DASH diet also includes whole grains, fish, and poultry. The DASH diet is one of several lifestyle changes your doctor may recommend to lower your high blood pressure. Your doctor may also want you to decrease the amount of sodium in your diet. Lowering sodium while following the DASH diet can lower blood pressure even further than just the DASH diet alone. Follow-up care is a key part of your treatment and safety. Be sure to make and go to all appointments, and call your doctor if you are having problems. It's also a good idea to know your test results and keep a list of the medicines you take. How can you care for yourself at home? Following the DASH diet · Eat 4 to 5 servings of fruit each day.  A serving is 1 medium-sized piece of fruit, ½ cup chopped or canned fruit, 1/4 cup dried fruit, or 4 ounces (½ cup) of fruit juice. Choose fruit more often than fruit juice. · Eat 4 to 5 servings of vegetables each day. A serving is 1 cup of lettuce or raw leafy vegetables, ½ cup of chopped or cooked vegetables, or 4 ounces (½ cup) of vegetable juice. Choose vegetables more often than vegetable juice. · Get 2 to 3 servings of low-fat and fat-free dairy each day. A serving is 8 ounces of milk, 1 cup of yogurt, or 1 ½ ounces of cheese. · Eat 6 to 8 servings of grains each day. A serving is 1 slice of bread, 1 ounce of dry cereal, or ½ cup of cooked rice, pasta, or cooked cereal. Try to choose whole-grain products as much as possible. · Limit lean meat, poultry, and fish to 2 servings each day. A serving is 3 ounces, about the size of a deck of cards. · Eat 4 to 5 servings of nuts, seeds, and legumes (cooked dried beans, lentils, and split peas) each week. A serving is 1/3 cup of nuts, 2 tablespoons of seeds, or ½ cup of cooked beans or peas. · Limit fats and oils to 2 to 3 servings each day. A serving is 1 teaspoon of vegetable oil or 2 tablespoons of salad dressing. · Limit sweets and added sugars to 5 servings or less a week. A serving is 1 tablespoon jelly or jam, ½ cup sorbet, or 1 cup of lemonade. · Eat less than 2,300 milligrams (mg) of sodium a day. If you limit your sodium to 1,500 mg a day, you can lower your blood pressure even more. Tips for success · Start small. Do not try to make dramatic changes to your diet all at once. You might feel that you are missing out on your favorite foods and then be more likely to not follow the plan. Make small changes, and stick with them. Once those changes become habit, add a few more changes. · Try some of the following: ¨ Make it a goal to eat a fruit or vegetable at every meal and at snacks. This will make it easy to get the recommended amount of fruits and vegetables each day. ¨ Try yogurt topped with fruit and nuts for a snack or healthy dessert. ¨ Add lettuce, tomato, cucumber, and onion to sandwiches. ¨ Combine a ready-made pizza crust with low-fat mozzarella cheese and lots of vegetable toppings. Try using tomatoes, squash, spinach, broccoli, carrots, cauliflower, and onions. ¨ Have a variety of cut-up vegetables with a low-fat dip as an appetizer instead of chips and dip. ¨ Sprinkle sunflower seeds or chopped almonds over salads. Or try adding chopped walnuts or almonds to cooked vegetables. ¨ Try some vegetarian meals using beans and peas. Add garbanzo or kidney beans to salads. Make burritos and tacos with mashed pino beans or black beans. Where can you learn more? Go to http://diamondHealth Catalystdonna.info/. Enter D840 in the search box to learn more about \"DASH Diet: Care Instructions. \" Current as of: September 21, 2016 Content Version: 11.4 © 6296-4299 [employer]. Care instructions adapted under license by [employer] (which disclaims liability or warranty for this information). If you have questions about a medical condition or this instruction, always ask your healthcare professional. [address] any warranty or liability for your use of this information. Introducing [facility]! [facility] introduces [employer] patient portal. Now you can access parts of your medical record, email your doctor's office, and request medication refills online. 1. In your internet browser, go to [email or web address] 2. Click on the First Time User? Click Here link in the Sign In box. You will see the New Member Sign Up page. 3. Enter your [employer] Access Code exactly as it appears below. You will not need to use this code after youve completed the sign-up process. If you do not sign up before the expiration date, you must request a new code. · [employer] Access Code: [email or web address] Expires: 4/9/2018  8:55 AM 
 
4. Enter the last four digits of your Social Security Number (xxxx) and Date of Birth (mm/dd/yyyy) as indicated and click Submit. You will be taken to the next sign-up page. 5. Create a Tamion ID. This will be your Tamion login ID and cannot be changed, so think of one that is secure and easy to remember. 6. Create a Tamion password. You can change your password at any time. 7. Enter your Password Reset Question and Answer. This can be used at a later time if you forget your password. 8. Enter your e-mail address. You will receive e-mail notification when new information is available in [address] 9. Click Sign Up. You can now view and download portions of your medical record. 10. Click the Download Summary menu link to download a portable copy of your medical information. If you have questions, please visit the Frequently Asked Questions section of the Tam[employer] website. Remember, Tamion is NOT to be used for urgent needs. For medical emergencies, dial 911. Now available from your iPhone and Android! Please provide this summary of care documentation to your next provider. Your primary care clinician is listed as Amari Noe. If you have any questions after today's visit, please call [phone].

## 2018-03-30 NOTE — PROGRESS NOTES
Francis Hou  Identified pt with two pt identifiers(name and ). Chief Complaint   Patient presents with    Blood Pressure Check    ED Follow-up    Medication Refill    Results       1. Have you been to the ER, urgent care clinic since your last visit? Hospitalized since your last visit? Yes, went to [address] [facility] ED on 2018    2. Have you seen or consulted any other health care providers outside of the [address] [place] [address] since your last visit? Include any pap smears or colon screening. [facility] in 2018 (note in encounters)      Medication reconciliation up to date and corrected with patient at this time. Today's provider has been notified of reason for visit, vitals and flowsheets obtained on patients. Reviewed record in preparation for visit, huddled with provider and have obtained necessary documentation. Health Maintenance Due   Topic    GLAUCOMA SCREENING Q2Y        Wt Readings from Last 3 Encounters:   18 153 lb (69.4 kg)   18 154 lb 3.2 oz (69.9 kg)   18 147 lb (66.7 kg)     Temp Readings from Last 3 Encounters:   18 97.6 °F (36.4 °C) (Temporal)   18 97.8 °F (36.6 °C) (Temporal)   18 97.8 °F (36.6 °C) (Temporal)     BP Readings from Last 3 Encounters:   18 105/55   18 105/58   18 105/58     Pulse Readings from Last 3 Encounters:   18 (!) 52   18 66   18 62     There were no vitals filed for this visit.       Learning Assessment:  :     Learning Assessment 2018 10/11/2017 4/3/2015   PRIMARY LEARNER Patient Patient Patient   HIGHEST LEVEL OF EDUCATION - PRIMARY LEARNER  - - > 4 YEARS OF COLLEGE   BARRIERS PRIMARY LEARNER - - NONE   PRIMARY LANGUAGE ENGLISH ENGLISH ENGLISH   LEARNER PREFERENCE PRIMARY DEMONSTRATION DEMONSTRATION DEMONSTRATION   ANSWERED BY other patient patient   RELATIONSHIP OTHER SELF SELF       Depression Screening:  :     PHQ over the last two weeks 2017   Little interest or pleasure in doing things Not at all   Feeling down, depressed or hopeless Not at all   Total Score PHQ 2 0       Fall Risk Assessment:  :     Fall Risk Assessment, last 12 mths 12/29/2017   Able to walk? Yes   Fall in past 12 months? No       Abuse Screening:  :     No flowsheet data found.     ADL Screening:  :     ADL Assessment 4/27/2016   Feeding yourself No Help Needed   Getting from bed to chair No Help Needed   Getting dressed No Help Needed   Bathing or showering No Help Needed   Walk across the room (includes cane/walker) No Help Needed   Using the telphone No Help Needed   Taking your medications Help Needed   Preparing meals Help Needed   Managing money (expenses/bills) No Help Needed   Moderately strenuous housework (laundry) No Help Needed   Shopping for personal items (toiletries/medicines) No Help Needed   Shopping for groceries No Help Needed   Driving No Help Needed   Climbing a flight of stairs No Help Needed   Getting to places beyond walking distances No Help Needed

## 2018-03-30 NOTE — PROGRESS NOTES
HISTORY OF PRESENT ILLNESS  Oliver Clarke is a 80 y.o. male presents with Blood Pressure Check; ED Follow-up; Medication Refill; Results; and Referral Follow Up    Agree with nurse note. His wife is present and helps provide the hx. Hypertensive pt with dyslipidemia, CAD, PVD, thrombocytosis, decreased WBC, and hx of stroke presents to the office with a BP of 96/62 and HR of 60 bpm. For BP, he takes Norvasc 5 mg daily, and Lopressor 12.5 mg BID, and Imdur 30 mg daily, tolerating well. Patient denies vision changes, headaches, dizziness, chest pain, SOB, or swelling. He weighs 154 lbs, up 8 lbs since 2017. Pt with leg weakness and unsteady gait. He had 6 weeks of PT for balance and his wife notes it helped \"a whole lot and it was very, very good. \" They have continued with some walking and exercises. Yesterday they walked 2 miles. He saw neurologist, Dr. Anderson Post on 18 for memory loss. Dx'd Alzheimer's dementia, hyperlipidemia, and HTN. Dr. Wendy Montes started him on Aricept and Namenda. F/U 3 months. He went to the ER on 18 for dizziness, nausea, and fatigue after taking Aricept x2 doses. Namenda was not started. Troponin 0.09. Hgb 11.7. Hct 34.5. Consult to Dr. Sally Villarreal who recommended pt stop Aricept. Dx'd nausea and vomiting and adverse effect of drug. Tx'd with IVF and Zofran 4 mg IV. Rx'd Zofran 4 mg. They went to [facility] for L hip pain. Rx'd Etodolac 400 mg which helps him. They request a refill. Written by lakesha Nunez, as dictated by Dr. Brittaney Marrero DO.    ROS    Review of Systems negative except as noted above in HPI.     ALLERGIES:    Allergies   Allergen Reactions    Aricept [Donepezil] Nausea and Vomiting     Mild depression    Betadine [Povidone-Iodine] Hives    Phenergan [Promethazine] Other (comments)     hallucinations    Seafood [Shellfish Containing Products] Rash and Swelling     crabmeat    Xylocaine [Lidocaine Hcl] Shortness of Breath       CURRENT MEDICATIONS:    Outpatient Prescriptions Marked as Taking for the 3/30/18 encounter (Office Visit) with Adonis Barbara, DO   Medication Sig Dispense Refill    FOLBEE 2.5-25-1 mg tablet   0    amLODIPine (NORVASC) 5 mg tablet take 1 tablet by mouth once daily 30 Tab 1    ondansetron hcl (ZOFRAN, AS HYDROCHLORIDE,) 4 mg tablet Take 1 Tab by mouth every eight (8) hours as needed for Nausea. 20 Tab 0    acetaminophen (TYLENOL ARTHRITIS PAIN) 650 mg TbER Take 650 mg by mouth every eight (8) hours.  isosorbide mononitrate ER (IMDUR) 30 mg tablet take 1 tablet by mouth once daily 30 Tab 11    vit B12-levomefolate calcium-vit B6 (FOLTX) 2-1.13-25 mg tablet Take 1 Tab by mouth daily. Indications: Vitamin Deficiency 30 Tab 5    pravastatin (PRAVACHOL) 20 mg tablet take 1 tablet by mouth at bedtime 90 Tab 3    metoprolol tartrate (LOPRESSOR) 25 mg tablet take 1/2 tablet twice a day 30 Tab 11    meclizine (ANTIVERT) 25 mg tablet Take 1 Tab by mouth three (3) times daily as needed for Dizziness. 20 Tab 0    GUAIFENESIN (MUCINEX PO) Take 1 Tab by mouth daily.  docusate sodium (COLACE) 100 mg capsule Take 100 mg by mouth daily. Instructed to take daily with plenty of liquid unless otherwise directed- as per 11/7/14 [facility] discharge med list      cholecalciferol, vitamin D3, (VITAMIN D3) 2,000 unit tab Take 1 Tab by mouth daily. As per 11/07/14 [facility] discharge med list      aspirin (ASPIRIN) 325 mg tablet Take 1 tablet by mouth daily. 30 tablet 3    tamsulosin (FLOMAX) 0.4 mg capsule Take 0.4 mg by mouth daily. For prostate          PAST MEDICAL HISTORY:    Past Medical History:   Diagnosis Date    Back pain     due to DDD and scoliosis    BPH (benign prostatic hyperplasia)     Dr. Hopson Quiet CAD (coronary artery disease) 10/30/13    30%LAD, 80% ostal stenosis. Dr. Gerri Denver.  Chest pain 10/04/04, 10/30/13    Dr. Sarah Weber.   Negative Stress Cardiolite Test.  Dr. Viji Skaggs Damian Wallace.  Chronic venous insufficiency 06/15/09    Dr. Ashkan Rizo due to fall    DDD (degenerative disc disease), lumbar     L3-S1    Diverticulosis 02/2004    sigmoid. Dr. Terrance Wei DJD (degenerative joint disease) of cervical spine     C 4-5 ;5-6    Dyslipidemia     Fracture 1980    facial/nasal    Hearing loss     wears hearing aid    Heartburn     Hemorrhoids, internal 1987    Dr. Jordana Painter of unspecified site of abdominal cavity without mention of obstruction or gangrene     inguinal  Lt    Hypertension     Impotence     Left acoustic neuroma ([facility] [address] 10/28/14    17 x 9 mm schwannoma.  Pes planus of both feet     PVD (peripheral vascular disease) ([address] 2009    Scoliosis     LS    Slipped intervertebral disc 1954    chiropractor    SOB (shortness of breath) 08/07/13    Dr. Sonam Frye.  Stroke ([facility] [address] 10/28/2014    small Left [place]. Dr. Blayne Ariza. Dr. Estrellita Meza.  Urinary incontinence     due to BPH. Dr. Kerrin Spatz    Varicose vein 06/15/09    Dr. Yanni Lewis Vertigo 2003    due to inner ear. Dr. Dewey Joel. Dr. Ariel Tucker. Dr. Dwyer Gess Visual loss     Dr. Yanni Donnelly, [facility]. PAST SURGICAL HISTORY:    Past Surgical History:   Procedure Laterality Date    APPENDECTOMY      due to gangrene   [facility], 02/2002    Dr. Michael Sanchez. benign.  COLONOSCOPY  02/2004    Dr. Nelda Lambert. due q 10 yrs    CYSTOSCOPY  10/17/03    Dr. Haro Ill  10/30/13    Dr. Darshan Childs. 80% ostal stenosis. 30% LAD.  HX HEMORRHOIDECTOMY      HX KNEE ARTHROSCOPY  1990 Left , 2007 Right    Dr. Josey Fuentes HX LAPAROTOMY  05/14/07    Dr. Stephanie Jacinto. due to Bowel Obstruction    HX POLYPECTOMY  02/2002    Anal.     HX TONSILLECTOMY      OK COLSC FLX W/RMVL OF TUMOR POLYP LESION SNARE TQ  5/23/2011    Dr. Wade Quick.        FAMILY HISTORY:    Family History   Problem Relation Age of Onset    Heart Attack Mother     Other Mother      brain aneurysm/AAA/varicose veins    Stroke Mother     Heart Attack Sister     Cancer Maternal Grandmother      ovarian       SOCIAL HISTORY:    Social History     Social History    Marital status:      Spouse name: N/A    Number of children: N/A    Years of education: N/A     Social History Main Topics    Smoking status: Never Smoker    Smokeless tobacco: Never Used    Alcohol use No    Drug use: No    Sexual activity: Not Asked     Other Topics Concern    None     Social History Narrative       IMMUNIZATIONS:    Immunization History   Administered Date(s) Administered    Influenza High Dose Vaccine PF 10/29/2013, 09/08/2015, 08/12/2016    Influenza Vaccine 10/01/2014, 10/01/2017    Influenza Vaccine Split 10/15/2010, 10/20/2011, 10/08/2012    Influenza Vaccine Whole 10/01/2009    Pneumococcal Conjugate (PCV-13) 06/03/2015    Pneumococcal Polysaccharide (PPSV-23) 07/02/2016    TD Vaccine 04/27/2004    Tdap 07/02/2016    ZZZ-RETIRED (DO NOT USE) Pneumococcal Vaccine (Unspecified Type) 02/08/2002    Zoster Vaccine, Live 09/08/2015         PHYSICAL EXAMINATION    Vital Signs    Visit Vitals    BP 96/62 (BP 1 Location: Left arm, BP Patient Position: Sitting)    Pulse 60    Temp 97.4 °F (36.3 °C) (Oral)    Resp 12    Ht 5' 6\" (1.676 m)    Wt 154 lb 9.6 oz (70.1 kg)    SpO2 96%    BMI 24.95 kg/m2       Weight Metrics 3/30/2018 1/25/2018 1/23/2018 1/13/2018 12/29/2017 12/23/2017 12/16/2017   Weight 154 lb 9.6 oz 153 lb 154 lb 3.2 oz 147 lb 146 lb 8 oz 151 lb 14.4 oz 151 lb 14.4 oz   BMI 24.95 kg/m2 24.69 kg/m2 24.89 kg/m2 23.73 kg/m2 25.15 kg/m2 26.07 kg/m2 26.07 kg/m2       General appearance - Well nourished. Well appearing. Well developed. No acute distress. Head - Normocephalic. Atraumatic. Eyes - pupils equal and reactive. Extraocular eye movements intact. Sclera anicteric. Mildly injected sclera.   Ears - Hearing is grossly abnormal bilaterally. Nose - normal and patent. No polyps noted. No erythema. No discharge. Mouth - mucous membranes with adequate moisture. Posterior pharynx normal with cobblestone appearance. No erythema, white exudate or obstruction. Neck - supple. Midline trachea. No carotid bruits noted bilaterally. No thyromegaly noted. Chest - clear to auscultation bilaterally anteriorly and posteriorly. No wheezes. No rales or rhonchi. Breath sounds are symmetrical bilaterally. Unlabored respirations. Heart - normal rate. Regular rhythm. Normal S1, S2. No murmur noted. No rubs, clicks or gallops noted. Abdomen - soft and nondistended. No masses or organomegaly. No rebound, rigidity or guarding. Bowel sounds normal x 4 quadrants. No tenderness noted. Neurological - awake, alert and oriented to person and place. \"Year is 2042. \"   Cranial nerves II through XII intact. Clear speech. Muscle strength is +5/5 x 4 extremities. Sensation is intact to light touch bilaterally. Steady gait. Heme/Lymph - peripheral pulses normal x 4 extremities. DPs +1/4. No peripheral edema is noted. Musculoskeletal - Intact x 4 extremities. Full ROM x 4 extremities. No pain with movement. Back exam - normal range of motion. No pain on palpation of the spinous processes in the cervical, thoracic, lumbar, sacral regions. No CVA tenderness. Skin - no rashes, erythema, ecchymosis, lacerations, abrasions, suspicious moles noted  Psychological -   normal behavior, dress and thought processes. Good insight. Good eye contact. Normal affect. Appropriate mood. Normal speech.       DATA REVIEWED    Lab Results   Component Value Date/Time    WBC 7.1 01/25/2018 02:54 PM    Hemoglobin (POC) 12.9 09/15/2016 04:44 PM    HGB 11.7 (L) 01/25/2018 02:54 PM    Hematocrit (POC) 38 09/15/2016 04:44 PM    HCT 34.5 (L) 01/25/2018 02:54 PM    PLATELET 729 25/44/3593 02:54 PM    MCV 92.7 01/25/2018 02:54 PM     Lab Results   Component Value Date/Time    Sodium 137 01/25/2018 02:54 PM    Potassium 4.4 01/25/2018 02:54 PM    Chloride 102 01/25/2018 02:54 PM    CO2 31 01/25/2018 02:54 PM    Anion gap 4 (L) 01/25/2018 02:54 PM    Glucose 110 (H) 01/25/2018 02:54 PM    BUN 15 01/25/2018 02:54 PM    Creatinine 0.91 01/25/2018 02:54 PM    BUN/Creatinine ratio 16 01/25/2018 02:54 PM    GFR est AA >60 01/25/2018 02:54 PM    GFR est non-AA >60 01/25/2018 02:54 PM    Calcium 9.0 01/25/2018 02:54 PM    Bilirubin, total 0.3 12/29/2017 12:06 PM    AST (SGOT) 39 12/29/2017 12:06 PM    Alk. phosphatase 95 12/29/2017 12:06 PM    Protein, total 7.1 12/29/2017 12:06 PM    Albumin 3.9 12/29/2017 12:06 PM    Globulin 3.6 12/16/2017 03:51 PM    A-G Ratio 1.2 12/29/2017 12:06 PM    ALT (SGPT) 31 12/29/2017 12:06 PM     Lab Results   Component Value Date/Time    Cholesterol, total 166 05/23/2017 11:08 AM    HDL Cholesterol 53 05/23/2017 11:08 AM    LDL, calculated 97 05/23/2017 11:08 AM    VLDL, calculated 16 05/23/2017 11:08 AM    Triglyceride 78 05/23/2017 11:08 AM    CHOL/HDL Ratio 2.2 10/29/2014 03:45 AM     Lab Results   Component Value Date/Time    Vitamin D 25-Hydroxy 21.5 (L) 10/31/2014 06:00 AM    VITAMIN D, 25-HYDROXY 34.2 05/23/2017 11:08 AM       Lab Results   Component Value Date/Time    Hemoglobin A1c 5.4 10/11/2017 10:01 AM     Lab Results   Component Value Date/Time    TSH 3.950 10/11/2017 10:01 AM     Lab Results   Component Value Date/Time    Microalb/Creat ratio (ug/mg creat.) 37.6 (H) 05/23/2017 11:41 AM       ASSESSMENT and PLAN      ICD-10-CM ICD-9-CM    1. Essential hypertension I10 401.9     with low readings   2. Cognitive and behavioral changes R41.89 799.59 FOLBEE 2.5-25-1 mg tablet    R46.89 312.9     improving, stable   3. Alzheimer's dementia without behavioral disturbance, unspecified timing of dementia onset G30.9 331.0 donepezil (ARICEPT) 5 mg tablet    F02.80 294.10 FOLBEE 2.5-25-1 mg tablet    stable   4.  Unsteady gait R26.81 781.2     improved since home PT   5. Weakness of both legs R29.898 729.89     improved since home PT   6. Thrombocytopenia ([facility]) D69.6 287.5     resolved   7. Medication intolerance Z78.9 995.27     Aricept x 2 doses caused n/v/depression, resolved   8. Other elevated white blood cell (WBC) count D72.828 288.69     resolved   9. PVD (peripheral vascular disease) ([facility]) I73.9 443.9    10. History of small bowel obstruction Z87.19 V12.79    11. Sensorineural hearing loss (SNHL) of both ears H90.3 389.18    12. Coronary artery disease involving native coronary artery of native heart without angina pectoris I25.10 414.01    13. Dyslipidemia E78.5 272.4    14. Bradycardia with 51-60 beats per minute R00.1 427.89     stable   15. History of stroke Z86.73 V12.54    16. Hip pain, acute, left M25.552 719.45 etodolac (LODINE) 400 mg tablet    resolved       Discussed the patient's BMI with him. The BMI follow up plan is as follows: Pt not eligible for BMI calculation due to normal weight. Decrease carbohydrates (white foods, sweet foods, sweet drinks and alcohol), increase green leafy vegetables and protein (lean meats and beans) with each meal.  Avoid fried foods. Eat 3-5 small meals daily. Do not skip meals. Increase water intake. Increase physical activity to 30 minutes daily for health benefit, as tolerated. Get 7-8 hours uninterrupted sleep nightly. Chart reviewed and updated. Continue current medications and care. Reduce Lopressor from 12.5 mg BID to 12.5 mg once daily since bp is low. Monitor BP at home and if BP is above 140/90 consistently then return to taking 12.5 mg BID. Cautioned pt and wife to take Etodolac with food. Monitor bp with its use. Prescriptions written and sent to pharmacy; medication side effects discussed. Etodolac 400 mg. Most recent tests reviewed. Recent office visit notes from Dr. Mady Doll and ER reviewed. Get recent office visit notes from Dr. Iesha Hearn.  Advised pt to sign release. Counseled patient on health concerns:  Memory, BP, and hip care. Relevant handouts given and discussed with patient. Immunizations noted. Offered empathy, support, legitimation, prayers, partnership to patient. Praised patient for progress. Follow-up Disposition:  Return in about 2 months (around 5/30/2018) for bp. Patient was offered a choice/choices in the treatment plan today. Patient expresses understanding of the plan and agrees with recommendations. More than 40 mins spent face to face with patient and more than 50% of this time spent in counseling and coordinating care. Written by lakesha Shipley, as dictated by Dr. Richie Garcia DO. Documentation True and Accepted by Carolyn Celis. Aurther Brittle. Patient Instructions        DASH Diet: Care Instructions  Your Care Instructions    The DASH diet is an eating plan that can help lower your blood pressure. DASH stands for Dietary Approaches to Stop Hypertension. Hypertension is high blood pressure. The DASH diet focuses on eating foods that are high in calcium, potassium, and magnesium. These nutrients can lower blood pressure. The foods that are highest in these nutrients are fruits, vegetables, low-fat dairy products, nuts, seeds, and legumes. But taking calcium, potassium, and magnesium supplements instead of eating foods that are high in those nutrients does not have the same effect. The DASH diet also includes whole grains, fish, and poultry. The DASH diet is one of several lifestyle changes your doctor may recommend to lower your high blood pressure. Your doctor may also want you to decrease the amount of sodium in your diet. Lowering sodium while following the DASH diet can lower blood pressure even further than just the DASH diet alone. Follow-up care is a key part of your treatment and safety. Be sure to make and go to all appointments, and call your doctor if you are having problems.  It's also a good idea to know your test results and keep a list of the medicines you take. How can you care for yourself at home? Following the DASH diet  · Eat 4 to 5 servings of fruit each day. A serving is 1 medium-sized piece of fruit, ½ cup chopped or canned fruit, 1/4 cup dried fruit, or 4 ounces (½ cup) of fruit juice. Choose fruit more often than fruit juice. · Eat 4 to 5 servings of vegetables each day. A serving is 1 cup of lettuce or raw leafy vegetables, ½ cup of chopped or cooked vegetables, or 4 ounces (½ cup) of vegetable juice. Choose vegetables more often than vegetable juice. · Get 2 to 3 servings of low-fat and fat-free dairy each day. A serving is 8 ounces of milk, 1 cup of yogurt, or 1 ½ ounces of cheese. · Eat 6 to 8 servings of grains each day. A serving is 1 slice of bread, 1 ounce of dry cereal, or ½ cup of cooked rice, pasta, or cooked cereal. Try to choose whole-grain products as much as possible. · Limit lean meat, poultry, and fish to 2 servings each day. A serving is 3 ounces, about the size of a deck of cards. · Eat 4 to 5 servings of nuts, seeds, and legumes (cooked dried beans, lentils, and split peas) each week. A serving is 1/3 cup of nuts, 2 tablespoons of seeds, or ½ cup of cooked beans or peas. · Limit fats and oils to 2 to 3 servings each day. A serving is 1 teaspoon of vegetable oil or 2 tablespoons of salad dressing. · Limit sweets and added sugars to 5 servings or less a week. A serving is 1 tablespoon jelly or jam, ½ cup sorbet, or 1 cup of lemonade. · Eat less than 2,300 milligrams (mg) of sodium a day. If you limit your sodium to 1,500 mg a day, you can lower your blood pressure even more. Tips for success  · Start small. Do not try to make dramatic changes to your diet all at once. You might feel that you are missing out on your favorite foods and then be more likely to not follow the plan. Make small changes, and stick with them.  Once those changes become habit, add a few more changes. · Try some of the following:  ¨ Make it a goal to eat a fruit or vegetable at every meal and at snacks. This will make it easy to get the recommended amount of fruits and vegetables each day. ¨ Try yogurt topped with fruit and nuts for a snack or healthy dessert. ¨ Add lettuce, tomato, cucumber, and onion to sandwiches. ¨ Combine a ready-made pizza crust with low-fat mozzarella cheese and lots of vegetable toppings. Try using tomatoes, squash, spinach, broccoli, carrots, cauliflower, and onions. ¨ Have a variety of cut-up vegetables with a low-fat dip as an appetizer instead of chips and dip. ¨ Sprinkle sunflower seeds or chopped almonds over salads. Or try adding chopped walnuts or almonds to cooked vegetables. ¨ Try some vegetarian meals using beans and peas. Add garbanzo or kidney beans to salads. Make burritos and tacos with mashed pino beans or black beans. Where can you learn more? Go to http://diamondCurbed.comdonna.info/. Enter B054 in the search box to learn more about \"DASH Diet: Care Instructions. \"  Current as of: September 21, 2016  Content Version: 11.4  © 3378-9846 [employer]. Care instructions adapted under license by [employer] (which disclaims liability or warranty for this information). If you have questions about a medical condition or this instruction, always ask your healthcare professional. [place] [address] any warranty or liability for your use of this information. Check BP twice weekly. Notify me if it consistently is above 140/90 or below 90/60. Bring your blood pressure log to your next office visit. Decrease salt, fats, caffeine, alcohol in your diet. Increase water, fiber. Exercise 5 times weekly for 30 minutes daily as tolerated. Continue current medications, care and subspecialty follow up. Visit eye doctor yearly to check your eyes blood pressure related changes.            DASH Diet: Care Instructions  Your Care Instructions    The DASH diet is an eating plan that can help lower your blood pressure. DASH stands for Dietary Approaches to Stop Hypertension. Hypertension is high blood pressure. The DASH diet focuses on eating foods that are high in calcium, potassium, and magnesium. These nutrients can lower blood pressure. The foods that are highest in these nutrients are fruits, vegetables, low-fat dairy products, nuts, seeds, and legumes. But taking calcium, potassium, and magnesium supplements instead of eating foods that are high in those nutrients does not have the same effect. The DASH diet also includes whole grains, fish, and poultry. The DASH diet is one of several lifestyle changes your doctor may recommend to lower your high blood pressure. Your doctor may also want you to decrease the amount of sodium in your diet. Lowering sodium while following the DASH diet can lower blood pressure even further than just the DASH diet alone. Follow-up care is a key part of your treatment and safety. Be sure to make and go to all appointments, and call your doctor if you are having problems. It's also a good idea to know your test results and keep a list of the medicines you take. How can you care for yourself at home? Following the DASH diet  · Eat 4 to 5 servings of fruit each day. A serving is 1 medium-sized piece of fruit, ½ cup chopped or canned fruit, 1/4 cup dried fruit, or 4 ounces (½ cup) of fruit juice. Choose fruit more often than fruit juice. · Eat 4 to 5 servings of vegetables each day. A serving is 1 cup of lettuce or raw leafy vegetables, ½ cup of chopped or cooked vegetables, or 4 ounces (½ cup) of vegetable juice. Choose vegetables more often than vegetable juice. · Get 2 to 3 servings of low-fat and fat-free dairy each day. A serving is 8 ounces of milk, 1 cup of yogurt, or 1 ½ ounces of cheese. · Eat 6 to 8 servings of grains each day.  A serving is 1 slice of bread, 1 ounce of dry cereal, or ½ cup of cooked rice, pasta, or cooked cereal. Try to choose whole-grain products as much as possible. · Limit lean meat, poultry, and fish to 2 servings each day. A serving is 3 ounces, about the size of a deck of cards. · Eat 4 to 5 servings of nuts, seeds, and legumes (cooked dried beans, lentils, and split peas) each week. A serving is 1/3 cup of nuts, 2 tablespoons of seeds, or ½ cup of cooked beans or peas. · Limit fats and oils to 2 to 3 servings each day. A serving is 1 teaspoon of vegetable oil or 2 tablespoons of salad dressing. · Limit sweets and added sugars to 5 servings or less a week. A serving is 1 tablespoon jelly or jam, ½ cup sorbet, or 1 cup of lemonade. · Eat less than 2,300 milligrams (mg) of sodium a day. If you limit your sodium to 1,500 mg a day, you can lower your blood pressure even more. Tips for success  · Start small. Do not try to make dramatic changes to your diet all at once. You might feel that you are missing out on your favorite foods and then be more likely to not follow the plan. Make small changes, and stick with them. Once those changes become habit, add a few more changes. · Try some of the following:  ¨ Make it a goal to eat a fruit or vegetable at every meal and at snacks. This will make it easy to get the recommended amount of fruits and vegetables each day. ¨ Try yogurt topped with fruit and nuts for a snack or healthy dessert. ¨ Add lettuce, tomato, cucumber, and onion to sandwiches. ¨ Combine a ready-made pizza crust with low-fat mozzarella cheese and lots of vegetable toppings. Try using tomatoes, squash, spinach, broccoli, carrots, cauliflower, and onions. ¨ Have a variety of cut-up vegetables with a low-fat dip as an appetizer instead of chips and dip. ¨ Sprinkle sunflower seeds or chopped almonds over salads. Or try adding chopped walnuts or almonds to cooked vegetables. ¨ Try some vegetarian meals using beans and peas.  Add garbanzo or kidney beans to salads. Make burritos and tacos with mashed pino beans or black beans. Where can you learn more? Go to http://diamond-donna.info/. Enter P676 in the search box to learn more about \"DASH Diet: Care Instructions. \"  Current as of: September 21, 2016  Content Version: 11.4  © 8242-1053 [employer]. Care instructions adapted under license by [employer] (which disclaims liability or warranty for this information). If you have questions about a medical condition or this instruction, always ask your healthcare professional. [place] [address] any warranty or liability for your use of this information.

## 2018-04-09 DIAGNOSIS — R41.89 COGNITIVE AND BEHAVIORAL CHANGES: ICD-10-CM

## 2018-04-09 DIAGNOSIS — G30.9 ALZHEIMER'S DEMENTIA WITHOUT BEHAVIORAL DISTURBANCE, UNSPECIFIED TIMING OF DEMENTIA ONSET: ICD-10-CM

## 2018-04-09 DIAGNOSIS — R46.89 COGNITIVE AND BEHAVIORAL CHANGES: ICD-10-CM

## 2018-04-09 DIAGNOSIS — F02.80 ALZHEIMER'S DEMENTIA WITHOUT BEHAVIORAL DISTURBANCE, UNSPECIFIED TIMING OF DEMENTIA ONSET: ICD-10-CM

## 2018-04-10 RX ORDER — CYANOCOBALAMIN/FOLIC AC/VIT B6 1-2.5-25MG
TABLET ORAL
Qty: 60 TAB | Refills: 0 | Status: SHIPPED | OUTPATIENT
Start: 2018-04-10 | End: 2018-06-10 | Stop reason: SDUPTHER

## 2018-04-24 ENCOUNTER — OFFICE VISIT (OUTPATIENT)
Dept: NEUROLOGY | Age: 83
End: 2018-04-24

## 2018-04-24 VITALS
WEIGHT: 152 LBS | OXYGEN SATURATION: 96 % | HEIGHT: 66 IN | BODY MASS INDEX: 24.43 KG/M2 | HEART RATE: 54 BPM | DIASTOLIC BLOOD PRESSURE: 60 MMHG | SYSTOLIC BLOOD PRESSURE: 110 MMHG

## 2018-04-24 DIAGNOSIS — E78.01 FAMILIAL HYPERCHOLESTEROLEMIA: ICD-10-CM

## 2018-04-24 DIAGNOSIS — F02.80 LATE ONSET ALZHEIMER'S DISEASE WITHOUT BEHAVIORAL DISTURBANCE (HCC): Primary | ICD-10-CM

## 2018-04-24 DIAGNOSIS — G25.0 ESSENTIAL TREMOR: ICD-10-CM

## 2018-04-24 DIAGNOSIS — G30.1 LATE ONSET ALZHEIMER'S DISEASE WITHOUT BEHAVIORAL DISTURBANCE (HCC): Primary | ICD-10-CM

## 2018-04-24 DIAGNOSIS — I10 ESSENTIAL HYPERTENSION: ICD-10-CM

## 2018-04-24 DIAGNOSIS — K21.9 GASTROESOPHAGEAL REFLUX DISEASE, ESOPHAGITIS PRESENCE NOT SPECIFIED: ICD-10-CM

## 2018-04-24 NOTE — PATIENT INSTRUCTIONS
[facility]   Statement to Patients  April 1, 2014      In an effort to ensure the large volume of patient prescription refills is processed in the most efficient and expeditious manner, we are asking our patients to assist us by calling your Pharmacy for all prescription refills, this will include also your  Mail Order Pharmacy. The pharmacy will contact our office electronically to continue the refill process. Please do not wait until the last minute to call your pharmacy. We need at least 48 hours (2days) to fill prescriptions. We also encourage you to call your pharmacy before going to  your prescription to make sure it is ready. With regard to controlled substance prescription refill requests (narcotic refills) that need to be picked up at our office, we ask your cooperation by providing us with at least 72 hours (3days) notice that you will need a refill. We will not refill narcotic prescription refill requests after 4:00pm on any weekday, Monday through Thursday, or after 2:00pm on Fridays, or on the weekends. We encourage everyone to explore another way of getting your prescription refill request processed using Caipiaobao, our patient web portal through our electronic medical record system. Caipiaobao is an efficient and effective way to communicate your medication request directly to the office and  downloadable as an chintan on your smart phone . Caipiaobao also features a review functionality that allows you to view your medication list as well as leave messages for your physician. Are you ready to get connected? If so please review the attatched instructions or speak to any of our staff to get you set up right away! Thank you so much for your cooperation. Should you have any questions please contact our Practice Administrator.     The Physicians and Staff,  [facility]     Please be advised there is a $25 fee for all paperwork to be completed from our  providers. This is to be paid by the patient prior to picking up the completed forms. A Healthy Lifestyle: Care Instructions  Your Care Instructions    A healthy lifestyle can help you feel good, stay at a healthy weight, and have plenty of energy for both work and play. A healthy lifestyle is something you can share with your whole family. A healthy lifestyle also can lower your risk for serious health problems, such as high blood pressure, heart disease, and diabetes. You can follow a few steps listed below to improve your health and the health of your family. Follow-up care is a key part of your treatment and safety. Be sure to make and go to all appointments, and call your doctor if you are having problems. It's also a good idea to know your test results and keep a list of the medicines you take. How can you care for yourself at home? · Do not eat too much sugar, fat, or fast foods. You can still have dessert and treats now and then. The goal is moderation. · Start small to improve your eating habits. Pay attention to portion sizes, drink less juice and soda pop, and eat more fruits and vegetables. ¨ Eat a healthy amount of food. A 3-ounce serving of meat, for example, is about the size of a deck of cards. Fill the rest of your plate with vegetables and whole grains. ¨ Limit the amount of soda and sports drinks you have every day. Drink more water when you are thirsty. ¨ Eat at least 5 servings of fruits and vegetables every day. It may seem like a lot, but it is not hard to reach this goal. A serving or helping is 1 piece of fruit, 1 cup of vegetables, or 2 cups of leafy, raw vegetables. Have an apple or some carrot sticks as an afternoon snack instead of a candy bar. Try to have fruits and/or vegetables at every meal.  · Make exercise part of your daily routine. You may want to start with simple activities, such as walking, bicycling, or slow swimming.  Try to be active 30 to 60 minutes every day. You do not need to do all 30 to 60 minutes all at once. For example, you can exercise 3 times a day for 10 or 20 minutes. Moderate exercise is safe for most people, but it is always a good idea to talk to your doctor before starting an exercise program.  · Keep moving. Sheila Krzysztofine the lawn, work in the garden, or [employer]Lens. Take the stairs instead of the elevator at work. · If you smoke, quit. People who smoke have an increased risk for heart attack, stroke, cancer, and other lung illnesses. Quitting is hard, but there are ways to boost your chance of quitting tobacco for good. ¨ Use nicotine gum, patches, or lozenges. ¨ Ask your doctor about stop-smoking programs and medicines. ¨ Keep trying. In addition to reducing your risk of diseases in the future, you will notice some benefits soon after you stop using tobacco. If you have shortness of breath or asthma symptoms, they will likely get better within a few weeks after you quit. · Limit how much alcohol you drink. Moderate amounts of alcohol (up to 2 drinks a day for men, 1 drink a day for women) are okay. But drinking too much can lead to liver problems, high blood pressure, and other health problems. Family health  If you have a family, there are many things you can do together to improve your health. · Eat meals together as a family as often as possible. · Eat healthy foods. This includes fruits, vegetables, lean meats and dairy, and whole grains. · Include your family in your fitness plan. Most people think of activities such as jogging or tennis as the way to fitness, but there are many ways you and your family can be more active. Anything that makes you breathe hard and gets your heart pumping is exercise. Here are some tips:  ¨ Walk to do errands or to take your child to school or the bus. ¨ Go for a family bike ride after dinner instead of watching TV. Where can you learn more?   Go to http://diamond-donna.info/. Enter E041 in the search box to learn more about \"A Healthy Lifestyle: Care Instructions. \"  Current as of: May 12, 2017  Content Version: 11.4  © 7308-5878 Healthwise, HealthEdge. Care instructions adapted under license by [employer] (which disclaims liability or warranty for this information). If you have questions about a medical condition or this instruction, always ask your healthcare professional. [address] any warranty or liability for your use of this information.

## 2018-04-24 NOTE — MR AVS SNAPSHOT
[address], 
[address], [address] [facility] 
[phone] Patient: Barbi Terry MRN:  [id number] Visit Information Date & Time Provider Department Dept. Phone Encounter #  
 2018 11:40 AM Tammi Deng MD [facility] [phone] [id number] Follow-up Instructions Return in about 1 year (around 2019) for alzheimer, DEMENTIA. Your Appointments 2018 10:00 AM  
ROUTINE CARE with DO Clarice Bhatti ([facility] [place]) Appt Note: 6 MO F/U BP, Referral F/U; 6 month follow up bp,referral follow up  
 [address] 
[address] [facility] [address]  
[phone]  
  
   
 [address] [place] [address] [address] [facility] Upcoming Health Maintenance Date Due  
 GLAUCOMA SCREENING Q2Y 6/15/2018* MEDICARE YEARLY EXAM 2018 DTaP/Tdap/Td series (2 - Td) 2026 *Topic was postponed. The date shown is not the original due date. Allergies as of 2018  Review Complete On: 2018 By: Tammi Deng MD  
  
 Severity Noted Reaction Type Reactions Aricept [Donepezil] High 2018    Nausea and Vomiting Mild depression Betadine [Povidone-iodine] High 2009    Hives Phenergan [Promethazine] High 2009    Other (comments)  
 hallucinations Seafood [Shellfish Containing Products] High 2009    Rash, Swelling  
 crabmeat Xylocaine [Lidocaine Hcl] High 2012    Shortness of Breath Current Immunizations  Reviewed on 2017 Name Date Influenza High Dose Vaccine PF 2016, 2015, 10/29/2013 Influenza Vaccine 10/1/2017, 10/1/2014 Influenza Vaccine Split 10/8/2012, 10/20/2011, 10/15/2010 Influenza Vaccine Whole 10/1/2009 Pneumococcal Conjugate (PCV-13) 6/3/2015 Pneumococcal Polysaccharide (PPSV-23) 2016 TD Vaccine 4/27/2004 Tdap 7/2/2016 ZZZ-RETIRED (DO NOT USE) Pneumococcal Vaccine (Unspecified Type) 2/8/2002 Zoster Recombinant 4/13/2018 12:00 AM  
 Zoster Vaccine, Live 9/8/2015 Not reviewed this visit You Were Diagnosed With   
  
 Codes Comments Memory disturbance    -  Primary ICD-10-CM: R41.3 ICD-9-CM: 780.93 Gastroesophageal reflux disease, esophagitis presence not specified     ICD-10-CM: K21.9 ICD-9-CM: 530.81 Essential hypertension     ICD-10-CM: I10 
ICD-9-CM: 401.9 Familial hypercholesterolemia     ICD-10-CM: E78.01 
ICD-9-CM: 272.0 Vitals BP Pulse Height(growth percentile) Weight(growth percentile) SpO2 BMI  
 110/60 (!) 54 5' 6\" (1.676 m) 152 lb (68.9 kg) 96% 24.53 kg/m2 Smoking Status Never Smoker BMI and BSA Data Body Mass Index Body Surface Area 24.53 kg/m 2 1.79 m 2 Preferred Pharmacy Pharmacy Name Phone [phone] [facility], [address] [facility] Your Updated Medication List  
  
   
This list is accurate as of 4/24/18 12:51 PM.  Always use your most recent med list. amLODIPine 5 mg tablet Commonly known as:  NORVASC  
take 1 tablet by mouth once daily  
  
 aspirin 325 mg tablet Commonly known as:  ASPIRIN Take 1 tablet by mouth daily. docusate sodium 100 mg capsule Commonly known as:  Constantino Stai Take 100 mg by mouth daily. Instructed to take daily with plenty of liquid unless otherwise directed- as per 11/7/14 [facility] discharge med list  
  
 etodolac 400 mg tablet Commonly known as:  LODINE Take 400 mg by mouth daily as needed. FLOMAX 0.4 mg capsule Generic drug:  tamsulosin Take 0.4 mg by mouth daily. For prostate FOLBEE 2.5-25-1 mg tablet Generic drug:  folic acid-vit O0-DAVIAN B46  
take 1 tablet by mouth once daily  
  
 isosorbide mononitrate ER 30 mg tablet Commonly known as:  IMDUR  
 take 1 tablet by mouth once daily  
  
 meclizine 25 mg tablet Commonly known as:  ANTIVERT Take 1 Tab by mouth three (3) times daily as needed for Dizziness. metoprolol tartrate 25 mg tablet Commonly known as:  LOPRESSOR  
take 1/2 tablet twice a day MUCINEX PO Take 1 Tab by mouth daily. ondansetron hcl 4 mg tablet Commonly known as:  Darlene Sames Take 1 Tab by mouth every eight (8) hours as needed for Nausea. pravastatin 20 mg tablet Commonly known as:  PRAVACHOL  
take 1 tablet by mouth at bedtime TYLENOL ARTHRITIS PAIN 650 mg Joslyn Arcadia Generic drug:  acetaminophen Take 650 mg by mouth every eight (8) hours. vit B12-levomefolate calcium-vit B6 2-1.13-25 mg tablet Commonly known as:  Heddie Yissel Take 1 Tab by mouth daily. Indications: Vitamin Deficiency VITAMIN D3 2,000 unit Tab Generic drug:  cholecalciferol (vitamin D3) Take 1 Tab by mouth daily. As per 11/07/14 [facility] discharge med list  
  
  
  
  
Follow-up Instructions Return in about 1 year (around 4/24/2019) for alzheimer, DEMENTIA. Patient Instructions PRESCRIPTION REFILL POLICY [facility] Statement to Patients April 1, 2014 In an effort to ensure the large volume of patient prescription refills is processed in the most efficient and expeditious manner, we are asking our patients to assist us by calling your Pharmacy for all prescription refills, this will include also your  Mail Order Pharmacy. The pharmacy will contact our office electronically to continue the refill process. Please do not wait until the last minute to call your pharmacy. We need at least 48 hours (2days) to fill prescriptions. We also encourage you to call your pharmacy before going to  your prescription to make sure it is ready.   
 
With regard to controlled substance prescription refill requests (narcotic refills) that need to be picked up at our office, we ask your cooperation by providing us with at least 72 hours (3days) notice that you will need a refill. We will not refill narcotic prescription refill requests after 4:00pm on any weekday, Monday through Thursday, or after 2:00pm on Fridays, or on the weekends. We encourage everyone to explore another way of getting your prescription refill request processed using Deal Decor, our patient web portal through our electronic medical record system. Deal Decor is an efficient and effective way to communicate your medication request directly to the office and  downloadable as an chintan on your smart phone . [employer] also features a review functionality that allows you to view your medication list as well as leave messages for your physician. Are you ready to get connected? If so please review the attatched instructions or speak to any of our staff to get you set up right away! Thank you so much for your cooperation. Should you have any questions please contact our Practice Administrator. The Physicians and Staff,  [facility] Please be advised there is a $25 fee for all paperwork to be completed from our  providers. This is to be paid by the patient prior to picking up the completed forms. A Healthy Lifestyle: Care Instructions Your Care Instructions A healthy lifestyle can help you feel good, stay at a healthy weight, and have plenty of energy for both work and play. A healthy lifestyle is something you can share with your whole family. A healthy lifestyle also can lower your risk for serious health problems, such as high blood pressure, heart disease, and diabetes. You can follow a few steps listed below to improve your health and the health of your family. Follow-up care is a key part of your treatment and safety. Be sure to make and go to all appointments, and call your doctor if you are having problems. It's also a good idea to know your test results and keep a list of the medicines you take. How can you care for yourself at home? · Do not eat too much sugar, fat, or fast foods. You can still have dessert and treats now and then. The goal is moderation. · Start small to improve your eating habits. Pay attention to portion sizes, drink less juice and soda pop, and eat more fruits and vegetables. ¨ Eat a healthy amount of food. A 3-ounce serving of meat, for example, is about the size of a deck of cards. Fill the rest of your plate with vegetables and whole grains. ¨ Limit the amount of soda and sports drinks you have every day. Drink more water when you are thirsty. ¨ Eat at least 5 servings of fruits and vegetables every day. It may seem like a lot, but it is not hard to reach this goal. A serving or helping is 1 piece of fruit, 1 cup of vegetables, or 2 cups of leafy, raw vegetables. Have an apple or some carrot sticks as an afternoon snack instead of a candy bar. Try to have fruits and/or vegetables at every meal. 
· Make exercise part of your daily routine. You may want to start with simple activities, such as walking, bicycling, or slow swimming. Try to be active 30 to 60 minutes every day. You do not need to do all 30 to 60 minutes all at once. For example, you can exercise 3 times a day for 10 or 20 minutes. Moderate exercise is safe for most people, but it is always a good idea to talk to your doctor before starting an exercise program. 
· Keep moving. Caio Handsome the lawn, work in the garden, or [place]. Take the stairs instead of the elevator at work. · If you smoke, quit. People who smoke have an increased risk for heart attack, stroke, cancer, and other lung illnesses. Quitting is hard, but there are ways to boost your chance of quitting tobacco for good. ¨ Use nicotine gum, patches, or lozenges. ¨ Ask your doctor about stop-smoking programs and medicines. ¨ Keep trying.  
In addition to reducing your risk of diseases in the future, you will notice some benefits soon after you stop using tobacco. If you have shortness of breath or asthma symptoms, they will likely get better within a few weeks after you quit. · Limit how much alcohol you drink. Moderate amounts of alcohol (up to 2 drinks a day for men, 1 drink a day for women) are okay. But drinking too much can lead to liver problems, high blood pressure, and other health problems. Family health If you have a family, there are many things you can do together to improve your health. · Eat meals together as a family as often as possible. · Eat healthy foods. This includes fruits, vegetables, lean meats and dairy, and whole grains. · Include your family in your fitness plan. Most people think of activities such as jogging or tennis as the way to fitness, but there are many ways you and your family can be more active. Anything that makes you breathe hard and gets your heart pumping is exercise. Here are some tips: 
¨ Walk to do errands or to take your child to school or the bus. ¨ Go for a family bike ride after dinner instead of watching TV. Where can you learn more? Go to http://diamondO-CODESdonna.info/. Enter M506 in the search box to learn more about \"A Healthy Lifestyle: Care Instructions. \" Current as of: May 12, 2017 Content Version: 11.4 © 1611-0060 RealMatch. Care instructions adapted under license by [employer]Gener[employer] (which disclaims liability or warranty for this information). If you have questions about a medical condition or this instruction, always ask your healthcare professional. [address] any warranty or liability for your use of this information. Introducing [facility] HE[facility]! Marco Oliva introduces TargetingMantra patient portal. Now you can access parts of your medical record, email your doctor's office, and request medication refills online.    
 
1. In your internet browser, go to [email or web address] ([email or web address] by [employer] Software)/[email or web address] 2. Click on the First Time User? Click Here link in the Sign In box. You will see the New Member Sign Up page. 3. Enter your [employer] [facility] [employer] [facility] Access Code exactly as it appears below. You will not need to use this code after youve completed the sign-up process. If you do not sign up before the expiration date, you must request a new code. · [employer] [facility] [employer] Access Code: [email or web address] Expires: 7/23/2018 12:51 PM 
 
4. Enter the last four digits of your Social Security Number (xxxx) and Date of Birth (mm/dd/yyyy) as indicated and click Submit. You will be taken to the next sign-up page. 5. Create a [place] ID. This will be your [employer] [facility] [employer] login ID and cannot be changed, so think of one that is secure and easy to remember. 6. Create a [employer] [facility] [employer] password. You can change your password at any time. 7. Enter your Password Reset Question and Answer. This can be used at a later time if you forget your password. 8. Enter your e-mail address. You will receive e-mail notification when new information is available in [address] 9. Click Sign Up. You can now view and download portions of your medical record. 10. Click the Download Summary menu link to download a portable copy of your medical information. If you have questions, please visit the Frequently Asked Questions section of the [employer] website. Remember, [employer] is NOT to be used for urgent needs. For medical emergencies, dial 911. Now available from your iPhone and Android! Please provide this summary of care documentation to your next provider. Your primary care clinician is listed as Jim Dior. If you have any questions after today's visit, please call [phone].

## 2018-04-24 NOTE — PROGRESS NOTES
Name: Chester Raul    Chief Complaint: alzheimer's     Was hospitalized for six days in December. It was suspected that he may have a flu. He became lethargic but no etiology was determined. He was discharged in a delirious state which resolved shortly after going home. He received home PT for six weeks . He ahs     Assesment and Plan  1. Alzheimer's dementia  Start aricept and namenda  Follow up  In three month. 2. Hyperlipidemia  Continue pravastatin    3. Hypertension  Continue amlodipine    Allergies  Aricept [donepezil]; Betadine [povidone-iodine]; Phenergan [promethazine]; Seafood [shellfish containing products]; and Xylocaine [lidocaine hcl]     Medications  Current Outpatient Prescriptions   Medication Sig    amLODIPine (NORVASC) 5 mg tablet take 1 tablet by mouth once daily    FOLBEE 2.5-25-1 mg tablet take 1 tablet by mouth once daily    etodolac (LODINE) 400 mg tablet Take 400 mg by mouth daily as needed.  ondansetron hcl (ZOFRAN, AS HYDROCHLORIDE,) 4 mg tablet Take 1 Tab by mouth every eight (8) hours as needed for Nausea.  acetaminophen (TYLENOL ARTHRITIS PAIN) 650 mg TbER Take 650 mg by mouth every eight (8) hours.  isosorbide mononitrate ER (IMDUR) 30 mg tablet take 1 tablet by mouth once daily    vit B12-levomefolate calcium-vit B6 (FOLTX) 2-1.13-25 mg tablet Take 1 Tab by mouth daily. Indications: Vitamin Deficiency    pravastatin (PRAVACHOL) 20 mg tablet take 1 tablet by mouth at bedtime    metoprolol tartrate (LOPRESSOR) 25 mg tablet take 1/2 tablet twice a day    meclizine (ANTIVERT) 25 mg tablet Take 1 Tab by mouth three (3) times daily as needed for Dizziness.  GUAIFENESIN (MUCINEX PO) Take 1 Tab by mouth daily.  docusate sodium (COLACE) 100 mg capsule Take 100 mg by mouth daily.  Instructed to take daily with plenty of liquid unless otherwise directed- as per 11/7/14 [facility] discharge med list    cholecalciferol, vitamin D3, (VITAMIN D3) 2,000 unit tab Take 1 Tab by mouth daily. As per 11/07/14 [facility] discharge med list    aspirin (ASPIRIN) 325 mg tablet Take 1 tablet by mouth daily.  tamsulosin (FLOMAX) 0.4 mg capsule Take 0.4 mg by mouth daily. For prostate     donepezil (ARICEPT) 5 mg tablet take 1 tablet by mouth at bedtime    donepezil (ARICEPT) 10 mg tablet take 1 tablet by mouth at bedtime     No current facility-administered medications for this visit. Medical History  Past Medical History:   Diagnosis Date    Back pain     due to DDD and scoliosis    BPH (benign prostatic hyperplasia)     Dr. Molly Cramer CAD (coronary artery disease) 10/30/13    30%LAD, 80% ostal stenosis. Dr. Francis Carrington.  Chest pain 10/04/04, 10/30/13    Dr. Pascual Hensley. Negative Stress Cardiolite Test.  Dr. Julee Izquierdo.  Chronic venous insufficiency 06/15/09    Dr. Miguel A Thomas due to fall    DDD (degenerative disc disease), lumbar     L3-S1    Diverticulosis 02/2004    sigmoid. Dr. Love Cristina DJD (degenerative joint disease) of cervical spine     C 4-5 ;5-6    Dyslipidemia     Fracture 1980    facial/nasal    Hearing loss     wears hearing aid    Heartburn     Hemorrhoids, internal 1987    Dr. Munoz Gut of unspecified site of abdominal cavity without mention of obstruction or gangrene     inguinal  Lt    Hypertension     Impotence     Left acoustic neuroma ([address] 10/28/14    17 x 9 mm schwannoma.  Pes planus of both feet     PVD (peripheral vascular disease) ([address] 2009    Scoliosis     LS    Slipped intervertebral disc 1954    chiropractor    SOB (shortness of breath) 08/07/13    Dr. George Leon.  Stroke ([facility] 10/28/2014    small Left Thalamus. Dr. Jamal Lora. Dr. Angelina Wilson.  Urinary incontinence     due to BPH. Dr. Jeannie Jordan    Varicose vein 06/15/09    Dr. Sheyla Gordon Vertigo 2003    due to inner ear. Dr. Yamilka Michael. Dr. Manjit Alonso.  Dr. Elieser Donato Visual loss     Dr. Jeramine Chavez [place]. Review of Systems   Constitutional: Negative for chills and fever. HENT: Negative for ear pain. Eyes: Negative for pain and discharge. Respiratory: Negative for cough and hemoptysis. Cardiovascular: Negative for chest pain and claudication. Gastrointestinal: Negative for constipation and diarrhea. Genitourinary: Negative for flank pain and hematuria. Musculoskeletal: Negative for back pain and myalgias. Skin: Negative for itching and rash. Neurological: Negative for headaches. Endo/Heme/Allergies: Negative for environmental allergies. Does not bruise/bleed easily. Psychiatric/Behavioral: Positive for memory loss. Negative for depression and hallucinations. Exam:    Visit Vitals    /60    Pulse (!) 54    Ht 5' 6\" (1.676 m)    Wt 152 lb (68.9 kg)    SpO2 96%    BMI 24.53 kg/m2      General: Well developed, well nourished. Patient in no apparent distress   Head: Normocephalic, atraumatic, anicteric sclera   Neck Normal ROM, No thyromegally   Lungs:  Clear to auscultation bilaterally, No wheezes or rubs   Cardiac: Regular rate and rhythm with no murmurs. Abd: Bowel sounds were audible. No tenderness on palpation   Ext: No pedal edema   Skin: Supple no rash     NeurologicExam:  Mental Status: Alert and oriented to person place    Speech: Fluent no aphasia or dysarthria. Cranial Nerves:  II - XII Intact   Motor:  Full and symmetric strength of upper and lower proximal and distal muscles. Normal bulk and tone. Reflexes:   Deep tendon reflexes 2+/4 and symmetric. Sensory:   Symmetric and intact with no perceived deficits modalities involving small or large fibers. Gait:  Gait is balanced and fluid with normal arm swing. Tremor:   Essential tremor of the right hand   Cerebellar:  Coordination intact. Neurovascular: No carotid bruits. No JVD         Max Score Patient Score Question   5 2  What is the year? Season? Date? Day? Month?    5 2  Where are we now? State? County? Town/city? Hospital? Floor? 3 3  Repeat names of three objects after 3 seconds   5 5  Ask patient for serial 7's or spell \"world\" backwards   3 1  Repeat same three objects after three minutes   2 2  Ask patient to name two common objects   1 1  Ask patient to say \"No ifs and or buts\"   3 3  Ask patient to do a 3 step command   1 1  Ask patient to write a sentence   1 1  Copy intersecting polygons   1 1  Read and do what is on the paper \"Close your eyes   21/30    Imaging    CT Results (most recent):    Results from Hospital Encounter encounter on 12/16/17   CT CHEST WO CONT   Narrative INDICATION: Wet cough. Altered mental status. Suspected pneumonia. COMPARISON: Chest radiograph from December 16, 2017    CONTRAST: None. TECHNIQUE:  5 mm axial images were obtained through the chest. Coronal and  sagittal reconstructions were generated. CT dose reduction was achieved through  use of a standardized protocol tailored for this examination and automatic  exposure control for dose modulation. The absence of intravenous contrast reduces the sensitivity for evaluation of  the mediastinum and upper abdominal organs. FINDINGS:    THYROID: No nodule. MEDIASTINUM: No mass or lymphadenopathy. MIKE: No mass or lymphadenopathy. THORACIC AORTA: No aneurysm. MAIN PULMONARY ARTERY: Normal in caliber. TRACHEA/BRONCHI: Patent. ESOPHAGUS: No wall thickening or dilatation. HEART: Normal in size. Moderate coronary artery calcifications. PLEURA: No effusion or pneumothorax. LUNGS: Bilateral dependent atelectasis. No evidence of pneumonia. INCIDENTALLY IMAGED UPPER ABDOMEN: No focal abnormality. BONES: No destructive bone lesion. Impression IMPRESSION:  Bilateral dependent atelectasis. No evidence of pneumonia or pleural effusion.           MRI Results (most recent):    Results from [place] encounter on 10/23/17   MRI BRAIN WO CONT   Narrative EXAM:  MRI BRAIN WO CONT      HISTORY: Dementia  INDICATION:  Dementia    COMPARISON: 10/29/2014. CONTRAST:  None. TECHNIQUE: Sagittal T1, axial FLAIR, T2,T1 and gradient echo images as well as  coronal T2 weighted images and axial diffusion weighted images of the head were  obtained. FINDINGS:     Stable left acoustic schwannoma. Sulcal and ventricular prominence. Confluent periventricular scattered foci of  increased T2 signal intensity in the corona radiata and centrum semiovale. Temporal pole prominence is increased. There is no evidence of hemorrhage, acute  infarct or abnormal extra-axial fluid collection. Normal appearing flow-voids  are present in the vertebral, basilar and carotid artery systems. The  craniocervical junction is normal. .         Impression IMPRESSION:   Moderate to severe chronic microvascular ischemic change with severe temporal  predominant cerebral atrophy. No intracranial hemorrhage or evidence of acute infarction. Stable left acoustic schwannoma.         .  Lab Review    Lab Results   Component Value Date/Time    WBC 7.1 01/25/2018 02:54 PM    HCT 34.5 (L) 01/25/2018 02:54 PM    HGB 11.7 (L) 01/25/2018 02:54 PM    PLATELET 089 10/89/8963 02:54 PM       Lab Results   Component Value Date/Time    Sodium 137 01/25/2018 02:54 PM    Potassium 4.4 01/25/2018 02:54 PM    Chloride 102 01/25/2018 02:54 PM    CO2 31 01/25/2018 02:54 PM    Glucose 110 (H) 01/25/2018 02:54 PM    BUN 15 01/25/2018 02:54 PM    Creatinine 0.91 01/25/2018 02:54 PM    Calcium 9.0 01/25/2018 02:54 PM           Lab Results   Component Value Date/Time    Hemoglobin A1c 5.4 10/11/2017 10:01 AM        Lab Results   Component Value Date/Time    Vitamin B12 755 12/17/2017 03:54 AM    Folate 25.3 (H) 12/17/2017 03:54 AM         Lab Results   Component Value Date/Time    Cholesterol, total 166 05/23/2017 11:08 AM    HDL Cholesterol 53 05/23/2017 11:08 AM    LDL, calculated 97 05/23/2017 11:08 AM    VLDL, calculated 16 05/23/2017 11:08 AM    Triglyceride 78 05/23/2017 11:08 AM    CHOL/HDL Ratio 2.2 10/29/2014 03:45 AM

## 2018-05-24 ENCOUNTER — OFFICE VISIT (OUTPATIENT)
Dept: FAMILY MEDICINE CLINIC | Age: 83
End: 2018-05-24

## 2018-05-24 VITALS
OXYGEN SATURATION: 97 % | RESPIRATION RATE: 16 BRPM | SYSTOLIC BLOOD PRESSURE: 108 MMHG | HEART RATE: 64 BPM | TEMPERATURE: 98.4 F | BODY MASS INDEX: 24.7 KG/M2 | WEIGHT: 153.7 LBS | DIASTOLIC BLOOD PRESSURE: 68 MMHG | HEIGHT: 66 IN

## 2018-05-24 DIAGNOSIS — E55.9 VITAMIN D DEFICIENCY: ICD-10-CM

## 2018-05-24 DIAGNOSIS — R79.89 ELEVATED BRAIN NATRIURETIC PEPTIDE (BNP) LEVEL: ICD-10-CM

## 2018-05-24 DIAGNOSIS — L90.9 ATROPHIC SPOTS OF SKIN: ICD-10-CM

## 2018-05-24 DIAGNOSIS — R41.3 MEMORY DISTURBANCE: ICD-10-CM

## 2018-05-24 DIAGNOSIS — E78.5 DYSLIPIDEMIA: ICD-10-CM

## 2018-05-24 DIAGNOSIS — Z86.73 HISTORY OF STROKE: ICD-10-CM

## 2018-05-24 DIAGNOSIS — I10 ESSENTIAL HYPERTENSION: Primary | ICD-10-CM

## 2018-05-24 DIAGNOSIS — I25.10 CORONARY ARTERY DISEASE INVOLVING NATIVE CORONARY ARTERY OF NATIVE HEART WITHOUT ANGINA PECTORIS: ICD-10-CM

## 2018-05-24 DIAGNOSIS — I73.9 PVD (PERIPHERAL VASCULAR DISEASE) (HCC): ICD-10-CM

## 2018-05-24 DIAGNOSIS — N39.498 OTHER URINARY INCONTINENCE: ICD-10-CM

## 2018-05-24 DIAGNOSIS — L82.1 SEBORRHEIC KERATOSES: ICD-10-CM

## 2018-05-24 DIAGNOSIS — D64.89 ANEMIA DUE TO OTHER CAUSE, NOT CLASSIFIED: ICD-10-CM

## 2018-05-24 DIAGNOSIS — H90.3 SENSORINEURAL HEARING LOSS (SNHL) OF BOTH EARS: ICD-10-CM

## 2018-05-24 DIAGNOSIS — D50.8 OTHER IRON DEFICIENCY ANEMIA: ICD-10-CM

## 2018-05-24 DIAGNOSIS — R00.1 BRADYCARDIA WITH 51-60 BEATS PER MINUTE: ICD-10-CM

## 2018-05-24 DIAGNOSIS — N40.1 BENIGN PROSTATIC HYPERPLASIA WITH LOWER URINARY TRACT SYMPTOMS, SYMPTOM DETAILS UNSPECIFIED: ICD-10-CM

## 2018-05-24 DIAGNOSIS — Z02.89 ENCOUNTER FOR COMPLETION OF FORM WITH PATIENT: ICD-10-CM

## 2018-05-24 RX ORDER — METOPROLOL TARTRATE 25 MG/1
TABLET, FILM COATED ORAL
Qty: 30 TAB | Refills: 11 | Status: SHIPPED | OUTPATIENT
Start: 2018-05-24 | End: 2019-01-01 | Stop reason: SDUPTHER

## 2018-05-24 RX ORDER — AMLODIPINE BESYLATE 2.5 MG/1
2.5 TABLET ORAL DAILY
Qty: 30 TAB | Refills: 11 | Status: SHIPPED | OUTPATIENT
Start: 2018-05-24 | End: 2018-12-31 | Stop reason: ALTCHOICE

## 2018-05-24 NOTE — PROGRESS NOTES
Musa Abdi is a 80 y.o. male    Chief Complaint   Patient presents with    Hypertension    Physical     has papers needing filled out    Labs     pt is fasting     1. Have you been to the ER, urgent care clinic since your last visit? Hospitalized since your last visit? no    2. Have you seen or consulted any other health care providers outside of the [address] [place] [address] since your last visit? Include any pap smears or colon screening.   no    Visit Vitals    /68 (BP 1 Location: Right arm, BP Patient Position: Sitting)    Pulse 64    Temp 98.4 °F (36.9 °C) (Oral)    Resp 16    Ht 5' 6\" (1.676 m)    Wt 153 lb 11.2 oz (69.7 kg)    SpO2 97%    BMI 24.81 kg/m2

## 2018-05-24 NOTE — PROGRESS NOTES
HISTORY OF PRESENT ILLNESS  Ruben Mijares is a 80 y.o. male presents with Hypertension; Labs (pt is fasting); Referral Follow Up; [facility]); and Skin Problem    Agree with nurse note. His wife is present and helps provide the hx. Hypertensive pt with dyslipidemia, vit d deficiency, anemia, hx of elevated BNP, bradycardia, CAD, hx of stroke, and PVD presents to the office with a BP of 108/68. This morning before he took his BP meds, his BP was 121/78. His BP log shows a range of 90/58 up to 130/79. He takes Norvasc 5 mg daily, Imdur 30 mg daily, and Metoprolol 25 mg 1/2 tab once daily (reduced from 1/2 tab BID), tolerating well. They are requesting a new Rx for Metoprolol 25 mg be changed to a 1/2 one daily so they do not have a surplus of medication. Patient denies vision changes, headaches, dizziness, chest pain, SOB, or swelling. Weight is stable at 153 lbs. He enjoys cereal for breakfast. Lunch is a sandwich. Gill Arrington is chicken with veggies, pancakes, or whatever his wife makes him. He drinks 4-5 glasses of water daily. He will drink hot cocoa 3x weekly during the colder weather or milk occasionally. He and his wife walk almost every day. He has dry skin on his arms that he is concerned about the spots on his skin. His wife mentions he does not like putting on lotion and that the dry skin improves whenever he will apply lotion. He does not see a dermatologist.    He saw neurologist, Dr. Cynthia Maldonado on 04/24/18 for follow up to memory. Dx'd late onset Alzheimer's dementia. He started the pt on Aricept and Namenda. F/U 3 monhts. Pt with BPH and hx of urinary incontinence. He sees urologist, Dr. Pool Marques annually and Dr. Traci Lomax is monitoring his PSA. They are requesting a form be completed for the department of  affairs examination for housebound status or permanent need for aid and attendance paperwork be completed.      Written by lakesha Oakes, as dictated by Dr. Xiao Farfan DO.    MILDRED    Review of Systems negative except as noted above in HPI. ALLERGIES:    Allergies   Allergen Reactions    Aricept [Donepezil] Nausea and Vomiting     Mild depression    Betadine [Povidone-Iodine] Hives    Phenergan [Promethazine] Other (comments)     hallucinations    Seafood [Shellfish Containing Products] Rash and Swelling     crabmeat    Xylocaine [Lidocaine Hcl] Shortness of Breath       CURRENT MEDICATIONS:    Outpatient Prescriptions Marked as Taking for the 5/24/18 encounter (Office Visit) with Mio De La Cruz DO   Medication Sig Dispense Refill    metoprolol tartrate (LOPRESSOR) 25 mg tablet take 1/2 tablet once a day  Indications: hypertension 30 Tab 11    amLODIPine (NORVASC) 2.5 mg tablet Take 1 Tab by mouth daily. Indications: hypertension 30 Tab 11    FOLBEE 2.5-25-1 mg tablet take 1 tablet by mouth once daily 60 Tab 0    etodolac (LODINE) 400 mg tablet Take 400 mg by mouth daily as needed. 30 Tab 2    acetaminophen (TYLENOL ARTHRITIS PAIN) 650 mg TbER Take 650 mg by mouth every eight (8) hours.  isosorbide mononitrate ER (IMDUR) 30 mg tablet take 1 tablet by mouth once daily 30 Tab 11    pravastatin (PRAVACHOL) 20 mg tablet take 1 tablet by mouth at bedtime 90 Tab 3    GUAIFENESIN (MUCINEX PO) Take 1 Tab by mouth daily.  docusate sodium (COLACE) 100 mg capsule Take 100 mg by mouth daily. Instructed to take daily with plenty of liquid unless otherwise directed- as per 11/7/14 [facility] discharge med list      cholecalciferol, vitamin D3, (VITAMIN D3) 2,000 unit tab Take 1 Tab by mouth daily. As per 11/07/14 [facility] discharge med list      aspirin (ASPIRIN) 325 mg tablet Take 1 tablet by mouth daily. 30 tablet 3    tamsulosin (FLOMAX) 0.4 mg capsule Take 0.4 mg by mouth daily.  For prostate          PAST MEDICAL HISTORY:    Past Medical History:   Diagnosis Date    Back pain     due to DDD and scoliosis    BPH (benign prostatic hyperplasia)     Dr. Laurie Lee CAD (coronary artery disease) 10/30/13    30%LAD, 80% ostal stenosis. Dr. Richy Painting.  Chest pain 10/04/04, 10/30/13    Dr. Alena Deleon. Negative Stress Cardiolite Test.  Dr. Vero Mckeon.  Chronic venous insufficiency 06/15/09    Dr. Aislinn Kincaid due to fall    DDD (degenerative disc disease), lumbar     L3-S1    Diverticulosis 02/2004    sigmoid. Dr. Sunni Flynn DJD (degenerative joint disease) of cervical spine     C 4-5 ;5-6    Dyslipidemia     Fracture 1980    facial/nasal    Hearing loss     wears hearing aid    Heartburn     Hemorrhoids, internal 1987    Dr. Erik Spence of unspecified site of abdominal cavity without mention of obstruction or gangrene     inguinal  Lt    Hypertension     Impotence     Left acoustic neuroma ([address] 10/28/14    17 x 9 mm schwannoma.  Pes planus of both feet     PVD (peripheral vascular disease) ([address] 2009    Scoliosis     LS    Slipped intervertebral disc 1954    chiropractor    SOB (shortness of breath) 08/07/13    Dr. Chikis Gomez.  Stroke ([address] 10/28/2014    small Left Thalamus. Dr. Nila Keene. Dr. Naeem Javed.  Urinary incontinence     due to BPH. Dr. Hull Minium    Varicose vein 06/15/09    Dr. Ambrose Goddard Vertigo 2003    due to inner ear. Dr. Francisco Madrid. Dr. Galilea Nielson. Dr. Demetra Mortimer Visual loss     Dr. Baltazar Krishnamurthy, [facility]. PAST SURGICAL HISTORY:    Past Surgical History:   Procedure Laterality Date    APPENDECTOMY      due to gangrene   [facility], 02/2002    Dr. Terrence Johnson. benign.  COLONOSCOPY  02/2004    Dr. Arlene Hines. due q 10 yrs    CYSTOSCOPY  10/17/03    Dr. Sangeeta Cook  10/30/13    Dr. Vero Mckeon. 80% ostal stenosis. 30% LAD.  HX HEMORRHOIDECTOMY      HX KNEE ARTHROSCOPY  1990 Left , 2007 Right    Dr. Addison Morris HX LAPAROTOMY  05/14/07    Dr. Lola Mckeon.   due to Bowel Obstruction    HX POLYPECTOMY  02/2002    Anal.     HX TONSILLECTOMY      IN COLSC FLX W/RMVL OF TUMOR POLYP LESION SNARE TQ  5/23/2011    Dr. Yon Gregorio.        FAMILY HISTORY:    Family History   Problem Relation Age of Onset    Heart Attack Mother     Other Mother      brain aneurysm/AAA/varicose veins    Stroke Mother     Heart Attack Sister     Cancer Maternal Grandmother      ovarian       SOCIAL HISTORY:    Social History     Social History    Marital status:      Spouse name: N/A    Number of children: N/A    Years of education: N/A     Social History Main Topics    Smoking status: Never Smoker    Smokeless tobacco: Never Used    Alcohol use No    Drug use: No    Sexual activity: Not Asked     Other Topics Concern    None     Social History Narrative       IMMUNIZATIONS:    Immunization History   Administered Date(s) Administered    Influenza High Dose Vaccine PF 10/29/2013, 09/08/2015, 08/12/2016    Influenza Vaccine 10/01/2014, 10/01/2017    Influenza Vaccine Split 10/15/2010, 10/20/2011, 10/08/2012    Influenza Vaccine Whole 10/01/2009    Pneumococcal Conjugate (PCV-13) 06/03/2015    Pneumococcal Polysaccharide (PPSV-23) 07/02/2016    TD Vaccine 04/27/2004    Tdap 07/02/2016    ZZZ-RETIRED (DO NOT USE) Pneumococcal Vaccine (Unspecified Type) 02/08/2002    Zoster Recombinant 04/13/2018    Zoster Vaccine, Live 09/08/2015         PHYSICAL EXAMINATION    Vital Signs    Visit Vitals    /68 (BP 1 Location: Right arm, BP Patient Position: Sitting)    Pulse 64    Temp 98.4 °F (36.9 °C) (Oral)    Resp 16    Ht 5' 6\" (1.676 m)    Wt 153 lb 11.2 oz (69.7 kg)    SpO2 97%    BMI 24.81 kg/m2       Weight Metrics 5/24/2018 4/24/2018 3/30/2018 1/25/2018 1/23/2018 1/13/2018 12/29/2017   Weight 153 lb 11.2 oz 152 lb 154 lb 9.6 oz 153 lb 154 lb 3.2 oz 147 lb 146 lb 8 oz   BMI 24.81 kg/m2 24.53 kg/m2 24.95 kg/m2 24.69 kg/m2 24.89 kg/m2 23.73 kg/m2 25.15 kg/m2       General appearance - Well nourished. Well appearing. Well developed. No acute distress. Head - Normocephalic. Atraumatic. Eyes - pupils equal and reactive. Extraocular eye movements intact. Sclera anicteric. Mildly injected sclera. Ears - Hearing is grossly abnormal bilaterally with hearing aids intact. Nose - normal and patent. No polyps noted. No erythema. No discharge. Mouth - mucous membranes with adequate moisture. Posterior pharynx normal with cobblestone appearance. No erythema, white exudate or obstruction. Neck - supple. Midline trachea. No carotid bruits noted bilaterally. No thyromegaly noted. Chest - clear to auscultation bilaterally anteriorly and posteriorly. No wheezes. No rales or rhonchi. Breath sounds are symmetrical bilaterally. Unlabored respirations. Heart - normal rate. Regular rhythm. Normal S1, S2. No murmur noted. No rubs, clicks or gallops noted. Abdomen - soft and nondistended. No masses or organomegaly. No rebound, rigidity or guarding. Bowel sounds normal x 4 quadrants. No tenderness noted. Neurological - awake, alert and oriented to person, place, and reason for visit. Unable to identify current year or president. Cranial nerves II through XII intact. Clear speech. Muscle strength is +5/5 x 4 extremities. Sensation is intact to light touch bilaterally. Steady slow somewhat shuffling gait. Heme/Lymph - peripheral pulses normal x 4 extremities. No peripheral edema is noted. Musculoskeletal - Intact x 4 extremities. Full ROM x 4 extremities. No pain with movement. Back exam - normal range of motion. No pain on palpation of the spinous processes in the cervical, thoracic, lumbar, sacral regions. No CVA tenderness. Skin - no rashes, erythema, ecchymosis, lacerations, abrasions noted. Multiple, erythematous, brownish, discolored, flat patches scattered across BL UE and face.   Psychological -   normal behavior, dress and thought processes. occ good insight. Good eye contact. Normal affect. Appropriate mood. Normal speech. DATA REVIEWED    Lab Results   Component Value Date/Time    WBC 7.1 01/25/2018 02:54 PM    Hemoglobin (POC) 12.9 09/15/2016 04:44 PM    HGB 11.7 (L) 01/25/2018 02:54 PM    Hematocrit (POC) 38 09/15/2016 04:44 PM    HCT 34.5 (L) 01/25/2018 02:54 PM    PLATELET 663 52/65/7864 02:54 PM    MCV 92.7 01/25/2018 02:54 PM     Lab Results   Component Value Date/Time    Sodium 137 01/25/2018 02:54 PM    Potassium 4.4 01/25/2018 02:54 PM    Chloride 102 01/25/2018 02:54 PM    CO2 31 01/25/2018 02:54 PM    Anion gap 4 (L) 01/25/2018 02:54 PM    Glucose 110 (H) 01/25/2018 02:54 PM    BUN 15 01/25/2018 02:54 PM    Creatinine 0.91 01/25/2018 02:54 PM    BUN/Creatinine ratio 16 01/25/2018 02:54 PM    GFR est AA >60 01/25/2018 02:54 PM    GFR est non-AA >60 01/25/2018 02:54 PM    Calcium 9.0 01/25/2018 02:54 PM    Bilirubin, total 0.3 12/29/2017 12:06 PM    AST (SGOT) 39 12/29/2017 12:06 PM    Alk.  phosphatase 95 12/29/2017 12:06 PM    Protein, total 7.1 12/29/2017 12:06 PM    Albumin 3.9 12/29/2017 12:06 PM    Globulin 3.6 12/16/2017 03:51 PM    A-G Ratio 1.2 12/29/2017 12:06 PM    ALT (SGPT) 31 12/29/2017 12:06 PM     Lab Results   Component Value Date/Time    Cholesterol, total 166 05/23/2017 11:08 AM    HDL Cholesterol 53 05/23/2017 11:08 AM    LDL, calculated 97 05/23/2017 11:08 AM    VLDL, calculated 16 05/23/2017 11:08 AM    Triglyceride 78 05/23/2017 11:08 AM    CHOL/HDL Ratio 2.2 10/29/2014 03:45 AM     Lab Results   Component Value Date/Time    Vitamin D 25-Hydroxy 21.5 (L) 10/31/2014 06:00 AM    VITAMIN D, 25-HYDROXY 34.2 05/23/2017 11:08 AM       Lab Results   Component Value Date/Time    Hemoglobin A1c 5.4 10/11/2017 10:01 AM     Lab Results   Component Value Date/Time    TSH 3.950 10/11/2017 10:01 AM     Lab Results   Component Value Date/Time    Microalb/Creat ratio (ug/mg creat.) 37.6 (H) 05/23/2017 11:41 AM ASSESSMENT and PLAN      ICD-10-CM ICD-9-CM    1. Essential hypertension I10 401.9 LIPID PANEL      METABOLIC PANEL, COMPREHENSIVE      URINALYSIS W/ RFLX MICROSCOPIC      MICROALBUMIN, UR, RAND W/ MICROALB/CREAT RATIO      metoprolol tartrate (LOPRESSOR) 25 mg tablet      amLODIPine (NORVASC) 2.5 mg tablet   2. Dyslipidemia E78.5 272.4 LIPID PANEL      TSH 3RD GENERATION   3. Vitamin D deficiency E55.9 268.9 VITAMIN D, 25 HYDROXY   4. Other iron deficiency anemia D50.8 280.8 CBC W/O DIFF   5. PVD (peripheral vascular disease) ([facility]) I73.9 443.9    6. Benign prostatic hyperplasia with lower urinary tract symptoms, symptom details unspecified N40.1 600.01    7. Sensorineural hearing loss (SNHL) of both ears H90.3 389.18    8. Coronary artery disease involving native coronary artery of native heart without angina pectoris I25.10 414.01    9. Memory disturbance R41.3 780.93    10. Bradycardia with 51-60 beats per minute R00.1 427.89     resolved since reduced Metoprolol 25 mg to 1/2 pill daily instead of BID   11. History of stroke Z86.73 V12.54    12. Anemia due to other cause, not classified D64.89 285.8     improving   13. Elevated brain natriuretic peptide (BNP) level R79.89 790.99     resolved   14. Other urinary incontinence N39.498 788.39     stable   15. Seborrheic keratoses L82.1 702.19 REFERRAL TO DERMATOLOGY   16. Atrophic spots of skin L90.9 701.3 REFERRAL TO DERMATOLOGY   17. Encounter for completion of form with patient Z02.89 V68.89     department [facility]        Discussed the patient's BMI with him. The BMI follow up plan is as follows: Pt not eligible for BMI calculation due to normal BMI. Decrease carbohydrates (white foods, sweet foods, sweet drinks and alcohol), increase green leafy vegetables and protein (lean meats and beans) with each meal.  Avoid fried foods. Eat 3-5 small meals daily. Do not skip meals. Increase water intake.     Increase physical activity to 30 minutes daily for health benefit, as tolerated. Get 7-8 hours uninterrupted sleep nightly. Chart reviewed and updated. Continue current medications and care. Reduce Norvasc from 5 mg to 2.5 mg. Continue Imdur 30 mg daily and Metoprolol 25 mg 1/2 tab daily, new Rx for Metoprolol written. Prescriptions written and sent to pharmacy; medication side effects discussed. Norvasc 2.5 mg. Metoprolol 25 mg. Most recent tests reviewed. Reviewed BP log brought in by pt and wife. Recheck pertinent labs today while fasting. Recent office visit notes from Dr. Nida Castillo reviewed. Referrals given; patient urged to keep appointments with specialists. Derm. Counseled patient on health concerns:  BP and skin care  Immunizations noted. Offered empathy, support, legitimation, prayers, partnership to patient. Praised patient for progress. Dept of [facility] paperwork be completed. Copy scanned into chart. Original given to pt and his wife. Follow-up Disposition:  Return in about 3 months (around 8/24/2018) for bp, results, annual medicare wellness. Patient was offered a choice/choices in the treatment plan today. Patient expresses understanding of the plan and agrees with recommendations. Patient declines any additional handouts. Patient is satisfied with previous handouts received from our office    More than 50 mins spent face to face with patient and more than 50% of this time spent in counseling and coordinating care. Written by Bearl Barthel, scribe, as dictated by Dr. Harjeet Downing DO. Documentation True and Accepted by Rodo Cee.  Nik [place].

## 2018-05-24 NOTE — MR AVS SNAPSHOT
[address] [facility] 
 
 
 [address] 
[address] [facility] [address] 
[phone] Patient: Penelope Valerio MRN:  [id number] Visit Information Date & Time Provider Department Dept. Phone Encounter #  
 2018 10:00 AM DO Jose Clarkenaedarcy [phone] [id number] Follow-up Instructions Return in about 3 months (around 2018) for bp, results, annual medicare wellness. Your Appointments 2019 11:40 AM  
Follow Up with Christianne Bartlett MD  
[facility]) Appt Note: FU,Dementia,adt,2018  
 [facility], 
[address], [address] [facility] [address]  
[address] [facility], [address] [facility], [address] [facility] [address] Upcoming Health Maintenance Date Due  
 MEDICARE YEARLY EXAM 2018 GLAUCOMA SCREENING Q2Y 6/15/2018* Influenza Age 5 to Adult 2018 DTaP/Tdap/Td series (2 - Td) 2026 *Topic was postponed. The date shown is not the original due date. Allergies as of 2018  Review Complete On: 2018 By: Jay Crawford, LPN Severity Noted Reaction Type Reactions Aricept [Donepezil] High 2018    Nausea and Vomiting Mild depression Betadine [Povidone-iodine] High 2009    Hives Phenergan [Promethazine] High 2009    Other (comments)  
 hallucinations Seafood [Shellfish Containing Products] High 2009    Rash, Swelling  
 crabmeat Xylocaine [Lidocaine Hcl] High 2012    Shortness of Breath Current Immunizations  Reviewed on 2017 Name Date Influenza High Dose Vaccine PF 2016, 2015, 10/29/2013 Influenza Vaccine 10/1/2017, 10/1/2014 Influenza Vaccine Split 10/8/2012, 10/20/2011, 10/15/2010 Influenza Vaccine Whole 10/1/2009 Pneumococcal Conjugate (PCV-13) 6/3/2015 Pneumococcal Polysaccharide (PPSV-23) 7/2/2016 TD Vaccine 4/27/2004 Tdap 7/2/2016 ZZZ-RETIRED (DO NOT USE) Pneumococcal Vaccine (Unspecified Type) 2/8/2002 Zoster Recombinant 4/13/2018 12:00 AM  
 Zoster Vaccine, Live 9/8/2015 Not reviewed this visit You Were Diagnosed With   
  
 Codes Comments Essential hypertension    -  Primary ICD-10-CM: I10 
ICD-9-CM: 401.9 Dyslipidemia     ICD-10-CM: E78.5 ICD-9-CM: 272.4 Vitamin D deficiency     ICD-10-CM: E55.9 ICD-9-CM: 268.9 Other iron deficiency anemia     ICD-10-CM: D50.8 ICD-9-CM: 280.8 PVD (peripheral vascular disease) ([facility]     ICD-10-CM: I73.9 ICD-9-CM: 443.9 Benign prostatic hyperplasia with lower urinary tract symptoms, symptom details unspecified     ICD-10-CM: N40.1 ICD-9-CM: 600.01 Sensorineural hearing loss (SNHL) of both ears     ICD-10-CM: H90.3 ICD-9-CM: 389.18 Coronary artery disease involving native coronary artery of native heart without angina pectoris     ICD-10-CM: I25.10 ICD-9-CM: 414.01 Memory disturbance     ICD-10-CM: R41.3 ICD-9-CM: 780.93 Bradycardia with 51-60 beats per minute     ICD-10-CM: R00.1 ICD-9-CM: 427.89 resolved since reduced Metoprolol 25 mg to 1/2 pill daily instead of BID History of stroke     ICD-10-CM: Z86.73 
ICD-9-CM: V12.54 Anemia due to other cause, not classified     ICD-10-CM: D64.89 ICD-9-CM: 285.8 improving Elevated brain natriuretic peptide (BNP) level     ICD-10-CM: R79.89 ICD-9-CM: 790.99 resolved Other urinary incontinence     ICD-10-CM: P28.876 ICD-9-CM: 788.39 stable Seborrheic keratoses     ICD-10-CM: L82.1 ICD-9-CM: 702.19 Atrophic spots of skin     ICD-10-CM: L90.9 ICD-9-CM: 701.3 Encounter for completion of form with patient     ICD-10-CM: Z02.89 ICD-9-CM: V68.89 department of [facility] Vitals BP Pulse Temp Resp Height(growth percentile) Weight(growth percentile) 108/68 (BP 1 Location: Right arm, BP Patient Position: Sitting) 64 98.4 °F (36.9 °C) (Oral) 16 5' 6\" (1.676 m) 153 lb 11.2 oz (69.7 kg) SpO2 BMI Smoking Status 97% 24.81 kg/m2 Never Smoker Vitals History BMI and BSA Data Body Mass Index Body Surface Area  
 24.81 kg/m 2 1.8 m 2 Preferred Pharmacy Pharmacy Name Phone [id number] [facility], [address] [place] [address] Your Updated Medication List  
  
   
This list is accurate as of 5/24/18 11:52 AM.  Always use your most recent med list. amLODIPine 2.5 mg tablet Commonly known as:  Skip Newcomer Take 1 Tab by mouth daily. Indications: hypertension  
  
 aspirin 325 mg tablet Commonly known as:  ASPIRIN Take 1 tablet by mouth daily. docusate sodium 100 mg capsule Commonly known as:  Idella Razor Take 100 mg by mouth daily. Instructed to take daily with plenty of liquid unless otherwise directed- as per 11/7/14 [facility] discharge med list  
  
 etodolac 400 mg tablet Commonly known as:  LODINE Take 400 mg by mouth daily as needed. FLOMAX 0.4 mg capsule Generic drug:  tamsulosin Take 0.4 mg by mouth daily. For prostate FOLBEE 2.5-25-1 mg tablet Generic drug:  folic acid-vit L9-BBW Z24  
take 1 tablet by mouth once daily  
  
 isosorbide mononitrate ER 30 mg tablet Commonly known as:  IMDUR  
take 1 tablet by mouth once daily  
  
 meclizine 25 mg tablet Commonly known as:  ANTIVERT Take 1 Tab by mouth three (3) times daily as needed for Dizziness. metoprolol tartrate 25 mg tablet Commonly known as:  LOPRESSOR  
take 1/2 tablet once a day  Indications: hypertension MUCINEX PO Take 1 Tab by mouth daily. pravastatin 20 mg tablet Commonly known as:  PRAVACHOL  
take 1 tablet by mouth at bedtime TYLENOL ARTHRITIS PAIN 650 mg Kimberly Myron Generic drug:  acetaminophen Take 650 mg by mouth every eight (8) hours. VITAMIN D3 2,000 unit Tab Generic drug:  cholecalciferol (vitamin D3) Take 1 Tab by mouth daily. As per 11/07/14 [facility] discharge med list  
  
  
  
  
Prescriptions Sent to Pharmacy Refills  
 metoprolol tartrate (LOPRESSOR) 25 mg tablet 11 Sig: take 1/2 tablet once a day  Indications: hypertension Class: Normal  
 Pharmacy: [facility] Ph #: [phone]  
 amLODIPine (NORVASC) 2.5 mg tablet 11 Sig: Take 1 Tab by mouth daily. Indications: hypertension Class: Normal  
 Pharmacy: [facility] [address], [address] [facility] Ph #: [phone] Route: Oral  
  
We Performed the Following CBC W/O DIFF [96312 CPT(R)] LIPID PANEL [20321 CPT(R)] METABOLIC PANEL, COMPREHENSIVE [95311 CPT(R)] MICROALBUMIN, UR, RAND W/ MICROALB/CREAT RATIO L9198211 CPT(R)] REFERRAL TO DERMATOLOGY [REF19 Custom] Comments:  
 Annual exam, skin exposed lesions TSH 3RD GENERATION [87095 CPT(R)] URINALYSIS W/ RFLX MICROSCOPIC [15231 CPT(R)] VITAMIN D, 25 HYDROXY [id number] CPT(R)] Follow-up Instructions Return in about 3 months (around 8/24/2018) for bp, results, annual medicare wellness. Referral Information Referral ID Referred By Referred To  
  
 [id number] Roberto Hurt MD   
   [facility] [address] Phone: [phone] Fax: [phone] Visits Status Start Date End Date 1 New Request 5/24/18 5/24/19 If your referral has a status of pending review or denied, additional information will be sent to support the outcome of this decision. Introducing [facility] & HEALTH [facility]! [employer] introduces [employer] patient portal. Now you can access parts of your medical record, email your doctor's office, and request medication refills online. 1. In your internet browser, go to [email or web address] 2. Click on the First Time User? Click Here link in the Sign In box. You will see the New Member Sign Up page. 3. Enter your [employer] Access Code exactly as it appears below. You will not need to use this code after youve completed the sign-up process. If you do not sign up before the expiration date, you must request a new code. · [employer] Access Code: [email or web address] Expires: 7/23/2018 12:51 PM 
 
4. Enter the last four digits of your Social Security Number (xxxx) and Date of Birth (mm/dd/yyyy) as indicated and click Submit. You will be taken to the next sign-up page. 5. Create a [employer] ID. This will be your [employer] login ID and cannot be changed, so think of one that is secure and easy to remember. 6. Create a [employer] password. You can change your password at any time. 7. Enter your Password Reset Question and Answer. This can be used at a later time if you forget your password. 8. Enter your e-mail address. You will receive e-mail notification when new information is available in [address] 9. Click Sign Up. You can now view and download portions of your medical record. 10. Click the Download Summary menu link to download a portable copy of your medical information. If you have questions, please visit the Frequently Asked Questions section of the [employer] website. Remember, [employer] is NOT to be used for urgent needs. For medical emergencies, dial 911. Now available from your iPhone and Android! Please provide this summary of care documentation to your next provider. Your primary care clinician is listed as Rebecca Hung. If you have any questions after today's visit, please call [phone].

## 2018-05-25 LAB
25(OH)D3+25(OH)D2 SERPL-MCNC: 35 NG/ML (ref 30–100)
ALBUMIN SERPL-MCNC: 4.2 G/DL (ref 3.5–4.7)
ALBUMIN/CREAT UR: 35.6 MG/G CREAT (ref 0–30)
ALBUMIN/GLOB SERPL: 1.6 {RATIO} (ref 1.2–2.2)
ALP SERPL-CCNC: 75 IU/L (ref 39–117)
ALT SERPL-CCNC: 13 IU/L (ref 0–44)
APPEARANCE UR: CLEAR
AST SERPL-CCNC: 19 IU/L (ref 0–40)
BILIRUB SERPL-MCNC: 0.5 MG/DL (ref 0–1.2)
BILIRUB UR QL STRIP: NEGATIVE
BUN SERPL-MCNC: 24 MG/DL (ref 8–27)
BUN/CREAT SERPL: 21 (ref 10–24)
CALCIUM SERPL-MCNC: 9.4 MG/DL (ref 8.6–10.2)
CHLORIDE SERPL-SCNC: 100 MMOL/L (ref 96–106)
CHOLEST SERPL-MCNC: 137 MG/DL (ref 100–199)
CO2 SERPL-SCNC: 25 MMOL/L (ref 18–29)
COLOR UR: YELLOW
CREAT SERPL-MCNC: 1.16 MG/DL (ref 0.76–1.27)
CREAT UR-MCNC: 167.8 MG/DL
ERYTHROCYTE [DISTWIDTH] IN BLOOD BY AUTOMATED COUNT: 14.2 % (ref 12.3–15.4)
GFR SERPLBLD CREATININE-BSD FMLA CKD-EPI: 56 ML/MIN/1.73
GFR SERPLBLD CREATININE-BSD FMLA CKD-EPI: 64 ML/MIN/1.73
GLOBULIN SER CALC-MCNC: 2.6 G/DL (ref 1.5–4.5)
GLUCOSE SERPL-MCNC: 107 MG/DL (ref 65–99)
GLUCOSE UR QL: NEGATIVE
HCT VFR BLD AUTO: 38.9 % (ref 37.5–51)
HDLC SERPL-MCNC: 52 MG/DL
HGB BLD-MCNC: 13.1 G/DL (ref 13–17.7)
HGB UR QL STRIP: NEGATIVE
INTERPRETATION, 910389: NORMAL
INTERPRETATION: NORMAL
KETONES UR QL STRIP: NEGATIVE
LDLC SERPL CALC-MCNC: 68 MG/DL (ref 0–99)
LEUKOCYTE ESTERASE UR QL STRIP: NEGATIVE
MCH RBC QN AUTO: 32.7 PG (ref 26.6–33)
MCHC RBC AUTO-ENTMCNC: 33.7 G/DL (ref 31.5–35.7)
MCV RBC AUTO: 97 FL (ref 79–97)
MICRO URNS: NORMAL
MICROALBUMIN UR-MCNC: 59.7 UG/ML
NITRITE UR QL STRIP: NEGATIVE
PDF IMAGE, 910387: NORMAL
PH UR STRIP: 5 [PH] (ref 5–7.5)
PLATELET # BLD AUTO: 198 X10E3/UL (ref 150–379)
POTASSIUM SERPL-SCNC: 4.9 MMOL/L (ref 3.5–5.2)
PROT SERPL-MCNC: 6.8 G/DL (ref 6–8.5)
PROT UR QL STRIP: NORMAL
RBC # BLD AUTO: 4.01 X10E6/UL (ref 4.14–5.8)
SODIUM SERPL-SCNC: 140 MMOL/L (ref 134–144)
SP GR UR: 1.02 (ref 1–1.03)
TRIGL SERPL-MCNC: 85 MG/DL (ref 0–149)
TSH SERPL DL<=0.005 MIU/L-ACNC: 2.7 UIU/ML (ref 0.45–4.5)
UROBILINOGEN UR STRIP-MCNC: 0.2 MG/DL (ref 0.2–1)
VLDLC SERPL CALC-MCNC: 17 MG/DL (ref 5–40)
WBC # BLD AUTO: 6.5 X10E3/UL (ref 3.4–10.8)

## 2018-06-10 DIAGNOSIS — F02.80 ALZHEIMER'S DEMENTIA WITHOUT BEHAVIORAL DISTURBANCE, UNSPECIFIED TIMING OF DEMENTIA ONSET: ICD-10-CM

## 2018-06-10 DIAGNOSIS — R41.89 COGNITIVE AND BEHAVIORAL CHANGES: ICD-10-CM

## 2018-06-10 DIAGNOSIS — R46.89 COGNITIVE AND BEHAVIORAL CHANGES: ICD-10-CM

## 2018-06-10 DIAGNOSIS — G30.9 ALZHEIMER'S DEMENTIA WITHOUT BEHAVIORAL DISTURBANCE, UNSPECIFIED TIMING OF DEMENTIA ONSET: ICD-10-CM

## 2018-06-10 RX ORDER — CYANOCOBALAMIN/FOLIC AC/VIT B6 1-2.5-25MG
TABLET ORAL
Qty: 60 TAB | Refills: 11 | Status: SHIPPED | OUTPATIENT
Start: 2018-06-10 | End: 2019-07-12 | Stop reason: SDUPTHER

## 2018-07-28 DIAGNOSIS — E78.5 DYSLIPIDEMIA: ICD-10-CM

## 2018-07-31 NOTE — TELEPHONE ENCOUNTER
PCP: Harvinder Saavedra DO    Last appt: 5/24/2018  Future Appointments  Date Time Provider Santana Fernándezi   8/31/2018 10:00 AM Harvinder Saavedra DO [facility]   5/17/2019 11:40 AM Ranjeet Elliott MD [address]       Requested Prescriptions     Pending Prescriptions Disp Refills    pravastatin (PRAVACHOL) 20 mg tablet [Pharmacy Med Name: PRAVASTATIN SODIUM 20 MG TAB] 90 Tab 3     Sig: take 1 tablet by mouth once daily at bedtime       Prior labs and Blood pressures:  BP Readings from Last 3 Encounters:   05/24/18 108/68   04/24/18 110/60   03/30/18 96/62     Lab Results   Component Value Date/Time    Sodium 140 05/24/2018 12:04 PM    Potassium 4.9 05/24/2018 12:04 PM    Chloride 100 05/24/2018 12:04 PM    CO2 25 05/24/2018 12:04 PM    Anion gap 4 (L) 01/25/2018 02:54 PM    Glucose 107 (H) 05/24/2018 12:04 PM    BUN 24 05/24/2018 12:04 PM    Creatinine 1.16 05/24/2018 12:04 PM    BUN/Creatinine ratio 21 05/24/2018 12:04 PM    GFR est AA 64 05/24/2018 12:04 PM    GFR est non-AA 56 (L) 05/24/2018 12:04 PM    Calcium 9.4 05/24/2018 12:04 PM     Lab Results   Component Value Date/Time    Hemoglobin A1c 5.4 10/11/2017 10:01 AM     Lab Results   Component Value Date/Time    Cholesterol, total 137 05/24/2018 12:04 PM    HDL Cholesterol 52 05/24/2018 12:04 PM    LDL, calculated 68 05/24/2018 12:04 PM    VLDL, calculated 17 05/24/2018 12:04 PM    Triglyceride 85 05/24/2018 12:04 PM    CHOL/HDL Ratio 2.2 10/29/2014 03:45 AM     Lab Results   Component Value Date/Time    Vitamin D 25-Hydroxy 21.5 (L) 10/31/2014 06:00 AM    VITAMIN D, 25-HYDROXY 35.0 05/24/2018 12:04 PM       Lab Results   Component Value Date/Time    TSH 2.700 05/24/2018 12:04 PM

## 2018-08-01 NOTE — TELEPHONE ENCOUNTER
----- Message from Alban Pod sent at 8/1/2018 11:53 AM EDT -----  Regarding:  Matthew Aranda, Wife, is requesting a call regarding pt Pravastatin. Best contact [phone].

## 2018-08-03 ENCOUNTER — PATIENT OUTREACH (OUTPATIENT)
Dept: FAMILY MEDICINE CLINIC | Age: 83
End: 2018-08-03

## 2018-08-03 RX ORDER — PRAVASTATIN SODIUM 20 MG/1
TABLET ORAL
Qty: 90 TAB | Refills: 3 | Status: SHIPPED | OUTPATIENT
Start: 2018-08-03 | End: 2019-07-23 | Stop reason: SDUPTHER

## 2018-08-03 NOTE — PROGRESS NOTES
NN Note - Wife called NN. Requested assistance in getting Pravastatin refilled. - Reported contacted pharm on 7/29/18 for refill request. Pharm reported no response from PCP office.  
 
-Pt took last tablet 8/2/18. Does not have any more. Wife requested refill be sent as soon as possible. - Per EMR review noted refill pending approval. Wife advised. Chart routed to PCP & nurse. - Wife verbalized appreciation for NN assistance.

## 2018-08-31 ENCOUNTER — TELEPHONE (OUTPATIENT)
Dept: FAMILY MEDICINE CLINIC | Age: 83
End: 2018-08-31

## 2018-08-31 ENCOUNTER — OFFICE VISIT (OUTPATIENT)
Dept: FAMILY MEDICINE CLINIC | Age: 83
End: 2018-08-31

## 2018-08-31 VITALS
DIASTOLIC BLOOD PRESSURE: 58 MMHG | TEMPERATURE: 97.4 F | RESPIRATION RATE: 16 BRPM | OXYGEN SATURATION: 98 % | SYSTOLIC BLOOD PRESSURE: 101 MMHG | WEIGHT: 151.9 LBS | BODY MASS INDEX: 25.93 KG/M2 | HEART RATE: 57 BPM | HEIGHT: 64 IN

## 2018-08-31 DIAGNOSIS — R73.9 HYPERGLYCEMIA: ICD-10-CM

## 2018-08-31 DIAGNOSIS — Z86.73 HISTORY OF STROKE: ICD-10-CM

## 2018-08-31 DIAGNOSIS — N40.1 BENIGN PROSTATIC HYPERPLASIA WITH LOWER URINARY TRACT SYMPTOMS, SYMPTOM DETAILS UNSPECIFIED: ICD-10-CM

## 2018-08-31 DIAGNOSIS — E78.5 DYSLIPIDEMIA: ICD-10-CM

## 2018-08-31 DIAGNOSIS — H90.3 SENSORINEURAL HEARING LOSS (SNHL) OF BOTH EARS: ICD-10-CM

## 2018-08-31 DIAGNOSIS — D75.839 THROMBOCYTOSIS: ICD-10-CM

## 2018-08-31 DIAGNOSIS — R00.1 BRADYCARDIA WITH 51-60 BEATS PER MINUTE: ICD-10-CM

## 2018-08-31 DIAGNOSIS — I25.10 CORONARY ARTERY DISEASE INVOLVING NATIVE CORONARY ARTERY OF NATIVE HEART WITHOUT ANGINA PECTORIS: ICD-10-CM

## 2018-08-31 DIAGNOSIS — R41.3 MEMORY DISTURBANCE: ICD-10-CM

## 2018-08-31 DIAGNOSIS — I10 ESSENTIAL HYPERTENSION: Primary | ICD-10-CM

## 2018-08-31 RX ORDER — ASPIRIN 325 MG
325 TABLET ORAL DAILY
Qty: 90 TAB | Refills: 3
Start: 2018-08-31 | End: 2020-01-01 | Stop reason: ALTCHOICE

## 2018-08-31 NOTE — PATIENT INSTRUCTIONS
Learning About High Blood Pressure What is high blood pressure? Blood pressure is a measure of how hard the blood pushes against the walls of your arteries. It's normal for blood pressure to go up and down throughout the day, but if it stays up, you have high blood pressure. Another name for high blood pressure is hypertension. Two numbers tell you your blood pressure. The first number is the systolic pressure. It shows how hard the blood pushes when your heart is pumping. The second number is the diastolic pressure. It shows how hard the blood pushes between heartbeats, when your heart is relaxed and filling with blood. A blood pressure of less than 120/80 (say \"120 over 80\") is ideal for an adult. High blood pressure is 130/80 or higher. You have high blood pressure if your top number is 130 or higher or your bottom number is 80 or higher, or both. What happens when you have high blood pressure? · Blood flows through your arteries with too much force. Over time, this damages the walls of your arteries. But you can't feel it. High blood pressure usually doesn't cause symptoms. · Fat and calcium start to build up in your arteries. This buildup is called plaque. Plaque makes your arteries narrower and stiffer. Blood can't flow through them as easily. · This lack of good blood flow starts to damage some of the organs in your body. This can lead to problems such as coronary artery disease and heart attack, heart failure, stroke, kidney failure, and eye damage. How can you prevent high blood pressure? · Stay at a healthy weight. · Try to limit how much sodium you eat to less than 2,300 milligrams (mg) a day. If you limit your sodium to 1,500 mg a day, you can lower your blood pressure even more. ¨ Buy foods that are labeled \"unsalted,\" \"sodium-free,\" or \"low-sodium. \" Foods labeled \"reduced-sodium\" and \"light sodium\" may still have too much sodium. ¨ Flavor your food with garlic, lemon juice, onion, vinegar, herbs, and spices instead of salt. Do not use soy sauce, steak sauce, onion salt, garlic salt, mustard, or ketchup on your food. ¨ Use less salt (or none) when recipes call for it. You can often use half the salt a recipe calls for without losing flavor. · Be physically active. Get at least 30 minutes of exercise on most days of the week. Walking is a good choice. You also may want to do other activities, such as running, swimming, cycling, or playing tennis or team sports. · Limit alcohol to 2 drinks a day for men and 1 drink a day for women. · Eat plenty of fruits, vegetables, and low-fat dairy products. Eat less saturated and total fats. How is high blood pressure treated? · Your doctor will suggest making lifestyle changes. For example, your doctor may ask you to eat healthy foods, quit smoking, lose extra weight, and be more active. · If lifestyle changes don't help enough or your blood pressure is very high, you will have to take medicine every day. Follow-up care is a key part of your treatment and safety. Be sure to make and go to all appointments, and call your doctor if you are having problems. It's also a good idea to know your test results and keep a list of the medicines you take. Where can you learn more? Go to http://diamond-donna.info/. Enter P501 in the search box to learn more about \"Learning About High Blood Pressure. \" Current as of: May 10, 2017 Content Version: 11.7 © 8066-8637 [employer]. Care instructions adapted under license by [employer] (which disclaims liability or warranty for this information). If you have questions about a medical condition or this instruction, always ask your healthcare professional. [address] any warranty or liability for your use of this information. Low Sodium Diet (2,000 Milligram): Care Instructions Your Care Instructions Too much sodium causes your body to hold on to extra water. This can raise your blood pressure and force your heart and kidneys to work harder. In very serious cases, this could cause you to be put in the hospital. It might even be life-threatening. By limiting sodium, you will feel better and lower your risk of serious problems. The most common source of sodium is salt. People get most of the salt in their diet from canned, prepared, and packaged foods. Fast food and restaurant meals also are very high in sodium. Your doctor will probably limit your sodium to less than 2,000 milligrams (mg) a day. This limit counts all the sodium in prepared and packaged foods and any salt you add to your food. Follow-up care is a key part of your treatment and safety. Be sure to make and go to all appointments, and call your doctor if you are having problems. It's also a good idea to know your test results and keep a list of the medicines you take. How can you care for yourself at home? Read food labels · Read labels on cans and food packages. The labels tell you how much sodium is in each serving. Make sure that you look at the serving size. If you eat more than the serving size, you have eaten more sodium. · Food labels also tell you the Percent Daily Value for sodium. Choose products with low Percent Daily Values for sodium. · Be aware that sodium can come in forms other than salt, including monosodium glutamate (MSG), sodium citrate, and sodium bicarbonate (baking soda). MSG is often added to Asian food. When you eat out, you can sometimes ask for food without MSG or added salt. Buy low-sodium foods · Buy foods that are labeled \"unsalted\" (no salt added), \"sodium-free\" (less than 5 mg of sodium per serving), or \"low-sodium\" (less than 140 mg of sodium per serving). Foods labeled \"reduced-sodium\" and \"light sodium\" may still have too much sodium.  Be sure to read the label to see how much sodium you are getting. · Buy fresh vegetables, or frozen vegetables without added sauces. Buy low-sodium versions of canned vegetables, soups, and other canned goods. Prepare low-sodium meals · Cut back on the amount of salt you use in cooking. This will help you adjust to the taste. Do not add salt after cooking. One teaspoon of salt has about 2,300 mg of sodium. · Take the salt shaker off the table. · Flavor your food with garlic, lemon juice, onion, vinegar, herbs, and spices. Do not use soy sauce, lite soy sauce, steak sauce, onion salt, garlic salt, celery salt, mustard, or ketchup on your food. · Use low-sodium salad dressings, sauces, and ketchup. Or make your own salad dressings and sauces without adding salt. · Use less salt (or none) when recipes call for it. You can often use half the salt a recipe calls for without losing flavor. Other foods such as rice, pasta, and grains do not need added salt. · Rinse canned vegetables, and cook them in fresh water. This removes some-but not all-of the salt. · Avoid water that is naturally high in sodium or that has been treated with water softeners, which add sodium. Call your local water company to find out the sodium content of your water supply. If you buy bottled water, read the label and choose a sodium-free brand. Avoid high-sodium foods · Avoid eating: ¨ Smoked, cured, salted, and canned meat, fish, and poultry. ¨ Ham, talley, hot dogs, and luncheon meats. ¨ Regular, hard, and processed cheese and regular peanut butter. ¨ Crackers with salted tops, and other salted snack foods such as pretzels, chips, and salted popcorn. ¨ Frozen prepared meals, unless labeled low-sodium. ¨ Canned and dried soups, broths, and bouillon, unless labeled sodium-free or low-sodium. ¨ Canned vegetables, unless labeled sodium-free or low-sodium. ¨ [place] fries, pizza, tacos, and other fast foods. ¨ Pickles, olives, ketchup, and other condiments, especially soy sauce, unless labeled sodium-free or low-sodium. Where can you learn more? Go to http://diamond-donna.info/. Enter C907 in the search box to learn more about \"Low Sodium Diet (2,000 Milligram): Care Instructions. \" Current as of: May 12, 2017 Content Version: 11.7 © 1021-2215 [employer]. Care instructions adapted under license by [employer] (which disclaims liability or warranty for this information). If you have questions about a medical condition or this instruction, always ask your healthcare professional. [place] [address] any warranty or liability for your use of this information. Check BP twice weekly. Notify me if it consistently is above 140/90 or below 90/60. Bring your blood pressure log to your next office visit. Decrease salt, fats, caffeine, alcohol in your diet. Increase water, fiber. Exercise 5 times weekly for 30 minutes daily as tolerated. Continue current medications, care and subspecialty follow up. Visit eye doctor yearly to check your eyes blood pressure related changes. Low Sodium Diet (2,000 Milligram): Care Instructions Your Care Instructions Too much sodium causes your body to hold on to extra water. This can raise your blood pressure and force your heart and kidneys to work harder. In very serious cases, this could cause you to be put in the hospital. It might even be life-threatening. By limiting sodium, you will feel better and lower your risk of serious problems. The most common source of sodium is salt. People get most of the salt in their diet from canned, prepared, and packaged foods. Fast food and restaurant meals also are very high in sodium. Your doctor will probably limit your sodium to less than 2,000 milligrams (mg) a day. This limit counts all the sodium in prepared and packaged foods and any salt you add to your food. Follow-up care is a key part of your treatment and safety. Be sure to make and go to all appointments, and call your doctor if you are having problems. It's also a good idea to know your test results and keep a list of the medicines you take. How can you care for yourself at home? Read food labels · Read labels on cans and food packages. The labels tell you how much sodium is in each serving. Make sure that you look at the serving size. If you eat more than the serving size, you have eaten more sodium. · Food labels also tell you the Percent Daily Value for sodium. Choose products with low Percent Daily Values for sodium. · Be aware that sodium can come in forms other than salt, including monosodium glutamate (MSG), sodium citrate, and sodium bicarbonate (baking soda). MSG is often added to Asian food. When you eat out, you can sometimes ask for food without MSG or added salt. Buy low-sodium foods · Buy foods that are labeled \"unsalted\" (no salt added), \"sodium-free\" (less than 5 mg of sodium per serving), or \"low-sodium\" (less than 140 mg of sodium per serving). Foods labeled \"reduced-sodium\" and \"light sodium\" may still have too much sodium. Be sure to read the label to see how much sodium you are getting. · Buy fresh vegetables, or frozen vegetables without added sauces. Buy low-sodium versions of canned vegetables, soups, and other canned goods. Prepare low-sodium meals · Cut back on the amount of salt you use in cooking. This will help you adjust to the taste. Do not add salt after cooking. One teaspoon of salt has about 2,300 mg of sodium. · Take the salt shaker off the table. · Flavor your food with garlic, lemon juice, onion, vinegar, herbs, and spices. Do not use soy sauce, lite soy sauce, steak sauce, onion salt, garlic salt, celery salt, mustard, or ketchup on your food. · Use low-sodium salad dressings, sauces, and ketchup. Or make your own salad dressings and sauces without adding salt. · Use less salt (or none) when recipes call for it. You can often use half the salt a recipe calls for without losing flavor. Other foods such as rice, pasta, and grains do not need added salt. · Rinse canned vegetables, and cook them in fresh water. This removes some-but not all-of the salt. · Avoid water that is naturally high in sodium or that has been treated with water softeners, which add sodium. Call your local water company to find out the sodium content of your water supply. If you buy bottled water, read the label and choose a sodium-free brand. Avoid high-sodium foods · Avoid eating: ¨ Smoked, cured, salted, and canned meat, fish, and poultry. ¨ Ham, talley, hot dogs, and luncheon meats. ¨ Regular, hard, and processed cheese and regular peanut butter. ¨ Crackers with salted tops, and other salted snack foods such as pretzels, chips, and salted popcorn. ¨ Frozen prepared meals, unless labeled low-sodium. ¨ Canned and dried soups, broths, and bouillon, unless labeled sodium-free or low-sodium. ¨ Canned vegetables, unless labeled sodium-free or low-sodium. ¨ [place] fries, pizza, tacos, and other fast foods. ¨ Pickles, olives, ketchup, and other condiments, especially soy sauce, unless labeled sodium-free or low-sodium. Where can you learn more? Go to http://diamond-donna.info/. Enter H063 in the search box to learn more about \"Low Sodium Diet (2,000 Milligram): Care Instructions. \" Current as of: May 12, 2017 Content Version: 11.7 © 3516-5857 [employer]. Care instructions adapted under license by [employer] (which disclaims liability or warranty for this information). If you have questions about a medical condition or this instruction, always ask your healthcare professional. [address] any warranty or liability for your use of this information.

## 2018-08-31 NOTE — TELEPHONE ENCOUNTER
Yes.  The medication will be removed from the list at his next appt. The list today is a reflection of what he has been taking up to today.

## 2018-08-31 NOTE — TELEPHONE ENCOUNTER
Spoke with patient wife after obtaining 2 patient identifiers  Writer returned call to patient. She stated she was told by provider to stop giving patient his amlodipine due to his BP was running low. She stated that medication was still on his updated medication list. Stated she will not give it to him but wanted provider to know.

## 2018-08-31 NOTE — PROGRESS NOTES
Pt will take flu shot at their usual [facility]. Wero Huang  Identified pt with two pt identifiers(name and ). Chief Complaint Patient presents with  Labs Review of labs and blood 3 month follow up 1. Have you been to the ER, urgent care clinic since your last visit? Hospitalized since your last visit? NO 
 
2. Have you seen or consulted any other health care providers outside of the [address] [place] [address] since your last visit? Include any pap smears or colon screening. NO 
 
3. In the event something were to happen to you and you were unable to speak on your behalf, do you have an Advance Directive/ Living Will in place, stating your wishes? NO If yes, do we have a copy on file NO If no, would you like information Today's provider has been notified of reason for visit, vitals and flowsheets obtained on patients. Reviewed record In preparation for visit, huddled with provider and have obtained necessary documentation Health Maintenance Due Topic  GLAUCOMA SCREENING Q2Y   
 
 
[id number] Wt Readings from Last 3 Encounters:  
18 151 lb 14.4 oz (68.9 kg) 18 153 lb 11.2 oz (69.7 kg) 18 152 lb (68.9 kg) Temp Readings from Last 3 Encounters:  
18 97.4 °F (36.3 °C)  
18 98.4 °F (36.9 °C) (Oral) 18 97.4 °F (36.3 °C) (Oral) BP Readings from Last 3 Encounters:  
18 101/58  
18 108/68  
18 110/60 Pulse Readings from Last 3 Encounters:  
18 (!) 57  
18 64  
18 (!) 54 Vitals:  
 18 1026 BP: 101/58 Pulse: (!) 57 Resp: 16 Temp: 97.4 °F (36.3 °C) SpO2: 98% Weight: 151 lb 14.4 oz (68.9 kg) Height: 5' 3.75\" (1.619 m) Learning Assessment: 
:  
 
Learning Assessment 2018 2018 2018 10/11/2017 4/3/2015 PRIMARY LEARNER Patient Patient Patient Patient Patient HIGHEST LEVEL OF EDUCATION - PRIMARY LEARNER  > 4 YEARS OF COLLEGE - - - > 4 YEARS OF COLLEGE  
BARRIERS PRIMARY LEARNER NONE - - - NONE  
CO-LEARNER CAREGIVER No - - - - PRIMARY LANGUAGE ENGLISH ENGLISH ENGLISH ENGLISH ENGLISH  
LEARNER PREFERENCE PRIMARY READING DEMONSTRATION DEMONSTRATION DEMONSTRATION DEMONSTRATION  
ANSWERED BY patient and wife patient other patient patient RELATIONSHIP SELF SELF OTHER SELF SELF Depression Screening: 
:  
 
PHQ over the last two weeks 8/31/2018 Little interest or pleasure in doing things Not at all Feeling down, depressed, irritable, or hopeless Not at all Total Score PHQ 2 0 Fall Risk Assessment: 
:  
 
Fall Risk Assessment, last 12 mths 8/31/2018 Able to walk? Yes Fall in past 12 months? No  
 
 
Abuse Screening: 
:  
 
Abuse Screening Questionnaire 8/31/2018 Do you ever feel afraid of your partner? Marco Antonio Chapa Are you in a relationship with someone who physically or mentally threatens you? Marco Antonio Chapa Is it safe for you to go home? Y  
 
 
ADL Screening: 
:  
 
ADL Assessment 8/31/2018 Feeding yourself No Help Needed Getting from bed to chair No Help Needed Getting dressed No Help Needed Bathing or showering No Help Needed Walk across the room (includes cane/walker) No Help Needed Using the telphone No Help Needed Taking your medications No Help Needed Preparing meals No Help Needed Managing money (expenses/bills) No Help Needed Moderately strenuous housework (laundry) No Help Needed Shopping for personal items (toiletries/medicines) No Help Needed Shopping for groceries No Help Needed Driving No Help Needed Climbing a flight of stairs No Help Needed Getting to places beyond walking distances No Help Needed Medication reconciliation up to date and corrected with patient at this time.

## 2018-08-31 NOTE — PROGRESS NOTES
HISTORY OF PRESENT ILLNESS Oly Clay is a 80 y.o. male presents with Results (Review of labs and blood 3 month follow up); Blood Pressure Check; and Referral Follow Up Agree with nurse note. His wife is present and provides the majority of the history. Pleasantly demented pt with dyslipidemia, vit d deficiency, anemia presents to the office with a BP of 101/58. Pt's wife brought the home BP log where range is from 118/73 to 120/78 and 88/58. No associated symptoms. Heart rate ranged from 53 -78. Pt's wife reports that pt does not feel dizzy when sitting and he tries to stay hydrated by drinking water, but that's only under his wife's monitoring where he would not drink water for all day without supervision. As of now, pt is taking half pill of metoprolol 25 mg a day and a reduced 2.5 mg of Norvasc daily, and Imdur 30 mg as well as aspirin daily. Patient denies vision changes, headaches, dizziness, chest pain, SOB or swelling. Pt had labwork and wants to discuss results. Pt weights 151 lbs, down 2 lbs since last visit. Pt reports normal bowel movements and denies any abnormalities when urinating. Pt has had an eye exam in 2017 and will be having another eye exam in October. ROS Review of Systems negative except as noted above in HPI. ALLERGIES:   
Allergies Allergen Reactions  Aricept [Donepezil] Nausea and Vomiting Mild depression  Betadine [Povidone-Iodine] Hives  Phenergan [Promethazine] Other (comments)  
  hallucinations  Seafood [Shellfish Containing Products] Rash and Swelling  
  crabmeat  Xylocaine [Lidocaine Hcl] Shortness of Breath CURRENT MEDICATIONS:   
 
PAST MEDICAL HISTORY:   
Past Medical History:  
Diagnosis Date  Back pain   
 due to DDD and scoliosis  BPH (benign prostatic hyperplasia) Dr. Azalia Maher  CAD (coronary artery disease) 10/30/13  
 30%LAD, 80% ostal stenosis. Dr. Lennox Hernandez.  Chest pain 10/04/04, 10/30/13 Dr. Lennox Edman. Negative Stress Cardiolite Test.  Dr. Pavithra Contreras.  Chronic venous insufficiency 06/15/09 Dr. Sarah Johnston  Clavicle fracture 1981 Lt due to fall  DDD (degenerative disc disease), lumbar L3-S1  Diverticulosis 02/2004  
 sigmoid. Dr. Tatum Hightower  DJD (degenerative joint disease) of cervical spine C 4-5 ;5-6  Dyslipidemia  Fracture 1980  
 facial/nasal  
 Hearing loss   
 wears hearing aid  Heartburn  Hemorrhoids, internal 1987 Dr. Eileen Jackson  Hernia of unspecified site of abdominal cavity without mention of obstruction or gangrene   
 inguinal  Lt  Hypertension  Impotence  Left acoustic neuroma ([address] 10/28/14  
 17 x 9 mm schwannoma.  Pes planus of both feet  PVD (peripheral vascular disease) ([address] 2009  Scoliosis LS  
 Slipped intervertebral disc 1954 chiropractor  SOB (shortness of breath) 08/07/13 Dr. Francis Wilkerson.  Stroke [address] [address] 10/28/2014  
 small Left Thalamus. Dr. Adam Feliciano. Dr. Le Jaimes.  Urinary incontinence   
 due to BPH. Dr. Marya Shone  Varicose vein 06/15/09 Dr. Sarah Johnston  Vertigo 2003  
 due to inner ear. Dr. Phil Bauer. Dr. Ted Morton. Dr. Jose Nicole  Visual loss Dr. Lewanda Olszewski, [facility]. PAST SURGICAL HISTORY:   
Past Surgical History:  
Procedure Laterality Date  APPENDECTOMY    
 due to gangrene [facility], 02/2002 Dr. Machelle Adam. benign.  COLONOSCOPY  02/2004 Dr. Eileen Jackson. due q 10 yrs  CYSTOSCOPY  10/17/03 Dr. Alvaro Castellano  HX HEART CATHETERIZATION  10/30/13 Dr. Pavithra Contreras. 80% ostal stenosis. 30% LAD.  HX HEMORRHOIDECTOMY  HX KNEE ARTHROSCOPY  1990 Left , 2007 Right Dr. Kingston Thomas  HX LAPAROTOMY  05/14/07 Dr. Phoenix Walton. due to Bowel Obstruction  HX POLYPECTOMY  02/2002 Anal.   
 HX TONSILLECTOMY  MI COLSC FLX W/RMVL OF TUMOR POLYP LESION SNARE TQ  5/23/2011 Dr. Guillermina Pearson. FAMILY HISTORY:   
Family History Problem Relation Age of Onset  Heart Attack Mother  Other Mother   
  brain aneurysm/AAA/varicose veins  Stroke Mother  Heart Attack Sister  Cancer Maternal Grandmother   
  ovarian SOCIAL HISTORY:   
Social History Social History  Marital status:  Spouse name: N/A  
 Number of children: N/A  
 Years of education: N/A Social History Main Topics  Smoking status: Never Smoker  Smokeless tobacco: Never Used  Alcohol use No  
 Drug use: No  
 Sexual activity: Not Asked Other Topics Concern  None Social History Narrative IMMUNIZATIONS:   
Immunization History Administered Date(s) Administered  Influenza High Dose Vaccine PF 10/29/2013, 09/08/2015, 08/12/2016  Influenza Vaccine 10/01/2014, 10/01/2017  Influenza Vaccine Split 10/15/2010, 10/20/2011, 10/08/2012  Influenza Vaccine Whole 10/01/2009  Pneumococcal Conjugate (PCV-13) 06/03/2015  Pneumococcal Polysaccharide (PPSV-23) 07/02/2016  TD Vaccine 04/27/2004  Tdap 07/02/2016  ZZZ-RETIRED (DO NOT USE) Pneumococcal Vaccine (Unspecified Type) 02/08/2002  Zoster Recombinant 04/13/2018, 06/12/2018  Zoster Vaccine, Live 09/08/2015 PHYSICAL EXAMINATION Vital Signs Visit Vitals  /58 (BP 1 Location: Right arm, BP Patient Position: Sitting)  Pulse (!) 57  Temp 97.4 °F (36.3 °C)  Resp 16  
 Ht 5' 3.75\" (1.619 m)  Wt 151 lb 14.4 oz (68.9 kg)  SpO2 98%  BMI 26.28 kg/m2 Weight Metrics 8/31/2018 5/24/2018 4/24/2018 3/30/2018 1/25/2018 1/23/2018 1/13/2018 Weight 151 lb 14.4 oz 153 lb 11.2 oz 152 lb 154 lb 9.6 oz 153 lb 154 lb 3.2 oz 147 lb BMI 26.28 kg/m2 24.81 kg/m2 24.53 kg/m2 24.95 kg/m2 24.69 kg/m2 24.89 kg/m2 23.73 kg/m2 General appearance - Well nourished. Well appearing. Well developed. No acute distress. Present with wife. Head - Normocephalic. Atraumatic. Eyes - pupils equal and reactive. Extraocular eye movements intact. Sclera anicteric. Mildly injected sclera. Ears - Hearing is grossly abnormal bilaterally. Nose - normal and patent. No polyps noted. No erythema. No discharge. Mouth - mucous membranes with decreased moisture. Posterior pharynx normal with cobblestone appearance. No erythema, white exudate or obstruction. Neck - supple. Midline trachea. No carotid bruits noted bilaterally. No thyromegaly noted. Chest - clear to auscultation bilaterally anteriorly and posteriorly. No wheezes. No rales or rhonchi. Breath sounds are symmetrical bilaterally. Unlabored respirations. Heart - low rate. Regular rhythm. Normal S1, S2. No murmur noted. No rubs, clicks or gallops noted. Abdomen - soft and non-distended. No masses or organomegaly. No rebound, rigidity or guarding. Bowel sounds normal x 4 quadrants. No tenderness noted. Neurological - awake, alert and oriented to person, place, and time and event. Cranial nerves II through XII intact. Clear speech. Muscle strength is +5/5 x 4 extremities. Sensation is intact to light touch bilaterally. Steady gait with short choppy stride. Heme/Lymph - peripheral pulses normal x 4 extremities. No peripheral edema is noted. Psychological -   normal behavior, dress and thought processes. Good insight. Good eye contact. Normal affect. Appropriate mood. Normal speech. DATA REVIEWED Lab Results Component Value Date/Time WBC 6.5 05/24/2018 12:04 PM  
 Hemoglobin (POC) 12.9 09/15/2016 04:44 PM  
 HGB 13.1 05/24/2018 12:04 PM  
 Hematocrit (POC) 38 09/15/2016 04:44 PM  
 HCT 38.9 05/24/2018 12:04 PM  
 PLATELET 113 81/46/8172 12:04 PM  
 MCV 97 05/24/2018 12:04 PM  
 
Lab Results Component Value Date/Time Sodium 140 05/24/2018 12:04 PM  
 Potassium 4.9 05/24/2018 12:04 PM  
 Chloride 100 05/24/2018 12:04 PM  
 CO2 25 05/24/2018 12:04 PM  
 Anion gap 4 (L) 01/25/2018 02:54 PM  
 Glucose 107 (H) 05/24/2018 12:04 PM  
 BUN 24 05/24/2018 12:04 PM  
 Creatinine 1.16 05/24/2018 12:04 PM  
 BUN/Creatinine ratio 21 05/24/2018 12:04 PM  
 GFR est AA 64 05/24/2018 12:04 PM  
 GFR est non-AA 56 (L) 05/24/2018 12:04 PM  
 Calcium 9.4 05/24/2018 12:04 PM  
 Bilirubin, total 0.5 05/24/2018 12:04 PM  
 AST (SGOT) 19 05/24/2018 12:04 PM  
 Alk. phosphatase 75 05/24/2018 12:04 PM  
 Protein, total 6.8 05/24/2018 12:04 PM  
 Albumin 4.2 05/24/2018 12:04 PM  
 Globulin 3.6 12/16/2017 03:51 PM  
 A-G Ratio 1.6 05/24/2018 12:04 PM  
 ALT (SGPT) 13 05/24/2018 12:04 PM  
 
Lab Results Component Value Date/Time Cholesterol, total 137 05/24/2018 12:04 PM  
 HDL Cholesterol 52 05/24/2018 12:04 PM  
 LDL, calculated 68 05/24/2018 12:04 PM  
 VLDL, calculated 17 05/24/2018 12:04 PM  
 Triglyceride 85 05/24/2018 12:04 PM  
 CHOL/HDL Ratio 2.2 10/29/2014 03:45 AM  
 
Lab Results Component Value Date/Time Vitamin D 25-Hydroxy 21.5 (L) 10/31/2014 06:00 AM  
 VITAMIN D, 25-HYDROXY 35.0 05/24/2018 12:04 PM  
   
Lab Results Component Value Date/Time Hemoglobin A1c 5.4 10/11/2017 10:01 AM  
 
Lab Results Component Value Date/Time TSH 2.700 05/24/2018 12:04 PM  
 
 
Lab Results Component Value Date/Time Microalb/Creat ratio (ug/mg creat.) 35.6 (H) 05/24/2018 12:17 PM  
 
 
 
ASSESSMENT and PLAN 
 
  ICD-10-CM ICD-9-CM 1. Essential hypertension I10 401.9   
 with low bp using Norvasc, Imdur and Metoprolol 2. Dyslipidemia E78.5 272.4   
 stable 3. Hyperglycemia R73.9 790.29   
 due to ice cream vs other 4. Coronary artery disease involving native coronary artery of native heart without angina pectoris I25.10 414.01 aspirin (ASPIRIN) 325 mg tablet 5. Benign prostatic hyperplasia with lower urinary tract symptoms, symptom details unspecified N40.1 600.01   
6. History of stroke Z86.73 V12.54 aspirin (ASPIRIN) 325 mg tablet 7. Bradycardia with 51-60 beats per minute R00.1 427.89   
8. Sensorineural hearing loss (SNHL) of both ears H90.3 389.18   
9. Memory disturbance R41.3 780.93   
10. Thrombocytosis ([address] D47.3 238.71   
 resolved Discussed the patient's BMI with him. The BMI follow up plan is as follows: I have counseled this patient on diet and exercise regimens. Decrease carbohydrates (white foods, sweet foods, sweet drinks and alcohol), increase green leafy vegetables and protein (lean meats and beans) with each meal.  Avoid fried foods. Eat 3-5 small meals daily. Do not skip meals. Increase water intake. Increase physical activity to 30 minutes daily for health benefit or 60 minutes daily to prevent weight regain, as tolerated. Get 7-8 hours uninterrupted sleep nightly. Chart reviewed and updated. I have stopped his Norvasc intake today due to pt's low BP. Continue metoprolol 25 mg 1/2 half pill daily, Aspirin 325 mg daily, and Imdur 30 mg daily. Wife will notify me if BP is consistently less 90/60 and above 140/90. Prescriptions written and sent to pharmacy; medication side effects discussed. Prescription given to patient during office visit today. Most recent tests reviewed from 5/24. Recheck pertinent labs today. Recent office visit notes from 5/24 reviewed. Get recent office visit notes from 5/24. Advised pt to sign release. Referrals given for cardiologist; patient urged to keep appointments with specialists. Counseled patient on health concerns:  Dehydration (drink 4 glasses/32 oz of water daily) and watch out with sugar intake as it's borderline. Relevant handouts given and discussed with patient. Immunizations noted; Will be getting flu shot in October Offered empathy, support, legitimation, prayers, partnership to patient. Praised patient for progress. Follow up in 6 months for bp check. Patient was offered a choice/choices in the treatment plan today. Patient expresses understanding of the plan and agrees with recommendations. Written by lakesha Mata, as dictated by Dr. Mireille Skelton DO. Documentation True and Accepted by Katiana Vaca. Ortiz Treviño. There are no Patient Instructions on file for this visit.

## 2018-08-31 NOTE — MR AVS SNAPSHOT
[address] [facility] 
 
 
 [address] 
[address] [facility] [address] 
[phone] Patient: Yareli Nielsen MRN:  [id number] Visit Information Date & Time Provider Department Dept. Phone Encounter #  
 2018 10:00 AM DO Johnie StanleycorrieNajma [phone] Follow-up Instructions Return in about 6 months (around 2019) for blood pressure. Your Appointments 2019 11:40 AM  
Follow Up with Lucrecia Graham MD  
[facility]) Appt Note: FU,Dementia,adt,2018  
 [address] [facility], 
[address], [address] [address]  
[address] [facility], [address], [address] [address] Upcoming Health Maintenance Date Due Influenza Age 5 to Adult 10/31/2018* GLAUCOMA SCREENING Q2Y 2019* MEDICARE YEARLY EXAM 2019 DTaP/Tdap/Td series (2 - Td) 2026 *Topic was postponed. The date shown is not the original due date. Allergies as of 2018  Review Complete On: 2018 By: Ashley Boyle,  Severity Noted Reaction Type Reactions Aricept [Donepezil] High 2018    Nausea and Vomiting Mild depression Betadine [Povidone-iodine] High 2009    Hives Phenergan [Promethazine] High 2009    Other (comments)  
 hallucinations Seafood [Shellfish Containing Products] High 2009    Rash, Swelling  
 crabmeat Xylocaine [Lidocaine Hcl] High 2012    Shortness of Breath Current Immunizations  Reviewed on 2018 Name Date Influenza High Dose Vaccine PF 2016, 2015, 10/29/2013 Influenza Vaccine 10/1/2017, 10/1/2014 Influenza Vaccine Split 10/8/2012, 10/20/2011, 10/15/2010 Influenza Vaccine Whole 10/1/2009 Pneumococcal Conjugate (PCV-13) 6/3/2015 Pneumococcal Polysaccharide (PPSV-23) 2016 TD Vaccine 4/27/2004 Tdap 7/2/2016 ZZZ-RETIRED (DO NOT USE) Pneumococcal Vaccine (Unspecified Type) 2/8/2002 Zoster Recombinant 6/12/2018 12:00 AM, 4/13/2018 12:00 AM  
 Zoster Vaccine, Live 9/8/2015 Reviewed by Kan Camara DO on 8/31/2018 at 11:31 AM  
You Were Diagnosed With   
  
 Codes Comments Essential hypertension    -  Primary ICD-10-CM: I10 
ICD-9-CM: 401.9 with low bp using Norvasc, Imdur and Metoprolol Dyslipidemia     ICD-10-CM: E78.5 ICD-9-CM: 272.4 stable Hyperglycemia     ICD-10-CM: R73.9 ICD-9-CM: 790.29 due to ice cream vs other Coronary artery disease involving native coronary artery of native heart without angina pectoris     ICD-10-CM: I25.10 ICD-9-CM: 414.01 Benign prostatic hyperplasia with lower urinary tract symptoms, symptom details unspecified     ICD-10-CM: N40.1 ICD-9-CM: 600.01 History of stroke     ICD-10-CM: Z86.73 
ICD-9-CM: V12.54 Bradycardia with 51-60 beats per minute     ICD-10-CM: R00.1 ICD-9-CM: 427.89 Sensorineural hearing loss (SNHL) of both ears     ICD-10-CM: H90.3 ICD-9-CM: 389.18 Memory disturbance     ICD-10-CM: R41.3 ICD-9-CM: 780.93 Thrombocytosis ([address]     ICD-10-CM: D47.3 ICD-9-CM: 238.71 resolved Vitals BP Pulse Temp Resp Height(growth percentile) Weight(growth percentile) 101/58 (BP 1 Location: Right arm, BP Patient Position: Sitting) (!) 57 97.4 °F (36.3 °C) 16 5' 3.75\" (1.619 m) 151 lb 14.4 oz (68.9 kg) SpO2 BMI Smoking Status 98% 26.28 kg/m2 Never Smoker BMI and BSA Data Body Mass Index Body Surface Area  
 26.28 kg/m 2 1.76 m 2 Preferred Pharmacy Pharmacy Name Phone [phone] [facility], [address] [place] [address] Your Updated Medication List  
  
   
This list is accurate as of 8/31/18 11:46 AM.  Always use your most recent med list. amLODIPine 2.5 mg tablet Commonly known as:  [place] Take 1 Tab by mouth daily. Indications: hypertension * aspirin 325 mg tablet Commonly known as:  ASPIRIN Take 1 tablet by mouth daily. * aspirin 325 mg tablet Commonly known as:  ASPIRIN Take 1 Tab by mouth daily. Indications: myocardial infarction prevention  
  
 docusate sodium 100 mg capsule Commonly known as:  [place] Take 100 mg by mouth daily. Instructed to take daily with plenty of liquid unless otherwise directed- as per 11/7/14 [facility] discharge med list  
  
 etodolac 400 mg tablet Commonly known as:  LODINE Take 400 mg by mouth daily as needed. FLOMAX 0.4 mg capsule Generic drug:  tamsulosin Take 0.4 mg by mouth daily. For prostate FOLBEE 2.5-25-1 mg tablet Generic drug:  folic acid-vit U1-FTI M19  
take 1 tablet by mouth once daily  
  
 isosorbide mononitrate ER 30 mg tablet Commonly known as:  IMDUR  
take 1 tablet by mouth once daily  
  
 meclizine 25 mg tablet Commonly known as:  ANTIVERT Take 1 Tab by mouth three (3) times daily as needed for Dizziness. metoprolol tartrate 25 mg tablet Commonly known as:  LOPRESSOR  
take 1/2 tablet once a day  Indications: hypertension MUCINEX PO Take 1 Tab by mouth daily. pravastatin 20 mg tablet Commonly known as:  PRAVACHOL  
take 1 tablet by mouth once daily at bedtime TYLENOL ARTHRITIS PAIN 650 mg [place] Generic drug:  acetaminophen Take 650 mg by mouth every eight (8) hours. VITAMIN D3 2,000 unit Tab Generic drug:  cholecalciferol (vitamin D3) Take 1 Tab by mouth daily. As per 11/07/14 [facility] discharge med list  
  
 * Notice: This list has 2 medication(s) that are the same as other medications prescribed for you. Read the directions carefully, and ask your doctor or other care provider to review them with you. Follow-up Instructions Return in about 6 months (around 2/28/2019) for blood pressure. Patient Instructions Learning About High Blood Pressure What is high blood pressure? Blood pressure is a measure of how hard the blood pushes against the walls of your arteries. It's normal for blood pressure to go up and down throughout the day, but if it stays up, you have high blood pressure. Another name for high blood pressure is hypertension. Two numbers tell you your blood pressure. The first number is the systolic pressure. It shows how hard the blood pushes when your heart is pumping. The second number is the diastolic pressure. It shows how hard the blood pushes between heartbeats, when your heart is relaxed and filling with blood. A blood pressure of less than 120/80 (say \"120 over 80\") is ideal for an adult. High blood pressure is 130/80 or higher. You have high blood pressure if your top number is 130 or higher or your bottom number is 80 or higher, or both. What happens when you have high blood pressure? · Blood flows through your arteries with too much force. Over time, this damages the walls of your arteries. But you can't feel it. High blood pressure usually doesn't cause symptoms. · Fat and calcium start to build up in your arteries. This buildup is called plaque. Plaque makes your arteries narrower and stiffer. Blood can't flow through them as easily. · This lack of good blood flow starts to damage some of the organs in your body. This can lead to problems such as coronary artery disease and heart attack, heart failure, stroke, kidney failure, and eye damage. How can you prevent high blood pressure? · Stay at a healthy weight. · Try to limit how much sodium you eat to less than 2,300 milligrams (mg) a day. If you limit your sodium to 1,500 mg a day, you can lower your blood pressure even more. ¨ Buy foods that are labeled \"unsalted,\" \"sodium-free,\" or \"low-sodium. \" Foods labeled \"reduced-sodium\" and \"light sodium\" may still have too much sodium. ¨ Flavor your food with garlic, lemon juice, onion, vinegar, herbs, and spices instead of salt. Do not use soy sauce, steak sauce, onion salt, garlic salt, mustard, or ketchup on your food. ¨ Use less salt (or none) when recipes call for it. You can often use half the salt a recipe calls for without losing flavor. · Be physically active. Get at least 30 minutes of exercise on most days of the week. Walking is a good choice. You also may want to do other activities, such as running, swimming, cycling, or playing tennis or team sports. · Limit alcohol to 2 drinks a day for men and 1 drink a day for women. · Eat plenty of fruits, vegetables, and low-fat dairy products. Eat less saturated and total fats. How is high blood pressure treated? · Your doctor will suggest making lifestyle changes. For example, your doctor may ask you to eat healthy foods, quit smoking, lose extra weight, and be more active. · If lifestyle changes don't help enough or your blood pressure is very high, you will have to take medicine every day. Follow-up care is a key part of your treatment and safety. Be sure to make and go to all appointments, and call your doctor if you are having problems. It's also a good idea to know your test results and keep a list of the medicines you take. Where can you learn more? Go to http://diamond-donna.info/. Enter P501 in the search box to learn more about \"Learning About High Blood Pressure. \" Current as of: May 10, 2017 Content Version: 11.7 © 0547-1269 "[employer]. Care instructions adapted under license by [employer] (which disclaims liability or warranty for this information). If you have questions about a medical condition or this instruction, always ask your healthcare professional. [address] any warranty or liability for your use of this information. Low Sodium Diet (2,000 Milligram): Care Instructions Your Care Instructions Too much sodium causes your body to hold on to extra water. This can raise your blood pressure and force your heart and kidneys to work harder. In very serious cases, this could cause you to be put in the hospital. It might even be life-threatening. By limiting sodium, you will feel better and lower your risk of serious problems. The most common source of sodium is salt. People get most of the salt in their diet from canned, prepared, and packaged foods. Fast food and restaurant meals also are very high in sodium. Your doctor will probably limit your sodium to less than 2,000 milligrams (mg) a day. This limit counts all the sodium in prepared and packaged foods and any salt you add to your food. Follow-up care is a key part of your treatment and safety. Be sure to make and go to all appointments, and call your doctor if you are having problems. It's also a good idea to know your test results and keep a list of the medicines you take. How can you care for yourself at home? Read food labels · Read labels on cans and food packages. The labels tell you how much sodium is in each serving. Make sure that you look at the serving size. If you eat more than the serving size, you have eaten more sodium. · Food labels also tell you the Percent Daily Value for sodium. Choose products with low Percent Daily Values for sodium. · Be aware that sodium can come in forms other than salt, including monosodium glutamate (MSG), sodium citrate, and sodium bicarbonate (baking soda). MSG is often added to Asian food. When you eat out, you can sometimes ask for food without MSG or added salt. Buy low-sodium foods · Buy foods that are labeled \"unsalted\" (no salt added), \"sodium-free\" (less than 5 mg of sodium per serving), or \"low-sodium\" (less than 140 mg of sodium per serving). Foods labeled \"reduced-sodium\" and \"light sodium\" may still have too much sodium.  Be sure to read the label to see how much sodium you are getting. · Buy fresh vegetables, or frozen vegetables without added sauces. Buy low-sodium versions of canned vegetables, soups, and other canned goods. Prepare low-sodium meals · Cut back on the amount of salt you use in cooking. This will help you adjust to the taste. Do not add salt after cooking. One teaspoon of salt has about 2,300 mg of sodium. · Take the salt shaker off the table. · Flavor your food with garlic, lemon juice, onion, vinegar, herbs, and spices. Do not use soy sauce, lite soy sauce, steak sauce, onion salt, garlic salt, celery salt, mustard, or ketchup on your food. · Use low-sodium salad dressings, sauces, and ketchup. Or make your own salad dressings and sauces without adding salt. · Use less salt (or none) when recipes call for it. You can often use half the salt a recipe calls for without losing flavor. Other foods such as rice, pasta, and grains do not need added salt. · Rinse canned vegetables, and cook them in fresh water. This removes some-but not all-of the salt. · Avoid water that is naturally high in sodium or that has been treated with water softeners, which add sodium. Call your local water company to find out the sodium content of your water supply. If you buy bottled water, read the label and choose a sodium-free brand. Avoid high-sodium foods · Avoid eating: ¨ Smoked, cured, salted, and canned meat, fish, and poultry. ¨ Ham, talley, hot dogs, and luncheon meats. ¨ Regular, hard, and processed cheese and regular peanut butter. ¨ Crackers with salted tops, and other salted snack foods such as pretzels, chips, and salted popcorn. ¨ Frozen prepared meals, unless labeled low-sodium. ¨ Canned and dried soups, broths, and bouillon, unless labeled sodium-free or low-sodium. ¨ Canned vegetables, unless labeled sodium-free or low-sodium. ¨ [place] fries, pizza, tacos, and other fast foods. ¨ Pickles, olives, ketchup, and other condiments, especially soy sauce, unless labeled sodium-free or low-sodium. Where can you learn more? Go to http://diamond-donna.info/. Enter Y210 in the search box to learn more about \"Low Sodium Diet (2,000 Milligram): Care Instructions. \" Current as of: May 12, 2017 Content Version: 11.7 © 5875-0956 [employer]. Care instructions adapted under license by [employer] (which disclaims liability or warranty for this information). If you have questions about a medical condition or this instruction, always ask your healthcare professional. [place] [address] any warranty or liability for your use of this information. Check BP twice weekly. Notify me if it consistently is above 140/90 or below 90/60. Bring your blood pressure log to your next office visit. Decrease salt, fats, caffeine, alcohol in your diet. Increase water, fiber. Exercise 5 times weekly for 30 minutes daily as tolerated. Continue current medications, care and subspecialty follow up. Visit eye doctor yearly to check your eyes blood pressure related changes. Low Sodium Diet (2,000 Milligram): Care Instructions Your Care Instructions Too much sodium causes your body to hold on to extra water. This can raise your blood pressure and force your heart and kidneys to work harder. In very serious cases, this could cause you to be put in the hospital. It might even be life-threatening. By limiting sodium, you will feel better and lower your risk of serious problems. The most common source of sodium is salt. People get most of the salt in their diet from canned, prepared, and packaged foods. Fast food and restaurant meals also are very high in sodium. Your doctor will probably limit your sodium to less than 2,000 milligrams (mg) a day. This limit counts all the sodium in prepared and packaged foods and any salt you add to your food. Follow-up care is a key part of your treatment and safety. Be sure to make and go to all appointments, and call your doctor if you are having problems. It's also a good idea to know your test results and keep a list of the medicines you take. How can you care for yourself at home? Read food labels · Read labels on cans and food packages. The labels tell you how much sodium is in each serving. Make sure that you look at the serving size. If you eat more than the serving size, you have eaten more sodium. · Food labels also tell you the Percent Daily Value for sodium. Choose products with low Percent Daily Values for sodium. · Be aware that sodium can come in forms other than salt, including monosodium glutamate (MSG), sodium citrate, and sodium bicarbonate (baking soda). MSG is often added to Asian food. When you eat out, you can sometimes ask for food without MSG or added salt. Buy low-sodium foods · Buy foods that are labeled \"unsalted\" (no salt added), \"sodium-free\" (less than 5 mg of sodium per serving), or \"low-sodium\" (less than 140 mg of sodium per serving). Foods labeled \"reduced-sodium\" and \"light sodium\" may still have too much sodium. Be sure to read the label to see how much sodium you are getting. · Buy fresh vegetables, or frozen vegetables without added sauces. Buy low-sodium versions of canned vegetables, soups, and other canned goods. Prepare low-sodium meals · Cut back on the amount of salt you use in cooking. This will help you adjust to the taste. Do not add salt after cooking. One teaspoon of salt has about 2,300 mg of sodium. · Take the salt shaker off the table. · Flavor your food with garlic, lemon juice, onion, vinegar, herbs, and spices. Do not use soy sauce, lite soy sauce, steak sauce, onion salt, garlic salt, celery salt, mustard, or ketchup on your food. · Use low-sodium salad dressings, sauces, and ketchup. Or make your own salad dressings and sauces without adding salt. · Use less salt (or none) when recipes call for it. You can often use half the salt a recipe calls for without losing flavor. Other foods such as rice, pasta, and grains do not need added salt. · Rinse canned vegetables, and cook them in fresh water. This removes some-but not all-of the salt. · Avoid water that is naturally high in sodium or that has been treated with water softeners, which add sodium. Call your local water company to find out the sodium content of your water supply. If you buy bottled water, read the label and choose a sodium-free brand. Avoid high-sodium foods · Avoid eating: ¨ Smoked, cured, salted, and canned meat, fish, and poultry. ¨ Ham, talley, hot dogs, and luncheon meats. ¨ Regular, hard, and processed cheese and regular peanut butter. ¨ Crackers with salted tops, and other salted snack foods such as pretzels, chips, and salted popcorn. ¨ Frozen prepared meals, unless labeled low-sodium. ¨ Canned and dried soups, broths, and bouillon, unless labeled sodium-free or low-sodium. ¨ Canned vegetables, unless labeled sodium-free or low-sodium. ¨ [place] fries, pizza, tacos, and other fast foods. ¨ Pickles, olives, ketchup, and other condiments, especially soy sauce, unless labeled sodium-free or low-sodium. Where can you learn more? Go to http://diamond-donna.info/. Enter T745 in the search box to learn more about \"Low Sodium Diet (2,000 Milligram): Care Instructions. \" Current as of: May 12, 2017 Content Version: 11.7 © 8849-4706 [employer]udarum. Care instructions adapted under license by [employer] (which disclaims liability or warranty for this information). If you have questions about a medical condition or this instruction, always ask your healthcare professional. [place] any warranty or liability for your use of this information. Introducing [facility]! Wade Fowler introduces [employer] patient portal. Now you can access parts of your medical record, email your doctor's office, and request medication refills online. 1. In your internet browser, go to [email or web address] 2. Click on the First Time User? Click Here link in the Sign In box. You will see the New Member Sign Up page. 3. Enter your [employer] Access Code exactly as it appears below. You will not need to use this code after youve completed the sign-up process. If you do not sign up before the expiration date, you must request a new code. · [employer] Access Code: [email or web address] Expires: 11/29/2018 11:42 AM 
 
4. Enter the last four digits of your Social Security Number (xxxx) and Date of Birth (mm/dd/yyyy) as indicated and click Submit. You will be taken to the next sign-up page. 5. Create a [employer] ID. This will be your [employer] login ID and cannot be changed, so think of one that is secure and easy to remember. 6. Create a [employer] password. You can change your password at any time. 7. Enter your Password Reset Question and Answer. This can be used at a later time if you forget your password. 8. Enter your e-mail address. You will receive e-mail notification when new information is available in [address] 9. Click Sign Up. You can now view and download portions of your medical record. 10. Click the Download Summary menu link to download a portable copy of your medical information. If you have questions, please visit the Frequently Asked Questions section of the [employer] website. Remember, [employer] is NOT to be used for urgent needs. For medical emergencies, dial 911. Now available from your iPhone and Android! Please provide this summary of care documentation to your next provider. Your primary care clinician is listed as Girish Lara. If you have any questions after today's visit, please call [phone].

## 2018-08-31 NOTE — TELEPHONE ENCOUNTER
----- Message from Pasquale Perea sent at 8/31/2018  1:34 PM EDT -----  Regarding: Dr. Neelam Ann, Wife, is requesting a call and states that she has questions about the Rx for Amlodipine and to confirm if she should continue giving it to the pt or if she should stop. Best contact number [phone].

## 2018-09-04 NOTE — TELEPHONE ENCOUNTER
Writer called patient's wife back regarding taking off medication that he is now no longer taking. Writer spoke with wife, Rory Taylor, verified on [address] form. Wife verified patient's . Writer notified wife that the medication she notified us of still being on his AVS, but is no longer taking, will be removed at his next office visit. Wife verbalized understanding and appreciation.

## 2018-09-10 NOTE — TELEPHONE ENCOUNTER
PCP: Yareli Choi DO    Last appt: 8/31/2018  Future Appointments  Date Time Provider Santana Anisa   5/17/2019 11:40 AM Jonny Haile MD [address]       Requested Prescriptions     Pending Prescriptions Disp Refills    isosorbide mononitrate ER (IMDUR) 30 mg tablet [Pharmacy Med Name: Adrian Storey ER 30 MG TB] 30 Tab 11     Sig: take 1 tablet by mouth once daily       Prior labs and Blood pressures:  BP Readings from Last 3 Encounters:   08/31/18 101/58   05/24/18 108/68   04/24/18 110/60     Lab Results   Component Value Date/Time    Sodium 140 05/24/2018 12:04 PM    Potassium 4.9 05/24/2018 12:04 PM    Chloride 100 05/24/2018 12:04 PM    CO2 25 05/24/2018 12:04 PM    Anion gap 4 (L) 01/25/2018 02:54 PM    Glucose 107 (H) 05/24/2018 12:04 PM    BUN 24 05/24/2018 12:04 PM    Creatinine 1.16 05/24/2018 12:04 PM    BUN/Creatinine ratio 21 05/24/2018 12:04 PM    GFR est AA 64 05/24/2018 12:04 PM    GFR est non-AA 56 (L) 05/24/2018 12:04 PM    Calcium 9.4 05/24/2018 12:04 PM     Lab Results   Component Value Date/Time    Hemoglobin A1c 5.4 10/11/2017 10:01 AM     Lab Results   Component Value Date/Time    Cholesterol, total 137 05/24/2018 12:04 PM    HDL Cholesterol 52 05/24/2018 12:04 PM    LDL, calculated 68 05/24/2018 12:04 PM    VLDL, calculated 17 05/24/2018 12:04 PM    Triglyceride 85 05/24/2018 12:04 PM    CHOL/HDL Ratio 2.2 10/29/2014 03:45 AM     Lab Results   Component Value Date/Time    Vitamin D 25-Hydroxy 21.5 (L) 10/31/2014 06:00 AM    VITAMIN D, 25-HYDROXY 35.0 05/24/2018 12:04 PM       Lab Results   Component Value Date/Time    TSH 2.700 05/24/2018 12:04 PM

## 2018-09-13 RX ORDER — ISOSORBIDE MONONITRATE 30 MG/1
TABLET, EXTENDED RELEASE ORAL
Qty: 30 TAB | Refills: 11 | Status: SHIPPED | OUTPATIENT
Start: 2018-09-13 | End: 2019-01-01 | Stop reason: SDUPTHER

## 2018-12-10 DIAGNOSIS — M25.552 HIP PAIN, ACUTE, LEFT: ICD-10-CM

## 2018-12-11 NOTE — TELEPHONE ENCOUNTER
PCP: Todd Irvin DO    Last appt: 8/31/2018  Future Appointments   Date Time Provider [place]ana [place]   3/11/2019 10:00 AM Todd Irvin DO [facility]       Requested Prescriptions     Pending Prescriptions Disp Refills    etodolac (LODINE) 400 mg tablet [Pharmacy Med Name: ETODOLAC 400 MG TABLET] 30 Tab 2     Sig: take 1 tablet by mouth once daily if needed       Prior labs and Blood pressures:  BP Readings from Last 3 Encounters:   08/31/18 101/58   05/24/18 108/68   04/24/18 110/60     Lab Results   Component Value Date/Time    Sodium 140 05/24/2018 12:04 PM    Potassium 4.9 05/24/2018 12:04 PM    Chloride 100 05/24/2018 12:04 PM    CO2 25 05/24/2018 12:04 PM    Anion gap 4 (L) 01/25/2018 02:54 PM    Glucose 107 (H) 05/24/2018 12:04 PM    BUN 24 05/24/2018 12:04 PM    Creatinine 1.16 05/24/2018 12:04 PM    BUN/Creatinine ratio 21 05/24/2018 12:04 PM    GFR est AA 64 05/24/2018 12:04 PM    GFR est non-AA 56 (L) 05/24/2018 12:04 PM    Calcium 9.4 05/24/2018 12:04 PM     Lab Results   Component Value Date/Time    Hemoglobin A1c 5.4 10/11/2017 10:01 AM     Lab Results   Component Value Date/Time    Cholesterol, total 137 05/24/2018 12:04 PM    HDL Cholesterol 52 05/24/2018 12:04 PM    LDL, calculated 68 05/24/2018 12:04 PM    VLDL, calculated 17 05/24/2018 12:04 PM    Triglyceride 85 05/24/2018 12:04 PM    CHOL/HDL Ratio 2.2 10/29/2014 03:45 AM     Lab Results   Component Value Date/Time    Vitamin D 25-Hydroxy 21.5 (L) 10/31/2014 06:00 AM    VITAMIN D, 25-HYDROXY 35.0 05/24/2018 12:04 PM       Lab Results   Component Value Date/Time    TSH 2.700 05/24/2018 12:04 PM

## 2018-12-12 RX ORDER — ETODOLAC 400 MG/1
TABLET, FILM COATED ORAL
Qty: 30 TAB | Refills: 2 | Status: SHIPPED | OUTPATIENT
Start: 2018-12-12 | End: 2019-05-07 | Stop reason: SDUPTHER

## 2018-12-14 ENCOUNTER — HOSPITAL ENCOUNTER (EMERGENCY)
Age: 83
Discharge: HOME | End: 2018-12-14
Attending: EMERGENCY MEDICINE | Admitting: EMERGENCY MEDICINE
Payer: MEDICARE

## 2018-12-14 ENCOUNTER — APPOINTMENT (OUTPATIENT)
Dept: GENERAL RADIOLOGY | Age: 83
End: 2018-12-14
Attending: EMERGENCY MEDICINE
Payer: MEDICARE

## 2018-12-14 VITALS
DIASTOLIC BLOOD PRESSURE: 87 MMHG | WEIGHT: 154.32 LBS | RESPIRATION RATE: 17 BRPM | BODY MASS INDEX: 24.8 KG/M2 | HEART RATE: 57 BPM | OXYGEN SATURATION: 97 % | TEMPERATURE: 98 F | SYSTOLIC BLOOD PRESSURE: 145 MMHG | HEIGHT: 66 IN

## 2018-12-14 DIAGNOSIS — R07.89 MUSCULOSKELETAL CHEST PAIN: ICD-10-CM

## 2018-12-14 DIAGNOSIS — R07.9 ACUTE CHEST PAIN: Primary | ICD-10-CM

## 2018-12-14 DIAGNOSIS — R07.89 ATYPICAL CHEST PAIN: ICD-10-CM

## 2018-12-14 DIAGNOSIS — I10 ACCELERATED HYPERTENSION: ICD-10-CM

## 2018-12-14 LAB
ALBUMIN SERPL-MCNC: 3.6 G/DL (ref 3.5–5)
ALBUMIN/GLOB SERPL: 1 {RATIO} (ref 1.1–2.2)
ALP SERPL-CCNC: 71 U/L (ref 45–117)
ALT SERPL-CCNC: 17 U/L (ref 12–78)
ANION GAP SERPL CALC-SCNC: 5 MMOL/L (ref 5–15)
AST SERPL-CCNC: 15 U/L (ref 15–37)
BASOPHILS # BLD: 0 K/UL (ref 0–0.1)
BASOPHILS NFR BLD: 1 % (ref 0–1)
BILIRUB SERPL-MCNC: 0.4 MG/DL (ref 0.2–1)
BNP SERPL-MCNC: 193 PG/ML (ref 0–450)
BUN SERPL-MCNC: 24 MG/DL (ref 6–20)
BUN/CREAT SERPL: 23 (ref 12–20)
CALCIUM SERPL-MCNC: 9.1 MG/DL (ref 8.5–10.1)
CHLORIDE SERPL-SCNC: 104 MMOL/L (ref 97–108)
CK SERPL-CCNC: 50 U/L (ref 39–308)
CO2 SERPL-SCNC: 30 MMOL/L (ref 21–32)
CREAT SERPL-MCNC: 1.05 MG/DL (ref 0.7–1.3)
DIFFERENTIAL METHOD BLD: ABNORMAL
EOSINOPHIL # BLD: 0.3 K/UL (ref 0–0.4)
EOSINOPHIL NFR BLD: 4 % (ref 0–7)
ERYTHROCYTE [DISTWIDTH] IN BLOOD BY AUTOMATED COUNT: 13.2 % (ref 11.5–14.5)
GLOBULIN SER CALC-MCNC: 3.5 G/DL (ref 2–4)
GLUCOSE SERPL-MCNC: 88 MG/DL (ref 65–100)
HCT VFR BLD AUTO: 39.3 % (ref 36.6–50.3)
HGB BLD-MCNC: 13.2 G/DL (ref 12.1–17)
IMM GRANULOCYTES # BLD: 0.1 K/UL (ref 0–0.04)
IMM GRANULOCYTES NFR BLD AUTO: 1 % (ref 0–0.5)
LYMPHOCYTES # BLD: 2.1 K/UL (ref 0.8–3.5)
LYMPHOCYTES NFR BLD: 31 % (ref 12–49)
MCH RBC QN AUTO: 32.9 PG (ref 26–34)
MCHC RBC AUTO-ENTMCNC: 33.6 G/DL (ref 30–36.5)
MCV RBC AUTO: 98 FL (ref 80–99)
MONOCYTES # BLD: 0.9 K/UL (ref 0–1)
MONOCYTES NFR BLD: 13 % (ref 5–13)
NEUTS SEG # BLD: 3.4 K/UL (ref 1.8–8)
NEUTS SEG NFR BLD: 51 % (ref 32–75)
NRBC # BLD: 0 K/UL (ref 0–0.01)
NRBC BLD-RTO: 0 PER 100 WBC
PLATELET # BLD AUTO: 171 K/UL (ref 150–400)
PMV BLD AUTO: 10.1 FL (ref 8.9–12.9)
POTASSIUM SERPL-SCNC: 4.4 MMOL/L (ref 3.5–5.1)
PROT SERPL-MCNC: 7.1 G/DL (ref 6.4–8.2)
RBC # BLD AUTO: 4.01 M/UL (ref 4.1–5.7)
SODIUM SERPL-SCNC: 139 MMOL/L (ref 136–145)
TROPONIN I SERPL-MCNC: <0.05 NG/ML
TROPONIN I SERPL-MCNC: <0.05 NG/ML
WBC # BLD AUTO: 6.7 K/UL (ref 4.1–11.1)

## 2018-12-14 PROCEDURE — 80053 COMPREHEN METABOLIC PANEL: CPT

## 2018-12-14 PROCEDURE — 83880 ASSAY OF NATRIURETIC PEPTIDE: CPT

## 2018-12-14 PROCEDURE — 93005 ELECTROCARDIOGRAM TRACING: CPT

## 2018-12-14 PROCEDURE — 85025 COMPLETE CBC W/AUTO DIFF WBC: CPT

## 2018-12-14 PROCEDURE — 71045 X-RAY EXAM CHEST 1 VIEW: CPT

## 2018-12-14 PROCEDURE — 36415 COLL VENOUS BLD VENIPUNCTURE: CPT

## 2018-12-14 PROCEDURE — 84484 ASSAY OF TROPONIN QUANT: CPT

## 2018-12-14 PROCEDURE — 82550 ASSAY OF CK (CPK): CPT

## 2018-12-14 PROCEDURE — 99284 EMERGENCY DEPT VISIT MOD MDM: CPT

## 2018-12-14 RX ORDER — KETOROLAC TROMETHAMINE 30 MG/ML
15 INJECTION, SOLUTION INTRAMUSCULAR; INTRAVENOUS
Status: DISCONTINUED | OUTPATIENT
Start: 2018-12-14 | End: 2018-12-14 | Stop reason: HOSPADM

## 2018-12-14 NOTE — DISCHARGE INSTRUCTIONS
Thank you for allowing us to take care of you today! We hope we addressed all of your concerns and needs. We strive to provide excellent quality care in the Emergency Department. You will receive a survey after your visit to evaluate the care you were provided. Should you receive a survey from us, we invite you to share your experience and tell us what made it excellent. It was a pleasure serving you, we invite you to share your experience with us, in our pursuit for excellence, should you be selected to receive a survey. The exam and treatment you received in the Emergency Department were for an urgent problem and are not intended as complete care. It is important that you follow up with a doctor, nurse practitioner, or physician assistant for ongoing care. If your symptoms become worse or you do not improve as expected and you are unable to reach your usual health care provider, you should return to the Emergency Department. We are available 24 hours a day. Please take your discharge instructions with you when you go to your follow-up appointment. If you have any problem arranging a follow-up appointment, contact the Emergency Department immediately. If a prescription has been provided, please have it filled as soon as possible to prevent a delay in treatment. Read the entire medication instruction sheet provided to you by the pharmacy. If you have any questions or reservations about taking the medication due to side effects or interactions with other medications, please call your primary care physician or contact the ER to speak with the charge nurse. Make an appointment with your family doctor or the physician you were referred to for follow-up of this visit as instructed on your discharge paperwork, as this is mandatory follow-up. Return to the ER if you are unable to be seen or if you are unable to be seen in a timely manner.     If you have any problem arranging the follow-up visit, contact the Emergency Department immediately. I hope you feel better and thank you again for allow us to provide you with excellent care today at [facility]! Warmest regards,    Donnell Rinne, MD  Emergency Medicine Physician  [facility]                   Chest Pain: Care Instructions  Your Care Instructions    There are many things that can cause chest pain. Some are not serious and will get better on their own in a few days. But some kinds of chest pain need more testing and treatment. Your doctor may have recommended a follow-up visit in the next 8 to 12 hours. If you are not getting better, you may need more tests or treatment. Even though your doctor has released you, you still need to watch for any problems. The doctor carefully checked you, but sometimes problems can develop later. If you have new symptoms or if your symptoms do not get better, get medical care right away. If you have worse or different chest pain or pressure that lasts more than 5 minutes or you passed out (lost consciousness), call 911 or seek other emergency help right away. A medical visit is only one step in your treatment. Even if you feel better, you still need to do what your doctor recommends, such as going to all suggested follow-up appointments and taking medicines exactly as directed. This will help you recover and help prevent future problems. How can you care for yourself at home? · Rest until you feel better. · Take your medicine exactly as prescribed. Call your doctor if you think you are having a problem with your medicine. · Do not drive after taking a prescription pain medicine. When should you call for help? Call 911 if:    · You passed out (lost consciousness).     · You have severe difficulty breathing.     · You have symptoms of a heart attack. These may include:  ?  Chest pain or pressure, or a strange feeling in your chest.  ? Sweating. ? Shortness of breath. ? Nausea or vomiting. ? Pain, pressure, or a strange feeling in your back, neck, jaw, or upper belly or in one or both shoulders or arms. ? Lightheadedness or sudden weakness. ? A fast or irregular heartbeat. After you call 911, the  may tell you to chew 1 adult-strength or 2 to 4 low-dose aspirin. Wait for an ambulance. Do not try to drive yourself.    Call your doctor today if:    · You have any trouble breathing.     · Your chest pain gets worse.     · You are dizzy or lightheaded, or you feel like you may faint.     · You are not getting better as expected.     · You are having new or different chest pain. Where can you learn more? Go to http://diamond-donna.info/. Enter A120 in the search box to learn more about \"Chest Pain: Care Instructions. \"  Current as of: November 20, 2017  Content Version: 11.8  © 7556-1519 [employer]. Care instructions adapted under license by [employer] (which disclaims liability or warranty for this information). If you have questions about a medical condition or this instruction, always ask your healthcare professional. [place] [address] any warranty or liability for your use of this information.

## 2018-12-14 NOTE — ED NOTES
Discharge instructions reviewed with patient, copy given by Dr. Azra Knox. Pt is accomponied by family, denies use of wheelchair.

## 2018-12-14 NOTE — ED PROVIDER NOTES
EMERGENCY DEPARTMENT HISTORY AND PHYSICAL EXAM      Date: 12/14/2018  Patient Name: Marry Louis    History of Presenting Illness     Chief Complaint   Patient presents with    Chest Pain       History Provided By: Patient and Patient's Wife    HPI: Marry Louis, 80 y.o. male with PMHx significant for dementia, CAD, DDD, DJD, L acoustic neuroma, PVD, scoliosis, CVA, vertigo, presents via EMS to the ED with cc of chest tightness onset [phone] today. Per wife, at onset of chest tightness, she gave pt 325mg ASA with relief of pain. She denies pt being followed by a cardiologist. She denies SOB, cough, or BLE swelling. Pt states pain has resolved now. Hx limited due to pt's dementia. PCP: Austin Nelson DO    There are no other complaints, changes or physical findings at this time. Past History     Past Medical History:   Diagnosis Date    Back pain     due to DDD and scoliosis    BPH (benign prostatic hyperplasia)     Dr. Katiana Enciso CAD (coronary artery disease) 10/30/13    30%LAD, 80% ostal stenosis. Dr. Jamal Burrell.  Chest pain 10/04/04, 10/30/13    Dr. Johanne Vásquez. Negative Stress Cardiolite Test.  Dr. Leanne Sears.  Chronic venous insufficiency 06/15/09    Dr. Lashawn Aguilera due to fall    DDD (degenerative disc disease), lumbar     L3-S1    Diverticulosis 02/2004    sigmoid. Dr. Jakub Monroe DJD (degenerative joint disease) of cervical spine     C 4-5 ;5-6    Dyslipidemia     Fracture 1980    facial/nasal    Hearing loss     wears hearing aid    Heartburn     Hemorrhoids, internal 1987    Dr. Aggie Alston of unspecified site of abdominal cavity without mention of obstruction or gangrene     inguinal  Lt    Hypertension     Impotence     Left acoustic neuroma ([address] 10/28/14    17 x 9 mm schwannoma.       Pes planus of both feet     PVD (peripheral vascular disease) ([address] 2009    Scoliosis     LS    Slipped intervertebral disc 1954    chiropractor    SOB (shortness of breath) 08/07/13    Dr. Anjelica Lambert.  Stroke ([facility] 10/28/2014    small Left Thalamus. Dr. Jarek Diez. Dr. Asif Contreras.  Urinary incontinence     due to BPH. Dr. Naomi Aguilar    Varicose vein 06/15/09    Dr. Prashanth Elkins Vertigo 2003    due to inner ear. Dr. Michael Gregg. Dr. Elba Silverio. Dr. Craig Cook Visual loss     [facility] Past [facility] History:  Past Surgical History:   Procedure Laterality Date    APPENDECTOMY      due to gangrene   [facility], 02/2002    Dr. Taj Rivera. benign.  COLONOSCOPY  02/2004    Dr. Gerianne Councilman. due q 10 yrs    CYSTOSCOPY  10/17/03    Dr. Raffi Adams  10/30/13    Dr. Abebe Gonzalez. 80% ostal stenosis. 30% LAD.  HX HEMORRHOIDECTOMY      HX KNEE ARTHROSCOPY  1990 Left , 2007 Right    Dr. Rhianna Mejia HX LAPAROTOMY  05/14/07    Dr. Jen Cruz. due to Bowel Obstruction    HX POLYPECTOMY  02/2002    Anal.     HX TONSILLECTOMY      [place] COLSC FLX W/RMVL OF TUMOR POLYP LESION SNARE TQ  5/23/2011    Dr. Navin Wright. Family History:  Family History   Problem Relation Age of Onset    Heart Attack Mother     Other Mother         brain aneurysm/AAA/varicose veins    Stroke Mother     Heart Attack Sister     Cancer Maternal Grandmother         ovarian       Allergies:   Allergies   Allergen Reactions    Aricept [Donepezil] Nausea and Vomiting     Mild depression    Betadine [Povidone-Iodine] Hives    Phenergan [Promethazine] Other (comments)     hallucinations    Seafood [Shellfish Containing Products] Rash and Swelling     crabmeat    Xylocaine [Lidocaine Hcl] Shortness of Breath     Social History     Socioeconomic History    Marital status:      Spouse name: Not on file    Number of children: Not on file    Years of education: Not on file    Highest education level: Not on file   Social Needs    Financial resource strain: Not on file    Food insecurity - worry: Not on file    Food insecurity - inability: Not on file    Transportation needs - medical: Not on file   [employer] needs - non-medical: Not on file   Occupational History    Not on file   Tobacco Use    Smoking status: Never Smoker    Smokeless tobacco: Never Used   Substance and Sexual Activity    Alcohol use: No    Drug use: No    Sexual activity: Not on file   Other Topics Concern    Not on file   Social History Narrative    Not on file       Review of Systems   Review of Systems   Constitutional: Negative. Negative for chills and fever. HENT: Negative. Negative for congestion, facial swelling, rhinorrhea, sore throat, trouble swallowing and voice change. Eyes: Negative. Respiratory: Negative for apnea, cough, shortness of breath and wheezing. Cardiovascular: Positive for chest pain. Negative for palpitations and leg swelling. Gastrointestinal: Negative. Negative for abdominal distention, abdominal pain, blood in stool, constipation, diarrhea, nausea and vomiting. Endocrine: Negative. Negative for cold intolerance, heat intolerance and polyuria. Genitourinary: Negative. Negative for difficulty urinating, dysuria, flank pain, frequency, hematuria and urgency. Musculoskeletal: Negative. Negative for arthralgias, back pain, myalgias, neck pain and neck stiffness. Skin: Negative. Negative for color change and rash. Neurological: Negative. Negative for dizziness, syncope, facial asymmetry, speech difficulty, weakness, light-headedness, numbness and headaches. Hematological: Negative. Does not bruise/bleed easily. Psychiatric/Behavioral: Negative. Negative for confusion and self-injury. The patient is not nervous/anxious. Physical Exam   Physical Exam   Constitutional: He is oriented to person, place, and time. Vital signs are normal. He appears well-developed and well-nourished. He is cooperative.   Non-toxic appearance. HENT:   Head: Normocephalic and atraumatic. Mouth/Throat: Mucous membranes are normal. No posterior oropharyngeal erythema. Eyes: Conjunctivae and EOM are normal. Pupils are equal, round, and reactive to light. Neck: Normal range of motion. Cardiovascular: Normal rate, regular rhythm, normal heart sounds and intact distal pulses. Exam reveals no gallop and no friction rub. No murmur heard. /79 on monitor. Pulmonary/Chest: Effort normal and breath sounds normal. No respiratory distress. He has no wheezes. He has no rales. He exhibits no tenderness. Abdominal: Soft. Bowel sounds are normal. He exhibits no distension and no mass. There is no tenderness. There is no rebound and no guarding. Musculoskeletal: Normal range of motion. He exhibits no edema, tenderness or deformity. Neurological: He is alert and oriented to person, place, and time. He displays normal reflexes. No cranial nerve deficit. He exhibits normal muscle tone. Coordination normal.   Skin: Skin is warm. No rash noted. Psychiatric: He has a normal mood and affect. Nursing note and vitals reviewed.       Diagnostic Study Results     Labs -     Recent Results (from the past 12 hour(s))   EKG, 12 LEAD, INITIAL    Collection Time: 12/14/18  2:25 PM   Result Value Ref Range    Ventricular Rate 50 BPM    Atrial Rate 50 BPM    P-R Interval 182 ms    QRS Duration 94 ms    Q-T Interval 434 ms    QTC Calculation (Bezet) 395 ms    Calculated P Axis 47 degrees    Calculated R Axis -8 degrees    Calculated T Axis 40 degrees    Diagnosis       Sinus bradycardia  Low voltage QRS  Incomplete right bundle branch block  When compared with ECG of 25-JAN-2018 14:41,  No significant change was found     CBC WITH AUTOMATED DIFF    Collection Time: 12/14/18  2:39 PM   Result Value Ref Range    WBC 6.7 4.1 - 11.1 K/uL    RBC 4.01 (L) 4.10 - 5.70 M/uL    HGB 13.2 12.1 - 17.0 g/dL    HCT 39.3 36.6 - 50.3 %    MCV 98.0 80.0 - 99.0 FL MCH 32.9 26.0 - 34.0 PG    MCHC 33.6 30.0 - 36.5 g/dL    RDW 13.2 11.5 - 14.5 %    PLATELET 478 432 - 317 K/uL    MPV 10.1 8.9 - 12.9 FL    NRBC 0.0 0  WBC    ABSOLUTE NRBC 0.00 0.00 - 0.01 K/uL    NEUTROPHILS 51 32 - 75 %    LYMPHOCYTES 31 12 - 49 %    MONOCYTES 13 5 - 13 %    EOSINOPHILS 4 0 - 7 %    BASOPHILS 1 0 - 1 %    IMMATURE GRANULOCYTES 1 (H) 0.0 - 0.5 %    ABS. NEUTROPHILS 3.4 1.8 - 8.0 K/UL    ABS. LYMPHOCYTES 2.1 0.8 - 3.5 K/UL    ABS. MONOCYTES 0.9 0.0 - 1.0 K/UL    ABS. EOSINOPHILS 0.3 0.0 - 0.4 K/UL    ABS. BASOPHILS 0.0 0.0 - 0.1 K/UL    ABS. IMM. GRANS. 0.1 (H) 0.00 - 0.04 K/UL    DF AUTOMATED     METABOLIC PANEL, COMPREHENSIVE    Collection Time: 12/14/18  2:39 PM   Result Value Ref Range    Sodium 139 136 - 145 mmol/L    Potassium 4.4 3.5 - 5.1 mmol/L    Chloride 104 97 - 108 mmol/L    CO2 30 21 - 32 mmol/L    Anion gap 5 5 - 15 mmol/L    Glucose 88 65 - 100 mg/dL    BUN 24 (H) 6 - 20 MG/DL    Creatinine 1.05 0.70 - 1.30 MG/DL    BUN/Creatinine ratio 23 (H) 12 - 20      GFR est AA >60 >60 ml/min/1.73m2    GFR est non-AA >60 >60 ml/min/1.73m2    Calcium 9.1 8.5 - 10.1 MG/DL    Bilirubin, total 0.4 0.2 - 1.0 MG/DL    ALT (SGPT) 17 12 - 78 U/L    AST (SGOT) 15 15 - 37 U/L    Alk. phosphatase 71 45 - 117 U/L    Protein, total 7.1 6.4 - 8.2 g/dL    Albumin 3.6 3.5 - 5.0 g/dL    Globulin 3.5 2.0 - 4.0 g/dL    A-G Ratio 1.0 (L) 1.1 - 2.2     CK W/ REFLX CKMB    Collection Time: 12/14/18  2:39 PM   Result Value Ref Range    CK 50 39 - 308 U/L   TROPONIN I    Collection Time: 12/14/18  2:39 PM   Result Value Ref Range    Troponin-I, Qt. <0.05 <0.05 ng/mL   NT-PRO BNP    Collection Time: 12/14/18  2:39 PM   Result Value Ref Range    NT pro- 0 - 450 PG/ML   TROPONIN I    Collection Time: 12/14/18  4:48 PM   Result Value Ref Range    Troponin-I, Qt. <0.05 <0.05 ng/mL       Radiologic Studies -   XR CHEST PORT   Final Result   IMPRESSION: No acute abnormality.                  CT Results  (Last 48 hours)    None        CXR Results  (Last 48 hours)               12/14/18 1518  XR CHEST PORT Final result    Impression:  IMPRESSION: No acute abnormality. Narrative:  INDICATION:  chest pain       COMPARISON: 12/16/2017. FINDINGS: An AP radiograph of the chest demonstrates clear lungs. The cardiac   and mediastinal contours and pulmonary vascularity are normal.  The bones and   soft tissues are within normal limits. Medical Decision Making   I am the first provider for this patient. I reviewed the vital signs, available nursing notes, past medical history, past surgical history, family history and social history. Vital Signs-Reviewed the patient's vital signs. Patient Vitals for the past 12 hrs:   Temp Pulse Resp BP SpO2   12/14/18 1530  (!) 56 15 137/83 95 %   12/14/18 1500  (!) 58 16 141/79 98 %   12/14/18 1432 98 °F (36.7 °C) (!) 57 11 141/88 96 %       Pulse Oximetry Analysis - 95% on RA     ED EKG interpretation: 14:25  Rhythm: sinus bradycardia; and regular . Rate (approx.): 50; Axis: normal; P wave: normal; QRS interval: Low voltage; ST/T wave: normal; Other findings: non-ischemic. This EKG was interpreted by Malgorzata Jiménez MD, ED Provider. Records Reviewed: Nursing Notes, Old Medical Records, Previous electrocardiograms, Previous Radiology Studies and Previous Laboratory Studies    Provider Notes (Medical Decision Making):   DDx: atypical chest pain, ACS, costochondritis, PNA, bronchitis, CHF, pleural effusion, MSK pain, GERD, pancreatitis    Pt presents with acute chest pain; stable vitals and nontoxic appearing. The pt is unlikely to have PE. There is no pleuritic chest pain, no tachycardia, no hypoxia, pt non-smoker, no unilateral leg swelling, no hormone use, no recent travel/immobilization, no recent surgery. Therefore no d-dimer or PE    The pt is unlikely to have aortic dissection.  The pulses are equal, there is no sharp tearing chest pain radiating to back, the patient is not extremely hypertensive. Therefore no d-dimer or CTA chest for dissection    Patient not extremely hypertensive, unlikely to be hypertensive emergency. No history of valve abnormality and no harsh murmur, no signs of flash pulmonary edema, therefore less likely ruptured valve. Endocarditis unlikely -- no IV drug abuse, native valve, no fevers, patient well appearing, no murmurs/splinter hemorrhages/janeway lesions or oslar nodes    The pt is unlikely to have esophageal rupture. There is no history of forceful vomiting, patient well appearing, no difficulty swallowing    Will pursue cardiac work-up with EKG, troponin, ckmb, CXR. While the pt is less likely to have pneumonia as there is no cough and no fever, and less likely to have ptx, will order CXR to assess lung fields. Will provide pain control and reassess. ED Course:   Initial assessment performed. The patients presenting problems have been discussed, and they are in agreement with the care plan formulated and outlined with them. I have encouraged them to ask questions as they arise throughout their visit. I reviewed our electronic medical record system for any past medical records that were available that may contribute to the patient's current condition, the nursing notes and vital signs from today's visit. Kenyatta Ferugson MD    Progress Note  5:15 PM  Patient reports feeling better and symptoms have improved after ED treatment. Pt able to tolerate PO and ambulate per baseline. Roosevelt Isidro's final labs and imaging have been reviewed with him. He has been counseled regarding his diagnosis. He verbally conveys understanding and agreement of the signs, symptoms, diagnosis, treatment and prognosis and additionally agrees to follow up as recommended with Dr. Elmer De La Torre DO in 24 - 48 hours. All questions have been answered, pt voiced understanding and agreement with plan.  Specific return precautions provided as well as instructions to return to the ED should sx worsen at any time. At time of discharge, pt had stable vital signs and had no questions or concerns, and was very satisfied with overall care. Pt is clinically safe for discharge. I have answered all questions to patient's satisfaction. He both understood and agreed with plan as discussed above. Vital signs stable for discharge. Critical Care Time:   0 minutes. Disposition:  Discharge Note:  5:15 PM  The patient has been re-evaluated and is ready for discharge. Reviewed available results with patient. Counseled patient/parent/guardian on diagnosis and care plan. Patient has expressed understanding, and all questions have been answered. Patient agrees with plan and agrees to follow up as recommended, or return to the ED if their symptoms worsen. Discharge instructions have been provided and explained to the patient, along with reasons to return to the ED. PLAN:  1. Discharge Medication List as of 12/14/2018  5:42 PM      No current facility-administered medications for this encounter. Current Outpatient Medications:     etodolac (LODINE) 400 mg tablet, take 1 tablet by mouth once daily if needed, Disp: 30 Tab, Rfl: 2    isosorbide mononitrate ER (IMDUR) 30 mg tablet, take 1 tablet by mouth once daily, Disp: 30 Tab, Rfl: 11    aspirin (ASPIRIN) 325 mg tablet, Take 1 Tab by mouth daily. Indications: myocardial infarction prevention, Disp: 90 Tab, Rfl: 3    pravastatin (PRAVACHOL) 20 mg tablet, take 1 tablet by mouth once daily at bedtime, Disp: 90 Tab, Rfl: 3    FOLBEE 2.5-25-1 mg tablet, take 1 tablet by mouth once daily, Disp: 60 Tab, Rfl: 11    metoprolol tartrate (LOPRESSOR) 25 mg tablet, take 1/2 tablet once a day  Indications: hypertension, Disp: 30 Tab, Rfl: 11    amLODIPine (NORVASC) 2.5 mg tablet, Take 1 Tab by mouth daily.  Indications: hypertension, Disp: 30 Tab, Rfl: 11    acetaminophen (TYLENOL ARTHRITIS PAIN) 650 mg TbER, Take 650 mg by mouth every eight (8) hours. , Disp: , Rfl:     meclizine (ANTIVERT) 25 mg tablet, Take 1 Tab by mouth three (3) times daily as needed for Dizziness. , Disp: 20 Tab, Rfl: 0    GUAIFENESIN (MUCINEX PO), Take 1 Tab by mouth daily. , Disp: , Rfl:     docusate sodium (COLACE) 100 mg capsule, Take 100 mg by mouth daily. Instructed to take daily with plenty of liquid unless otherwise directed- as per 11/7/14 [facility] discharge med list, Disp: , Rfl:     cholecalciferol, vitamin D3, (VITAMIN D3) 2,000 unit tab, Take 1 Tab by mouth daily. As per 11/07/14 [facility] discharge med list, Disp: , Rfl:     aspirin (ASPIRIN) 325 mg tablet, Take 1 tablet by mouth daily. , Disp: 30 tablet, Rfl: 3    tamsulosin (FLOMAX) 0.4 mg capsule, Take 0.4 mg by mouth daily. For prostate , Disp: , Rfl:     2. Follow-up Information     Follow up With Specialties Details Why [address] [place] [address], [facility],  Family Practice   [address]  [phone]      [facility] EMERGENCY DEPT Emergency Medicine  As needed, If symptoms worsen [facility]  [address] [facility]  [phone]        Return to ED if worse     Diagnosis     Clinical Impression:   1. Acute chest pain    2. Atypical chest pain    3. Accelerated hypertension    4. Musculoskeletal chest pain        Attestations: This note is prepared by Jesse Soulier, acting as scribe for MD Gisel Capone MD: The scribe's documentation has been prepared under my direction and personally reviewed by me in its entirety. I confirm that the note above accurately reflects all work, treatment, procedures, and medical decision making performed by me. This note will not be viewable in [address]

## 2018-12-15 LAB
ATRIAL RATE: 50 BPM
CALCULATED P AXIS, ECG09: 47 DEGREES
CALCULATED R AXIS, ECG10: -8 DEGREES
CALCULATED T AXIS, ECG11: 40 DEGREES
DIAGNOSIS, 93000: NORMAL
P-R INTERVAL, ECG05: 182 MS
Q-T INTERVAL, ECG07: 434 MS
QRS DURATION, ECG06: 94 MS
QTC CALCULATION (BEZET), ECG08: 395 MS
VENTRICULAR RATE, ECG03: 50 BPM

## 2018-12-17 ENCOUNTER — TELEPHONE (OUTPATIENT)
Dept: FAMILY MEDICINE CLINIC | Age: 83
End: 2018-12-17

## 2018-12-17 NOTE — TELEPHONE ENCOUNTER
Called patient to inform patient that they needed to follow up with their cardiologist, per nurse supervisor. Patient did not answer. Left a voicemail to return a call back to the office. Cannot schedule a follow up appointment for tomorrow until after 4:00pm today.

## 2018-12-17 NOTE — TELEPHONE ENCOUNTER
----- Message from Tato Taylor sent at 12/17/2018 11:06 AM EST -----  Regarding: Dr. Jorge Joseph telephone   Pt's wife Patience Lazaro is requesting a sooner follow up appt from ER visit on 12/14/18 Best contact [phone]

## 2018-12-20 ENCOUNTER — HOSPITAL ENCOUNTER (INPATIENT)
Age: 83
LOS: 2 days | Discharge: HOME HEALTH | DRG: 683 | End: 2018-12-22
Attending: EMERGENCY MEDICINE | Admitting: FAMILY MEDICINE
Payer: MEDICARE

## 2018-12-20 ENCOUNTER — APPOINTMENT (OUTPATIENT)
Dept: CT IMAGING | Age: 83
DRG: 683 | End: 2018-12-20
Attending: EMERGENCY MEDICINE
Payer: MEDICARE

## 2018-12-20 DIAGNOSIS — J20.9 ACUTE BRONCHITIS, UNSPECIFIED ORGANISM: ICD-10-CM

## 2018-12-20 DIAGNOSIS — R55 SYNCOPE AND COLLAPSE: Primary | ICD-10-CM

## 2018-12-20 PROBLEM — J40 BRONCHITIS: Status: ACTIVE | Noted: 2018-12-20

## 2018-12-20 PROBLEM — N17.9 AKI (ACUTE KIDNEY INJURY) (HCC): Status: ACTIVE | Noted: 2018-12-20

## 2018-12-20 PROBLEM — R77.8 ELEVATED TROPONIN: Status: ACTIVE | Noted: 2018-12-20

## 2018-12-20 LAB
ALBUMIN SERPL-MCNC: 3 G/DL (ref 3.5–5)
ALBUMIN/GLOB SERPL: 0.8 {RATIO} (ref 1.1–2.2)
ALP SERPL-CCNC: 70 U/L (ref 45–117)
ALT SERPL-CCNC: 18 U/L (ref 12–78)
ANION GAP SERPL CALC-SCNC: 6 MMOL/L (ref 5–15)
APPEARANCE UR: CLEAR
AST SERPL-CCNC: 15 U/L (ref 15–37)
ATRIAL RATE: 62 BPM
BACTERIA URNS QL MICRO: NEGATIVE /HPF
BASOPHILS # BLD: 0 K/UL (ref 0–0.1)
BASOPHILS NFR BLD: 0 % (ref 0–1)
BILIRUB SERPL-MCNC: 0.5 MG/DL (ref 0.2–1)
BILIRUB UR QL CFM: NEGATIVE
BUN SERPL-MCNC: 18 MG/DL (ref 6–20)
BUN/CREAT SERPL: 14 (ref 12–20)
CALCIUM SERPL-MCNC: 8.5 MG/DL (ref 8.5–10.1)
CALCULATED P AXIS, ECG09: 47 DEGREES
CALCULATED R AXIS, ECG10: -24 DEGREES
CALCULATED T AXIS, ECG11: 29 DEGREES
CHLORIDE SERPL-SCNC: 103 MMOL/L (ref 97–108)
CK SERPL-CCNC: 53 U/L (ref 39–308)
CO2 SERPL-SCNC: 25 MMOL/L (ref 21–32)
COLOR UR: ABNORMAL
COMMENT, HOLDF: NORMAL
CREAT SERPL-MCNC: 1.32 MG/DL (ref 0.7–1.3)
DIAGNOSIS, 93000: NORMAL
DIFFERENTIAL METHOD BLD: ABNORMAL
EOSINOPHIL # BLD: 0 K/UL (ref 0–0.4)
EOSINOPHIL NFR BLD: 0 % (ref 0–7)
EPITH CASTS URNS QL MICRO: ABNORMAL /LPF
ERYTHROCYTE [DISTWIDTH] IN BLOOD BY AUTOMATED COUNT: 13.5 % (ref 11.5–14.5)
GLOBULIN SER CALC-MCNC: 3.8 G/DL (ref 2–4)
GLUCOSE SERPL-MCNC: 185 MG/DL (ref 65–100)
GLUCOSE UR STRIP.AUTO-MCNC: NEGATIVE MG/DL
HCT VFR BLD AUTO: 36.6 % (ref 36.6–50.3)
HGB BLD-MCNC: 12.3 G/DL (ref 12.1–17)
HGB UR QL STRIP: NEGATIVE
IMM GRANULOCYTES # BLD: 0 K/UL (ref 0–0.04)
IMM GRANULOCYTES NFR BLD AUTO: 0 % (ref 0–0.5)
KETONES UR QL STRIP.AUTO: NEGATIVE MG/DL
LACTATE SERPL-SCNC: 1.7 MMOL/L (ref 0.4–2)
LEUKOCYTE ESTERASE UR QL STRIP.AUTO: NEGATIVE
LYMPHOCYTES # BLD: 1.4 K/UL (ref 0.8–3.5)
LYMPHOCYTES NFR BLD: 18 % (ref 12–49)
MCH RBC QN AUTO: 33 PG (ref 26–34)
MCHC RBC AUTO-ENTMCNC: 33.6 G/DL (ref 30–36.5)
MCV RBC AUTO: 98.1 FL (ref 80–99)
MONOCYTES # BLD: 0.9 K/UL (ref 0–1)
MONOCYTES NFR BLD: 11 % (ref 5–13)
MUCOUS THREADS URNS QL MICRO: ABNORMAL /LPF
NEUTS SEG # BLD: 5.4 K/UL (ref 1.8–8)
NEUTS SEG NFR BLD: 70 % (ref 32–75)
NITRITE UR QL STRIP.AUTO: NEGATIVE
NRBC # BLD: 0 K/UL (ref 0–0.01)
NRBC BLD-RTO: 0 PER 100 WBC
P-R INTERVAL, ECG05: 210 MS
PH UR STRIP: 5.5 [PH] (ref 5–8)
PLATELET # BLD AUTO: 140 K/UL (ref 150–400)
PMV BLD AUTO: 10 FL (ref 8.9–12.9)
POTASSIUM SERPL-SCNC: 3.8 MMOL/L (ref 3.5–5.1)
PROT SERPL-MCNC: 6.8 G/DL (ref 6.4–8.2)
PROT UR STRIP-MCNC: 30 MG/DL
Q-T INTERVAL, ECG07: 414 MS
QRS DURATION, ECG06: 104 MS
QTC CALCULATION (BEZET), ECG08: 420 MS
RBC # BLD AUTO: 3.73 M/UL (ref 4.1–5.7)
RBC #/AREA URNS HPF: ABNORMAL /HPF (ref 0–5)
SAMPLES BEING HELD,HOLD: NORMAL
SODIUM SERPL-SCNC: 134 MMOL/L (ref 136–145)
SP GR UR REFRACTOMETRY: 1.02 (ref 1–1.03)
TROPONIN I SERPL-MCNC: 0.06 NG/ML
TROPONIN I SERPL-MCNC: 0.07 NG/ML
UROBILINOGEN UR QL STRIP.AUTO: 1 EU/DL (ref 0.2–1)
VENTRICULAR RATE, ECG03: 62 BPM
WBC # BLD AUTO: 7.7 K/UL (ref 4.1–11.1)
WBC URNS QL MICRO: ABNORMAL /HPF (ref 0–4)

## 2018-12-20 PROCEDURE — 65660000000 HC RM CCU STEPDOWN

## 2018-12-20 PROCEDURE — 93005 ELECTROCARDIOGRAM TRACING: CPT

## 2018-12-20 PROCEDURE — 74011250637 HC RX REV CODE- 250/637: Performed by: INTERNAL MEDICINE

## 2018-12-20 PROCEDURE — 36415 COLL VENOUS BLD VENIPUNCTURE: CPT

## 2018-12-20 PROCEDURE — 82550 ASSAY OF CK (CPK): CPT

## 2018-12-20 PROCEDURE — 96361 HYDRATE IV INFUSION ADD-ON: CPT

## 2018-12-20 PROCEDURE — 74011250637 HC RX REV CODE- 250/637: Performed by: EMERGENCY MEDICINE

## 2018-12-20 PROCEDURE — 74011250637 HC RX REV CODE- 250/637: Performed by: FAMILY MEDICINE

## 2018-12-20 PROCEDURE — 80053 COMPREHEN METABOLIC PANEL: CPT

## 2018-12-20 PROCEDURE — 96365 THER/PROPH/DIAG IV INF INIT: CPT

## 2018-12-20 PROCEDURE — 74011000250 HC RX REV CODE- 250: Performed by: FAMILY MEDICINE

## 2018-12-20 PROCEDURE — 74011250636 HC RX REV CODE- 250/636: Performed by: FAMILY MEDICINE

## 2018-12-20 PROCEDURE — 81001 URINALYSIS AUTO W/SCOPE: CPT

## 2018-12-20 PROCEDURE — 83605 ASSAY OF LACTIC ACID: CPT

## 2018-12-20 PROCEDURE — 85025 COMPLETE CBC W/AUTO DIFF WBC: CPT

## 2018-12-20 PROCEDURE — 84484 ASSAY OF TROPONIN QUANT: CPT

## 2018-12-20 PROCEDURE — 87040 BLOOD CULTURE FOR BACTERIA: CPT

## 2018-12-20 PROCEDURE — 71250 CT THORAX DX C-: CPT

## 2018-12-20 PROCEDURE — 74011250636 HC RX REV CODE- 250/636: Performed by: EMERGENCY MEDICINE

## 2018-12-20 PROCEDURE — 99285 EMERGENCY DEPT VISIT HI MDM: CPT

## 2018-12-20 RX ORDER — ACETAMINOPHEN 325 MG/1
650 TABLET ORAL
Status: DISCONTINUED | OUTPATIENT
Start: 2018-12-20 | End: 2018-12-22 | Stop reason: HOSPADM

## 2018-12-20 RX ORDER — ASPIRIN 325 MG
325 TABLET ORAL DAILY
Status: DISCONTINUED | OUTPATIENT
Start: 2018-12-21 | End: 2018-12-22 | Stop reason: HOSPADM

## 2018-12-20 RX ORDER — SODIUM CHLORIDE 0.9 % (FLUSH) 0.9 %
5-10 SYRINGE (ML) INJECTION AS NEEDED
Status: DISCONTINUED | OUTPATIENT
Start: 2018-12-20 | End: 2018-12-22 | Stop reason: HOSPADM

## 2018-12-20 RX ORDER — SODIUM CHLORIDE 9 MG/ML
1000 INJECTION, SOLUTION INTRAVENOUS ONCE
Status: COMPLETED | OUTPATIENT
Start: 2018-12-20 | End: 2018-12-20

## 2018-12-20 RX ORDER — ACETAMINOPHEN 325 MG/1
975 TABLET ORAL
Status: COMPLETED | OUTPATIENT
Start: 2018-12-20 | End: 2018-12-20

## 2018-12-20 RX ORDER — METOPROLOL TARTRATE 25 MG/1
12.5 TABLET, FILM COATED ORAL 2 TIMES DAILY
Status: DISCONTINUED | OUTPATIENT
Start: 2018-12-21 | End: 2018-12-22 | Stop reason: HOSPADM

## 2018-12-20 RX ORDER — PRAVASTATIN SODIUM 10 MG/1
20 TABLET ORAL
Status: DISCONTINUED | OUTPATIENT
Start: 2018-12-20 | End: 2018-12-22 | Stop reason: HOSPADM

## 2018-12-20 RX ORDER — ISOSORBIDE MONONITRATE 30 MG/1
30 TABLET, EXTENDED RELEASE ORAL DAILY
Status: DISCONTINUED | OUTPATIENT
Start: 2018-12-21 | End: 2018-12-22 | Stop reason: HOSPADM

## 2018-12-20 RX ORDER — SODIUM CHLORIDE 0.9 % (FLUSH) 0.9 %
5-10 SYRINGE (ML) INJECTION EVERY 8 HOURS
Status: DISCONTINUED | OUTPATIENT
Start: 2018-12-20 | End: 2018-12-22 | Stop reason: HOSPADM

## 2018-12-20 RX ORDER — LANOLIN ALCOHOL/MO/W.PET/CERES
3 CREAM (GRAM) TOPICAL
Status: DISCONTINUED | OUTPATIENT
Start: 2018-12-20 | End: 2018-12-22 | Stop reason: HOSPADM

## 2018-12-20 RX ORDER — TAMSULOSIN HYDROCHLORIDE 0.4 MG/1
0.4 CAPSULE ORAL DAILY
Status: DISCONTINUED | OUTPATIENT
Start: 2018-12-21 | End: 2018-12-22 | Stop reason: HOSPADM

## 2018-12-20 RX ORDER — AMLODIPINE BESYLATE 5 MG/1
2.5 TABLET ORAL DAILY
Status: DISCONTINUED | OUTPATIENT
Start: 2018-12-21 | End: 2018-12-22 | Stop reason: HOSPADM

## 2018-12-20 RX ORDER — NALOXONE HYDROCHLORIDE 0.4 MG/ML
0.4 INJECTION, SOLUTION INTRAMUSCULAR; INTRAVENOUS; SUBCUTANEOUS AS NEEDED
Status: DISCONTINUED | OUTPATIENT
Start: 2018-12-20 | End: 2018-12-22 | Stop reason: HOSPADM

## 2018-12-20 RX ORDER — MELATONIN
1000 DAILY
Status: DISCONTINUED | OUTPATIENT
Start: 2018-12-21 | End: 2018-12-22 | Stop reason: HOSPADM

## 2018-12-20 RX ORDER — ONDANSETRON 2 MG/ML
4 INJECTION INTRAMUSCULAR; INTRAVENOUS
Status: DISCONTINUED | OUTPATIENT
Start: 2018-12-20 | End: 2018-12-22 | Stop reason: HOSPADM

## 2018-12-20 RX ORDER — OXYCODONE AND ACETAMINOPHEN 5; 325 MG/1; MG/1
1 TABLET ORAL
Status: DISCONTINUED | OUTPATIENT
Start: 2018-12-20 | End: 2018-12-22 | Stop reason: HOSPADM

## 2018-12-20 RX ORDER — HEPARIN SODIUM 5000 [USP'U]/ML
5000 INJECTION, SOLUTION INTRAVENOUS; SUBCUTANEOUS EVERY 8 HOURS
Status: DISCONTINUED | OUTPATIENT
Start: 2018-12-20 | End: 2018-12-22 | Stop reason: HOSPADM

## 2018-12-20 RX ORDER — DOCUSATE SODIUM 100 MG/1
100 CAPSULE, LIQUID FILLED ORAL 2 TIMES DAILY
Status: DISCONTINUED | OUTPATIENT
Start: 2018-12-21 | End: 2018-12-21

## 2018-12-20 RX ORDER — DOCUSATE SODIUM 100 MG/1
100 CAPSULE, LIQUID FILLED ORAL DAILY
Status: DISCONTINUED | OUTPATIENT
Start: 2018-12-21 | End: 2018-12-22 | Stop reason: HOSPADM

## 2018-12-20 RX ADMIN — MELATONIN TAB 3 MG 3 MG: 3 TAB at 22:18

## 2018-12-20 RX ADMIN — ALUMINUM HYDROXIDE AND MAGNESIUM HYDROXIDE 15 ML: 200; 200 SUSPENSION ORAL at 17:39

## 2018-12-20 RX ADMIN — ACETAMINOPHEN 650 MG: 325 TABLET ORAL at 22:18

## 2018-12-20 RX ADMIN — AZITHROMYCIN MONOHYDRATE 500 MG: 500 INJECTION, POWDER, LYOPHILIZED, FOR SOLUTION INTRAVENOUS at 17:40

## 2018-12-20 RX ADMIN — HEPARIN SODIUM 5000 UNITS: 5000 INJECTION INTRAVENOUS; SUBCUTANEOUS at 22:20

## 2018-12-20 RX ADMIN — PRAVASTATIN SODIUM 20 MG: 10 TABLET ORAL at 22:18

## 2018-12-20 RX ADMIN — ACETAMINOPHEN 975 MG: 325 TABLET ORAL at 17:40

## 2018-12-20 RX ADMIN — SODIUM CHLORIDE 1000 ML: 900 INJECTION, SOLUTION INTRAVENOUS at 11:37

## 2018-12-20 RX ADMIN — FAMOTIDINE 20 MG: 10 INJECTION, SOLUTION INTRAVENOUS at 22:19

## 2018-12-20 NOTE — ED NOTES
Bedside and Verbal shift change report given to Michi Causey (oncoming nurse) by Ron Birch RN (offgoing nurse). Report included the following information SBAR, Kardex, ED Summary, MAR, Med Rec Status and Cardiac Rhythm Sinus.

## 2018-12-20 NOTE — ED NOTES
Assumed care of patient. Bedside shift change report received from [address] [place] [address] (offgoing nurse) by Del Sosa (oncoming nurse). Given using SBAR, ED summary, MAR and recent results.  Pt and wife updated on plan of care

## 2018-12-20 NOTE — ED NOTES
Wife now at bedside. States when the patient \"passed out\" he became diaphoretic and skin was clammy.

## 2018-12-20 NOTE — ED PROVIDER NOTES
EMERGENCY DEPARTMENT HISTORY AND PHYSICAL EXAM      Date: 12/20/2018  Patient Name: Carito Poe    History of Presenting Illness     Chief Complaint   Patient presents with    Syncope    Cough       History Provided By: Patient and Caregiver    HPI: Carito Poe, 80 y.o. male with PMHx significant for HTN, CAD and stroke, presents via EMS to the ED with cc of a sudden onset syncopal episode earlier this morning with an associated productive cough, fever (100.4F) and diaphoresis described as cold sweats. Cough production is yellow. Pt's caregiver states he had eaten breakfast and taken his medications when he experienced a syncopal episode at the table, remaining unconscious until EMS arrived. He had gone to [facility] yesterday for his cough and received an XR of the chest with no significant findings. He was also here Friday for CP with no significant findings during workup. Pt's caregiver specifically denies the pt has exhibited abd pain or acute SOB. There are no other complaints, changes, or physical findings at this time. PCP: Joanne Quevedo, DO    Current Outpatient Medications   Medication Sig Dispense Refill    etodolac (LODINE) 400 mg tablet take 1 tablet by mouth once daily if needed 30 Tab 2    isosorbide mononitrate ER (IMDUR) 30 mg tablet take 1 tablet by mouth once daily 30 Tab 11    aspirin (ASPIRIN) 325 mg tablet Take 1 Tab by mouth daily. Indications: myocardial infarction prevention 90 Tab 3    pravastatin (PRAVACHOL) 20 mg tablet take 1 tablet by mouth once daily at bedtime 90 Tab 3    FOLBEE 2.5-25-1 mg tablet take 1 tablet by mouth once daily 60 Tab 11    metoprolol tartrate (LOPRESSOR) 25 mg tablet take 1/2 tablet once a day  Indications: hypertension 30 Tab 11    amLODIPine (NORVASC) 2.5 mg tablet Take 1 Tab by mouth daily. Indications: hypertension 30 Tab 11    acetaminophen (TYLENOL ARTHRITIS PAIN) 650 mg TbER Take 650 mg by mouth every eight (8) hours.       meclizine (ANTIVERT) 25 mg tablet Take 1 Tab by mouth three (3) times daily as needed for Dizziness. 20 Tab 0    GUAIFENESIN (MUCINEX PO) Take 1 Tab by mouth daily.  docusate sodium (COLACE) 100 mg capsule Take 100 mg by mouth daily. Instructed to take daily with plenty of liquid unless otherwise directed- as per 11/7/14 [address] discharge med list      cholecalciferol, vitamin D3, (VITAMIN D3) 2,000 unit tab Take 1 Tab by mouth daily. As per 11/07/14 [address] discharge med list      aspirin (ASPIRIN) 325 mg tablet Take 1 tablet by mouth daily. 30 tablet 3    tamsulosin (FLOMAX) 0.4 mg capsule Take 0.4 mg by mouth daily. For prostate          Past History     Past Medical History:  Past Medical History:   Diagnosis Date    Back pain     due to DDD and scoliosis    BPH (benign prostatic hyperplasia)     Dr. Paige Gonzalez CAD (coronary artery disease) 10/30/13    30%LAD, 80% ostal stenosis. Dr. Nirali Fu.  Chest pain 10/04/04, 10/30/13    Dr. Harsha Galvez. Negative Stress Cardiolite Test.  Dr. Ricardo Asif.  Chronic venous insufficiency 06/15/09    Dr. Linda Omalley due to fall    DDD (degenerative disc disease), lumbar     L3-S1    Diverticulosis 02/2004    sigmoid. Dr. Pedro Reilly DJD (degenerative joint disease) of cervical spine     C 4-5 ;5-6    Dyslipidemia     Fracture 1980    facial/nasal    Hearing loss     wears hearing aid    Heartburn     Hemorrhoids, internal 1987    Dr. Willy Suarez of unspecified site of abdominal cavity without mention of obstruction or gangrene     inguinal  Lt    Hypertension     Impotence     Left acoustic neuroma ([facility] [address] 10/28/14    17 x 9 mm schwannoma.  Pes planus of both feet     PVD (peripheral vascular disease) ([address] 2009    Scoliosis     LS    Slipped intervertebral disc 1954    chiropractor    SOB (shortness of breath) 08/07/13    Dr. Kathy Parker.     Stroke ([facility] [address] 10/28/2014    small Left Thalamus. Dr. Desmond Trujillo. Dr. Mary Ferreira.  Urinary incontinence     due to BPH. Dr. Justen Wilson    Varicose vein 06/15/09    Dr. Annamarie Coreas Vertigo 2003    due to inner ear. Dr. Danna Acevedo. Dr. Carla Gregorio. Dr. Torres Loud Visual loss     Dr. Abe Berger, [facility]. Past Surgical History:  Past Surgical History:   Procedure Laterality Date    APPENDECTOMY      due to gangrene   [facility], 02/2002    Dr. Jacob Martel. benign.  COLONOSCOPY  02/2004    Dr. Lemon Lanes. due q 10 yrs    CYSTOSCOPY  10/17/03    Dr. Josias Quiroga  10/30/13    Dr. Yary Frey. 80% ostal stenosis. 30% LAD.  HX HEMORRHOIDECTOMY      HX KNEE ARTHROSCOPY  1990 Left , 2007 Right    Dr. Villavicencio Primus HX LAPAROTOMY  05/14/07    Dr. Janice Chaves. due to Bowel Obstruction    HX POLYPECTOMY  02/2002    Anal.     HX TONSILLECTOMY      FL COLSC FLX W/RMVL OF TUMOR POLYP LESION SNARE TQ  5/23/2011    Dr. Isabella Moralez. Family History:  Family History   Problem Relation Age of Onset    Heart Attack Mother     Other Mother         brain aneurysm/AAA/varicose veins    Stroke Mother     Heart Attack Sister     Cancer Maternal Grandmother         ovarian       Social History:  Social History     Tobacco Use    Smoking status: Never Smoker    Smokeless tobacco: Never Used   Substance Use Topics    Alcohol use: No    Drug use: No       Allergies: Allergies   Allergen Reactions    Aricept [Donepezil] Nausea and Vomiting     Mild depression    Betadine [Povidone-Iodine] Hives    Phenergan [Promethazine] Other (comments)     hallucinations    Seafood [Shellfish Containing Products] Rash and Swelling     crabmeat    Xylocaine [Lidocaine Hcl] Shortness of Breath         Review of Systems   Review of Systems   Constitutional: Positive for diaphoresis and fever (100.4F). Negative for chills. HENT: Negative for congestion. Eyes: Negative for visual disturbance.    Respiratory: Positive for cough. Negative for chest tightness and shortness of breath. Cardiovascular: Negative for chest pain and leg swelling. Gastrointestinal: Negative for abdominal pain and vomiting. Endocrine: Negative for polyuria. Genitourinary: Negative for dysuria and frequency. Musculoskeletal: Negative for myalgias. Skin: Negative for color change. Allergic/Immunologic: Negative for immunocompromised state. Neurological: Positive for syncope. Negative for numbness. Physical Exam   Nursing note and vitals reviewed.   General appearance: non-toxic, generally weak appearing  Eyes: PERRL, EOMI, conjunctiva normal, anicteric sclera  HEENT: mucous membranes moist, oropharynx is clear  Pulmonary: coarse breaths sounds B/L, scattered rhonchi  Cardiac: normal rate and regular rhythm, no murmurs, gallops, or rubs, 2+DP pulses, 2+ radial pulses  Abdomen: soft, nontender, nondistended, bowel sounds present, healed surgical scar over the lower abd wall, no CVA tenderness  MSK: no pre-tibial edema, negative Juana's sign  Neuro: Alert, answers questions appropriately, CN II-XII grossly intact, moves all extremities  Skin: capillary refill brisk, skin is warm to touch    Diagnostic Study Results     Labs -     Recent Results (from the past 12 hour(s))   EKG, 12 LEAD, INITIAL    Collection Time: 12/20/18  9:54 AM   Result Value Ref Range    Ventricular Rate 62 BPM    Atrial Rate 62 BPM    P-R Interval 210 ms    QRS Duration 104 ms    Q-T Interval 414 ms    QTC Calculation (Bezet) 420 ms    Calculated P Axis 47 degrees    Calculated R Axis -24 degrees    Calculated T Axis 29 degrees    Diagnosis       Sinus rhythm with 1st degree AV block  Incomplete right bundle branch block  When compared with ECG of 14-DEC-2018 14:25,  No significant change was found     CBC WITH AUTOMATED DIFF    Collection Time: 12/20/18 10:21 AM   Result Value Ref Range    WBC 7.7 4.1 - 11.1 K/uL    RBC 3.73 (L) 4.10 - 5.70 M/uL    HGB 12.3 12.1 - 17.0 g/dL    HCT 36.6 36.6 - 50.3 %    MCV 98.1 80.0 - 99.0 FL    MCH 33.0 26.0 - 34.0 PG    MCHC 33.6 30.0 - 36.5 g/dL    RDW 13.5 11.5 - 14.5 %    PLATELET 988 (L) 101 - 400 K/uL    MPV 10.0 8.9 - 12.9 FL    NRBC 0.0 0  WBC    ABSOLUTE NRBC 0.00 0.00 - 0.01 K/uL    NEUTROPHILS 70 32 - 75 %    LYMPHOCYTES 18 12 - 49 %    MONOCYTES 11 5 - 13 %    EOSINOPHILS 0 0 - 7 %    BASOPHILS 0 0 - 1 %    IMMATURE GRANULOCYTES 0 0.0 - 0.5 %    ABS. NEUTROPHILS 5.4 1.8 - 8.0 K/UL    ABS. LYMPHOCYTES 1.4 0.8 - 3.5 K/UL    ABS. MONOCYTES 0.9 0.0 - 1.0 K/UL    ABS. EOSINOPHILS 0.0 0.0 - 0.4 K/UL    ABS. BASOPHILS 0.0 0.0 - 0.1 K/UL    ABS. IMM. GRANS. 0.0 0.00 - 0.04 K/UL    DF AUTOMATED     METABOLIC PANEL, COMPREHENSIVE    Collection Time: 12/20/18 10:21 AM   Result Value Ref Range    Sodium 134 (L) 136 - 145 mmol/L    Potassium 3.8 3.5 - 5.1 mmol/L    Chloride 103 97 - 108 mmol/L    CO2 25 21 - 32 mmol/L    Anion gap 6 5 - 15 mmol/L    Glucose 185 (H) 65 - 100 mg/dL    BUN 18 6 - 20 MG/DL    Creatinine 1.32 (H) 0.70 - 1.30 MG/DL    BUN/Creatinine ratio 14 12 - 20      GFR est AA >60 >60 ml/min/1.73m2    GFR est non-AA 51 (L) >60 ml/min/1.73m2    Calcium 8.5 8.5 - 10.1 MG/DL    Bilirubin, total 0.5 0.2 - 1.0 MG/DL    ALT (SGPT) 18 12 - 78 U/L    AST (SGOT) 15 15 - 37 U/L    Alk. phosphatase 70 45 - 117 U/L    Protein, total 6.8 6.4 - 8.2 g/dL    Albumin 3.0 (L) 3.5 - 5.0 g/dL    Globulin 3.8 2.0 - 4.0 g/dL    A-G Ratio 0.8 (L) 1.1 - 2.2     SAMPLES BEING HELD    Collection Time: 12/20/18 10:21 AM   Result Value Ref Range    SAMPLES BEING HELD 1BLUE     COMMENT        Add-on orders for these samples will be processed based on acceptable specimen integrity and analyte stability, which may vary by analyte. Radiologic Studies -   No orders to display     CT Results  (Last 48 hours)               12/20/18 1216  CT CHEST WO CONT Final result    Impression:  IMPRESSION:    1.  No airspace disease or other acute abnormality. 2. Atherosclerosis with mild coronary artery calcification. Narrative:  EXAM:  CT CHEST WITHOUT CONTRAST    INDICATION: Persistent cough. COMPARISON: 12/16/2017. CONTRAST: None. TECHNIQUE: Unenhanced multislice helical CT was performed from the thoracic   inlet to the adrenal glands without intravenous contrast administration. Contiguous 5 mm axial images were reconstructed and lung and soft tissue windows   were generated. Coronal and sagittal reformations were generated. CT dose   reduction was achieved through use of a standardized protocol tailored for this   examination and automatic exposure control for dose modulation. FINDINGS:   LOWER NECK: The visualized portions of the thyroid and structures of the lower   neck are within normal limits. LUNGS: The lungs are clear of mass, nodule, airspace disease or edema. There is   minimal dependent atelectasis. PLEURA: There is no pleural effusion or pneumothorax. The absence of intravenous contrast reduces the sensitivity for evaluation of   the mediastinum and upper abdominal organs. AORTA: The aorta is atherosclerotic and has a normal contour with no evidence of   aneurysm. There is mild coronary artery calcification. MEDIASTINUM: No mediastinal or hilar or axillary adenopathy is appreciated. BONES AND SOFT TISSUES: The bones and soft tissues of the chest wall are within   normal limits. UPPER ABDOMEN: The visualized portions of the upper abdominal organs are normal.               CXR Results  (Last 48 hours)    None            Medical Decision Making   I am the first provider for this patient. I reviewed the vital signs, available nursing notes, past medical history, past surgical history, family history and social history. Vital Signs-Reviewed the patient's vital signs.   Patient Vitals for the past 12 hrs:   Temp Pulse Resp BP SpO2   12/20/18 0953 97.6 °F (36.4 °C) 64 20 108/70 95 %       Pulse Oximetry Analysis - 95% on RA    Cardiac Monitor:   Rate: 64 bpm  Rhythm: Normal Sinus Rhythm     EKG interpretation: (Preliminary) 0954  Rhythm: sinus rhythm with 1st degree AV block and an incomplete RBBB; and regular . Rate (approx.): 62; Axis: normal; ST/T wave: no ST elevation, no ST depression;   WA interval 210 ms,  ms,  ms, QTc 420 ms. Written by Marybeth Woodard ED Scribe, as dictated by Sanjuana Goodman MD.      Records Reviewed: Nursing Notes and Old Medical Records    Provider Notes (Medical Decision Making):   DDx: PNA, bronchitis, viral syndrome, dehydration, orthostasis    +orthostatics initially, improved after IVF's. Pt continues to feel unwell, c/o sore throat. No TAA noted on CT. No pna. Will cover for bacterial bronchitis, retain in hospital for syncope evaluation. ED Course:   Initial assessment performed. The patients presenting problems have been discussed, and they are in agreement with the care plan formulated and outlined with them. I have encouraged them to ask questions as they arise throughout their visit. Critical Care Time:   0    Disposition:  Admit to hospitalist    PLAN:  1. Admit to hospitalist, Dr. Gloria Roper      Diagnosis     Clinical Impression:   1. Syncope and collapse    2. Acute bronchitis, unspecified organism        Attestations: This note is prepared by Davide Armendariz, acting as Scribe for [employer]. Trevor Thomason MD.    [employer]. Trevor Thomason MD: The scribe's documentation has been prepared under my direction and personally reviewed by me in its entirety. I confirm that the note above accurately reflects all work, treatment, procedures, and medical decision making performed by me.

## 2018-12-20 NOTE — H&P
Medicine History and Physical    Patient: Ivelisse Natarajan   Age:  80 y.o. Chief Complaint:   Chief Complaint   Patient presents with    Syncope    Cough         HPI:   80 y.o m with PMH of HTN, CAD, h/o Stroke and Alzheimer's dementia brought in by EMS following a Syncope episode at home shortly after breakfast. Patient lives with his wife which is his primary caregiver and very good historian. She denies and seizure activity during or after episode. He was seen yesterday at Urgent Care for productive cough but she states he did not cough prior to syncopal episode. Patient's wife also reporting fever at home of 100.4 and diaphoresis. In the ER, patient found to have minimally elevated Troponin and DEVAN. CT head showed no acute findings. He denies CP or SOB. Past Medical History:  Past Medical History:   Diagnosis Date    Back pain     due to DDD and scoliosis    BPH (benign prostatic hyperplasia)     Dr. Ga Frias CAD (coronary artery disease) 10/30/13    30%LAD, 80% ostal stenosis. Dr. Mcarthur Boast.  Chest pain 10/04/04, 10/30/13    Dr. Jj Bravo. Negative Stress Cardiolite Test.  Dr. Eron Parmar.  Chronic venous insufficiency 06/15/09    Dr. Rebecca Rivera due to fall    DDD (degenerative disc disease), lumbar     L3-S1    Diverticulosis 02/2004    sigmoid. Dr. Annmarie Bernal DJD (degenerative joint disease) of cervical spine     C 4-5 ;5-6    Dyslipidemia     Fracture 1980    facial/nasal    Hearing loss     wears hearing aid    Heartburn     Hemorrhoids, internal 1987    Dr. Sanjuana Sarah of unspecified site of abdominal cavity without mention of obstruction or gangrene     inguinal  Lt    Hypertension     Impotence     Left acoustic neuroma ([address] 10/28/14    17 x 9 mm schwannoma.       Pes planus of both feet     PVD (peripheral vascular disease) ([address] 2009    Scoliosis     LS    Slipped intervertebral disc 1954    chiropractor    SOB (shortness of breath) 08/07/13    Dr. Kathy Parker.  Stroke ([facility] [address] 10/28/2014    small Left Thalamus. Dr. Jelani Rae. Dr. Dalia Pierce.  Urinary incontinence     due to BPH. Dr. Rama Watson    Varicose vein 06/15/09    Dr. Marcus Clemons Vertigo 2003    due to inner ear. Dr. Kayden Mercer. Dr. Alessandro Jacob. Dr. Elia Gonzalez Visual loss     Dr. Sheela Cade, ophth. Past Surgical History:  Past Surgical History:   Procedure Laterality Date    APPENDECTOMY      due to gangrene   [facility], 02/2002    Dr. Luanne Murrell. benign.  COLONOSCOPY  02/2004    Dr. Nilam Guerra. due q 10 yrs    CYSTOSCOPY  10/17/03    Dr. Aries Aj  10/30/13    Dr. Ricardo Asif. 80% ostal stenosis. 30% LAD.  HX HEMORRHOIDECTOMY      HX KNEE ARTHROSCOPY  1990 Left , 2007 Right    Dr. Johana Medina HX LAPAROTOMY  05/14/07    Dr. Devin Asif. due to Bowel Obstruction    HX POLYPECTOMY  02/2002    Anal.     HX TONSILLECTOMY      PA COLSC FLX W/RMVL OF TUMOR POLYP LESION SNARE TQ  5/23/2011    Dr. Tana Hill. Family History:  Family History   Problem Relation Age of Onset    Heart Attack Mother     Other Mother         brain aneurysm/AAA/varicose veins    Stroke Mother     Heart Attack Sister     Cancer Maternal Grandmother         ovarian       Social History:  Social History     Socioeconomic History    Marital status:      Spouse name: Not on file    Number of children: Not on file    Years of education: Not on file    Highest education level: Not on file   Tobacco Use    Smoking status: Never Smoker    Smokeless tobacco: Never Used   Substance and Sexual Activity    Alcohol use: No    Drug use: No       Home Medications:  Prior to Admission medications    Medication Sig Start Date End Date Taking?  Authorizing Provider   etodolac (LODINE) 400 mg tablet take 1 tablet by mouth once daily if needed 12/12/18   Vandana Low, DO   isosorbide mononitrate ER (IMDUR) 30 mg tablet take 1 tablet by mouth once daily 9/13/18   Samara Councilman R, DO   aspirin (ASPIRIN) 325 mg tablet Take 1 Tab by mouth daily. Indications: myocardial infarction prevention 8/31/18   [facility] R, DO   pravastatin (PRAVACHOL) 20 mg tablet take 1 tablet by mouth once daily at bedtime 8/3/18   Samara Councilman R, DO   FOLBEE 2.5-25-1 mg tablet take 1 tablet by mouth once daily 6/10/18   John Rodriguez MD   metoprolol tartrate (LOPRESSOR) 25 mg tablet take 1/2 tablet once a day  Indications: hypertension 5/24/18   Samara Councilman R, DO   amLODIPine (NORVASC) 2.5 mg tablet Take 1 Tab by mouth daily. Indications: hypertension 5/24/18   Samara Councilman R, DO   acetaminophen (TYLENOL ARTHRITIS PAIN) 650 mg TbER Take 650 mg by mouth every eight (8) hours. Provider, Historical   meclizine (ANTIVERT) 25 mg tablet Take 1 Tab by mouth three (3) times daily as needed for Dizziness. 5/29/16   Suzan Branham MD   GUAIFENESIN (MUCINEX PO) Take 1 Tab by mouth daily. Provider, Historical   docusate sodium (COLACE) 100 mg capsule Take 100 mg by mouth daily. Instructed to take daily with plenty of liquid unless otherwise directed- as per 11/7/14 [facility] discharge med list    Provider, Historical   cholecalciferol, vitamin D3, (VITAMIN D3) 2,000 unit tab Take 1 Tab by mouth daily. As per 11/07/14 [facility] discharge med list    Provider, Historical   aspirin (ASPIRIN) 325 mg tablet Take 1 tablet by mouth daily. 10/30/14   Hung Conley MD   tamsulosin (FLOMAX) 0.4 mg capsule Take 0.4 mg by mouth daily. For prostate     Provider, Historical       Allergies:   Allergies   Allergen Reactions    Aricept [Donepezil] Nausea and Vomiting     Mild depression    Betadine [Povidone-Iodine] Hives    Phenergan [Promethazine] Other (comments)     hallucinations    Seafood [Shellfish Containing Products] Rash and Swelling     crabmeat    Xylocaine [Lidocaine Hcl] Shortness of Breath       Review of Systems:   As above otherwise 11 point review of systems negative including constitutional, skin, HENT, eyes, respiratory, cardiovascular, gastrointestinal, genitourinary, musculoskeletal, endo/heme/aller, neurological.      Physical Exam:     Visit Vitals  BP (!) 132/92 (BP 1 Location: Left arm, BP Patient Position: Sitting)   Pulse 70   Temp 99.1 °F (37.3 °C)   Resp 18   Ht 5' 6\" (1.676 m)   Wt 73.9 kg (162 lb 14.7 oz)   SpO2 93%   BMI 26.30 kg/m²       Physical Exam:   General appearance: alert, cooperative, no distress   Head: Normocephalic, atraumatic  Neck: supple, trachea midline  Lungs: good air entry, minimal wheezing, no rhonchi or rales  Heart: regular rate and rhythm, S1, S2 normal   Abdomen: soft, non-tender. Bowel sounds normal. No masses,  no organomegaly  Extremities: extremities normal, atraumatic, no cyanosis or edema  Skin: Skin color, texture, turgor normal. No rashes or lesions  Neurologic: no acute deficit      PSY: dementia, at baseline     Intake and Output:  Current Shift:  12/20 0701 - 12/20 1900  In: 915.8 [I.V.:915.8]  Out: 850 [Urine:850]  Last three shifts:  No intake/output data recorded.     Lab/Data Reviewed:  BMP:   Lab Results   Component Value Date/Time     (L) 12/20/2018 10:21 AM    K 3.8 12/20/2018 10:21 AM     12/20/2018 10:21 AM    CO2 25 12/20/2018 10:21 AM    AGAP 6 12/20/2018 10:21 AM     (H) 12/20/2018 10:21 AM    BUN 18 12/20/2018 10:21 AM    CREA 1.32 (H) 12/20/2018 10:21 AM    GFRAA >60 12/20/2018 10:21 AM    GFRNA 51 (L) 12/20/2018 10:21 AM     CMP:   Lab Results   Component Value Date/Time     (L) 12/20/2018 10:21 AM    K 3.8 12/20/2018 10:21 AM     12/20/2018 10:21 AM    CO2 25 12/20/2018 10:21 AM    AGAP 6 12/20/2018 10:21 AM     (H) 12/20/2018 10:21 AM    BUN 18 12/20/2018 10:21 AM    CREA 1.32 (H) 12/20/2018 10:21 AM    GFRAA >60 12/20/2018 10:21 AM    GFRNA 51 (L) 12/20/2018 10:21 AM    CA 8.5 12/20/2018 10:21 AM    ALB 3.0 (L) 12/20/2018 10:21 AM    TP 6.8 12/20/2018 10:21 AM    GLOB 3.8 12/20/2018 10:21 AM    AGRAT 0.8 (L) 12/20/2018 10:21 AM    SGOT 15 12/20/2018 10:21 AM    ALT 18 12/20/2018 10:21 AM     CBC:   Lab Results   Component Value Date/Time    WBC 7.7 12/20/2018 10:21 AM    HGB 12.3 12/20/2018 10:21 AM    HCT 36.6 12/20/2018 10:21 AM     (L) 12/20/2018 10:21 AM     All Cardiac Markers in the last 24 hours:   Lab Results   Component Value Date/Time    CPK 53 12/20/2018 10:21 AM    TROIQ 0.07 (H) 12/20/2018 02:11 PM    TROIQ 0.06 (H) 12/20/2018 10:21 AM       Chest X-Ray is obtained; CXR reviewed independently - no acute findings, no consolidation        EKG: tracing reviewed  independently - NSR @ 62 bpm, no acute ST changes     Assessment   Active Problems:    Syncope (12/20/2018)      Bronchitis (12/20/2018)      DEVAN (acute kidney injury) ([facility] [address] (12/20/2018)      Elevated troponin (12/20/2018)          Plan   Syncope:  Telemetry   ECHO in am   Serial cardiac enzymes     Elevated Troponin:  Patient without active CP/SOB  Likely secondary to DEVAN, will continue monitoring closely  Patient with known CAD, will ask Cardiology to follow with us  Continue Aspirin, Metoprolol and Statins     DEVAN:  Gentle hydration   Monitor daily    Bronchitis:  No obvious infiltrate  No clinical evidence of Pneumonia  Continue Azithromycin daily for 5 days    Dementia:  Continue home meds    Prophylaxis:  Heparin SC  Pepcid      DISPO  -Pt to be admitted  at this time for reasons addressed above, continued hospitalization for ongoing assessment and treatment indicated     Patient has advanced directive - confirmed with wife - DNR status    Anticipated Date of Discharge: 3-4 days  Anticipated Disposition (home, SNF) : Home with Marry Canela MD    December 20, 2018

## 2018-12-20 NOTE — ED NOTES
Patient presents to ED via EMS from home. Per EMS, patient had a syncopal episode (witnessed) and after began to breathe funny and would not respond to wife. By the time EMS arrived, patient was answering questions appropriately. [facility] and was given medication (tessalon pearls). Patient also had a chest xray done yesterday that was clear. Blood sugar 195 in route. Monitor x3, call bell within reach. Side rails x2.

## 2018-12-21 LAB
ANION GAP SERPL CALC-SCNC: 6 MMOL/L (ref 5–15)
BASOPHILS # BLD: 0 K/UL (ref 0–0.1)
BASOPHILS NFR BLD: 0 % (ref 0–1)
BUN SERPL-MCNC: 16 MG/DL (ref 6–20)
BUN/CREAT SERPL: 16 (ref 12–20)
CALCIUM SERPL-MCNC: 8.4 MG/DL (ref 8.5–10.1)
CHLORIDE SERPL-SCNC: 106 MMOL/L (ref 97–108)
CK MB CFR SERPL CALC: NORMAL % (ref 0–2.5)
CK MB SERPL-MCNC: <1 NG/ML (ref 5–25)
CK SERPL-CCNC: 70 U/L (ref 39–308)
CO2 SERPL-SCNC: 26 MMOL/L (ref 21–32)
CREAT SERPL-MCNC: 0.97 MG/DL (ref 0.7–1.3)
DIFFERENTIAL METHOD BLD: ABNORMAL
EOSINOPHIL # BLD: 0 K/UL (ref 0–0.4)
EOSINOPHIL NFR BLD: 0 % (ref 0–7)
ERYTHROCYTE [DISTWIDTH] IN BLOOD BY AUTOMATED COUNT: 13.3 % (ref 11.5–14.5)
GLUCOSE SERPL-MCNC: 104 MG/DL (ref 65–100)
HCT VFR BLD AUTO: 35.4 % (ref 36.6–50.3)
HGB BLD-MCNC: 11.8 G/DL (ref 12.1–17)
IMM GRANULOCYTES # BLD: 0 K/UL (ref 0–0.04)
IMM GRANULOCYTES NFR BLD AUTO: 0 % (ref 0–0.5)
LYMPHOCYTES # BLD: 1.3 K/UL (ref 0.8–3.5)
LYMPHOCYTES NFR BLD: 14 % (ref 12–49)
MCH RBC QN AUTO: 32.6 PG (ref 26–34)
MCHC RBC AUTO-ENTMCNC: 33.3 G/DL (ref 30–36.5)
MCV RBC AUTO: 97.8 FL (ref 80–99)
MONOCYTES # BLD: 1.1 K/UL (ref 0–1)
MONOCYTES NFR BLD: 12 % (ref 5–13)
NEUTS SEG # BLD: 6.6 K/UL (ref 1.8–8)
NEUTS SEG NFR BLD: 73 % (ref 32–75)
NRBC # BLD: 0 K/UL (ref 0–0.01)
NRBC BLD-RTO: 0 PER 100 WBC
PLATELET # BLD AUTO: 144 K/UL (ref 150–400)
PMV BLD AUTO: 10 FL (ref 8.9–12.9)
POTASSIUM SERPL-SCNC: 4.2 MMOL/L (ref 3.5–5.1)
RBC # BLD AUTO: 3.62 M/UL (ref 4.1–5.7)
SODIUM SERPL-SCNC: 138 MMOL/L (ref 136–145)
TROPONIN I SERPL-MCNC: 0.05 NG/ML
WBC # BLD AUTO: 9.1 K/UL (ref 4.1–11.1)

## 2018-12-21 PROCEDURE — G8978 MOBILITY CURRENT STATUS: HCPCS

## 2018-12-21 PROCEDURE — 74011250637 HC RX REV CODE- 250/637: Performed by: FAMILY MEDICINE

## 2018-12-21 PROCEDURE — 74011000250 HC RX REV CODE- 250: Performed by: HOSPITALIST

## 2018-12-21 PROCEDURE — 97535 SELF CARE MNGMENT TRAINING: CPT | Performed by: OCCUPATIONAL THERAPIST

## 2018-12-21 PROCEDURE — G8987 SELF CARE CURRENT STATUS: HCPCS | Performed by: OCCUPATIONAL THERAPIST

## 2018-12-21 PROCEDURE — G8979 MOBILITY GOAL STATUS: HCPCS

## 2018-12-21 PROCEDURE — 80048 BASIC METABOLIC PNL TOTAL CA: CPT

## 2018-12-21 PROCEDURE — 97116 GAIT TRAINING THERAPY: CPT

## 2018-12-21 PROCEDURE — 82550 ASSAY OF CK (CPK): CPT

## 2018-12-21 PROCEDURE — 85025 COMPLETE CBC W/AUTO DIFF WBC: CPT

## 2018-12-21 PROCEDURE — 65660000000 HC RM CCU STEPDOWN

## 2018-12-21 PROCEDURE — 84484 ASSAY OF TROPONIN QUANT: CPT

## 2018-12-21 PROCEDURE — 97161 PT EVAL LOW COMPLEX 20 MIN: CPT

## 2018-12-21 PROCEDURE — 74011000250 HC RX REV CODE- 250: Performed by: FAMILY MEDICINE

## 2018-12-21 PROCEDURE — 36415 COLL VENOUS BLD VENIPUNCTURE: CPT

## 2018-12-21 PROCEDURE — 97167 OT EVAL HIGH COMPLEX 60 MIN: CPT | Performed by: OCCUPATIONAL THERAPIST

## 2018-12-21 PROCEDURE — 93306 TTE W/DOPPLER COMPLETE: CPT

## 2018-12-21 PROCEDURE — 74011250637 HC RX REV CODE- 250/637: Performed by: HOSPITALIST

## 2018-12-21 PROCEDURE — G8988 SELF CARE GOAL STATUS: HCPCS | Performed by: OCCUPATIONAL THERAPIST

## 2018-12-21 PROCEDURE — 74011250636 HC RX REV CODE- 250/636: Performed by: FAMILY MEDICINE

## 2018-12-21 PROCEDURE — 74011250636 HC RX REV CODE- 250/636: Performed by: HOSPITALIST

## 2018-12-21 RX ORDER — HALOPERIDOL 5 MG/ML
5 INJECTION INTRAMUSCULAR ONCE
Status: COMPLETED | OUTPATIENT
Start: 2018-12-21 | End: 2018-12-21

## 2018-12-21 RX ORDER — QUETIAPINE FUMARATE 25 MG/1
25 TABLET, FILM COATED ORAL
Status: COMPLETED | OUTPATIENT
Start: 2018-12-21 | End: 2018-12-21

## 2018-12-21 RX ORDER — QUETIAPINE FUMARATE 25 MG/1
25 TABLET, FILM COATED ORAL
Status: DISCONTINUED | OUTPATIENT
Start: 2018-12-21 | End: 2018-12-22 | Stop reason: HOSPADM

## 2018-12-21 RX ORDER — HALOPERIDOL 5 MG/ML
2 INJECTION INTRAMUSCULAR
Status: DISCONTINUED | OUTPATIENT
Start: 2018-12-21 | End: 2018-12-21

## 2018-12-21 RX ORDER — LANOLIN ALCOHOL/MO/W.PET/CERES
3 CREAM (GRAM) TOPICAL
Status: DISCONTINUED | OUTPATIENT
Start: 2018-12-21 | End: 2018-12-22 | Stop reason: HOSPADM

## 2018-12-21 RX ORDER — AMOXICILLIN AND CLAVULANATE POTASSIUM 875; 125 MG/1; MG/1
1 TABLET, FILM COATED ORAL EVERY 12 HOURS
Status: DISCONTINUED | OUTPATIENT
Start: 2018-12-21 | End: 2018-12-22 | Stop reason: HOSPADM

## 2018-12-21 RX ADMIN — DOCUSATE SODIUM 100 MG: 100 CAPSULE, LIQUID FILLED ORAL at 09:43

## 2018-12-21 RX ADMIN — FAMOTIDINE 20 MG: 10 INJECTION, SOLUTION INTRAVENOUS at 09:43

## 2018-12-21 RX ADMIN — ASPIRIN 325 MG: 325 TABLET ORAL at 09:44

## 2018-12-21 RX ADMIN — AMLODIPINE BESYLATE 2.5 MG: 5 TABLET ORAL at 09:44

## 2018-12-21 RX ADMIN — HALOPERIDOL LACTATE 2 MG: 5 INJECTION INTRAMUSCULAR at 13:57

## 2018-12-21 RX ADMIN — Medication 10 ML: at 01:32

## 2018-12-21 RX ADMIN — HALOPERIDOL LACTATE 5 MG: 5 INJECTION INTRAMUSCULAR at 15:23

## 2018-12-21 RX ADMIN — Medication 10 ML: at 13:35

## 2018-12-21 RX ADMIN — HEPARIN SODIUM 5000 UNITS: 5000 INJECTION INTRAVENOUS; SUBCUTANEOUS at 06:50

## 2018-12-21 RX ADMIN — ACETAMINOPHEN 650 MG: 325 TABLET ORAL at 09:42

## 2018-12-21 RX ADMIN — VITAMIN D, TAB 1000IU (100/BT) 1000 UNITS: 25 TAB at 09:44

## 2018-12-21 RX ADMIN — HEPARIN SODIUM 5000 UNITS: 5000 INJECTION INTRAVENOUS; SUBCUTANEOUS at 22:35

## 2018-12-21 RX ADMIN — METOPROLOL TARTRATE 12.5 MG: 25 TABLET ORAL at 09:43

## 2018-12-21 RX ADMIN — TAMSULOSIN HYDROCHLORIDE 0.4 MG: 0.4 CAPSULE ORAL at 09:44

## 2018-12-21 RX ADMIN — ISOSORBIDE MONONITRATE 30 MG: 30 TABLET, EXTENDED RELEASE ORAL at 09:44

## 2018-12-21 RX ADMIN — QUETIAPINE FUMARATE 25 MG: 25 TABLET ORAL at 13:33

## 2018-12-21 RX ADMIN — WATER 10 MG: 1 INJECTION INTRAMUSCULAR; INTRAVENOUS; SUBCUTANEOUS at 17:04

## 2018-12-21 NOTE — CONSULTS
Consult    NAME: Jayjay Najera   :  10/24/1928   MRN:  [id number]     Date/Time:  2018 10:44 AM    Patient PCP: Anup Schneider, DO  ________________________________________________________________________     Assessment:     1. Fever, presyncope, DEVAN, equivicol troponin likely bronchitis  2. CAD with cath in  with small branch vessel disease managed medically  3. Echo  nl EF  4. Sinus with incomplete RBBB  5. Dyslipidemia  6. DJD  7. Hx of CVA, currently with dementia  8. BPH  9. Iodine allergy  10.   11. DNR  12. Cardiologist:  Sarai Jacksonorest        Plan:     Fever, cough found to have bronchitis. Presyncope, likely due to dehydration  Equivicol troponin, manage CAD medically. Tele so far unrevealing    Ok to check echo, follow tele    1. Cont ASA  2. [facility] metoprolol  3. Cont amlod  4. Cont imdur  5. Cont pravastatin    Conservative care        [x]        High complexity decision making was performed        Subjective:   CHIEF COMPLAINT: Presyncope    HISTORY OF PRESENT ILLNESS:       80 y.o m with PMH of HTN, CAD, h/o Stroke and Alzheimer's dementia brought in by EMS following a Syncope episode at home shortly after breakfast. Patient lives with his wife which is his primary caregiver and very good historian. She denies and seizure activity during or after episode. He was seen yesterday at Urgent Care for productive cough but she states he did not cough prior to syncopal episode. Patient's wife also reporting fever at home of 100.4 and diaphoresis.      In the ER, patient found to have minimally elevated Troponin and DEVAN. CT head showed no acute findings.      He denies CP or SOB. Sitting in bed, reading paper, no complaint. We were asked to consult for work up and evaluation of the above problems.      Past Medical History:   Diagnosis Date    Back pain     due to DDD and scoliosis    BPH (benign prostatic hyperplasia)     Dr. Harriet Regan CAD (coronary artery disease) 10/30/13    30%LAD, 80% ostal stenosis. Dr. Juno Zavala.  Chest pain 10/04/04, 10/30/13    Dr. Portillo Umanzor. Negative Stress Cardiolite Test.  Dr. Vidya Damian.  Chronic venous insufficiency 06/15/09    Dr. Suzan Briggs due to fall    DDD (degenerative disc disease), lumbar     L3-S1    Diverticulosis 02/2004    sigmoid. Dr. Bella Del Real DJD (degenerative joint disease) of cervical spine     C 4-5 ;5-6    Dyslipidemia     Fracture 1980    facial/nasal    Hearing loss     wears hearing aid    Heartburn     Hemorrhoids, internal 1987    Dr. Girish Alfaro of unspecified site of abdominal cavity without mention of obstruction or gangrene     inguinal  Lt    Hypertension     Impotence     Left acoustic neuroma ([address] 10/28/14    17 x 9 mm schwannoma.  Pes planus of both feet     PVD (peripheral vascular disease) ([address] 2009    Scoliosis     LS    Slipped intervertebral disc 1954    chiropractor    SOB (shortness of breath) 08/07/13    Dr. Trice Morales.  Stroke ([facility] [address] 10/28/2014    small Left Thalamus. Dr. Stefanie Campbell. Dr. Donta Austin.  Urinary incontinence     due to BPH. Dr. Beata Hale    Varicose vein 06/15/09    Dr. Tori Gomes Vertigo 2003    due to inner ear. Dr. Concepcion [place]. Dr. Raquel Sewell. Dr. Hernandez [facility] Visual loss     Dr. Betsy Boxer, ophth. Past Surgical History:   Procedure Laterality Date    APPENDECTOMY      due to gangrene   [facility], 02/2002    Dr. Jessica De La Rosa. benign.  COLONOSCOPY  02/2004    Dr. Shweta Woo. due q 10 yrs    CYSTOSCOPY  10/17/03    Dr. Carlos A Joshi  10/30/13    Dr. Vidya Damian. 80% ostal stenosis. 30% LAD.  HX HEMORRHOIDECTOMY      HX KNEE ARTHROSCOPY  1990 Left , 2007 Right    Dr. Geovani Palacios HX LAPAROTOMY  05/14/07    Dr. Sudhakar Mai.   due to Bowel Obstruction    HX POLYPECTOMY  02/2002    Anal.     HX TONSILLECTOMY      MN COLSC FLX W/RMVL OF TUMOR POLYP LESION SNARE TQ  5/23/2011    Dr. Sharon Negron. Allergies   Allergen Reactions    Aricept [Donepezil] Nausea and Vomiting     Mild depression    Betadine [Povidone-Iodine] Hives    Phenergan [Promethazine] Other (comments)     hallucinations    Seafood [Shellfish Containing Products] Rash and Swelling     crabmeat    Xylocaine [Lidocaine Hcl] Shortness of Breath      Meds:  See below  Social History     Tobacco Use    Smoking status: Never Smoker    Smokeless tobacco: Never Used   Substance Use Topics    Alcohol use: No      Family History   Problem Relation Age of Onset    Heart Attack Mother     Other Mother         brain aneurysm/AAA/varicose veins    Stroke Mother     Heart Attack Sister     Cancer Maternal Grandmother         ovarian       REVIEW OF SYSTEMS:     []         Unable to obtain  ROS due to ---   [x]         Total of 12 systems reviewed as follows:     Total of 12 systems reviewed as follows:       POSITIVE= Bold text  Negative = normal text  General:  fever, chills, sweats, generalized weakness, weight loss/gain,      loss of appetite   Eyes:    blurred vision, eye pain, loss of vision, double vision  ENT:    rhinorrhea, pharyngitis   Respiratory:   cough, sputum production, SOB, GRECO, wheezing, pleuritic pain   Cardiology:   chest pain, palpitations, orthopnea, PND, edema, syncope   Gastrointestinal:  abdominal pain , N/V, diarrhea, dysphagia, constipation, bleeding   Genitourinary:  frequency, urgency, dysuria, hematuria, incontinence   Muskuloskeletal :  arthralgia, myalgia, back pain  Hematology:  easy bruising, nose or gum bleeding, lymphadenopathy   Dermatological: rash, ulceration, pruritis, color change / jaundice  Endocrine:   hot flashes or polydipsia   Neurological:  headache, dizziness, confusion, focal weakness, paresthesia,     Speech difficulties, memory loss, gait difficulty  Psychological: Feelings of anxiety, depression, agitation    Objective:      Physical Exam:    Last 24hrs VS reviewed since prior progress note. Most recent are:    Visit Vitals  /86 (BP 1 Location: Right arm, BP Patient Position: At rest)   Pulse 92   Temp 100.1 °F (37.8 °C)   Resp 20   Ht 5' 6\" (1.676 m)   Wt 73.9 kg (162 lb 14.7 oz)   SpO2 96%   BMI 26.30 kg/m²       Intake/Output Summary (Last 24 hours) at 12/21/2018 1044  Last data filed at 12/21/2018 6212  Gross per 24 hour   Intake 1355.75 ml   Output 1300 ml   Net 55.75 ml        General Appearance: Well developed, well nourished, elderly, alert & oriented x to self,    no acute distress. Ears/Nose/Mouth/Throat: Pupils equal and round, Hearing grossly normal.  Neck: Supple. JVP within normal limits. Carotids good upstrokes, with no bruit. Chest: Lungs clear to auscultation bilaterally. Cardiovascular: Regular rate and rhythm, S1S2 normal, no murmur, rubs, gallops. Abdomen: Soft, non-tender, bowel sounds are active. No organomegaly. Extremities: No edema bilaterally. Femoral pulses +1, Distal Pulses +1. Skin: Warm and dry. Neuro: CN II-XII grossly intact, Strength and sensation grossly intact. Data:      Prior to Admission medications    Medication Sig Start Date End Date Taking? Authorizing Provider   isosorbide mononitrate ER (IMDUR) 30 mg tablet take 1 tablet by mouth once daily 9/13/18  Yes Marybeth León R, DO   pravastatin (PRAVACHOL) 20 mg tablet take 1 tablet by mouth once daily at bedtime 8/3/18  Yes Todd Irvin DO   FOLBEE 2.5-25-1 mg tablet take 1 tablet by mouth once daily 6/10/18  Yes Phoebe Bence, MD   metoprolol tartrate (LOPRESSOR) 25 mg tablet take 1/2 tablet once a day  Indications: hypertension 5/24/18  Yes Miguel Reynolds R,    acetaminophen (TYLENOL ARTHRITIS PAIN) 650 mg TbER Take 650 mg by mouth every eight (8) hours. Yes Provider, Historical   docusate sodium (COLACE) 100 mg capsule Take 100 mg by mouth daily.  Instructed to take daily with plenty of liquid unless otherwise directed- as per 11/7/14 [facility] discharge med list   Yes Provider, Historical   cholecalciferol, vitamin D3, (VITAMIN D3) 2,000 unit tab Take 1 Tab by mouth daily. As per 11/07/14 [facility] discharge med list   Yes Provider, Historical   aspirin (ASPIRIN) 325 mg tablet Take 1 tablet by mouth daily. 10/30/14  Yes Rocio Dickerson MD   tamsulosin (FLOMAX) 0.4 mg capsule Take 0.4 mg by mouth daily. For prostate    Yes Provider, Historical   etodolac (LODINE) 400 mg tablet take 1 tablet by mouth once daily if needed 12/12/18   Albertina Sharps R, DO   aspirin (ASPIRIN) 325 mg tablet Take 1 Tab by mouth daily. Indications: myocardial infarction prevention 8/31/18   Albertina Sharps R, DO   amLODIPine (NORVASC) 2.5 mg tablet Take 1 Tab by mouth daily. Indications: hypertension 5/24/18   Albertina Sharps R, DO   meclizine (ANTIVERT) 25 mg tablet Take 1 Tab by mouth three (3) times daily as needed for Dizziness. 5/29/16   Emy Branham MD   GUAIFENESIN (MUCINEX PO) Take 1 Tab by mouth daily.     Provider, Historical       Recent Results (from the past 24 hour(s))   CULTURE, BLOOD, PAIRED    Collection Time: 12/20/18 11:58 AM   Result Value Ref Range    Special Requests: NO SPECIAL REQUESTS      Culture result: NO GROWTH AFTER 19 HOURS     LACTIC ACID    Collection Time: 12/20/18 11:58 AM   Result Value Ref Range    Lactic acid 1.7 0.4 - 2.0 MMOL/L   URINALYSIS W/ RFLX MICROSCOPIC    Collection Time: 12/20/18 12:53 PM   Result Value Ref Range    Color YELLOW/STRAW      Appearance CLEAR CLEAR      Specific gravity 1.020 1.003 - 1.030      pH (UA) 5.5 5.0 - 8.0      Protein 30 (A) NEG mg/dL    Glucose NEGATIVE  NEG mg/dL    Ketone NEGATIVE  NEG mg/dL    Blood NEGATIVE  NEG      Urobilinogen 1.0 0.2 - 1.0 EU/dL    Nitrites NEGATIVE  NEG      Leukocyte Esterase NEGATIVE  NEG      WBC 0-4 0 - 4 /hpf    RBC 0-5 0 - 5 /hpf    Epithelial cells FEW FEW /lpf    Bacteria NEGATIVE  NEG /hpf    Mucus 1+ (A) NEG /lpf   BILIRUBIN, CONFIRM    Collection Time: 12/20/18 12:53 PM   Result Value Ref Range    Bilirubin UA, confirm NEGATIVE  NEG     TROPONIN I    Collection Time: 12/20/18  2:11 PM   Result Value Ref Range    Troponin-I, Qt. 0.07 (H) <0.05 ng/mL   CK W/ CKMB & INDEX    Collection Time: 12/21/18 12:29 AM   Result Value Ref Range    CK 70 39 - 308 U/L    CK - MB <1.0 <3.6 NG/ML    CK-MB Index Cannot be calculated 0 - 2.5     TROPONIN I    Collection Time: 12/21/18 12:29 AM   Result Value Ref Range    Troponin-I, Qt. 0.05 (H) <0.05 ng/mL   CBC WITH AUTOMATED DIFF    Collection Time: 12/21/18 12:29 AM   Result Value Ref Range    WBC 9.1 4.1 - 11.1 K/uL    RBC 3.62 (L) 4.10 - 5.70 M/uL    HGB 11.8 (L) 12.1 - 17.0 g/dL    HCT 35.4 (L) 36.6 - 50.3 %    MCV 97.8 80.0 - 99.0 FL    MCH 32.6 26.0 - 34.0 PG    MCHC 33.3 30.0 - 36.5 g/dL    RDW 13.3 11.5 - 14.5 %    PLATELET 249 (L) 841 - 400 K/uL    MPV 10.0 8.9 - 12.9 FL    NRBC 0.0 0  WBC    ABSOLUTE NRBC 0.00 0.00 - 0.01 K/uL    NEUTROPHILS 73 32 - 75 %    LYMPHOCYTES 14 12 - 49 %    MONOCYTES 12 5 - 13 %    EOSINOPHILS 0 0 - 7 %    BASOPHILS 0 0 - 1 %    IMMATURE GRANULOCYTES 0 0.0 - 0.5 %    ABS. NEUTROPHILS 6.6 1.8 - 8.0 K/UL    ABS. LYMPHOCYTES 1.3 0.8 - 3.5 K/UL    ABS. MONOCYTES 1.1 (H) 0.0 - 1.0 K/UL    ABS. EOSINOPHILS 0.0 0.0 - 0.4 K/UL    ABS. BASOPHILS 0.0 0.0 - 0.1 K/UL    ABS. IMM.  GRANS. 0.0 0.00 - 0.04 K/UL    DF AUTOMATED     METABOLIC PANEL, BASIC    Collection Time: 12/21/18 12:29 AM   Result Value Ref Range    Sodium 138 136 - 145 mmol/L    Potassium 4.2 3.5 - 5.1 mmol/L    Chloride 106 97 - 108 mmol/L    CO2 26 21 - 32 mmol/L    Anion gap 6 5 - 15 mmol/L    Glucose 104 (H) 65 - 100 mg/dL    BUN 16 6 - 20 MG/DL    Creatinine 0.97 0.70 - 1.30 MG/DL    BUN/Creatinine ratio 16 12 - 20      GFR est AA >60 >60 ml/min/1.73m2    GFR est non-AA >60 >60 ml/min/1.73m2    Calcium 8.4 (L) 8.5 - 10.1 MG/DL

## 2018-12-21 NOTE — ED NOTES
Pt received resting on stretcher in NAD with wife at the bedside. Pt hard of hearing but states he feels ok. Pt awaiting bed assignment.

## 2018-12-21 NOTE — PROGRESS NOTES
Problem: Falls - Risk of  Goal: *Absence of Falls  Document Rodolfo Fall Risk and appropriate interventions in the flowsheet.   Outcome: Progressing Towards Goal  Fall Risk Interventions:  Mobility Interventions: Bed/chair exit alarm    Mentation Interventions: Bed/chair exit alarm         Elimination Interventions: Bed/chair exit alarm, Call light in reach, Urinal in reach    History of Falls Interventions: Bed/chair exit alarm, Door open when patient unattended

## 2018-12-21 NOTE — PROGRESS NOTES
Patient is confused and combative. Patient grabbing at nurse, scratched nurse, kicking. 2 doses of Haldol have been administered. 2 mg at 1357 and 5 mg at 1523. Security called to make a presence. MD aware. Geodon ordered. 1800  Restraints ordered and started per MD.  Soft Mitts for both hands. Wife aware and at bedside. Dr. Chandu Benítez at bedside assessing.

## 2018-12-21 NOTE — PROGRESS NOTES
Hospitalist Progress Note    NAME: Jerry Dooley   :  10/24/1928   MRN:  [id number]     5:54: pt was seen and re examined     Assessment / Plan:  Syncope   CAD / HTN   -no recurrent event  Tele: wo arrhythmia   -seen by cardiology: pre syncope likely due to dehydration   -cont home imdur, asa, statin, BB, norvasc  -echo; EF 55%; no vascular abnormalities     DEVAN / dehydration  -baseline cr 0.9, admission 1.32, resolved with IVF     Advanced dementia with behavioral problems  -pt need to be dc back to his usual environment asap. D/w wife today and offered for pt to be dc home. She wanted him to be sedated / calm before she will take him   Discussed side effects of using psychotic meds. She verbalized understanding  Haldol/ Geodon prn  Scheduled Seroquel   -sitter prn     Acute bronchitis   -changing Zithromax to Augmentin to avoid QTC prolongation   -speech eval   -cxrau clear     BPH, cont flomax   PVD  Hyperlipidemia, cont statin   H/o stroke, cont asa           Code status: DNR  Prophylaxis: Lovenox     Baseline: lives with wife; advanced dementia; ambulating independent   Recommended Disposition: Home w/Family   Seen by PT: [facility]      Subjective:     Chief Complaint / Reason for Physician Visit: following syncope   Pt is very agitated   requiring sitter and restrains now   Pulled IV     Discussed with RN events overnight. Review of Systems:  Symptom Y/N Comments  Symptom Y/N Comments   Fever/Chills    Chest Pain     Poor Appetite    Edema     Cough    Abdominal Pain     Sputum    Joint Pain     SOB/GRECO    Pruritis/Rash     Nausea/vomit    Tolerating PT/OT     Diarrhea    Tolerating Diet     Constipation    Other       Could NOT obtain due to: Dementia      Objective:     VITALS:   Last 24hrs VS reviewed since prior progress note.  Most recent are:  Patient Vitals for the past 24 hrs:   Temp Pulse Resp BP SpO2   18 1430 98.1 °F (36.7 °C) 80 20 107/72 96 %   18 1110 97.8 °F (36.6 °C) 72 20 104/77 95 %   12/21/18 0830 100.1 °F (37.8 °C) 92 20 149/86 96 %   12/21/18 0312 99.4 °F (37.4 °C) 75 20 158/86 97 %   12/20/18 2301 100.2 °F (37.9 °C) 74 20 144/80 96 %   12/20/18 2100 99 °F (37.2 °C) 75 20 139/75 97 %   12/20/18 1952 99.4 °F (37.4 °C) 73 18 127/70 96 %   12/20/18 1900  84 23 130/79 95 %       Intake/Output Summary (Last 24 hours) at 12/21/2018 1705  Last data filed at 12/21/2018 1330  Gross per 24 hour   Intake 580 ml   Output 450 ml   Net 130 ml        PHYSICAL EXAM:  General: WD, WN. Alert, uncooperative, agitated     EENT:  EOMI. Anicteric sclerae. MMM  Resp:  CTA bilaterally, no wheezing or rales. No accessory muscle use  CV:  Regular  rhythm,  No edema  GI:  Soft, Non distended, Non tender.  +Bowel sounds  Neurologic:  Alert and oriented X 0, agitated, normal speech,   Psych:   Poor insight. Not anxious nor agitated  Skin:  No rashes. No jaundice    Reviewed most current lab test results and cultures  YES  Reviewed most current radiology test results   YES  Review and summation of old records today    NO  Reviewed patient's current orders and MAR    YES  PMH/ reviewed - no change compared to H&P  ________________________________________________________________________  Care Plan discussed with:    Comments   Patient y    Family  y Wife bedside    RN y    Care Manager     Consultant                        Multidiciplinary team rounds were held today with , nursing, pharmacist and clinical coordinator. Patient's plan of care was discussed; medications were reviewed and discharge planning was addressed.      ________________________________________________________________________  Total NON critical care TIME:  50 Minutes    Total CRITICAL CARE TIME Spent:   Minutes non procedure based      Comments   >50% of visit spent in counseling and coordination of care     ________________________________________________________________________  Kathya Cespedes MD     Procedures: see electronic medical records for all procedures/Xrays and details which were not copied into this note but were reviewed prior to creation of Plan. LABS:  I reviewed today's most current labs and imaging studies.   Pertinent labs include:  Recent Labs     12/21/18  0029 12/20/18  1021   WBC 9.1 7.7   HGB 11.8* 12.3   HCT 35.4* 36.6   * 140*     Recent Labs     12/21/18  0029 12/20/18  1021    134*   K 4.2 3.8    103   CO2 26 25   * 185*   BUN 16 18   CREA 0.97 1.32*   CA 8.4* 8.5   ALB  --  3.0*   TBILI  --  0.5   SGOT  --  15   ALT  --  18       Signed: Belton Olszewski, MD

## 2018-12-21 NOTE — PROGRESS NOTES
Bedside and Verbal shift change report given to ZEYNEP lugo (oncoming nurse). Report included the following information SBAR, Kardex, ED Summary, Procedure Summary, Intake/Output, MAR and Recent Results. SHIFT SUMMARY:  341: attending at bedside assessing pt for restraints.   -md aware pt has no iv. MD order to try as able to place iv.   -md notified that pt not taking PO meds. Pt still very disgruntled at change of shift. No PRN meds available. Unable to obtain IV.

## 2018-12-21 NOTE — ED NOTES
TRANSFER - OUT REPORT:    Verbal report given to [facility], RN(name) on Michela Knight  being transferred to [facility](unit) for routine progression of care       Report consisted of patients Situation, Background, Assessment and   Recommendations(SBAR). Information from the following report(s) ED Summary was reviewed with the receiving nurse. Opportunity for questions and clarification was provided.       Patient transported with:

## 2018-12-21 NOTE — PROGRESS NOTES
CM attempted to see pt, but pt seemed agitated and confused. CM attempted to contact pt's wife, but was unsuccessful.     Srinivas [facility]  Ext [phone]

## 2018-12-21 NOTE — PROGRESS NOTES
Problem: Mobility Impaired (Adult and Pediatric)  Goal: *Acute Goals and Plan of Care (Insert Text)  Physical Therapy Goals  Initiated 12/21/2018  1. Patient will move from supine to sit and sit to supine  in bed with supervision/set-up within 7 day(s). 2.  Patient will transfer from bed to chair and chair to bed with supervision/set-up using the least restrictive device within 7 day(s). 3.  Patient will perform sit to stand with supervision/set-up within 7 day(s). 4.  Patient will ambulate with minimal assistance/contact guard assist for 100 feet with the least restrictive device within 7 day(s). physical Therapy EVALUATION  Patient: Fatuma Silva (57 y.o. male)  Date: 12/21/2018  Primary Diagnosis: DEVAN (acute kidney injury) ([facility] [address]  Syncope  Elevated troponin       Precautions:   DNR    ASSESSMENT :  Based on the objective data described below, the patient presents with decreased ability to transfer, perform bed mobility and ambulate following admission for DEVAN and an episode of syncope. Patient received in bed and agreeable to participate, wife present in room and provides most history. Patient has diagnosis of dementia and wife is primary caregiver. Patient requires 24 hour supervision and wife feels she may need to hire paid help in the near future. Prior to admission patient was ambulating household distances and performing ADLs with supervision, did not use AD. Patient was eager to get to chair and required min assist for bed mobility and transfer to stand. Patient ambulated x 15 feet with hand held assist, mild unsteadiness noted but no LOB. Patient demonstrated safe technique to turn and sit in chair. Patient is below baseline level of functional mobility and will benefit from continued therapy while inpatient. Discussed discharge planning with wife and she reports patient has had home health PT in the past and that it was very helpful and she is agreeable to this suggestion.   Patient left up in chair with chair alarm and wife present in room. Patient will benefit from skilled intervention to address the above impairments. Patients rehabilitation potential is considered to be Good  Factors which may influence rehabilitation potential include:   []         None noted  [x]         Mental ability/status  []         Medical condition  []         Home/family situation and support systems  []         Safety awareness  []         Pain tolerance/management  []         Other:      PLAN :  Recommendations and Planned Interventions:  []           Bed Mobility Training             []    Neuromuscular Re-Education  []           Transfer Training                   []    Orthotic/Prosthetic Training  []           Gait Training                         []    Modalities  []           Therapeutic Exercises           []    Edema Management/Control  []           Therapeutic Activities            []    Patient and Family Training/Education  []           Other (comment):    Frequency/Duration: Patient will be followed by physical therapy  4 times a week to address goals. Discharge Recommendations: Home Health  Further Equipment Recommendations for Discharge:      SUBJECTIVE:   Patient stated I say yes m'am.    OBJECTIVE DATA SUMMARY:   HISTORY:    Past Medical History:   Diagnosis Date    Back pain     due to DDD and scoliosis    BPH (benign prostatic hyperplasia)     Dr. Dayna Rodriguez CAD (coronary artery disease) 10/30/13    30%LAD, 80% ostal stenosis. Dr. Mando Sood.  Chest pain 10/04/04, 10/30/13    Dr. Glenis Martinez. Negative Stress Cardiolite Test.  Dr. Rosa Maria Jack.  Chronic venous insufficiency 06/15/09    Dr. Keaton Hernandez due to fall    DDD (degenerative disc disease), lumbar     L3-S1    Diverticulosis 02/2004    sigmoid.   Dr. Tyler Elizalde DJD (degenerative joint disease) of cervical spine     C 4-5 ;5-6    Dyslipidemia     Fracture 1980 facial/nasal    Hearing loss     wears hearing aid    Heartburn     Hemorrhoids, internal 1987    Dr. Claudia Miles of unspecified site of abdominal cavity without mention of obstruction or gangrene     inguinal  Lt    Hypertension     Impotence     Left acoustic neuroma ([facility] [address] 10/28/14    17 x 9 mm schwannoma.  Pes planus of both feet     PVD (peripheral vascular disease) ([facility] [address] 2009    Scoliosis     LS    Slipped intervertebral disc 1954    chiropractor    SOB (shortness of breath) 08/07/13    Dr. Noelle Lopes.  Stroke ([facility] [address] 10/28/2014    small Left Thalamus. Dr. Kaden Katz. Dr. Julia Perla.  Urinary incontinence     due to BPH. Dr. Audrea Lefort    Varicose vein 06/15/09    Dr. Tavarez Proper Vertigo 2003    due to inner ear. Dr. Darshan Cuadra. Dr. Che Beavers. Dr. Audrey Lezama Visual loss     Dr. Jaret Muniz, ophth. 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 Chris Taylor. benign.  COLONOSCOPY  02/2004    Dr. Hugh Kwon. due q 10 yrs    CYSTOSCOPY  10/17/03    Dr. Maude Vitale  10/30/13    Dr. Ziyad Dixon. 80% ostal stenosis. 30% LAD.  HX HEMORRHOIDECTOMY      HX KNEE ARTHROSCOPY  1990 Left , 2007 Right    Dr. Meadows Nones HX LAPAROTOMY  05/14/07    Dr. Arlene De Souza. due to Bowel Obstruction    HX POLYPECTOMY  02/2002    Anal.     HX TONSILLECTOMY      [place] COLSC FLX W/RMVL OF TUMOR POLYP LESION SNARE TQ  5/23/2011    Dr. Lord Cast.      Prior Level of Function/Home Situation: requires 24 hour supervision, household ambulator without AD  Personal factors and/or comorbidities impacting plan of care: progressing dementia    Home Situation  Home Environment: Private residence  # Steps to Enter: 4  One/Two Story Residence: One story  Living Alone: No  Support Systems: Spouse/Significant Other/Partner  Patient Expects to be Discharged to[employer] residence  Current DME Used/Available at Home: None    EXAMINATION/PRESENTATION/DECISION MAKING:   Critical Behavior:  Neurologic State: Appropriate for age  Orientation Level: Oriented to person, Oriented to place, Disoriented to situation, Disoriented to time  Cognition: Decreased command following, Decreased attention/concentration, Memory loss  Safety/Judgement: Decreased awareness of need for safety  Hearing: Auditory  Auditory Impairment: Hard of hearing, bilateral, Hearing aid(s)  Hearing Aids/Status: At home    Range Of Motion:  AROM: Within functional limits           PROM: Within functional limits           Strength:    Strength: Generally decreased, functional                    Tone & Sensation:   Tone: Normal              Sensation: Intact               Coordination:  Coordination: Within functional limits  Vision:   Tracking: Able to track stimulus in all quadrants w/o difficulty  Acuity: (bifocals)  Corrective Lenses: Glasses  Functional Mobility:  Bed Mobility:  Rolling: Contact guard assistance  Supine to Sit: Minimum assistance     Scooting: Supervision  Transfers:  Sit to Stand: Minimum assistance  Stand to Sit: Contact guard assistance        Bed to Chair: Minimum assistance              Balance:   Sitting: Intact  Standing: Impaired  Standing - Static: Constant support;Good  Standing - Dynamic : Fair  Ambulation/Gait Training:  Distance (ft): 15 Feet (ft)  Assistive Device: Gait belt  Ambulation - Level of Assistance: Minimal assistance        Gait Abnormalities: Decreased step clearance        Base of Support: Widened     Speed/Alayna: Pace decreased (<100 feet/min); Shuffled  Step Length: Left shortened;Right shortened                     Stairs:                  Functional Measure:  Barthel Index:    Bathin  Bladder: 10  Bowels: 10  Groomin  Dressin  Feeding: 10  Mobility: 10  Stairs: 0  Toilet Use: 5  Transfer (Bed to Chair and Back): 10  Total: 65       Barthel and G-code impairment scale:  Percentage of impairment CH  0% CI  1-19% CJ  20-39% CK  40-59% CL  60-79% CM  80-99% CN  100%   Barthel Score 0-100 100 99-80 79-60 59-40 20-39 1-19   0   Barthel Score 0-20 20 17-19 13-16 9-12 5-8 1-4 0      The Barthel ADL Index: Guidelines  1. The index should be used as a record of what a patient does, not as a record of what a patient could do. 2. The main aim is to establish degree of independence from any help, physical or verbal, however minor and for whatever reason. 3. The need for supervision renders the patient not independent. 4. A patient's performance should be established using the best available evidence. Asking the patient, friends/relatives and nurses are the usual sources, but direct observation and common sense are also important. However direct testing is not needed. 5. Usually the patient's performance over the preceding 24-48 hours is important, but occasionally longer periods will be relevant. 6. Middle categories imply that the patient supplies over 50 per cent of the effort. 7. Use of aids to be independent is allowed. Rosario Power., Barthel, DHennyW. (4212). Functional evaluation: the Barthel Index. [facility] (14)2. Nolan Akhtar, TONYA, Adenike Iverson., Talisha Nicholson., [place], [address] [place] [address] (1999). Measuring the change indisability after inpatient rehabilitation; comparison of the responsiveness of the Barthel Index and Functional [place] Measure. Journal of Neurology, Neurosurgery, and Psychiatry, 66(4), 697-849. DARRON Varner.A, CHAR Rodgers, & Keo Ang, MHennyA. (2004.) Assessment of post-stroke quality of life in cost-effectiveness studies: The usefulness of the Barthel Index and the EuroQoL-5D. Quality of Life Research, 13, 563-75       G codes: In compliance with CMSs Claims Based Outcome Reporting, the following G-code set was chosen for this patient based on their primary functional limitation being treated:     The outcome measure chosen to determine the severity of the functional limitation was the Barthel with a score of 65/100 which was correlated with the impairment scale. ? Mobility - Walking and Moving Around:     - CURRENT STATUS: CJ - 20%-39% impaired, limited or restricted    - GOAL STATUS: CI - 1%-19% impaired, limited or restricted    - D/C STATUS:  ---------------To be determined---------------      Physical Therapy Evaluation Charge Determination   History Examination Presentation Decision-Making   MEDIUM  Complexity : 1-2 comorbidities / personal factors will impact the outcome/ POC  MEDIUM Complexity : 3 Standardized tests and measures addressing body structure, function, activity limitation and / or participation in recreation  MEDIUM Complexity : Evolving with changing characteristics  MEDIUM Complexity : FOTO score of 26-74      Based on the above components, the patient evaluation is determined to be of the following complexity level: MEDIUM    Pain:  Pain Scale 1: Numeric (0 - 10)  Pain Intensity 1: 0  Pain Location 1: Neck  Pain Orientation 1: Posterior  Pain Description 1: Aching  Pain Intervention(s) 1: Medication (see MAR)  Activity Tolerance:   fair  Please refer to the flowsheet for vital signs taken during this treatment. After treatment:   [x]         Patient left in no apparent distress sitting up in chair  []         Patient left in no apparent distress in bed  [x]         Call bell left within reach  [x]         Nursing notified  []         Caregiver present  [x]         chair alarm activated    COMMUNICATION/EDUCATION:   The patients plan of care was discussed with: Registered Nurse. [x]         Fall prevention education was provided and the patient/caregiver indicated understanding. [x]         Patient/family have participated as able in goal setting and plan of care. [x]         Patient/family agree to work toward stated goals and plan of care.   []         Patient understands intent and goals of therapy, but is neutral about his/her participation. []         Patient is unable to participate in goal setting and plan of care.     Thank you for this referral.  Avani Caldwell   Time Calculation: 21 mins

## 2018-12-21 NOTE — PROGRESS NOTES
0700: Bedside shift change report given to Yamini Washburn RN (oncoming nurse) by Sujit Qureshi RN (offgoing nurse). Report included the following information SBAR, Kardex, ED Summary, Procedure Summary, Intake/Output, MAR and Recent Results. 1220: Pt becoming increasingly more agitated, attempted to redirect pt w/ minimal success -- MD notifed    1300: Pt very agitated and aggressive, IV pulled out and pt still attempting to get out of chair. MD paged for orders     [phone] [id number]: Code ATLAS called, pt agitated, aggressive, shoving nurses -- PRN haldol administered IM d/t no PIV, sitter placed at pt bedside for protection, will continue to monitor     1517: Verbal orders received from Dr. Allison Ashley for 5mg Haldol IM 1 time, orders placed     [id number]: Pt transferred to -- Bedside report given to Paul Raphael 28:  One time dose of Geodon administered -- will continue to monitor

## 2018-12-21 NOTE — PROGRESS NOTES
1950-Attempted to call for transfer report. Unable to reach ED RN. Will retry. 2000-TRANSFER - IN REPORT:    Verbal report received from JeremiahRN(name) on Robert Faraul  being received from ED(unit) for routine progression of care      Report consisted of patients Situation, Background, Assessment and   Recommendations(SBAR). Information from the following report(s) SBAR, Kardex and ED Summary was reviewed with the receiving nurse. Opportunity for questions and clarification was provided. Assessment completed upon patients arrival to unit and care assumed. 2020-Pt. Arrived to [address] [place] [address] via stretcher, wife @ bedside. Pt is AO to person and place.

## 2018-12-21 NOTE — PROGRESS NOTES
Problem: Self Care Deficits Care Plan (Adult)  Goal: *Acute Goals and Plan of Care (Insert Text)  Occupational Therapy Goals:  Initiated 12/21/2018  1. Patient will perform grooming standing with supervision within 7 days. 2. Patient will perform upper body dressing and lower body dressing with supervision within 7 days. 3. Patient will perform toileting with supervision within 7 days. 4. Patient will transfer from toilet with supervision using the least restrictive device and appropriate durable medical equipment within 7 days. Occupational Therapy EVALUATION  Patient: Jessica De Santiago (79 y.o. male)  Date: 12/21/2018  Primary Diagnosis: DEVAN (acute kidney injury) ([facility] [address]  Syncope  Elevated troponin       Precautions:   DNR    ASSESSMENT :  Based on the objective data described below, the patient presents with pleasant confusion and wife was (whom is his caregiver) was able to provide PLF. Pt was oriented to self this session. Mild tremor noted with mobility/activity and wife reports this is baseline. Min assist for supine to sit edge of bed. Initial mild tremors noted and CGA to remain seated. Pt was able to don socks seated with bending forward method with SBA. Min assist for sit to stand and for mobility around bed to chair with hand held assist.  Pts wife is sole caregiver and pt and his wife would benefit from respite care in the home/paid caregiver. Pt is performing ADLS at a independent to min assist level. Pt is close to his baseline but will benefit from skilled intervention to address the above impairments. Recommend return to home with increased assist from paid caregiver.       Patients rehabilitation potential is considered to be Fair  Factors which may influence rehabilitation potential include:   []             None noted  [x]             Mental ability/status  []             Medical condition  []             Home/family situation and support systems  []             Safety awareness  []             Pain tolerance/management  []             Other:      PLAN :  Recommendations and Planned Interventions:  [x]               Self Care Training                  [x]        Therapeutic Activities  [x]               Functional Mobility Training    []        Cognitive Retraining  [x]               Therapeutic Exercises           []        Endurance Activities  [x]               Balance Training                   []        Neuromuscular Re-Education  []               Visual/Perceptual Training     [x]   Home Safety Training  [x]               Patient Education                 [x]        Family Training/Education  []               Other (comment):    Frequency/Duration: Patient will be followed by occupational therapy 3 times a week to address goals. Discharge Recommendations: home with wife's support and respite care for wife   Further Equipment Recommendations for Discharge: none     SUBJECTIVE:   Patient stated I can do it.     OBJECTIVE DATA SUMMARY:   HISTORY:   Past Medical History:   Diagnosis Date    Back pain     due to DDD and scoliosis    BPH (benign prostatic hyperplasia)     Dr. Ramona Medrano CAD (coronary artery disease) 10/30/13    30%LAD, 80% ostal stenosis. Dr. Rhodia Goldberg.  Chest pain 10/04/04, 10/30/13    Dr. Marija Harkins. Negative Stress Cardiolite Test.  Dr. Scottie Shepherd.  Chronic venous insufficiency 06/15/09    Dr. Hannah Christian due to fall    DDD (degenerative disc disease), lumbar     L3-S1    Diverticulosis 02/2004    sigmoid.   Dr. Ananya Conklin DJD (degenerative joint disease) of cervical spine     C 4-5 ;5-6    Dyslipidemia     Fracture 1980    facial/nasal    Hearing loss     wears hearing aid    Heartburn     Hemorrhoids, internal 1987    Dr. Kusum Jerry of unspecified site of abdominal cavity without mention of obstruction or gangrene     inguinal  Lt    Hypertension     Impotence     Left acoustic neuroma ([facility] 10/28/14    17 x 9 mm schwannoma.  Pes planus of both feet     PVD (peripheral vascular disease) ([facility] 2009    Scoliosis     LS    Slipped intervertebral disc 1954    chiropractor    SOB (shortness of breath) 08/07/13    Dr. Carrol Mcgee.  Stroke ([facility] 10/28/2014    small Left Thalamus. Dr. Brianna Hernandez. Dr. Jd Cano.  Urinary incontinence     due to BPH. Dr. Mariana Jeong    Varicose vein 06/15/09    Dr. Davie Ambriz Vertigo 2003    due to inner ear. Dr. Keanu Lentz. Dr. Sarah Pope. Dr. English Staple Visual loss     Dr. Lennox Ala, ophth. Past Surgical History:   Procedure Laterality Date    APPENDECTOMY      due to gangrene   [facility], 02/2002    Dr. Claudeen Castleman. benign.  COLONOSCOPY  02/2004    Dr. Bruno Ricketts. due q 10 yrs    CYSTOSCOPY  10/17/03    Dr. Meli Mejia  10/30/13    Dr. Arturo Sarah. 80% ostal stenosis. 30% LAD.  HX HEMORRHOIDECTOMY      HX KNEE ARTHROSCOPY  1990 Left , 2007 Right    Dr. Clare Vanegas HX LAPAROTOMY  05/14/07    Dr. Ashwini Maravilla. due to Bowel Obstruction    HX POLYPECTOMY  02/2002    Anal.     HX TONSILLECTOMY      ND COLSC FLX W/RMVL OF TUMOR POLYP LESION SNARE TQ  5/23/2011    Dr. Zach Guzman. Prior Level of Function/Environment/Context: ambulated without assist devices, performed ADLs with supervision including bathing (standing) in shower,   Expanded or extensive additional review of patient history:     Home Situation  Home Environment: Private residence  # Steps to Enter: 4  One/Two Story Residence: One story  Living Alone: No  Support Systems: Spouse/Significant Other/Partner  Patient Expects to be Discharged to[de-identified] Private residence  Current DME Used/Available at Home: None    Hand dominance: Right    EXAMINATION OF PERFORMANCE DEFICITS:  Cognitive/Behavioral Status:  Neurologic State: Appropriate for age  Orientation Level: Oriented to person;Oriented to place; Disoriented to situation;Disoriented to time  Cognition: Decreased command following;Decreased attention/concentration;Memory loss  Perception: Appears intact  Perseveration: Perseverates during mobility(attempting to get up)  Safety/Judgement: Decreased awareness of need for safety      Hearing: Auditory  Auditory Impairment: Hard of hearing, bilateral, Hearing aid(s)  Hearing Aids/Status: At home    Vision/Perceptual:    Tracking: Able to track stimulus in all quadrants w/o difficulty                      Acuity: (bifocals)    Corrective Lenses: Glasses    Range of Motion:    AROM: Within functional limits  PROM: Within functional limits                      Strength:    Strength: Generally decreased, functional                Coordination:  Coordination: Within functional limits  Fine Motor Skills-Upper: Left Intact; Right Intact    Gross Motor Skills-Upper: Left Intact; Right Intact    Tone & Sensation:    Tone: Normal  Sensation: Intact                      Balance:  Sitting: Intact  Standing: Impaired  Standing - Static: Constant support;Good  Standing - Dynamic : Fair    Functional Mobility and Transfers for ADLs:  Bed Mobility:  Rolling: Contact guard assistance  Supine to Sit: Minimum assistance  Scooting: Supervision    Transfers:  Sit to Stand: Minimum assistance  Stand to Sit: Contact guard assistance  Bed to Chair: Minimum assistance  Bathroom Mobility: Minimum assistance  Toilet Transfer : Minimum assistance    ADL Assessment:  Feeding: Independent    Oral Facial Hygiene/Grooming: Setup    Bathing: Minimum assistance    Upper Body Dressing: Supervision    Lower Body Dressing: Minimum assistance    Toileting: Minimum assistance                ADL Intervention and task modifications:     See assessment  Cognitive Retraining  Safety/Judgement: Decreased awareness of need for safety      Functional Measure:  Barthel Index:    Bathin  Bladder: 10  Bowels: 10  Groomin  Dressin  Feeding: 10  Mobility: 10  Stairs: 0  Toilet Use: 5  Transfer (Bed to Chair and Back): 10  Total: 65       Barthel and G-code impairment scale:  Percentage of impairment CH  0% CI  1-19% CJ  20-39% CK  40-59% CL  60-79% CM  80-99% CN  100%   Barthel Score 0-100 100 99-80 79-60 59-40 20-39 1-19   0   Barthel Score 0-20 20 17-19 13-16 9-12 5-8 1-4 0      The Barthel ADL Index: Guidelines  1. The index should be used as a record of what a patient does, not as a record of what a patient could do. 2. The main aim is to establish degree of independence from any help, physical or verbal, however minor and for whatever reason. 3. The need for supervision renders the patient not independent. 4. A patient's performance should be established using the best available evidence. Asking the patient, friends/relatives and nurses are the usual sources, but direct observation and common sense are also important. However direct testing is not needed. 5. Usually the patient's performance over the preceding 24-48 hours is important, but occasionally longer periods will be relevant. 6. Middle categories imply that the patient supplies over 50 per cent of the effort. 7. Use of aids to be independent is allowed. Janie Otto., Barthel, D.W. (9992). Functional evaluation: the Barthel Index. [facility] (14)2. Sunil Ross megan TONYA Bermudez, King Jensen.[place]., [place], [address] [place] [address] (1999). Measuring the change indisability after inpatient rehabilitation; comparison of the responsiveness of the Barthel Index and Functional Dixon Measure. Journal of Neurology, Neurosurgery, and Psychiatry, 66(4), 960-668. Ronaldo Grewal, N.J.A, QUINTEN Rodgers.CRYS, & Bertram Ventura MHennyA. (2004.) Assessment of post-stroke quality of life in cost-effectiveness studies: The usefulness of the Barthel Index and the EuroQoL-5D. Quality of Life Research, 13, 916-04         G codes:   In compliance with CMSs Claims Based Outcome Reporting, the following G-code set was chosen for this patient based on their primary functional limitation being treated: The outcome measure chosen to determine the severity of the functional limitation was the barthel with a score of 65/100 which was correlated with the impairment scale. ? Self Care:     - CURRENT STATUS: CJ - 20%-39% impaired, limited or restricted    - GOAL STATUS: CI - 1%-19% impaired, limited or restricted    - D/C STATUS:  ---------------To be determined---------------     Occupational Therapy Evaluation Charge Determination   History Examination Decision-Making   MEDIUM Complexity : Expanded review of history including physical, cognitive and psychosocial  history  MEDIUM Complexity : 3-5 performance deficits relating to physical, cognitive , or psychosocial skils that result in activity limitations and / or participation restrictions MEDIUM Complexity : Patient may present with comorbidities that affect occupational performnce. Miniml to moderate modification of tasks or assistance (eg, physical or verbal ) with assesment(s) is necessary to enable patient to complete evaluation       Based on the above components, the patient evaluation is determined to be of the following complexity level: MEDIUM  Pain:  Pain Scale 1: Numeric (0 - 10)  Pain Intensity 1: 0  Pain Location 1: Neck  Pain Orientation 1: Posterior  Pain Description 1: Aching  Pain Intervention(s) 1: Medication (see MAR)  Activity Tolerance:     Please refer to the flowsheet for vital signs taken during this treatment. After treatment:   [x] Patient left in no apparent distress sitting up in chair  [] Patient left in no apparent distress in bed  [x] Call bell left within reach  [x] Nursing notified  [x] Caregiver present  [x] Bed alarm activated    COMMUNICATION/EDUCATION:   The patients plan of care was discussed with: Physical Therapist, Registered Nurse and patient.  And his wife  [] Home safety education was provided and the patient/caregiver indicated understanding. [x] family have participated as able in goal setting and plan of care. [x] family agree to work toward stated goals and plan of care. [] Patient understands intent and goals of therapy, but is neutral about his/her participation. [] Patient is unable to participate in goal setting and plan of care. This patients plan of care is appropriate for delegation to CHRISTINA.     Thank you for this referral.  William Torres OTR/L  Time Calculation: 15 mins

## 2018-12-22 ENCOUNTER — HOME HEALTH ADMISSION (OUTPATIENT)
Dept: HOME HEALTH SERVICES | Facility: HOME HEALTH | Age: 83
End: 2018-12-22
Payer: MEDICARE

## 2018-12-22 VITALS
SYSTOLIC BLOOD PRESSURE: 117 MMHG | HEART RATE: 88 BPM | HEIGHT: 66 IN | DIASTOLIC BLOOD PRESSURE: 74 MMHG | RESPIRATION RATE: 20 BRPM | WEIGHT: 162.92 LBS | OXYGEN SATURATION: 93 % | BODY MASS INDEX: 26.18 KG/M2 | TEMPERATURE: 98.6 F

## 2018-12-22 PROCEDURE — 74011250636 HC RX REV CODE- 250/636: Performed by: FAMILY MEDICINE

## 2018-12-22 PROCEDURE — 74011250637 HC RX REV CODE- 250/637: Performed by: FAMILY MEDICINE

## 2018-12-22 PROCEDURE — 74011250637 HC RX REV CODE- 250/637: Performed by: HOSPITALIST

## 2018-12-22 RX ORDER — AMOXICILLIN AND CLAVULANATE POTASSIUM 875; 125 MG/1; MG/1
1 TABLET, FILM COATED ORAL EVERY 12 HOURS
Qty: 8 TAB | Refills: 0 | Status: SHIPPED | OUTPATIENT
Start: 2018-12-22 | End: 2018-12-26

## 2018-12-22 RX ADMIN — METOPROLOL TARTRATE 12.5 MG: 25 TABLET ORAL at 09:39

## 2018-12-22 RX ADMIN — TAMSULOSIN HYDROCHLORIDE 0.4 MG: 0.4 CAPSULE ORAL at 09:38

## 2018-12-22 RX ADMIN — VITAMIN D, TAB 1000IU (100/BT) 1000 UNITS: 25 TAB at 09:36

## 2018-12-22 RX ADMIN — AMLODIPINE BESYLATE 2.5 MG: 5 TABLET ORAL at 09:38

## 2018-12-22 RX ADMIN — Medication 10 ML: at 05:35

## 2018-12-22 RX ADMIN — ISOSORBIDE MONONITRATE 30 MG: 30 TABLET, EXTENDED RELEASE ORAL at 09:38

## 2018-12-22 RX ADMIN — DOCUSATE SODIUM 100 MG: 100 CAPSULE, LIQUID FILLED ORAL at 09:36

## 2018-12-22 RX ADMIN — HEPARIN SODIUM 5000 UNITS: 5000 INJECTION INTRAVENOUS; SUBCUTANEOUS at 09:47

## 2018-12-22 RX ADMIN — ASPIRIN 325 MG: 325 TABLET ORAL at 09:36

## 2018-12-22 RX ADMIN — AMOXICILLIN AND CLAVULANATE POTASSIUM 1 TABLET: 875; 125 TABLET, FILM COATED ORAL at 09:36

## 2018-12-22 NOTE — PROGRESS NOTES
Problem: Falls - Risk of  Goal: *Absence of Falls  Document Rodolfo Fall Risk and appropriate interventions in the flowsheet.   Outcome: Progressing Towards Goal  Fall Risk Interventions:  Mobility Interventions: Bed/chair exit alarm    Mentation Interventions: Adequate sleep, hydration, pain control, Bed/chair exit alarm    Medication Interventions: Bed/chair exit alarm    Elimination Interventions: Call light in reach, Bed/chair exit alarm    History of Falls Interventions: Bed/chair exit alarm, Consult care management for discharge planning, Door open when patient unattended, Evaluate medications/consider consulting pharmacy, Investigate reason for fall, Room close to nurse's station, Utilize gait belt for transfer/ambulation

## 2018-12-22 NOTE — DISCHARGE SUMMARY
Hospitalist Discharge Summary     Patient ID:  Dayron Kelly  [id number]  57 y.o.  10/24/1928    PCP on record: Purnima Carlisle DO    Admit date: 12/20/2018  Discharge date and time: 12/22/2018      DISCHARGE DIAGNOSIS:    Syncope resolved   CAD / HTN   DEVAN / dehydration poa resolved   Advanced dementia with behavioral problems   Acute bronchitis   BPH   PVD  Hyperlipidemia  H/o stroke  Code status: DNR        CONSULTATIONS:  IP CONSULT TO CARDIOLOGY    Excerpted HPI from H&P of Luis Dill MD:    80 y.o m with PMH of HTN, CAD, h/o Stroke and Alzheimer's dementia brought in by EMS following a Syncope episode at home shortly after breakfast. Patient lives with his wife which is his primary caregiver and very good historian. She denies and seizure activity during or after episode. He was seen yesterday at Urgent Care for productive cough but she states he did not cough prior to syncopal episode. Patient's wife also reporting fever at home of 100.4 and diaphoresis.      In the ER, patient found to have minimally elevated Troponin and DEVAN. CT head showed no acute findings.      He denies CP or SOB.        ______________________________________________________________________  DISCHARGE SUMMARY/HOSPITAL COURSE:  for full details see H&P, daily progress notes, labs, consult notes.        Syncope   CAD / HTN   -no recurrent event  Tele: wo arrhythmia   -seen by cardiology: pre syncope likely due to dehydration   -cont home imdur, asa, statin, BB, norvasc  -echo; EF 55%; no vascular abnormalities      DEVAN / dehydration poa resolved   -baseline cr 0.9, admission 1.32, resolved with IVF   -wife was advised to keep pt well hydrated      Advanced dementia with behavioral problems   -MS back to baseline     Acute bronchitis   -changed Zithromax to Augmentin to avoid QTC prolongation - complete 5 days   Noted temp 99 overnight   BC NTD   -d/w wife to monitor for signs of aspiration at home ( coughing with food)    -cxray clear on admission      BPH, cont flomax   PVD  Hyperlipidemia, cont statin   H/o stroke, cont asa               Code status: DNR  Prophylaxis: Lovenox      Baseline: lives with wife; advanced dementia; ambulating independent   Recommended Disposition: Home w/Family today ( he will be benefiting from returning home asap to avoid further delirium)   Seen by PT: Lucyfrannie 78       _______________________________________________________________________  Patient seen and examined by me on discharge day. PHYSICAL EXAM:  General:          WD, WN. Alert, cooperative    EENT:              EOMI. Anicteric sclerae. MMM  Resp:               CTA bilaterally, no wheezing or rales. No accessory muscle use  CV:                  Regular  rhythm,  No edema  GI:                   Soft, Non distended, Non tender.  +Bowel sounds  Neurologic:      Alert and oriented X 1, normal speech,   Psych:             Poor insight. Not anxious nor agitated  Skin:                No rashes. No jaundice      _______________________________________________________________________  DISCHARGE MEDICATIONS:   Current Discharge Medication List      START taking these medications    Details   amoxicillin-clavulanate (AUGMENTIN) 875-125 mg per tablet Take 1 Tab by mouth every twelve (12) hours for 4 days. Qty: 8 Tab, Refills: 0         CONTINUE these medications which have NOT CHANGED    Details   isosorbide mononitrate ER (IMDUR) 30 mg tablet take 1 tablet by mouth once daily  Qty: 30 Tab, Refills: 11      pravastatin (PRAVACHOL) 20 mg tablet take 1 tablet by mouth once daily at bedtime  Qty: 90 Tab, Refills: 3    Associated Diagnoses: Dyslipidemia      FOLBEE 2.5-25-1 mg tablet take 1 tablet by mouth once daily  Qty: 60 Tab, Refills: 11    Associated Diagnoses: Cognitive and behavioral changes;  Alzheimer's dementia without behavioral disturbance, unspecified timing of dementia onset      metoprolol tartrate (LOPRESSOR) 25 mg tablet take 1/2 tablet once a day  Indications: hypertension  Qty: 30 Tab, Refills: 11    Associated Diagnoses: Essential hypertension      acetaminophen (TYLENOL ARTHRITIS PAIN) 650 mg TbER Take 650 mg by mouth every eight (8) hours. docusate sodium (COLACE) 100 mg capsule Take 100 mg by mouth daily. Instructed to take daily with plenty of liquid unless otherwise directed- as per 11/7/14 [facility] discharge med list      cholecalciferol, vitamin D3, (VITAMIN D3) 2,000 unit tab Take 1 Tab by mouth daily. As per 11/07/14 [facility] discharge med list      !! aspirin (ASPIRIN) 325 mg tablet Take 1 tablet by mouth daily. Qty: 30 tablet, Refills: 3      tamsulosin (FLOMAX) 0.4 mg capsule Take 0.4 mg by mouth daily. For prostate       etodolac (LODINE) 400 mg tablet take 1 tablet by mouth once daily if needed  Qty: 30 Tab, Refills: 2    Associated Diagnoses: Hip pain, acute, left      !! aspirin (ASPIRIN) 325 mg tablet Take 1 Tab by mouth daily. Indications: myocardial infarction prevention  Qty: 90 Tab, Refills: 3    Associated Diagnoses: Coronary artery disease involving native coronary artery of native heart without angina pectoris; History of stroke      amLODIPine (NORVASC) 2.5 mg tablet Take 1 Tab by mouth daily. Indications: hypertension  Qty: 30 Tab, Refills: 11    Associated Diagnoses: Essential hypertension      meclizine (ANTIVERT) 25 mg tablet Take 1 Tab by mouth three (3) times daily as needed for Dizziness. Qty: 20 Tab, Refills: 0      GUAIFENESIN (MUCINEX PO) Take 1 Tab by mouth daily. Associated Diagnoses: Chest congestion       !! - Potential duplicate medications found. Please discuss with provider. My Recommended Diet, Activity, Wound Care, and follow-up labs are listed in the patient's Discharge Insturctions which I have personally completed and reviewed.     ______________________________________________________________________    Risk of deterioration: Low    Condition at Discharge: Stable  ______________________________________________________________________    Disposition  Home with family and home health services  ______________________________________________________________________    Care Plan discussed with:   Patient, Family, RN, Care Manager    ______________________________________________________________________    Code Status: DNR/DNI  ______________________________________________________________________      Follow up with:   PCP : Anup Schneider,   Follow-up Information     Follow up With Specialties Details Why [address] [place] [address] Drive, Mary Ray,  Family Practice In 1 week  [address]  [phone]                Total time in minutes spent coordinating this discharge (includes going over instructions, follow-up, prescriptions, and preparing report for sign off to her PCP) :  > 30 minutes    Signed:  Belton Olszewski, MD

## 2018-12-22 NOTE — PROGRESS NOTES
Home Health Care Discharge Planning: [facility]  Face to Face Encounter      NAME: Ro Springer   :  10/24/1928   MRN:  [id number]     Primary Diagnosis: syncope     Date of Face to Face:  2018 3:15 PM                                  Face to Face Encounter findings are related to primary reason for home care:   YES    1. I certify that the patient needs intermittent skilled nursing care, physical therapy and/or speech therapy. I will not be following this patient in the Community and Dr. Shereen Meeks DO will be responsible for signing the [address] 2. Initial Orders for Care: Skilled Nursing, Physical Therapy and Occupational Therapy    3. I certify that this patient is homebound because of illness or injury, need the aid of supportive devices such as crutches, canes, wheelchairs, and walkers; the use of special transportation; or the assistance of another person in order to leave their place of residence. There exists a normal inability to leave home and leaving home requires a considerable and taxing effort. 4. I certify that this patient is under my care and that I had a Face-to-Face Encounter that meets the physician Face-to-Face Encounter requirements. Document the physical findings from the Face-to-Face Encounter that support the need for skilled services: Has new diagnosis that requires skilled nursing teaching and intervention , Needs skilled safety assessment and interventions  and Has new finding of weakness and altered mobility that requires skilled physical/occupational and/or speech therapy services for evaluation and interventions.      Kathya Cespedes MD  Discharging Physician  Office: [phone]  Fax:   [phone]

## 2018-12-22 NOTE — DISCHARGE INSTRUCTIONS
Patient Discharge Instructions    Tee Storey / [id number] : 10/24/1928    Admitted 2018 Discharged: 2018         DISCHARGE DIAGNOSIS:     Passed out due to dehydration    Bronchitis     Monitor at home: if coughing or choking with food, discuss with PCP to be evaluated b speech pathology for aspiration       Take Home Medications     You are being discharge on antibiotic Augmentin for treatment of bronchitis      What you should know about antibiotics:     Antibiotics are medicines that help people fight infections caused by bacteria. They work by killing bacteria that are in the body. Antibiotics can cause side effects, such as nausea, vomiting. Nausea is a common side effect of many antibiotics. It is not an allergic reaction. If you are a woman and you get a yeast infection after taking an antibiotic, that does not mean you are allergic to it. Yeast infections are a common side effect of antibiotics. One of the most important side effect to watch while taking antibiotic or after you just finished taking it is diarrhea. This type of diarrhea may be caused by an infection with bacteria called C. difficile. C. difficile normally lives in the intestines. When people are on antibiotics, the C. difficile in their intestines can overgrow and cause infection. If you develop any side effect especially diarrhea while taking antibiotics it is very important to contact your doctor. We recommend taking probiotics while taking antibiotics. Probiotics can be bought over the counter. Allergies to antibiotics are common. You can develop an allergy to an antibiotic, even if you have not had a problem with it before. Symptoms of antibiotic allergy can be mild and include a flat rash and itching.  They can also be more serious and include:      -----  Hives - are raised, red patches of skin that are usually very itchy      -----  Lip or tongue swelling     ------ Trouble swallowing or breathing  Serious allergy symptoms can start right after you start taking an antibiotic if you are very sensitive. Less serious symptoms, on the other hand, often start a day or more later. If you think you are allergic to antibiotics tell your doctor. General drug facts     If you have a very bad allergy, wear an allergy ID at all times. It is important that you take the medication exactly as they are prescribed. Keep your medication in the bottles provided by the pharmacist.  Keep a list of all your drugs (prescription, natural products, vitamins, OTC) with you. Give this list to your doctor. Do not take other medications without consulting your doctor. Do not share your drugs with others and do not take anyone else's drugs. Keep all drugs out of the reach of children and pets. Most drugs may be thrown away in household trash after mixing with coffee grounds or vidhya litter and sealing in a plastic bag. Keep a list Call your doctor for help with any side effects. If in the [place] you may also call the FDA at 3-963-YNI-4637    Talk with the doctor before starting any new drug, including OTC, natural products, or vitamins. What to do at Home    1. Recommended diet: regular     2. Recommended activity: Activity as tolerated    3. If you experience any of the following symptoms then please call your primary care physician or return to the emergency room if you cannot get hold of your doctor: fever/chills     4. Bring these papers with you to your follow up appointments.  The papers will help your doctors be sure to continue the care plan from the hospital.      Follow-up with:   PCP: Margaret Coleman DO  Follow-up Information     Follow up With Specialties Details Why [address] [place] [address] DriveLisa DO Family Practice In 1 week  [address]  [address]  [phone]             Please call for your own appointment        Information obtained by :  I understand that if any problems occur once I am at home I am to contact my physician. I understand and acknowledge receipt of the instructions indicated above.                                                                                                                                            Physician's or R.N.'s Signature                                                                  Date/Time                                                                                                                                              Patient or Representative Signature                                                          Date/Time

## 2018-12-22 NOTE — PROGRESS NOTES
Problem: Pressure Injury - Risk of  Goal: *Prevention of pressure injury  Document Grady Scale and appropriate interventions in the flowsheet.   Outcome: Progressing Towards Goal  Pressure Injury Interventions:  Sensory Interventions: Assess changes in LOC, Chair cushion, Check visual cues for pain, Discuss PT/OT consult with provider, Float heels, Keep linens dry and wrinkle-free, Maintain/enhance activity level, Minimize linen layers, Monitor skin under medical devices, Pad between skin to skin    Moisture Interventions: Absorbent underpads, Apply protective barrier, creams and emollients, Limit adult briefs, Maintain skin hydration (lotion/cream), Minimize layers, Moisture barrier    Activity Interventions: Chair cushion, Increase time out of bed, PT/OT evaluation    Mobility Interventions: Chair cushion, Float heels, PT/OT evaluation    Nutrition Interventions: Document food/fluid/supplement intake, Discuss nutritional consult with provider, Offer support with meals,snacks and hydration    Friction and Shear Interventions: Apply protective barrier, creams and emollients, Feet elevated on foot rest, Lift sheet, Lift team/patient mobility team, Minimize layers

## 2018-12-22 NOTE — PROGRESS NOTES
*CM acknowledged consult*    CM sent referral to [facility], via [facility]. CM informed that pt has been accepted to [facility]. CM will inform pt and MD of the following.     BELA Irizarry CM  [phone]

## 2018-12-22 NOTE — PROGRESS NOTES
Pt's wife is ready to take pt home. I s/w CLAUDINE Keys, she will arrange pt's [facility] and follow up tomorrow. Pt's wife stated they have used [facility] in the past and would like to use them again. 1522 - pt is being discharged from the hospital to home with his wife. Pt has no s/s of distress at discharge, no IV access, telemetry removed. Discharge instructions were reviewed with the pt's wife, she verbalized understanding. One prescription was given.

## 2018-12-22 NOTE — PROGRESS NOTES
Hospitalist Progress Note    NAME: Fatuma Silva   :  10/24/1928   MRN:  [id number]         Assessment / Plan:  Syncope   CAD / HTN   -no recurrent event  Tele: wo arrhythmia   -seen by cardiology: pre syncope likely due to dehydration   -cont home imdur, asa, statin, BB, norvasc  -echo; EF 55%; no vascular abnormalities     DEVAN / dehydration poa resolved   -baseline cr 0.9, admission 1.32, resolved with IVF     Advanced dementia with behavioral problems   -MS back to baseline    Acute bronchitis   -changed Zithromax to Augmentin to avoid QTC prolongation - complete 5 days   Noted temp 99 overnight   BC NTD   -cxray clear on admission     BPH, cont flomax   PVD  Hyperlipidemia, cont statin   H/o stroke, cont asa           Code status: DNR  Prophylaxis: Lovenox     Baseline: lives with wife; advanced dementia; ambulating independent   Recommended Disposition: Home w/Family today   Seen by PT: C      Subjective:     Chief Complaint / Reason for Physician Visit: following syncope   Wife at bedside  MS: back to baseline  No more haldol/ Geodon  was used       Discussed with RN events overnight. Review of Systems:  Symptom Y/N Comments  Symptom Y/N Comments   Fever/Chills    Chest Pain     Poor Appetite    Edema     Cough    Abdominal Pain     Sputum    Joint Pain     SOB/GRECO    Pruritis/Rash     Nausea/vomit    Tolerating PT/OT     Diarrhea    Tolerating Diet     Constipation    Other       Could NOT obtain due to: Dementia      Objective:     VITALS:   Last 24hrs VS reviewed since prior progress note.  Most recent are:  Patient Vitals for the past 24 hrs:   Temp Pulse Resp BP SpO2   18 0900 99.9 °F (37.7 °C) 99 20 147/89 96 %   18 0347 98.7 °F (37.1 °C) 86 22 157/74 98 %   18 2325 99.1 °F (37.3 °C) 81 20 141/78 95 %   18 2006 99 °F (37.2 °C) 90 20 150/75 95 %   18 1430 98.1 °F (36.7 °C) 80 20 107/72 96 %       Intake/Output Summary (Last 24 hours) at 2018 1115  Last data filed at 12/22/2018 1034  Gross per 24 hour   Intake 260 ml   Output 350 ml   Net -90 ml        PHYSICAL EXAM:  General: WD, WN. Alert, cooperative    EENT:  EOMI. Anicteric sclerae. MMM  Resp:  CTA bilaterally, no wheezing or rales. No accessory muscle use  CV:  Regular  rhythm,  No edema  GI:  Soft, Non distended, Non tender.  +Bowel sounds  Neurologic:  Alert and oriented X 1, normal speech,   Psych:   Poor insight. Not anxious nor agitated  Skin:  No rashes. No jaundice    Reviewed most current lab test results and cultures  YES  Reviewed most current radiology test results   YES  Review and summation of old records today    NO  Reviewed patient's current orders and MAR    YES  PMH/SH reviewed - no change compared to H&P  ________________________________________________________________________  Care Plan discussed with:    Comments   Patient y    Family  y Wife bedside    RN y    Care Manager     Consultant                        Multidiciplinary team rounds were held today with , nursing, pharmacist and clinical coordinator. Patient's plan of care was discussed; medications were reviewed and discharge planning was addressed. ________________________________________________________________________  Total NON critical care TIME: 35 Minutes    Total CRITICAL CARE TIME Spent:   Minutes non procedure based      Comments   >50% of visit spent in counseling and coordination of care y Dc coordination    ________________________________________________________________________  Dandy Russell MD     Procedures: see electronic medical records for all procedures/Xrays and details which were not copied into this note but were reviewed prior to creation of Plan. LABS:  I reviewed today's most current labs and imaging studies.   Pertinent labs include:  Recent Labs     12/21/18  0029 12/20/18  1021   WBC 9.1 7.7   HGB 11.8* 12.3   HCT 35.4* 36.6   * 140*     Recent Labs 12/21/18  0029 12/20/18  1021    134*   K 4.2 3.8    103   CO2 26 25   * 185*   BUN 16 18   CREA 0.97 1.32*   CA 8.4* 8.5   ALB  --  3.0*   TBILI  --  0.5   SGOT  --  15   ALT  --  18       Signed: Sinai Puri MD

## 2018-12-22 NOTE — PROGRESS NOTES
Pt has been recommended to D/C home with [facility]. Pt provided FOC and Pt and his wife selected [address] [place] [address]. Referral sent to [address] [place] [address]. CM awaiting confirmation from [address] [place] [address] regarding ability to accept Pt. Thanh Morgan LCSW  Ext # [phone]    CM made Pt and his wife aware of [employer] being unable to accept Pt for [facility] referral. [facility] offered and Pt selected [facility].  CM ECIN referral to [facility] and updated AVS.    Thanh Morgan LCSW  Ext # [phone]

## 2018-12-23 NOTE — PROGRESS NOTES
Weekend CM received a call from the Pt's wife and she indicated that they DC yesterday and just wanted the name of the [facility] agency that will be coming out. CM gave her the number for [facility] and they are going to be calling to schedule the [facility] visits in next 24 hours. No further CM needs.      Zak Buckner, [facility]

## 2018-12-24 ENCOUNTER — HOME CARE VISIT (OUTPATIENT)
Dept: SCHEDULING | Facility: HOME HEALTH | Age: 83
End: 2018-12-24
Payer: MEDICARE

## 2018-12-24 ENCOUNTER — TELEPHONE (OUTPATIENT)
Dept: FAMILY MEDICINE CLINIC | Age: 83
End: 2018-12-24

## 2018-12-24 ENCOUNTER — PATIENT OUTREACH (OUTPATIENT)
Dept: FAMILY MEDICINE CLINIC | Age: 83
End: 2018-12-24

## 2018-12-24 DIAGNOSIS — R41.3 MEMORY DISTURBANCE: Primary | ICD-10-CM

## 2018-12-24 PROCEDURE — 3331090001 HH PPS REVENUE CREDIT

## 2018-12-24 PROCEDURE — 400013 HH SOC

## 2018-12-24 PROCEDURE — G0299 HHS/HOSPICE OF RN EA 15 MIN: HCPCS

## 2018-12-24 PROCEDURE — 3331090002 HH PPS REVENUE DEBIT

## 2018-12-24 RX ORDER — QUETIAPINE FUMARATE 25 MG/1
12.5 TABLET, FILM COATED ORAL
Qty: 30 TAB | Refills: 0 | Status: SHIPPED | OUTPATIENT
Start: 2018-12-24 | End: 2019-02-05 | Stop reason: SDUPTHER

## 2018-12-24 NOTE — TELEPHONE ENCOUNTER
Writer called, spoke with Ressie  wife, on PHI, two patient identifiers used for patient verification, advised 25mg of serequel was called in, patient to take 1/2 pill at [facility] and to still follow up with neurologist.  Patient stated that she will and expressed her thanks!

## 2018-12-24 NOTE — TELEPHONE ENCOUNTER
Writer called Jill back with [facility] 48 was not in, spoke with Thereasa Burkitt patients spouse she is on PHI, Writer was advised that Patient had a bout of confusion, patient wanted to know if there was anything that we could call into pharmacy that would help calm him down. I advised Patient I will get information to MD, but normally a office visit is required. Patient wife then stated that his day today has been good, no issues with confusion and he has been very calm.

## 2018-12-24 NOTE — TELEPHONE ENCOUNTER
Pt sees Dr. Martha Salgado, neurologist for memory disturbance. Wife must call him right away. Meanwhile, I sent a Rx for Seroquel 25 mg 1/2 pill q hs, if Dr. Martha Salgado agrees. This can make pt sleepy. Return to ER if unable to get Dr. Martha Salgado and pt worsens.

## 2018-12-24 NOTE — TELEPHONE ENCOUNTER
Chicho Latif [facility] calling on behalf of patient. She is requesting a return call to discuss some confusion patient is experiencing. Contact # is [phone].

## 2018-12-24 NOTE — PROGRESS NOTES
Hospital Discharge Follow-Up      Date/Time:  2018 2:45 PM    Patient was admitted to [facility] on 18 and discharged on 18 for Syncope. The physician discharge summary was available at the time of outreach. Patient was contacted within 2 business days of discharge. Top Challenges reviewed with the provider   - Taking ABX as prescribed. D/C WBC 9.7. Bld cx- NG    - As per wife pt taking adequate amt of PO fluids. D/C Creatinine 0.97    - Some confusion 1st night @ home. Wife reported improvement today    - [facility] SN/PT/OT. SOC SN visit today       Method of communication with provider :chart routing    Inpatient RRAT score: 12  Was this a readmission? no   Patient stated reason for the readmission: n/a    Nurse Navigator (NN) contacted the pt's wife Sampson Bella by telephone to perform post hospital discharge assessment. Verified name and  with wife as identifiers. Provided introduction to self, and explanation of the Nurse Navigator role. Reviewed discharge instructions and red flags with wife who verbalized understanding. Wife given an opportunity to ask questions and does not have any further questions or concerns at this time. The wife agrees to contact the PCP office for questions related to their healthcare. NN provided contact information for future reference. Disease Specific:   N/A    Summary of patient's top problems:  1. Syncope- no recurrent event. Telemetry- no arrhythmia. Cardiology consult. Presyncope likely due to dehydration. TTE- Left ventricle: Systolic function normal. EF est range 55 % to 60 %. No obvious wall motion abnormalities identified. Continue home Imdur, ASA, Statin, BB, Norvasc. 2. DEVAN / Dehydration -admission Creatinine 1.32, resolved with IVF . Creatinine @ d/c 0.97  Wife advised to keep pt well hydrated. 3. Acute Bronchitis- +cough. CT Chest- IMPRESSION: 1. No airspace disease or other acute abnormality.  2. Atherosclerosis with mild coronary artery calcification. Admission WBC- 7.7. Started on Zithromax. Blood Cx- NG. Changed to Augmentin to avoid QTC prolongation. To complete 5 day therapy.     Home Health orders at discharge: PT, OT, [address]: [facility]  Date of initial visit: [facility] [place] [facility] 12[facility]24[facility]18    Durable Medical Equipment ordered/company: none  Durable Medical Equipment received: n/a    Barriers to care? none identified @ present time    Advance Care Planning:   Does patient have an Advance Directive:  reviewed and current     Medication(s):   New Medications at Discharge:   amoxicillin-clavulanate (AUGMENTIN) 875-125 mg per tablet Take 1 Tab by mouth every twelve (12) hours for 4 days. Qty: 8 Tab, Refills: 0         Changed Medications at Discharge: none  Discontinued Medications at Discharge: none    Medication reconciliation was performed by [facility] Nurse Toi Rubinstein . Wife verbalized understanding of administration of pt's home medications. There were no barriers to obtaining medications identified at this time. Referral to Pharm D needed: no     Current Outpatient Medications   Medication Sig    QUEtiapine (SEROQUEL) 25 mg tablet Take 0.5 Tabs by mouth nightly.  etodolac (LODINE) 400 mg tablet take 1 tablet by mouth once daily if needed    isosorbide mononitrate ER (IMDUR) 30 mg tablet take 1 tablet by mouth once daily    aspirin (ASPIRIN) 325 mg tablet Take 1 Tab by mouth daily. Indications: myocardial infarction prevention    pravastatin (PRAVACHOL) 20 mg tablet take 1 tablet by mouth once daily at bedtime    FOLBEE 2.5-25-1 mg tablet take 1 tablet by mouth once daily    metoprolol tartrate (LOPRESSOR) 25 mg tablet take 1/2 tablet once a day  Indications: hypertension    amLODIPine (NORVASC) 2.5 mg tablet Take 1 Tab by mouth daily. Indications: hypertension    acetaminophen (TYLENOL ARTHRITIS PAIN) 650 mg TbER Take 650 mg by mouth every eight (8) hours.     meclizine (ANTIVERT) 25 mg tablet Take 1 Tab by mouth three (3) times daily as needed for Dizziness.  GUAIFENESIN (MUCINEX PO) Take 1 Tab by mouth daily.  docusate sodium (COLACE) 100 mg capsule Take 100 mg by mouth daily. Instructed to take daily with plenty of liquid unless otherwise directed- as per 11/7/14 [facility] discharge med list    cholecalciferol, vitamin D3, (VITAMIN D3) 2,000 unit tab Take 1 Tab by mouth daily. As per 11/07/14 [facility] discharge med list    aspirin (ASPIRIN) 325 mg tablet Take 1 tablet by mouth daily.  tamsulosin (FLOMAX) 0.4 mg capsule Take 0.4 mg by mouth daily. For prostate      No current facility-administered medications for this visit. There are no discontinued medications.     [facility] follow up appointment(s):   Future Appointments   Date Time Provider Santana Lange   12/31/2018 10:30 AM DO ELIEZER Encinas AGNES SCHED   1/1/2019 [facility] Be Determined [facility] [address] [facility]   1/1/2019 To Be Determined Rosa Millan, PT [facility]   1/3/2019  6:00 AM Shikha Young LPN [facility]   1/3/2019 To Be Determined [address]   1/8/2019 To Be Determined [address]   1/8/2019 To Be Determined [facility]   1/10/2019 To Be Determined [address]   1/11/2019 To Be Determined [facility]   1/15/2019 [facility]   1/17/2019 To Be Determined [facility]   1/22/2019 To Be Determined [address]   1/24/2019 To Be Determined Wilber Thomas [facility]   3/11/2019 10:00 AM DO ELIEZER Encinas AGNES SCHED   5/17/2019 11:40 AM Andry Reynoso  [facility]      Non-[facility] follow up appointment(s): none    [facility]:  information provided as a resource       Goals      Transitions of Care- collaboration & care coordination to prevent complications post hospitalization. 12/24/18- spoke w/ wife. Wife reported pt had increased confusion last night. Today is better. No dizziness/lightheadedness. Afebrile. Taking ABX as prescribed. Appetite fair. Adequate PO fluid intake. Vdg w/o difficulty. +BMs. \"BS [address] visited today. She did a good evaluation. Everyrthing good. She checked all the meds. She's coming back Thurs 12/27/18. PT/OT will call to schedule visits\". Red flags s/s & action plan reviewed. Wife advised providers on call 24 hours a day / 7 days a week (M-F 5 PM to 8 AM, and from Friday 5 PM until Monday 8 AM for the weekend) should the patient have questions or concerns. [facility] information provided. Wife verbalized concern re PCP f/u. Called office to schedule. No available appts for PCP. Plan- NN spoke w/ office PM. PCP ANGELA f/u scheduled for 12/31/18 @ 10:30 AM. Wife informed. Pt to attend PCP f/u as scheduled. Pt to participate w/ [facility] SN/PT/OT plan of care to accomplish set goals. Next NN ~ 3-4 days-ID.

## 2018-12-24 NOTE — TELEPHONE ENCOUNTER
----- Message from Mohan Geronimo sent at 12/24/2018  8:01 AM EST -----  Regarding: /telephone  Krish Gallegos, wife is calling on behalf of pt to request an appointment for an emergency room follow up visit to replace the appointment that was on Friday that had to be cancelled due to the pt being admitted to the hospital for  syncope and bronchitis. Pt needs a hospital discharge follow up as well. Pt has had mild confusion,but  it  has increased since the pt had the syncope episode. Mrs. Serena Serrano is requesting that a medication be prescribed to calm the patient down due to the confusion and  sent to the [facility] located on [facility] (p) [phone]. Mrs. Serena Serrano can be reached at [phone].

## 2018-12-24 NOTE — TELEPHONE ENCOUNTER
Please contact patient. Do not see much availability for doctors and patient may be too complicated for PA or NP. Thank you.

## 2018-12-25 VITALS
DIASTOLIC BLOOD PRESSURE: 94 MMHG | TEMPERATURE: 97.3 F | BODY MASS INDEX: 22.43 KG/M2 | SYSTOLIC BLOOD PRESSURE: 144 MMHG | HEART RATE: 72 BPM | RESPIRATION RATE: 18 BRPM | WEIGHT: 139.6 LBS | OXYGEN SATURATION: 98 % | HEIGHT: 66 IN

## 2018-12-25 LAB
BACTERIA SPEC CULT: NORMAL
SERVICE CMNT-IMP: NORMAL

## 2018-12-25 PROCEDURE — 3331090002 HH PPS REVENUE DEBIT

## 2018-12-25 PROCEDURE — 3331090001 HH PPS REVENUE CREDIT

## 2018-12-26 ENCOUNTER — HOME CARE VISIT (OUTPATIENT)
Dept: SCHEDULING | Facility: HOME HEALTH | Age: 83
End: 2018-12-26
Payer: MEDICARE

## 2018-12-26 PROCEDURE — 3331090001 HH PPS REVENUE CREDIT

## 2018-12-26 PROCEDURE — 3331090002 HH PPS REVENUE DEBIT

## 2018-12-26 PROCEDURE — G0299 HHS/HOSPICE OF RN EA 15 MIN: HCPCS

## 2018-12-27 ENCOUNTER — HOME CARE VISIT (OUTPATIENT)
Dept: SCHEDULING | Facility: HOME HEALTH | Age: 83
End: 2018-12-27
Payer: MEDICARE

## 2018-12-27 VITALS
RESPIRATION RATE: 18 BRPM | DIASTOLIC BLOOD PRESSURE: 74 MMHG | HEART RATE: 76 BPM | SYSTOLIC BLOOD PRESSURE: 136 MMHG | TEMPERATURE: 97.3 F | OXYGEN SATURATION: 97 %

## 2018-12-27 VITALS
SYSTOLIC BLOOD PRESSURE: 130 MMHG | HEART RATE: 77 BPM | RESPIRATION RATE: 18 BRPM | DIASTOLIC BLOOD PRESSURE: 78 MMHG | OXYGEN SATURATION: 97 % | TEMPERATURE: 97.3 F

## 2018-12-27 PROCEDURE — 3331090002 HH PPS REVENUE DEBIT

## 2018-12-27 PROCEDURE — G0151 HHCP-SERV OF PT,EA 15 MIN: HCPCS

## 2018-12-27 PROCEDURE — G0152 HHCP-SERV OF OT,EA 15 MIN: HCPCS

## 2018-12-27 PROCEDURE — 3331090001 HH PPS REVENUE CREDIT

## 2018-12-28 ENCOUNTER — PATIENT OUTREACH (OUTPATIENT)
Dept: FAMILY MEDICINE CLINIC | Age: 83
End: 2018-12-28

## 2018-12-28 ENCOUNTER — HOME CARE VISIT (OUTPATIENT)
Dept: SCHEDULING | Facility: HOME HEALTH | Age: 83
End: 2018-12-28
Payer: MEDICARE

## 2018-12-28 VITALS
TEMPERATURE: 97.5 F | OXYGEN SATURATION: 95 % | DIASTOLIC BLOOD PRESSURE: 68 MMHG | HEART RATE: 80 BPM | RESPIRATION RATE: 16 BRPM | SYSTOLIC BLOOD PRESSURE: 115 MMHG

## 2018-12-28 PROCEDURE — 3331090001 HH PPS REVENUE CREDIT

## 2018-12-28 PROCEDURE — 3331090002 HH PPS REVENUE DEBIT

## 2018-12-28 PROCEDURE — G0300 HHS/HOSPICE OF LPN EA 15 MIN: HCPCS

## 2018-12-29 PROCEDURE — 3331090001 HH PPS REVENUE CREDIT

## 2018-12-29 PROCEDURE — 3331090002 HH PPS REVENUE DEBIT

## 2018-12-30 PROCEDURE — 3331090002 HH PPS REVENUE DEBIT

## 2018-12-30 PROCEDURE — 3331090001 HH PPS REVENUE CREDIT

## 2018-12-31 ENCOUNTER — OFFICE VISIT (OUTPATIENT)
Dept: FAMILY MEDICINE CLINIC | Age: 83
End: 2018-12-31

## 2018-12-31 VITALS
RESPIRATION RATE: 18 BRPM | TEMPERATURE: 97.6 F | BODY MASS INDEX: 25.43 KG/M2 | HEIGHT: 66 IN | DIASTOLIC BLOOD PRESSURE: 62 MMHG | OXYGEN SATURATION: 97 % | SYSTOLIC BLOOD PRESSURE: 115 MMHG | HEART RATE: 63 BPM | WEIGHT: 158.2 LBS

## 2018-12-31 DIAGNOSIS — M25.472 LEFT ANKLE SWELLING: ICD-10-CM

## 2018-12-31 DIAGNOSIS — E86.0 DEHYDRATION: ICD-10-CM

## 2018-12-31 DIAGNOSIS — F02.818 EARLY ONSET ALZHEIMER'S DISEASE WITH BEHAVIORAL DISTURBANCE (HCC): ICD-10-CM

## 2018-12-31 DIAGNOSIS — G30.0 EARLY ONSET ALZHEIMER'S DISEASE WITH BEHAVIORAL DISTURBANCE (HCC): ICD-10-CM

## 2018-12-31 DIAGNOSIS — J20.8 ACUTE BRONCHITIS DUE TO OTHER SPECIFIED ORGANISMS: ICD-10-CM

## 2018-12-31 DIAGNOSIS — N17.9 AKI (ACUTE KIDNEY INJURY) (HCC): ICD-10-CM

## 2018-12-31 DIAGNOSIS — R55 SYNCOPE AND COLLAPSE: Primary | ICD-10-CM

## 2018-12-31 PROBLEM — G30.9 ALZHEIMER'S DEMENTIA WITH BEHAVIORAL DISTURBANCE (HCC): Status: ACTIVE | Noted: 2018-04-01

## 2018-12-31 PROCEDURE — 3331090002 HH PPS REVENUE DEBIT

## 2018-12-31 PROCEDURE — 3331090001 HH PPS REVENUE CREDIT

## 2018-12-31 RX ORDER — GUAIFENESIN/DEXTROMETHORPHAN 100-10MG/5
SYRUP ORAL
Refills: 0 | COMMUNITY
Start: 2018-12-19 | End: 2019-01-01 | Stop reason: ALTCHOICE

## 2018-12-31 RX ORDER — ALBUTEROL SULFATE 90 UG/1
AEROSOL, METERED RESPIRATORY (INHALATION)
COMMUNITY
Start: 2018-12-19 | End: 2018-12-31 | Stop reason: CLARIF

## 2018-12-31 RX ORDER — BENZONATATE 100 MG/1
CAPSULE ORAL
Refills: 0 | COMMUNITY
Start: 2018-12-19 | End: 2019-01-01 | Stop reason: ALTCHOICE

## 2018-12-31 NOTE — PROGRESS NOTES
Ivelisse Natarajan  Identified pt with two pt identifiers(name and ). Chief Complaint   Patient presents with   Sean Cai     Rm 14         1. Have you been to the ER, urgent care clinic since your last visit? Hospitalized since your last visit? YES [facility]  2018    2. Have you seen or consulted any other health care providers outside of the [address] [place] [address] since your last visit? Include any pap smears or colon screening. NO      Advance Care Planning    In the event something were to happen to you and you were unable to speak on your behalf, do you have an Advance Directive/ Living Will in place stating your wishes? YES    If yes, do we have a copy on file YES    If no, would you like information NO    My Chart     My chart gives you direct online access to portions of the electronic medical record (EMR) where your doctor stores your health information (ie, lab results, appointment information, medications, immunizations, and more. It is free. Would you like to set up your my chart? NO    [unfilled]    Weight Metrics 2018   Weight 158 lb 3.2 oz 139 lb 9.6 oz 162 lb 14.7 oz 154 lb 5.2 oz 151 lb 14.4 oz 153 lb 11.2 oz 152 lb   BMI 25.53 kg/m2 22.53 kg/m2 26.3 kg/m2 24.91 kg/m2 26.28 kg/m2 24.81 kg/m2 24.53 kg/m2         Medication reconciliation up to date and corrected with patient at this time. Today's provider has been notified of reason for visit, vitals and flowsheets obtained on patients. Reviewed record in preparation for visit, huddled with provider and have obtained necessary documentation. There are no preventive care reminders to display for this patient.     Wt Readings from Last 3 Encounters:   18 158 lb 3.2 oz (71.8 kg)   18 139 lb 9.6 oz (63.3 kg)   18 162 lb 14.7 oz (73.9 kg)     Temp Readings from Last 3 Encounters:   18 97.6 °F (36.4 °C) (Temporal)   18 97.3 °F (36.3 °C)   12/27/18 97.5 °F (36.4 °C) (Temporal)     BP Readings from Last 3 Encounters:   12/31/18 115/62   12/28/18 120/90   12/27/18 130/78     Pulse Readings from Last 3 Encounters:   12/31/18 63   12/27/18 77   12/27/18 80     Vitals:    12/31/18 1031   BP: 115/62   Pulse: 63   Resp: 18   Temp: 97.6 °F (36.4 °C)   TempSrc: Temporal   SpO2: 97%   Weight: 158 lb 3.2 oz (71.8 kg)   Height: 5' 6\" (1.676 m)   PainSc:   0 - No pain         Learning Assessment:  :     Learning Assessment 8/31/2018 4/24/2018 1/23/2018 10/11/2017 4/3/2015   PRIMARY LEARNER Patient Patient Patient Patient Patient   HIGHEST LEVEL OF EDUCATION - PRIMARY LEARNER  > 4 YEARS OF COLLEGE - - - > 4 YEARS OF COLLEGE   BARRIERS PRIMARY LEARNER NONE - - - NONE   CO-LEARNER CAREGIVER No - - - -   PRIMARY LANGUAGE ENGLISH ENGLISH ENGLISH ENGLISH ENGLISH   LEARNER PREFERENCE PRIMARY READING DEMONSTRATION DEMONSTRATION DEMONSTRATION DEMONSTRATION   ANSWERED BY patient and wife patient other patient patient   RELATIONSHIP SELF SELF OTHER SELF SELF       Depression Screening:  :     PHQ over the last two weeks 8/31/2018   Little interest or pleasure in doing things Not at all   Feeling down, depressed, irritable, or hopeless Not at all   Total Score PHQ 2 0       Fall Risk Assessment:  :     Fall Risk Assessment, last 12 mths 8/31/2018   Able to walk? Yes   Fall in past 12 months? No       Abuse Screening:  :     Abuse Screening Questionnaire 8/31/2018   Do you ever feel afraid of your partner? N   Are you in a relationship with someone who physically or mentally threatens you? N   Is it safe for you to go home?  Y       ADL Screening:  :     ADL Assessment 8/31/2018   Feeding yourself No Help Needed   Getting from bed to chair No Help Needed   Getting dressed No Help Needed   Bathing or showering No Help Needed   Walk across the room (includes cane/walker) No Help Needed   Using the telphone No Help Needed   Taking your medications No Help Needed Preparing meals No Help Needed   Managing money (expenses/bills) No Help Needed   Moderately strenuous housework (laundry) No Help Needed   Shopping for personal items (toiletries/medicines) No Help Needed   Shopping for groceries No Help Needed   Driving No Help Needed   Climbing a flight of stairs No Help Needed   Getting to places beyond walking distances No Help Needed

## 2018-12-31 NOTE — PROGRESS NOTES
HISTORY OF PRESENT ILLNESS  Bree Estrada is a 80 y.o. male presents with Transitions Of Care (Rm 14)    Agree with nurse note. His wife is present and provides the majority of the history. Pleasantly demented pt presents to the office with a BP of 115/62. For BP, he uses Lopressor 25 mg 1/2 tab daily and Imdur 30 mg daily, tolerating well. For cholesterol, he uses Pravachol 20 mg daily, tolerating well. Patient presents today for the face to face physician transitional care office visit within 9 days from discharge. Patient was hospitalized at [facility] from 12/20/2018 to 12/22/2018 and was diagnosed with Syncope, DEVAN/Dehydration, and acute bronchitis. Per chart review, our nurse navigator spoke to patient on 12/24/2018 within 2 days of discharge regarding the hospital stay. His wife called 911 on 12/14/2018 after pt complained of chest pain. Chest XR showed no acute abnormality. EKG showed sinus bradycardia and regular rhythm. Minimally elevated Troponin and DEVAN. Dx'd acute chest pain, atypical chest pain, accelerated hypertension, and musculoskeletal chest pain. His wife reports she took him to [facility] on 12/19/2018 for productive cough and was treated with cough syrup. [facility] and they gave him cough medicine. Tthe next day at breakfast, he completely lost consciousness. She called 911 and pt was admitted. Telemetry showed no arrhythmia. Cardiology was consulted and presyncope was likely due to dehydration. TTE- Left ventricle: Systolic function normal. EF est range 55 % to 60 %. No obvious wall motion abnormalities identified. Continue home Imdur, ASA, Statin, BB. At admission, Cr was 1.32 and pt was tx'd with IVF. Cr was 0.97 at discharge. Pt had cough and CT chest showed no airspace disease or other acute abnormality, atherosclerosis with mild coronary artery calcification. WBC was 7.7 on admission.  Rx'd Augmentin x5 days to avoid QTC prolongation. Blood culture was neg. Discharged with home health orders for PT, OT, and SN. Today, his wife reports his cough has been improving. His wife called the office on 2018 because pt had been getting agitated for no reason and thought he was sundowning. Pt was not recognizing his wife. At that time, I called in Seroquil to the pharmacy. Today, she reports she gives him a 1/2 tab at bedtime with relief. Pt is followed by neurologist, Dr. Shital Pelayo for late onset Alzheimer's disease. Pt has an appointment scheduled but his wife will call to see if it can be changed to sooner than May 2019. Written by lakesha Valdez, as dictated by Dr. Rolley Apgar, DO.    ROS    Review of Systems negative except as noted above in HPI. ALLERGIES:    Allergies   Allergen Reactions    Aricept [Donepezil] Nausea and Vomiting     Mild depression    Betadine [Povidone-Iodine] Hives    Phenergan [Promethazine] Other (comments)     hallucinations    Seafood [Shellfish Containing Products] Rash and Swelling     crabmeat    Xylocaine [Lidocaine Hcl] Shortness of Breath       CURRENT MEDICATIONS:    Outpatient Medications Marked as Taking for the 18 encounter (Office Visit) with Hermelindo Pinzon DO   Medication Sig Dispense Refill    PROAIR HFA 90 mcg/actuation inhaler       benzonatate (TESSALON) 100 mg capsule take 1 capsule by mouth three times a day if needed for cough  0    TUSSIN DM  mg/5 mL syrup take 10 milliliters (2 teaspoonfuls) every 6 to 8 hours if needed for cough  0    QUEtiapine (SEROQUEL) 25 mg tablet Take 0.5 Tabs by mouth nightly. 30 Tab 0    etodolac (LODINE) 400 mg tablet take 1 tablet by mouth once daily if needed 30 Tab 2    isosorbide mononitrate ER (IMDUR) 30 mg tablet take 1 tablet by mouth once daily 30 Tab 11    aspirin (ASPIRIN) 325 mg tablet Take 1 Tab by mouth daily.  Indications: myocardial infarction prevention 90 Tab 3    pravastatin (PRAVACHOL) 20 mg tablet take 1 tablet by mouth once daily at bedtime 90 Tab 3    FOLBEE 2.5-25-1 mg tablet take 1 tablet by mouth once daily 60 Tab 11    metoprolol tartrate (LOPRESSOR) 25 mg tablet take 1/2 tablet once a day  Indications: hypertension 30 Tab 11    acetaminophen (TYLENOL ARTHRITIS PAIN) 650 mg TbER Take 650 mg by mouth every eight (8) hours.  meclizine (ANTIVERT) 25 mg tablet Take 1 Tab by mouth three (3) times daily as needed for Dizziness. 20 Tab 0    GUAIFENESIN (MUCINEX PO) Take 1 Tab by mouth daily.  docusate sodium (COLACE) 100 mg capsule Take 100 mg by mouth daily. Instructed to take daily with plenty of liquid unless otherwise directed- as per 11/7/14 [facility] discharge med list      cholecalciferol, vitamin D3, (VITAMIN D3) 2,000 unit tab Take 1 Tab by mouth daily. As per 11/07/14 [facility] discharge med list      aspirin (ASPIRIN) 325 mg tablet Take 1 tablet by mouth daily. 30 tablet 3    tamsulosin (FLOMAX) 0.4 mg capsule Take 0.4 mg by mouth daily. For prostate          PAST MEDICAL HISTORY:    Past Medical History:   Diagnosis Date    Back pain     due to DDD and scoliosis    BPH (benign prostatic hyperplasia)     Dr. Carlos Enrique Mancia CAD (coronary artery disease) 10/30/13    30%LAD, 80% ostal stenosis. Dr. Darline Rodriguez.  Chest pain 10/04/04, 10/30/13    Dr. Isabel Mcguire. Negative Stress Cardiolite Test.  Dr. Bala Cruz.  Chest pain 12/02/2018    chest pain and tightness    Chronic venous insufficiency 06/15/09    Dr. Bobbi Krishna due to fall    DDD (degenerative disc disease), lumbar     L3-S1    Diverticulosis 02/2004    sigmoid.   Dr. Dyllan Chaparro DJD (degenerative joint disease) of cervical spine     C 4-5 ;5-6    Dyslipidemia     Fracture 1980    facial/nasal    Hearing loss     wears hearing aid    Heartburn     Hemorrhoids, internal 1987    Dr. Mateo Foster of unspecified site of abdominal cavity without mention of obstruction or gangrene     inguinal  Lt    Hypertension     Impotence     Left acoustic neuroma ([facility] 10/28/14    17 x 9 mm schwannoma.  Pes planus of both feet     PVD (peripheral vascular disease) ([facility] 2009    Scoliosis     LS    Slipped intervertebral disc 1954    chiropractor    SOB (shortness of breath) 08/07/13    Dr. Astrid Carrion.  [facility] 10/28/2014    small Left Thalamus. Dr. Scott Reyna. Dr. Ulysses Arzate.  Syncope 12/20/2018    pt experienced fainting spells     Urinary incontinence     due to BPH. Dr. Mt Meyer    Varicose vein 06/15/09    Dr. Rafael Elizondo Vertigo 2003    due to inner ear. Dr. Brian Cristobal. Dr. Jacob Petit. Dr. Chela Galeas Visual loss     Dr. Shade Raya, [facility]. PAST SURGICAL HISTORY:    Past Surgical History:   Procedure Laterality Date    APPENDECTOMY      due to gangrene   [facility], 02/2002    Dr. Vishnu Ramirez. benign.  COLONOSCOPY  02/2004    Dr. Stuart Julien. due q 10 yrs    CYSTOSCOPY  10/17/03    Dr. Selma Braden  10/30/13    Dr. Maribell Navarro. 80% ostal stenosis. 30% LAD.  HX HEMORRHOIDECTOMY      HX KNEE ARTHROSCOPY  1990 Left , 2007 Right    Dr. Abiel Ernst HX LAPAROTOMY  05/14/07    Dr. Kari Vtial. due to Bowel Obstruction    HX POLYPECTOMY  02/2002    Anal.     HX TONSILLECTOMY      MI COLSC FLX W/RMVL OF TUMOR POLYP LESION SNARE TQ  5/23/2011    Dr. Barbie Bragg.        FAMILY HISTORY:    Family History   Problem Relation Age of Onset    Heart Attack Mother     Other Mother         brain aneurysm/AAA/varicose veins    Stroke Mother     Heart Attack Sister     Cancer Maternal Grandmother         ovarian       SOCIAL HISTORY:    Social History     Socioeconomic History    Marital status:      Spouse name: Not on file    Number of children: Not on file    Years of education: Not on file    Highest education level: Not on file   Tobacco Use    Smoking status: Never Smoker    Smokeless tobacco: Never Used   Substance and Sexual Activity    Alcohol use: No    Drug use: No       IMMUNIZATIONS:    Immunization History   Administered Date(s) Administered    Influenza High Dose Vaccine PF 10/29/2013, 09/08/2015, 08/12/2016, 09/18/2018    Influenza Vaccine 10/01/2014, 10/01/2017    Influenza Vaccine Split 10/15/2010, 10/20/2011, 10/08/2012    Influenza Vaccine Whole 10/01/2009    Pneumococcal Conjugate (PCV-13) 06/03/2015    Pneumococcal Polysaccharide (PPSV-23) 07/02/2016    TD Vaccine 04/27/2004    Tdap 07/02/2016    ZZZ-RETIRED (DO NOT USE) Pneumococcal Vaccine (Unspecified Type) 02/08/2002    Zoster Recombinant 04/13/2018, 06/12/2018    Zoster Vaccine, Live 09/08/2015         PHYSICAL EXAMINATION    Vital Signs    Visit Vitals  /62 (BP 1 Location: Left arm, BP Patient Position: Sitting)   Pulse 63   Temp 97.6 °F (36.4 °C) (Temporal)   Resp 18   Ht 5' 6\" (1.676 m)   Wt 158 lb 3.2 oz (71.8 kg)   SpO2 97%   BMI 25.53 kg/m²       Weight Metrics 12/31/2018 12/24/2018 12/20/2018 12/14/2018 8/31/2018 5/24/2018 4/24/2018   Weight 158 lb 3.2 oz 139 lb 9.6 oz 162 lb 14.7 oz 154 lb 5.2 oz 151 lb 14.4 oz 153 lb 11.2 oz 152 lb   BMI 25.53 kg/m2 22.53 kg/m2 26.3 kg/m2 24.91 kg/m2 26.28 kg/m2 24.81 kg/m2 24.53 kg/m2       General appearance - Well nourished. Well appearing. Well developed. No acute distress. Present with wife, Jose Howe. Head - Normocephalic. Atraumatic. Eyes - pupils equal and reactive. Extraocular eye movements intact. Sclera anicteric. Mildly injected sclera. Ears - Hearing is grossly abnormal bilaterally with hearing aids. Nose - normal and patent. No polyps noted. No erythema. No discharge. Mouth - mucous membranes with adequate moisture. Posterior pharynx normal with cobblestone appearance. No erythema, white exudate or obstruction. Neck - supple. Midline trachea. No carotid bruits noted bilaterally.   No thyromegaly noted.  Chest - clear to auscultation bilaterally anteriorly and posteriorly. No wheezes. No rales or rhonchi. Breath sounds are symmetrical bilaterally. Unlabored respirations. Heart - normal rate. Regular rhythm. Normal S1, S2. No murmur noted. No rubs, clicks or gallops noted. Abdomen - soft and nondistended. No masses or organomegaly. No rebound, rigidity or guarding. Bowel sounds normal x 4 quadrants. No tenderness noted. Neurological - awake, alert and oriented to person, place. Cranial nerves II through XII intact. occ clear, occ garbled speech. Muscle strength is +5/5 x 4 extremities. Sensation is intact to light touch bilaterally. Steady short choppy gait. Heme/Lymph - peripheral pulses normal x 4 extremities. Trace of pitting edema in L ankle. Musculoskeletal - Intact x 4 extremities. Full ROM x 4 extremities. No pain with movement. Back exam - normal range of motion. No pain on palpation of the spinous processes in the cervical, thoracic, lumbar, sacral regions. No CVA tenderness. Skin - no rashes, erythema, ecchymosis, lacerations, abrasions, suspicious moles noted  Psychological -   normal behavior, dress and thought processes. Witty. Poor insight vs poor hearing. Good eye contact. Normal affect. Appropriate mood. Normal speech. Witty.        DATA REVIEWED    Lab Results   Component Value Date/Time    WBC 9.1 12/21/2018 12:29 AM    Hemoglobin (POC) 12.9 09/15/2016 04:44 PM    HGB 11.8 (L) 12/21/2018 12:29 AM    Hematocrit (POC) 38 09/15/2016 04:44 PM    HCT 35.4 (L) 12/21/2018 12:29 AM    PLATELET 413 (L) 75/23/6557 12:29 AM    MCV 97.8 12/21/2018 12:29 AM     Lab Results   Component Value Date/Time    Sodium 138 12/21/2018 12:29 AM    Potassium 4.2 12/21/2018 12:29 AM    Chloride 106 12/21/2018 12:29 AM    CO2 26 12/21/2018 12:29 AM    Anion gap 6 12/21/2018 12:29 AM    Glucose 104 (H) 12/21/2018 12:29 AM    BUN 16 12/21/2018 12:29 AM    Creatinine 0.97 12/21/2018 12:29 AM    BUN/Creatinine ratio 16 12/21/2018 12:29 AM    GFR est AA >60 12/21/2018 12:29 AM    GFR est non-AA >60 12/21/2018 12:29 AM    Calcium 8.4 (L) 12/21/2018 12:29 AM    Bilirubin, total 0.5 12/20/2018 10:21 AM    AST (SGOT) 15 12/20/2018 10:21 AM    Alk. phosphatase 70 12/20/2018 10:21 AM    Protein, total 6.8 12/20/2018 10:21 AM    Albumin 3.0 (L) 12/20/2018 10:21 AM    Globulin 3.8 12/20/2018 10:21 AM    A-G Ratio 0.8 (L) 12/20/2018 10:21 AM    ALT (SGPT) 18 12/20/2018 10:21 AM     Lab Results   Component Value Date/Time    Cholesterol, total 137 05/24/2018 12:04 PM    HDL Cholesterol 52 05/24/2018 12:04 PM    LDL, calculated 68 05/24/2018 12:04 PM    VLDL, calculated 17 05/24/2018 12:04 PM    Triglyceride 85 05/24/2018 12:04 PM    CHOL/HDL Ratio 2.2 10/29/2014 03:45 AM     Lab Results   Component Value Date/Time    Vitamin D 25-Hydroxy 21.5 (L) 10/31/2014 06:00 AM    VITAMIN D, 25-HYDROXY 35.0 05/24/2018 12:04 PM       Lab Results   Component Value Date/Time    Hemoglobin A1c 5.4 10/11/2017 10:01 AM     Lab Results   Component Value Date/Time    TSH 2.700 05/24/2018 12:04 PM       Lab Results   Component Value Date/Time    Microalb/Creat ratio (ug/mg creat.) 35.6 (H) 05/24/2018 12:17 PM         ASSESSMENT and PLAN      ICD-10-CM ICD-9-CM    1. Syncope and collapse R55 780.2     due to dehydration vs Bronchitis vs other, without recurrence   2. Early onset Alzheimer's disease with behavioral disturbance G30.0 331.0     F02.81 294.11     with worsening sundowning, improving with Seroquel 25 mg 1/2 daily   3. DEVAN (acute kidney injury) ([facility] N17.9 584.9    4. Dehydration E86.0 276.51    5. Acute bronchitis due to other specified organisms J20.8 466.0     improving since starting Antibiotic   6. Left ankle swelling M25.472 719.07        Discussed the patient's BMI with him.   The BMI follow up plan is as follows: Pt not eligible for BMI calculation due to normal BMI  Decrease carbohydrates (white foods, sweet foods, sweet drinks and alcohol), increase green leafy vegetables and protein (lean meats and beans) with each meal.  Avoid fried foods. Eat 3-5 small meals daily. Do not skip meals. Increase water intake. Increase physical activity to 30 minutes daily for health benefit or 60 minutes daily to prevent weight regain, as tolerated. Get 7-8 hours uninterrupted sleep nightly. Chart reviewed and updated. Continue current medications and care. Decrease Imdur 300 mg from 1 tab to 1/2 tab daily due to recent syncope and possible ?hypotensive episode. Do not re-start Norvasc 2.5 mg daily. Recent office visit notes from [id number] reviewed. Urged pt to f/u with neurologist and cardiologist due to recent syncope episode. Counseled patient on health concerns:  Alzheimer's, syncope, DEVAN, dehydration, bronchitis, ankle swelling. Immunizations noted. Offered empathy, support, legitimation, prayers, partnership to patient. Praised patient for progress. Follow-up Disposition:  Return for referral follow up, blood pressure. Patient was offered a choice/choices in the treatment plan today. Patient expresses understanding of the plan and agrees with recommendations. Patient declines any additional handouts. Patient is satisfied with previous handouts received from our office    More than 40 mins spent face to face with patient and more than 50% of this time spent in counseling and coordinating care. Written by lakesha Chambers, as dictated by Dr. Kristen Saha DO. Documentation True and Accepted by Kenia Lopez.  Ileana Salgado.

## 2018-12-31 NOTE — PROGRESS NOTES
NN ANGELA F/U Progress Note Goals Addressed This Visit's Progress  Transitions of Care- collaboration & care coordination to prevent complications post hospitalization. 12/28/18- wife reported pt \"doing much better\". Completed ABX therapy. Afebrile. Appetite good. Taking PO fluids. Ambulating w/o assistive device. Wife by side. ADLs w/ assistance. HH PT & OT evals done today. Confirmed 12/31/18 PCP appt @ 10:30 AM. Plan- pt to attend scheduled appts. Pt to participate w/ HH SN/PT/OT to accomplish set goals. NN to collaborate w/ pt's wife, [facility] staff, PCP & other Care Team Members as needed for care coordination. Next NN f/u ~ 1 week-ID. 12/24/18- spoke w/ wife. Wife reported pt had increased confusion last night. Today is better. No dizziness/lightheadedness. Afebrile. Taking ABX as prescribed. Appetite fair. Adequate PO fluid intake. Vdg w/o difficulty. +BMs. \" [address] [place] [address] Ln visited today. She did a good evaluation. Everyrthing good. She checked all the meds. She's coming back Thurs 12/27/18. PT/OT will call to schedule visits\". Red flags s/s & action plan reviewed. Wife advised providers on call 24 hours a day / 7 days a week (M-F 5 PM to 8 AM, and from Friday 5 PM until Monday 8 AM for the weekend) should the patient have questions or concerns. [facility] information provided. Wife verbalized concern re PCP f/u. Called office to schedule. No available appts for PCP. Plan- NN spoke w/ office PM. PCP ANGELA f/u scheduled for 12/31/18 @ 10:30 AM. Wife informed. Pt to attend PCP f/u as scheduled. Pt to participate w/ [facility] SN/PT/OT plan of care to accomplish set goals. Next NN ~ 3-4 days-ID.

## 2019-01-01 ENCOUNTER — HOME CARE VISIT (OUTPATIENT)
Dept: SCHEDULING | Facility: HOME HEALTH | Age: 84
End: 2019-01-01
Payer: MEDICARE

## 2019-01-01 ENCOUNTER — OFFICE VISIT (OUTPATIENT)
Dept: FAMILY MEDICINE CLINIC | Age: 84
End: 2019-01-01

## 2019-01-01 ENCOUNTER — PATIENT OUTREACH (OUTPATIENT)
Dept: FAMILY MEDICINE CLINIC | Age: 84
End: 2019-01-01

## 2019-01-01 VITALS
DIASTOLIC BLOOD PRESSURE: 74 MMHG | SYSTOLIC BLOOD PRESSURE: 148 MMHG | TEMPERATURE: 97.5 F | OXYGEN SATURATION: 95 % | RESPIRATION RATE: 16 BRPM | HEIGHT: 64 IN | WEIGHT: 165.4 LBS | HEART RATE: 56 BPM | BODY MASS INDEX: 28.24 KG/M2

## 2019-01-01 VITALS
TEMPERATURE: 98.5 F | SYSTOLIC BLOOD PRESSURE: 118 MMHG | OXYGEN SATURATION: 95 % | DIASTOLIC BLOOD PRESSURE: 60 MMHG | RESPIRATION RATE: 18 BRPM | HEART RATE: 60 BPM

## 2019-01-01 DIAGNOSIS — D50.8 OTHER IRON DEFICIENCY ANEMIA: ICD-10-CM

## 2019-01-01 DIAGNOSIS — R41.3 MEMORY DISTURBANCE: ICD-10-CM

## 2019-01-01 DIAGNOSIS — G30.9 ALZHEIMER'S DEMENTIA WITHOUT BEHAVIORAL DISTURBANCE, UNSPECIFIED TIMING OF DEMENTIA ONSET: ICD-10-CM

## 2019-01-01 DIAGNOSIS — H90.3 SENSORINEURAL HEARING LOSS (SNHL) OF BOTH EARS: ICD-10-CM

## 2019-01-01 DIAGNOSIS — E78.5 DYSLIPIDEMIA: ICD-10-CM

## 2019-01-01 DIAGNOSIS — F93.0 SEPARATION ANXIETY: ICD-10-CM

## 2019-01-01 DIAGNOSIS — Z13.31 SCREENING FOR DEPRESSION: ICD-10-CM

## 2019-01-01 DIAGNOSIS — R26.81 UNSTEADY GAIT: ICD-10-CM

## 2019-01-01 DIAGNOSIS — I10 ESSENTIAL HYPERTENSION: ICD-10-CM

## 2019-01-01 DIAGNOSIS — F41.8 ANXIETY WITH DEPRESSION: ICD-10-CM

## 2019-01-01 DIAGNOSIS — N40.1 BENIGN PROSTATIC HYPERPLASIA WITH LOWER URINARY TRACT SYMPTOMS, SYMPTOM DETAILS UNSPECIFIED: ICD-10-CM

## 2019-01-01 DIAGNOSIS — R73.9 HYPERGLYCEMIA: ICD-10-CM

## 2019-01-01 DIAGNOSIS — F32.89 OTHER DEPRESSION: ICD-10-CM

## 2019-01-01 DIAGNOSIS — Z86.73 HISTORY OF STROKE: ICD-10-CM

## 2019-01-01 DIAGNOSIS — Z13.39 SCREENING FOR ALCOHOLISM: ICD-10-CM

## 2019-01-01 DIAGNOSIS — N39.498 OTHER URINARY INCONTINENCE: ICD-10-CM

## 2019-01-01 DIAGNOSIS — F02.80 ALZHEIMER'S DEMENTIA WITHOUT BEHAVIORAL DISTURBANCE, UNSPECIFIED TIMING OF DEMENTIA ONSET: ICD-10-CM

## 2019-01-01 DIAGNOSIS — Z00.00 MEDICARE ANNUAL WELLNESS VISIT, SUBSEQUENT: Primary | ICD-10-CM

## 2019-01-01 DIAGNOSIS — E55.9 VITAMIN D DEFICIENCY: ICD-10-CM

## 2019-01-01 PROCEDURE — 3331090001 HH PPS REVENUE CREDIT

## 2019-01-01 PROCEDURE — 3331090002 HH PPS REVENUE DEBIT

## 2019-01-01 PROCEDURE — G0300 HHS/HOSPICE OF LPN EA 15 MIN: HCPCS

## 2019-01-01 PROCEDURE — G0151 HHCP-SERV OF PT,EA 15 MIN: HCPCS

## 2019-01-01 RX ORDER — QUETIAPINE FUMARATE 25 MG/1
TABLET, FILM COATED ORAL
Qty: 45 TAB | Refills: 3 | Status: SHIPPED | OUTPATIENT
Start: 2019-01-01 | End: 2020-01-01

## 2019-01-01 RX ORDER — SERTRALINE HYDROCHLORIDE 25 MG/1
25 TABLET, FILM COATED ORAL DAILY
Qty: 90 TAB | Refills: 3 | Status: SHIPPED | OUTPATIENT
Start: 2019-01-01 | End: 2020-01-01 | Stop reason: ALTCHOICE

## 2019-01-01 RX ORDER — ISOSORBIDE MONONITRATE 30 MG/1
TABLET, EXTENDED RELEASE ORAL
Qty: 90 TAB | Refills: 3 | Status: SHIPPED | OUTPATIENT
Start: 2019-01-01 | End: 2020-01-01 | Stop reason: ALTCHOICE

## 2019-01-01 RX ORDER — CELECOXIB 100 MG/1
100 CAPSULE ORAL DAILY
Refills: 0 | COMMUNITY
Start: 2019-01-01 | End: 2020-01-01

## 2019-01-01 RX ORDER — METOPROLOL TARTRATE 25 MG/1
TABLET, FILM COATED ORAL
Qty: 45 TAB | Refills: 3 | Status: SHIPPED | OUTPATIENT
Start: 2019-01-01 | End: 2020-01-01 | Stop reason: ALTCHOICE

## 2019-01-02 ENCOUNTER — HOME CARE VISIT (OUTPATIENT)
Dept: HOME HEALTH SERVICES | Facility: HOME HEALTH | Age: 84
End: 2019-01-02
Payer: MEDICARE

## 2019-01-02 VITALS
SYSTOLIC BLOOD PRESSURE: 120 MMHG | TEMPERATURE: 98.2 F | DIASTOLIC BLOOD PRESSURE: 90 MMHG | RESPIRATION RATE: 18 BRPM | OXYGEN SATURATION: 98 % | HEART RATE: 70 BPM

## 2019-01-02 PROCEDURE — 3331090001 HH PPS REVENUE CREDIT

## 2019-01-02 PROCEDURE — 3331090002 HH PPS REVENUE DEBIT

## 2019-01-03 ENCOUNTER — HOME CARE VISIT (OUTPATIENT)
Dept: SCHEDULING | Facility: HOME HEALTH | Age: 84
End: 2019-01-03
Payer: MEDICARE

## 2019-01-03 VITALS
TEMPERATURE: 98.5 F | RESPIRATION RATE: 16 BRPM | SYSTOLIC BLOOD PRESSURE: 131 MMHG | OXYGEN SATURATION: 98 % | HEART RATE: 72 BPM | DIASTOLIC BLOOD PRESSURE: 80 MMHG

## 2019-01-03 PROCEDURE — 3331090002 HH PPS REVENUE DEBIT

## 2019-01-03 PROCEDURE — 3331090001 HH PPS REVENUE CREDIT

## 2019-01-03 PROCEDURE — G0151 HHCP-SERV OF PT,EA 15 MIN: HCPCS

## 2019-01-03 PROCEDURE — G0155 HHCP-SVS OF CSW,EA 15 MIN: HCPCS

## 2019-01-04 ENCOUNTER — HOME CARE VISIT (OUTPATIENT)
Dept: SCHEDULING | Facility: HOME HEALTH | Age: 84
End: 2019-01-04
Payer: MEDICARE

## 2019-01-04 VITALS
DIASTOLIC BLOOD PRESSURE: 65 MMHG | OXYGEN SATURATION: 99 % | SYSTOLIC BLOOD PRESSURE: 115 MMHG | HEART RATE: 78 BPM | RESPIRATION RATE: 16 BRPM | TEMPERATURE: 98.5 F

## 2019-01-04 PROCEDURE — G0300 HHS/HOSPICE OF LPN EA 15 MIN: HCPCS

## 2019-01-04 PROCEDURE — 3331090002 HH PPS REVENUE DEBIT

## 2019-01-04 PROCEDURE — 3331090001 HH PPS REVENUE CREDIT

## 2019-01-05 VITALS
SYSTOLIC BLOOD PRESSURE: 104 MMHG | RESPIRATION RATE: 18 BRPM | TEMPERATURE: 97.9 F | HEART RATE: 67 BPM | OXYGEN SATURATION: 98 % | DIASTOLIC BLOOD PRESSURE: 62 MMHG

## 2019-01-05 PROCEDURE — 3331090002 HH PPS REVENUE DEBIT

## 2019-01-05 PROCEDURE — 3331090001 HH PPS REVENUE CREDIT

## 2019-01-06 PROCEDURE — 3331090002 HH PPS REVENUE DEBIT

## 2019-01-06 PROCEDURE — 3331090001 HH PPS REVENUE CREDIT

## 2019-01-07 ENCOUNTER — HOME CARE VISIT (OUTPATIENT)
Dept: SCHEDULING | Facility: HOME HEALTH | Age: 84
End: 2019-01-07
Payer: MEDICARE

## 2019-01-07 PROCEDURE — 3331090001 HH PPS REVENUE CREDIT

## 2019-01-07 PROCEDURE — G0151 HHCP-SERV OF PT,EA 15 MIN: HCPCS

## 2019-01-07 PROCEDURE — 3331090002 HH PPS REVENUE DEBIT

## 2019-01-08 VITALS
TEMPERATURE: 98.4 F | RESPIRATION RATE: 16 BRPM | SYSTOLIC BLOOD PRESSURE: 125 MMHG | DIASTOLIC BLOOD PRESSURE: 70 MMHG | OXYGEN SATURATION: 98 % | HEART RATE: 76 BPM

## 2019-01-08 PROCEDURE — 3331090001 HH PPS REVENUE CREDIT

## 2019-01-08 PROCEDURE — 3331090002 HH PPS REVENUE DEBIT

## 2019-01-09 ENCOUNTER — HOME CARE VISIT (OUTPATIENT)
Dept: SCHEDULING | Facility: HOME HEALTH | Age: 84
End: 2019-01-09
Payer: MEDICARE

## 2019-01-09 PROCEDURE — G0151 HHCP-SERV OF PT,EA 15 MIN: HCPCS

## 2019-01-09 PROCEDURE — 3331090001 HH PPS REVENUE CREDIT

## 2019-01-09 PROCEDURE — 3331090002 HH PPS REVENUE DEBIT

## 2019-01-09 PROCEDURE — G0300 HHS/HOSPICE OF LPN EA 15 MIN: HCPCS

## 2019-01-10 VITALS
DIASTOLIC BLOOD PRESSURE: 88 MMHG | TEMPERATURE: 98.6 F | OXYGEN SATURATION: 98 % | RESPIRATION RATE: 16 BRPM | HEART RATE: 80 BPM | SYSTOLIC BLOOD PRESSURE: 130 MMHG

## 2019-01-10 VITALS
TEMPERATURE: 98.6 F | DIASTOLIC BLOOD PRESSURE: 88 MMHG | HEART RATE: 65 BPM | OXYGEN SATURATION: 98 % | SYSTOLIC BLOOD PRESSURE: 130 MMHG | RESPIRATION RATE: 16 BRPM

## 2019-01-10 PROCEDURE — 3331090002 HH PPS REVENUE DEBIT

## 2019-01-10 PROCEDURE — 3331090001 HH PPS REVENUE CREDIT

## 2019-01-11 ENCOUNTER — HOME CARE VISIT (OUTPATIENT)
Dept: SCHEDULING | Facility: HOME HEALTH | Age: 84
End: 2019-01-11
Payer: MEDICARE

## 2019-01-11 PROCEDURE — 3331090001 HH PPS REVENUE CREDIT

## 2019-01-11 PROCEDURE — 3331090002 HH PPS REVENUE DEBIT

## 2019-01-11 PROCEDURE — G0299 HHS/HOSPICE OF RN EA 15 MIN: HCPCS

## 2019-01-12 PROCEDURE — 3331090001 HH PPS REVENUE CREDIT

## 2019-01-12 PROCEDURE — 3331090002 HH PPS REVENUE DEBIT

## 2019-01-13 PROCEDURE — 3331090002 HH PPS REVENUE DEBIT

## 2019-01-13 PROCEDURE — 3331090001 HH PPS REVENUE CREDIT

## 2019-01-14 VITALS
RESPIRATION RATE: 14 BRPM | TEMPERATURE: 97.8 F | SYSTOLIC BLOOD PRESSURE: 110 MMHG | HEART RATE: 78 BPM | OXYGEN SATURATION: 98 % | DIASTOLIC BLOOD PRESSURE: 78 MMHG

## 2019-01-14 PROCEDURE — 3331090001 HH PPS REVENUE CREDIT

## 2019-01-14 PROCEDURE — 3331090002 HH PPS REVENUE DEBIT

## 2019-01-15 PROCEDURE — 3331090002 HH PPS REVENUE DEBIT

## 2019-01-15 PROCEDURE — 3331090001 HH PPS REVENUE CREDIT

## 2019-01-16 ENCOUNTER — HOME CARE VISIT (OUTPATIENT)
Dept: SCHEDULING | Facility: HOME HEALTH | Age: 84
End: 2019-01-16
Payer: MEDICARE

## 2019-01-16 PROCEDURE — G0151 HHCP-SERV OF PT,EA 15 MIN: HCPCS

## 2019-01-16 PROCEDURE — 3331090001 HH PPS REVENUE CREDIT

## 2019-01-16 PROCEDURE — 3331090002 HH PPS REVENUE DEBIT

## 2019-01-17 VITALS
HEART RATE: 76 BPM | SYSTOLIC BLOOD PRESSURE: 128 MMHG | DIASTOLIC BLOOD PRESSURE: 65 MMHG | TEMPERATURE: 98.1 F | OXYGEN SATURATION: 98 % | RESPIRATION RATE: 16 BRPM

## 2019-01-17 PROCEDURE — 3331090002 HH PPS REVENUE DEBIT

## 2019-01-17 PROCEDURE — 3331090001 HH PPS REVENUE CREDIT

## 2019-01-18 ENCOUNTER — HOME CARE VISIT (OUTPATIENT)
Dept: SCHEDULING | Facility: HOME HEALTH | Age: 84
End: 2019-01-18
Payer: MEDICARE

## 2019-01-18 PROCEDURE — 3331090001 HH PPS REVENUE CREDIT

## 2019-01-18 PROCEDURE — G0151 HHCP-SERV OF PT,EA 15 MIN: HCPCS

## 2019-01-18 PROCEDURE — 3331090002 HH PPS REVENUE DEBIT

## 2019-01-19 VITALS
DIASTOLIC BLOOD PRESSURE: 67 MMHG | TEMPERATURE: 98.5 F | SYSTOLIC BLOOD PRESSURE: 121 MMHG | RESPIRATION RATE: 16 BRPM | HEART RATE: 68 BPM | OXYGEN SATURATION: 98 %

## 2019-01-19 PROCEDURE — 3331090002 HH PPS REVENUE DEBIT

## 2019-01-19 PROCEDURE — 3331090001 HH PPS REVENUE CREDIT

## 2019-01-20 PROCEDURE — 3331090001 HH PPS REVENUE CREDIT

## 2019-01-20 PROCEDURE — 3331090002 HH PPS REVENUE DEBIT

## 2019-01-21 PROCEDURE — 3331090002 HH PPS REVENUE DEBIT

## 2019-01-21 PROCEDURE — 3331090001 HH PPS REVENUE CREDIT

## 2019-01-22 ENCOUNTER — HOME CARE VISIT (OUTPATIENT)
Dept: SCHEDULING | Facility: HOME HEALTH | Age: 84
End: 2019-01-22
Payer: MEDICARE

## 2019-01-22 PROCEDURE — 3331090002 HH PPS REVENUE DEBIT

## 2019-01-22 PROCEDURE — G0151 HHCP-SERV OF PT,EA 15 MIN: HCPCS

## 2019-01-22 PROCEDURE — 3331090001 HH PPS REVENUE CREDIT

## 2019-01-23 PROCEDURE — 3331090001 HH PPS REVENUE CREDIT

## 2019-01-23 PROCEDURE — 3331090002 HH PPS REVENUE DEBIT

## 2019-01-24 ENCOUNTER — HOME CARE VISIT (OUTPATIENT)
Dept: SCHEDULING | Facility: HOME HEALTH | Age: 84
End: 2019-01-24
Payer: MEDICARE

## 2019-01-24 PROCEDURE — 3331090002 HH PPS REVENUE DEBIT

## 2019-01-24 PROCEDURE — G0151 HHCP-SERV OF PT,EA 15 MIN: HCPCS

## 2019-01-24 PROCEDURE — 3331090001 HH PPS REVENUE CREDIT

## 2019-01-25 VITALS
HEART RATE: 62 BPM | TEMPERATURE: 97.8 F | TEMPERATURE: 98.4 F | HEART RATE: 62 BPM | RESPIRATION RATE: 16 BRPM | OXYGEN SATURATION: 98 % | OXYGEN SATURATION: 98 % | RESPIRATION RATE: 16 BRPM | DIASTOLIC BLOOD PRESSURE: 58 MMHG | SYSTOLIC BLOOD PRESSURE: 119 MMHG | SYSTOLIC BLOOD PRESSURE: 121 MMHG | DIASTOLIC BLOOD PRESSURE: 65 MMHG

## 2019-01-25 PROCEDURE — 3331090001 HH PPS REVENUE CREDIT

## 2019-01-25 PROCEDURE — 3331090002 HH PPS REVENUE DEBIT

## 2019-01-26 PROCEDURE — 3331090001 HH PPS REVENUE CREDIT

## 2019-01-26 PROCEDURE — 3331090002 HH PPS REVENUE DEBIT

## 2019-01-27 PROCEDURE — 3331090001 HH PPS REVENUE CREDIT

## 2019-01-27 PROCEDURE — 3331090002 HH PPS REVENUE DEBIT

## 2019-01-28 PROCEDURE — 3331090002 HH PPS REVENUE DEBIT

## 2019-01-28 PROCEDURE — 3331090001 HH PPS REVENUE CREDIT

## 2019-01-29 PROCEDURE — 3331090001 HH PPS REVENUE CREDIT

## 2019-01-29 PROCEDURE — 3331090002 HH PPS REVENUE DEBIT

## 2019-01-30 PROCEDURE — 3331090002 HH PPS REVENUE DEBIT

## 2019-01-30 PROCEDURE — 3331090001 HH PPS REVENUE CREDIT

## 2019-01-31 ENCOUNTER — HOME CARE VISIT (OUTPATIENT)
Dept: SCHEDULING | Facility: HOME HEALTH | Age: 84
End: 2019-01-31
Payer: MEDICARE

## 2019-01-31 PROCEDURE — 3331090002 HH PPS REVENUE DEBIT

## 2019-01-31 PROCEDURE — G0151 HHCP-SERV OF PT,EA 15 MIN: HCPCS

## 2019-01-31 PROCEDURE — 3331090001 HH PPS REVENUE CREDIT

## 2019-02-01 ENCOUNTER — HOME CARE VISIT (OUTPATIENT)
Dept: SCHEDULING | Facility: HOME HEALTH | Age: 84
End: 2019-02-01
Payer: MEDICARE

## 2019-02-01 VITALS
TEMPERATURE: 98.7 F | SYSTOLIC BLOOD PRESSURE: 121 MMHG | HEART RATE: 65 BPM | OXYGEN SATURATION: 98 % | DIASTOLIC BLOOD PRESSURE: 68 MMHG | RESPIRATION RATE: 16 BRPM

## 2019-02-01 PROCEDURE — 3331090002 HH PPS REVENUE DEBIT

## 2019-02-01 PROCEDURE — G0151 HHCP-SERV OF PT,EA 15 MIN: HCPCS

## 2019-02-01 PROCEDURE — 3331090001 HH PPS REVENUE CREDIT

## 2019-02-02 PROCEDURE — 3331090002 HH PPS REVENUE DEBIT

## 2019-02-02 PROCEDURE — 3331090001 HH PPS REVENUE CREDIT

## 2019-02-03 PROCEDURE — 3331090002 HH PPS REVENUE DEBIT

## 2019-02-03 PROCEDURE — 3331090001 HH PPS REVENUE CREDIT

## 2019-02-04 VITALS
HEART RATE: 72 BPM | DIASTOLIC BLOOD PRESSURE: 65 MMHG | OXYGEN SATURATION: 98 % | TEMPERATURE: 98.4 F | RESPIRATION RATE: 16 BRPM | SYSTOLIC BLOOD PRESSURE: 121 MMHG

## 2019-02-04 PROCEDURE — 3331090001 HH PPS REVENUE CREDIT

## 2019-02-04 PROCEDURE — 3331090002 HH PPS REVENUE DEBIT

## 2019-02-05 ENCOUNTER — HOME CARE VISIT (OUTPATIENT)
Dept: SCHEDULING | Facility: HOME HEALTH | Age: 84
End: 2019-02-05
Payer: MEDICARE

## 2019-02-05 PROCEDURE — 3331090002 HH PPS REVENUE DEBIT

## 2019-02-05 PROCEDURE — 3331090001 HH PPS REVENUE CREDIT

## 2019-02-05 PROCEDURE — G0151 HHCP-SERV OF PT,EA 15 MIN: HCPCS

## 2019-02-06 VITALS
SYSTOLIC BLOOD PRESSURE: 121 MMHG | DIASTOLIC BLOOD PRESSURE: 58 MMHG | HEART RATE: 64 BPM | TEMPERATURE: 98.5 F | RESPIRATION RATE: 16 BRPM | OXYGEN SATURATION: 99 %

## 2019-02-06 PROCEDURE — 3331090002 HH PPS REVENUE DEBIT

## 2019-02-06 PROCEDURE — 3331090001 HH PPS REVENUE CREDIT

## 2019-02-07 ENCOUNTER — HOME CARE VISIT (OUTPATIENT)
Dept: SCHEDULING | Facility: HOME HEALTH | Age: 84
End: 2019-02-07
Payer: MEDICARE

## 2019-02-07 PROCEDURE — 3331090002 HH PPS REVENUE DEBIT

## 2019-02-07 PROCEDURE — 3331090001 HH PPS REVENUE CREDIT

## 2019-02-07 PROCEDURE — G0151 HHCP-SERV OF PT,EA 15 MIN: HCPCS

## 2019-02-07 PROCEDURE — 3331090003 HH PPS REVENUE ADJ

## 2019-02-08 VITALS
RESPIRATION RATE: 16 BRPM | DIASTOLIC BLOOD PRESSURE: 67 MMHG | OXYGEN SATURATION: 98 % | HEART RATE: 65 BPM | SYSTOLIC BLOOD PRESSURE: 125 MMHG | TEMPERATURE: 98.4 F

## 2019-02-08 PROCEDURE — 3331090001 HH PPS REVENUE CREDIT

## 2019-02-08 PROCEDURE — 3331090002 HH PPS REVENUE DEBIT

## 2019-02-09 PROCEDURE — 3331090002 HH PPS REVENUE DEBIT

## 2019-02-09 PROCEDURE — 3331090001 HH PPS REVENUE CREDIT

## 2019-02-10 PROCEDURE — 3331090001 HH PPS REVENUE CREDIT

## 2019-02-10 PROCEDURE — 3331090002 HH PPS REVENUE DEBIT

## 2019-03-19 ENCOUNTER — TELEPHONE (OUTPATIENT)
Dept: FAMILY MEDICINE CLINIC | Age: 84
End: 2019-03-19

## 2019-03-19 NOTE — TELEPHONE ENCOUNTER
----- Message from Mathew Severin sent at 3/19/2019  9:13 AM EDT -----  Regarding: Dr. Joellen Oliver  Pt wife Maxwell Augustine requesting a call back in regards to a recent study on \"Aspirin\". She has some concerns if pt should be taking it or not. Best contact [phone].

## 2019-03-21 NOTE — TELEPHONE ENCOUNTER
Spoke to patient. Verified with two patient identifiers. Pt wife (Trista Ulrich) Alfred Mesa was informed to call his cardiologist before taking any action to stop his aspirin prescription. Pt wife was given Dr. Magui Gr office number [phone]. Patient verbalized understanding of information, and has no further questions at this time.

## 2019-03-21 NOTE — TELEPHONE ENCOUNTER
Since pt has established CAD, please have him contact his cardiologist, Dr. Obed Steel before stopping any Aspirin. If he has already stopped it, please have pt contact his cardiologist right away.

## 2019-05-07 DIAGNOSIS — M25.552 HIP PAIN, ACUTE, LEFT: ICD-10-CM

## 2019-05-07 NOTE — TELEPHONE ENCOUNTER
PCP: Johana Restrepo DO    Last appt: 12/31/2018  Future Appointments   Date Time Provider Santana Lange   5/17/2019 11:40 AM Sissy Carlisle  [address]   6/28/2019 10:00 AM Johana Restrepo DO [facility]       Requested Prescriptions     Pending Prescriptions Disp Refills    etodolac (LODINE) 400 mg tablet [Pharmacy Med Name: ETODOLAC 400 MG TABLET] 30 Tab 2     Sig: take 1 tablet by mouth once daily if needed       Prior labs and Blood pressures:  BP Readings from Last 3 Encounters:   02/07/19 125/67   02/05/19 121/58   02/01/19 121/65     Lab Results   Component Value Date/Time    Sodium 138 12/21/2018 12:29 AM    Potassium 4.2 12/21/2018 12:29 AM    Chloride 106 12/21/2018 12:29 AM    CO2 26 12/21/2018 12:29 AM    Anion gap 6 12/21/2018 12:29 AM    Glucose 104 (H) 12/21/2018 12:29 AM    BUN 16 12/21/2018 12:29 AM    Creatinine 0.97 12/21/2018 12:29 AM    BUN/Creatinine ratio 16 12/21/2018 12:29 AM    GFR est AA >60 12/21/2018 12:29 AM    GFR est non-AA >60 12/21/2018 12:29 AM    Calcium 8.4 (L) 12/21/2018 12:29 AM     Lab Results   Component Value Date/Time    Hemoglobin A1c 5.4 10/11/2017 10:01 AM     Lab Results   Component Value Date/Time    Cholesterol, total 137 05/24/2018 12:04 PM    HDL Cholesterol 52 05/24/2018 12:04 PM    LDL, calculated 68 05/24/2018 12:04 PM    VLDL, calculated 17 05/24/2018 12:04 PM    Triglyceride 85 05/24/2018 12:04 PM    CHOL/HDL Ratio 2.2 10/29/2014 03:45 AM     Lab Results   Component Value Date/Time    Vitamin D 25-Hydroxy 21.5 (L) 10/31/2014 06:00 AM    VITAMIN D, 25-HYDROXY 35.0 05/24/2018 12:04 PM       Lab Results   Component Value Date/Time    TSH 2.700 05/24/2018 12:04 PM

## 2019-05-10 DIAGNOSIS — M25.552 HIP PAIN, ACUTE, LEFT: ICD-10-CM

## 2019-05-10 RX ORDER — ETODOLAC 400 MG/1
TABLET, FILM COATED ORAL
Qty: 30 TAB | Refills: 2 | Status: SHIPPED | OUTPATIENT
Start: 2019-05-10 | End: 2019-05-10 | Stop reason: SDUPTHER

## 2019-05-10 NOTE — TELEPHONE ENCOUNTER
Requested Prescriptions     Pending Prescriptions Disp Refills    etodolac (LODINE) 400 mg tablet 30 Tab 2     Sig: take 1 tablet by mouth once daily if needed for pain

## 2019-05-13 NOTE — TELEPHONE ENCOUNTER
PCP: Jose Rafael Guzman DO    Last appt: 12/31/2018  Future Appointments   Date Time Provider Santana Lange   5/17/2019 11:40 AM Estrada Evans  [facility]   6/28/2019 10:00 AM Jose Rafael Guzman DO [facility]       Requested Prescriptions     Pending Prescriptions Disp Refills    etodolac (LODINE) 400 mg tablet 30 Tab 2     Sig: take 1 tablet by mouth once daily if needed for pain       Prior labs and Blood pressures:  BP Readings from Last 3 Encounters:   02/07/19 125/67   02/05/19 121/58   02/01/19 121/65     Lab Results   Component Value Date/Time    Sodium 138 12/21/2018 12:29 AM    Potassium 4.2 12/21/2018 12:29 AM    Chloride 106 12/21/2018 12:29 AM    CO2 26 12/21/2018 12:29 AM    Anion gap 6 12/21/2018 12:29 AM    Glucose 104 (H) 12/21/2018 12:29 AM    BUN 16 12/21/2018 12:29 AM    Creatinine 0.97 12/21/2018 12:29 AM    BUN/Creatinine ratio 16 12/21/2018 12:29 AM    GFR est AA >60 12/21/2018 12:29 AM    GFR est non-AA >60 12/21/2018 12:29 AM    Calcium 8.4 (L) 12/21/2018 12:29 AM     Lab Results   Component Value Date/Time    Hemoglobin A1c 5.4 10/11/2017 10:01 AM     Lab Results   Component Value Date/Time    Cholesterol, total 137 05/24/2018 12:04 PM    HDL Cholesterol 52 05/24/2018 12:04 PM    LDL, calculated 68 05/24/2018 12:04 PM    VLDL, calculated 17 05/24/2018 12:04 PM    Triglyceride 85 05/24/2018 12:04 PM    CHOL/HDL Ratio 2.2 10/29/2014 03:45 AM     Lab Results   Component Value Date/Time    Vitamin D 25-Hydroxy 21.5 (L) 10/31/2014 06:00 AM    VITAMIN D, 25-HYDROXY 35.0 05/24/2018 12:04 PM       Lab Results   Component Value Date/Time    TSH 2.700 05/24/2018 12:04 PM

## 2019-05-16 RX ORDER — ETODOLAC 400 MG/1
TABLET, FILM COATED ORAL
Qty: 30 TAB | Refills: 2 | Status: SHIPPED | OUTPATIENT
Start: 2019-05-16 | End: 2019-01-01 | Stop reason: ALTCHOICE

## 2019-05-17 ENCOUNTER — OFFICE VISIT (OUTPATIENT)
Dept: NEUROLOGY | Age: 84
End: 2019-05-17

## 2019-05-17 ENCOUNTER — TELEPHONE (OUTPATIENT)
Dept: NEUROLOGY | Age: 84
End: 2019-05-17

## 2019-05-17 VITALS
WEIGHT: 158 LBS | OXYGEN SATURATION: 97 % | DIASTOLIC BLOOD PRESSURE: 62 MMHG | HEIGHT: 66 IN | HEART RATE: 61 BPM | SYSTOLIC BLOOD PRESSURE: 122 MMHG | BODY MASS INDEX: 25.39 KG/M2

## 2019-05-17 DIAGNOSIS — E78.5 HYPERLIPOPROTEINEMIA: ICD-10-CM

## 2019-05-17 DIAGNOSIS — E78.5 DYSLIPIDEMIA: ICD-10-CM

## 2019-05-17 DIAGNOSIS — F02.80 LATE ONSET ALZHEIMER'S DISEASE WITHOUT BEHAVIORAL DISTURBANCE (HCC): ICD-10-CM

## 2019-05-17 DIAGNOSIS — R53.81 DEBILITY: ICD-10-CM

## 2019-05-17 DIAGNOSIS — G57.01 PIRIFORMIS SYNDROME OF RIGHT SIDE: ICD-10-CM

## 2019-05-17 DIAGNOSIS — G30.1 LATE ONSET ALZHEIMER'S DISEASE WITHOUT BEHAVIORAL DISTURBANCE (HCC): ICD-10-CM

## 2019-05-17 DIAGNOSIS — R26.9 GAIT DIFFICULTY: Primary | ICD-10-CM

## 2019-05-17 DIAGNOSIS — E78.01 FAMILIAL HYPERCHOLESTEROLEMIA: ICD-10-CM

## 2019-05-17 NOTE — PROGRESS NOTES
Name: Malathi Umana    Chief Complaint: alzheimer's     Was hospitalized for six days in December. It was suspected that he may have had the flu. He became lethargic but no etiology was determined. He was discharged in a delirious state which resolved shortly after going home. He received home PT for six weeks . He has been tolerating his medications. Wife is very supportive. He has not been as active. Suggested home health to which they agreed. He has no violent tendencies and does not wander. He is sometimes frustrated with his condition but is easily cheered up. Assesment and Plan  1. Alzheimer's dementia  Continue aricept      2. Hyperlipidemia  Continue pravastatin    3. Hypertension  Continue amlodipine    4. Debility  Home health    Allergies  Aricept [donepezil]; Betadine [povidone-iodine]; Phenergan [promethazine]; Seafood [shellfish containing products]; and Xylocaine [lidocaine hcl]     Medications  Current Outpatient Medications   Medication Sig    etodolac (LODINE) 400 mg tablet take 1 tablet by mouth once daily if needed for pain    QUEtiapine (SEROQUEL) 25 mg tablet take 1/2 tablet by mouth at bedtime    metoprolol tartrate (LOPRESSOR) 25 mg tablet Take 12.5 mg by mouth daily. 0.5 of 25 mg tab daily    benzonatate (TESSALON) 100 mg capsule take 1 capsule by mouth three times a day if needed for cough    TUSSIN DM  mg/5 mL syrup take 10 milliliters (2 teaspoonfuls) every 6 to 8 hours if needed for cough    isosorbide mononitrate ER (IMDUR) 30 mg tablet take 1 tablet by mouth once daily    aspirin (ASPIRIN) 325 mg tablet Take 1 Tab by mouth daily.  Indications: myocardial infarction prevention    pravastatin (PRAVACHOL) 20 mg tablet take 1 tablet by mouth once daily at bedtime    FOLBEE 2.5-25-1 mg tablet take 1 tablet by mouth once daily    metoprolol tartrate (LOPRESSOR) 25 mg tablet take 1/2 tablet once a day  Indications: hypertension (Patient taking differently: Take 12.5 mg by mouth daily. take 1/2 tablet once a day)    acetaminophen (TYLENOL ARTHRITIS PAIN) 650 mg TbER Take 650 mg by mouth every eight (8) hours.  meclizine (ANTIVERT) 25 mg tablet Take 1 Tab by mouth three (3) times daily as needed for Dizziness.  GUAIFENESIN (MUCINEX PO) Take 1 Tab by mouth daily.  docusate sodium (COLACE) 100 mg capsule Take 100 mg by mouth daily. Instructed to take daily with plenty of liquid unless otherwise directed- as per 11/7/14 [facility] discharge med list    cholecalciferol, vitamin D3, (VITAMIN D3) 2,000 unit tab Take 1 Tab by mouth daily. As per 11/07/14 [facility] discharge med list    tamsulosin (FLOMAX) 0.4 mg capsule Take 0.4 mg by mouth daily. For prostate      No current facility-administered medications for this visit. Medical History  Past Medical History:   Diagnosis Date    Alzheimer's dementia with behavioral disturbance 04/2018    Dr. Jonny Haile.  Back pain     due to DDD and scoliosis    BPH (benign prostatic hyperplasia)     Dr. Ryan Castillo CAD (coronary artery disease) 10/30/13    30%LAD, 80% ostal stenosis. Dr. Rober Dhillon.  Chest pain 10/04/04, 10/30/13    Dr. Andrea Francis. Negative Stress Cardiolite Test.  Dr. Charly Day.  Chest pain 12/02/2018    chest pain and tightness    Chronic venous insufficiency 06/15/09    Dr. Ramin Johnson due to fall    DDD (degenerative disc disease), lumbar     L3-S1    Diverticulosis 02/2004    sigmoid. Dr. Mike Mijares DJD (degenerative joint disease) of cervical spine     C 4-5 ;5-6    Dyslipidemia     Fracture 1980    facial/nasal    Hearing loss     wears hearing aid    Heartburn     Hemorrhoids, internal 1987    Dr. Maine Billy of unspecified site of abdominal cavity without mention of obstruction or gangrene     inguinal  Lt    Hypertension     Impotence     Left acoustic neuroma ([address] 10/28/14    17 x 9 mm schwannoma.       Pes planus of both feet     PVD (peripheral vascular disease) ([facility] 2009    Scoliosis     LS    Slipped intervertebral disc 1954    chiropractor    SOB (shortness of breath) 08/07/13    Dr. Esvin Herrera.  Stroke ([facility] 10/28/2014    small Left Thalamus. Dr. Loyd Cardoza. Dr. Keara Dickerson.  Syncope 12/20/2018    pt experienced fainting spells     Urinary incontinence     due to BPH. Dr. Janet Kline    Varicose vein 06/15/09    Dr. Alexi Oakes Vertigo 2003    due to inner ear. Dr. Apodaca Mo. Dr. Mullins. Dr. Liset Lacy Visual loss     Dr. Cathy Sifuentes, ophth. Review of Systems   Constitutional: Negative for chills and fever. HENT: Negative for ear pain. Eyes: Negative for pain and discharge. Respiratory: Negative for cough and hemoptysis. Cardiovascular: Negative for chest pain and claudication. Gastrointestinal: Negative for constipation and diarrhea. Genitourinary: Negative for flank pain and hematuria. Musculoskeletal: Negative for back pain and myalgias. Skin: Negative for itching and rash. Neurological: Negative for headaches. Endo/Heme/Allergies: Negative for environmental allergies. Does not bruise/bleed easily. Psychiatric/Behavioral: Positive for memory loss. Negative for depression and hallucinations. Exam:    Visit Vitals  /62   Pulse 61   Ht 5' 6\" (1.676 m)   Wt 158 lb (71.7 kg)   SpO2 97%   BMI 25.50 kg/m²      General: Well developed, well nourished. Patient in no apparent distress   Head: Normocephalic, atraumatic, anicteric sclera   Neck Normal ROM, No thyromegally   Lungs:  Clear to auscultation bilaterally, No wheezes or rubs   Cardiac: Regular rate and rhythm with no murmurs. Abd: Bowel sounds were audible. No tenderness on palpation   Ext: No pedal edema   Skin: Supple no rash     NeurologicExam:  Mental Status: Alert and oriented to person place    Speech: Fluent no aphasia or dysarthria.    Cranial Nerves:  II - XII Intact   Motor:  Full and symmetric strength of upper and lower proximal and distal muscles. Normal bulk and tone. Reflexes:   Deep tendon reflexes 2+/4 and symmetric. Sensory:   Symmetric and intact with no perceived deficits modalities involving small or large fibers. Gait:  Gait is balanced and fluid with normal arm swing. Tremor:   Essential tremor of the right hand   Cerebellar:  Coordination intact. Neurovascular: No carotid bruits. No JVD         Max Score Patient Score Question   5 2   What is the year? Season? Date? Day? Month?    5 2   Where are we now? State? County? Town/city? Hospital? Floor? 3   Repeat names of three objects after 3 seconds   5   Ask patient for serial 7's or spell \"world\" backwards   3   Repeat same three objects after three minutes   2   Ask patient to name two common objects   1   Ask patient to say \"No ifs and or buts\"   3   Ask patient to do a 3 step command   1   Ask patient to write a sentence   1   Copy intersecting polygons   1   Read and do what is on the paper \"Close your eyes   21/30  COULD NOT COMPLETE    Imaging    CT Results (most recent):  Results from Hospital Encounter encounter on 12/20/18   CT CHEST WO CONT    Narrative EXAM:  CT CHEST WITHOUT CONTRAST   INDICATION: Persistent cough. COMPARISON: 12/16/2017. CONTRAST: None. TECHNIQUE: Unenhanced multislice helical CT was performed from the thoracic  inlet to the adrenal glands without intravenous contrast administration. Contiguous 5 mm axial images were reconstructed and lung and soft tissue windows  were generated. Coronal and sagittal reformations were generated. CT dose  reduction was achieved through use of a standardized protocol tailored for this  examination and automatic exposure control for dose modulation. FINDINGS:  LOWER NECK: The visualized portions of the thyroid and structures of the lower  neck are within normal limits. LUNGS: The lungs are clear of mass, nodule, airspace disease or edema.  There is  minimal dependent atelectasis. PLEURA: There is no pleural effusion or pneumothorax. The absence of intravenous contrast reduces the sensitivity for evaluation of  the mediastinum and upper abdominal organs. AORTA: The aorta is atherosclerotic and has a normal contour with no evidence of  aneurysm. There is mild coronary artery calcification. MEDIASTINUM: No mediastinal or hilar or axillary adenopathy is appreciated. BONES AND SOFT TISSUES: The bones and soft tissues of the chest wall are within  normal limits. UPPER ABDOMEN: The visualized portions of the upper abdominal organs are normal.      Impression IMPRESSION:   1. No airspace disease or other acute abnormality. 2. Atherosclerosis with mild coronary artery calcification. MRI Results (most recent):  Results from [place] encounter on 10/23/17   MRI BRAIN WO CONT    Narrative EXAM:  MRI BRAIN WO CONT      HISTORY: Dementia  INDICATION:  Dementia    COMPARISON: 10/29/2014. CONTRAST:  None. TECHNIQUE: Sagittal T1, axial FLAIR, T2,T1 and gradient echo images as well as  coronal T2 weighted images and axial diffusion weighted images of the head were  obtained. FINDINGS:     Stable left acoustic schwannoma. Sulcal and ventricular prominence. Confluent periventricular scattered foci of  increased T2 signal intensity in the corona radiata and centrum semiovale. Temporal pole prominence is increased. There is no evidence of hemorrhage, acute  infarct or abnormal extra-axial fluid collection. Normal appearing flow-voids  are present in the vertebral, basilar and carotid artery systems. The  craniocervical junction is normal. .      Impression IMPRESSION:   Moderate to severe chronic microvascular ischemic change with severe temporal  predominant cerebral atrophy. No intracranial hemorrhage or evidence of acute infarction. Stable left acoustic schwannoma.        .  Lab Review    Lab Results   Component Value Date/Time    WBC 9.1 12/21/2018 12:29 AM    HCT 35.4 (L) 12/21/2018 12:29 AM    HGB 11.8 (L) 12/21/2018 12:29 AM    PLATELET 919 (L) 89/70/6081 12:29 AM       Lab Results   Component Value Date/Time    Sodium 138 12/21/2018 12:29 AM    Potassium 4.2 12/21/2018 12:29 AM    Chloride 106 12/21/2018 12:29 AM    CO2 26 12/21/2018 12:29 AM    Glucose 104 (H) 12/21/2018 12:29 AM    BUN 16 12/21/2018 12:29 AM    Creatinine 0.97 12/21/2018 12:29 AM    Calcium 8.4 (L) 12/21/2018 12:29 AM           Lab Results   Component Value Date/Time    Hemoglobin A1c 5.4 10/11/2017 10:01 AM        Lab Results   Component Value Date/Time    Vitamin B12 755 12/17/2017 03:54 AM    Folate 25.3 (H) 12/17/2017 03:54 AM         Lab Results   Component Value Date/Time    Cholesterol, total 137 05/24/2018 12:04 PM    HDL Cholesterol 52 05/24/2018 12:04 PM    LDL, calculated 68 05/24/2018 12:04 PM    VLDL, calculated 17 05/24/2018 12:04 PM    Triglyceride 85 05/24/2018 12:04 PM    CHOL/HDL Ratio 2.2 10/29/2014 03:45 AM       45  minutes spent more than 50% spent in face to face  examination and discussion of treatment options and approach

## 2019-05-17 NOTE — PATIENT INSTRUCTIONS
A Healthy Lifestyle: Care Instructions  Your Care Instructions    A healthy lifestyle can help you feel good, stay at a healthy weight, and have plenty of energy for both work and play. A healthy lifestyle is something you can share with your whole family. A healthy lifestyle also can lower your risk for serious health problems, such as high blood pressure, heart disease, and diabetes. You can follow a few steps listed below to improve your health and the health of your family. Follow-up care is a key part of your treatment and safety. Be sure to make and go to all appointments, and call your doctor if you are having problems. It's also a good idea to know your test results and keep a list of the medicines you take. How can you care for yourself at home? · Do not eat too much sugar, fat, or fast foods. You can still have dessert and treats now and then. The goal is moderation. · Start small to improve your eating habits. Pay attention to portion sizes, drink less juice and soda pop, and eat more fruits and vegetables. ? Eat a healthy amount of food. A 3-ounce serving of meat, for example, is about the size of a deck of cards. Fill the rest of your plate with vegetables and whole grains. ? Limit the amount of soda and sports drinks you have every day. Drink more water when you are thirsty. ? Eat at least 5 servings of fruits and vegetables every day. It may seem like a lot, but it is not hard to reach this goal. A serving or helping is 1 piece of fruit, 1 cup of vegetables, or 2 cups of leafy, raw vegetables. Have an apple or some carrot sticks as an afternoon snack instead of a candy bar. Try to have fruits and/or vegetables at every meal.  · Make exercise part of your daily routine. You may want to start with simple activities, such as walking, bicycling, or slow swimming. Try to be active 30 to 60 minutes every day. You do not need to do all 30 to 60 minutes all at once.  For example, you can exercise 3 times a day for 10 or 20 minutes. Moderate exercise is safe for most people, but it is always a good idea to talk to your doctor before starting an exercise program.  · Keep moving. [place] the lawn, work in the garden, or [employer]. Take the stairs instead of the elevator at work. · If you smoke, quit. People who smoke have an increased risk for heart attack, stroke, cancer, and other lung illnesses. Quitting is hard, but there are ways to boost your chance of quitting tobacco for good. ? Use nicotine gum, patches, or lozenges. ? Ask your doctor about stop-smoking programs and medicines. ? Keep trying. In addition to reducing your risk of diseases in the future, you will notice some benefits soon after you stop using tobacco. If you have shortness of breath or asthma symptoms, they will likely get better within a few weeks after you quit. · Limit how much alcohol you drink. Moderate amounts of alcohol (up to 2 drinks a day for men, 1 drink a day for women) are okay. But drinking too much can lead to liver problems, high blood pressure, and other health problems. Family health  If you have a family, there are many things you can do together to improve your health. · Eat meals together as a family as often as possible. · Eat healthy foods. This includes fruits, vegetables, lean meats and dairy, and whole grains. · Include your family in your fitness plan. Most people think of activities such as jogging or tennis as the way to fitness, but there are many ways you and your family can be more active. Anything that makes you breathe hard and gets your heart pumping is exercise. Here are some tips:  ? Walk to do errands or to take your child to school or the bus.  ? Go for a family bike ride after dinner instead of watching TV. Where can you learn more? Go to http://diamond-donna.info/. Enter Q550 in the search box to learn more about \"A Healthy Lifestyle: Care Instructions. \"  Current as of: September 11, 2018  Content Version: 11.9  © 2786-1623 [employer], [employer]. Care instructions adapted under license by [employer] (which disclaims liability or warranty for this information). If you have questions about a medical condition or this instruction, always ask your healthcare professional. [address] any warranty or liability for your use of this information. Office Policies      Paperwork            Any paperwork that needs to be completed has a 3 WEEK turnaround time. Phone calls/patient messages:    · Please allow up to 24 hours for someone in the office to contact you about your call or message. Be mindful your provider may be out of the office or your message may require further review. We encourage you to use [employer] for your messages as this is a faster, more efficient way to communicate with our office           Medication Refills:    · Prescription medications require up to 48 business hours to process. We encourage you to use [employer] for your refills. · For controlled medications: Please allow up to 72 business hours to process. Certain medications may require you to  a written prescription at our office.   · NO narcotic/controlled medications will be prescribed after 4pm Monday through Friday or on weekends                  \\

## 2019-05-20 ENCOUNTER — TELEPHONE (OUTPATIENT)
Dept: NEUROLOGY | Age: 84
End: 2019-05-20

## 2019-05-20 NOTE — TELEPHONE ENCOUNTER
----- Message from Denisha Barrios sent at 5/20/2019  9:16 AM EDT -----  Regarding: Dr. Alexandrea Christian from [facility] would like a call back regarding a referral she received for the patient. Stated it does not have a disipline specified.  Contact is [phone]

## 2019-05-28 ENCOUNTER — TELEPHONE (OUTPATIENT)
Dept: NEUROLOGY | Age: 84
End: 2019-05-28

## 2019-05-29 NOTE — TELEPHONE ENCOUNTER
Advised the home health will contacted once Dr. Yoselin Mata changes the dx and signs the note. Once completed [facility] will reach out.

## 2019-06-04 ENCOUNTER — TELEPHONE (OUTPATIENT)
Dept: NEUROLOGY | Age: 84
End: 2019-06-04

## 2019-06-05 NOTE — TELEPHONE ENCOUNTER
Spoke with [facility], they needed to know what disciplines the patient needs. Dr. Carolina Steele can you update the referral order for this patient so the home health can get started.

## 2019-06-07 ENCOUNTER — TELEPHONE (OUTPATIENT)
Dept: NEUROLOGY | Age: 84
End: 2019-06-07

## 2019-06-07 NOTE — TELEPHONE ENCOUNTER
Received call from patient's wife, she stated that a referral was sent for the patient to have home health, but she has not received a call from. She would like a call back please.       [phone]

## 2019-06-12 DIAGNOSIS — R26.9 GAIT DIFFICULTY: Primary | ICD-10-CM

## 2019-06-12 DIAGNOSIS — F02.80 LATE ONSET ALZHEIMER'S DISEASE WITHOUT BEHAVIORAL DISTURBANCE (HCC): ICD-10-CM

## 2019-06-12 DIAGNOSIS — G30.1 LATE ONSET ALZHEIMER'S DISEASE WITHOUT BEHAVIORAL DISTURBANCE (HCC): ICD-10-CM

## 2019-06-19 ENCOUNTER — HOME HEALTH ADMISSION (OUTPATIENT)
Dept: HOME HEALTH SERVICES | Facility: HOME HEALTH | Age: 84
End: 2019-06-19
Payer: MEDICARE

## 2019-06-19 ENCOUNTER — TELEPHONE (OUTPATIENT)
Dept: NEUROLOGY | Age: 84
End: 2019-06-19

## 2019-06-19 NOTE — TELEPHONE ENCOUNTER
Spoke with [address] [place] [address] Rd to make sure the referral was updated enough for the patient to  be scheduled to be seen. She then transferred me to intake and spoke with [facility]; a new order is needed; [facility] also advised for me to do a new order [facility] also provided a phone number for the patient's wife to call [phone] with any questions or if there is a conflict with scheduling.     Speech will start the week of the 24th

## 2019-06-19 NOTE — TELEPHONE ENCOUNTER
Left a detailed message and also provided a phone number for the patient's wife to contact if there are any other questions in regards to this matter.

## 2019-06-19 NOTE — TELEPHONE ENCOUNTER
----- Message from Carrie Asencio sent at 6/19/2019  1:05 PM EDT -----  Regarding: /Telephone  Rosa Melton pt's wife called requesting a call back from [facility] in regards to pt has not heard anything yet from [employer] . Best contact number is [phone] .

## 2019-06-20 ENCOUNTER — HOME CARE VISIT (OUTPATIENT)
Dept: SCHEDULING | Facility: HOME HEALTH | Age: 84
End: 2019-06-20
Payer: MEDICARE

## 2019-06-20 PROCEDURE — 400013 HH SOC

## 2019-06-20 PROCEDURE — 3331090001 HH PPS REVENUE CREDIT

## 2019-06-20 PROCEDURE — 3331090002 HH PPS REVENUE DEBIT

## 2019-06-20 PROCEDURE — G0151 HHCP-SERV OF PT,EA 15 MIN: HCPCS

## 2019-06-21 PROCEDURE — 3331090001 HH PPS REVENUE CREDIT

## 2019-06-21 PROCEDURE — 3331090002 HH PPS REVENUE DEBIT

## 2019-06-22 VITALS
RESPIRATION RATE: 18 BRPM | HEART RATE: 66 BPM | OXYGEN SATURATION: 97 % | TEMPERATURE: 97.5 F | DIASTOLIC BLOOD PRESSURE: 72 MMHG | SYSTOLIC BLOOD PRESSURE: 132 MMHG

## 2019-06-22 PROCEDURE — 3331090002 HH PPS REVENUE DEBIT

## 2019-06-22 PROCEDURE — 3331090001 HH PPS REVENUE CREDIT

## 2019-06-23 ENCOUNTER — HOSPITAL ENCOUNTER (EMERGENCY)
Age: 84
Discharge: HOME | End: 2019-06-23
Attending: EMERGENCY MEDICINE
Payer: MEDICARE

## 2019-06-23 ENCOUNTER — APPOINTMENT (OUTPATIENT)
Dept: CT IMAGING | Age: 84
End: 2019-06-23
Attending: EMERGENCY MEDICINE
Payer: MEDICARE

## 2019-06-23 VITALS
SYSTOLIC BLOOD PRESSURE: 141 MMHG | TEMPERATURE: 97.5 F | RESPIRATION RATE: 21 BRPM | DIASTOLIC BLOOD PRESSURE: 86 MMHG | WEIGHT: 159.39 LBS | OXYGEN SATURATION: 94 % | BODY MASS INDEX: 25.73 KG/M2 | HEART RATE: 65 BPM

## 2019-06-23 DIAGNOSIS — H81.13 BENIGN PAROXYSMAL POSITIONAL VERTIGO DUE TO BILATERAL VESTIBULAR DISORDER: Primary | ICD-10-CM

## 2019-06-23 LAB
ALBUMIN SERPL-MCNC: 3.4 G/DL (ref 3.5–5)
ALBUMIN/GLOB SERPL: 1 {RATIO} (ref 1.1–2.2)
ALP SERPL-CCNC: 72 U/L (ref 45–117)
ALT SERPL-CCNC: 17 U/L (ref 12–78)
ANION GAP SERPL CALC-SCNC: 3 MMOL/L (ref 5–15)
AST SERPL-CCNC: 15 U/L (ref 15–37)
BASOPHILS # BLD: 0 K/UL (ref 0–0.1)
BASOPHILS NFR BLD: 1 % (ref 0–1)
BILIRUB SERPL-MCNC: 0.3 MG/DL (ref 0.2–1)
BUN SERPL-MCNC: 23 MG/DL (ref 6–20)
BUN/CREAT SERPL: 18 (ref 12–20)
CALCIUM SERPL-MCNC: 8.8 MG/DL (ref 8.5–10.1)
CHLORIDE SERPL-SCNC: 106 MMOL/L (ref 97–108)
CK SERPL-CCNC: 54 U/L (ref 39–308)
CO2 SERPL-SCNC: 29 MMOL/L (ref 21–32)
CREAT SERPL-MCNC: 1.28 MG/DL (ref 0.7–1.3)
DIFFERENTIAL METHOD BLD: ABNORMAL
EOSINOPHIL # BLD: 0.4 K/UL (ref 0–0.4)
EOSINOPHIL NFR BLD: 6 % (ref 0–7)
ERYTHROCYTE [DISTWIDTH] IN BLOOD BY AUTOMATED COUNT: 13.8 % (ref 11.5–14.5)
GLOBULIN SER CALC-MCNC: 3.5 G/DL (ref 2–4)
GLUCOSE SERPL-MCNC: 99 MG/DL (ref 65–100)
HCT VFR BLD AUTO: 38 % (ref 36.6–50.3)
HGB BLD-MCNC: 12.7 G/DL (ref 12.1–17)
IMM GRANULOCYTES # BLD AUTO: 0 K/UL (ref 0–0.04)
IMM GRANULOCYTES NFR BLD AUTO: 0 % (ref 0–0.5)
LYMPHOCYTES # BLD: 1.8 K/UL (ref 0.8–3.5)
LYMPHOCYTES NFR BLD: 29 % (ref 12–49)
MAGNESIUM SERPL-MCNC: 2 MG/DL (ref 1.6–2.4)
MCH RBC QN AUTO: 32.1 PG (ref 26–34)
MCHC RBC AUTO-ENTMCNC: 33.4 G/DL (ref 30–36.5)
MCV RBC AUTO: 96 FL (ref 80–99)
MONOCYTES # BLD: 0.8 K/UL (ref 0–1)
MONOCYTES NFR BLD: 13 % (ref 5–13)
NEUTS SEG # BLD: 3.2 K/UL (ref 1.8–8)
NEUTS SEG NFR BLD: 51 % (ref 32–75)
NRBC # BLD: 0 K/UL (ref 0–0.01)
NRBC BLD-RTO: 0 PER 100 WBC
PLATELET # BLD AUTO: 181 K/UL (ref 150–400)
PMV BLD AUTO: 10 FL (ref 8.9–12.9)
POTASSIUM SERPL-SCNC: 4.7 MMOL/L (ref 3.5–5.1)
PROT SERPL-MCNC: 6.9 G/DL (ref 6.4–8.2)
RBC # BLD AUTO: 3.96 M/UL (ref 4.1–5.7)
SODIUM SERPL-SCNC: 138 MMOL/L (ref 136–145)
TROPONIN I SERPL-MCNC: <0.05 NG/ML
WBC # BLD AUTO: 6.2 K/UL (ref 4.1–11.1)

## 2019-06-23 PROCEDURE — 3331090002 HH PPS REVENUE DEBIT

## 2019-06-23 PROCEDURE — 74011250636 HC RX REV CODE- 250/636: Performed by: EMERGENCY MEDICINE

## 2019-06-23 PROCEDURE — 70450 CT HEAD/BRAIN W/O DYE: CPT

## 2019-06-23 PROCEDURE — 83735 ASSAY OF MAGNESIUM: CPT

## 2019-06-23 PROCEDURE — 82550 ASSAY OF CK (CPK): CPT

## 2019-06-23 PROCEDURE — 84484 ASSAY OF TROPONIN QUANT: CPT

## 2019-06-23 PROCEDURE — 93005 ELECTROCARDIOGRAM TRACING: CPT

## 2019-06-23 PROCEDURE — 85025 COMPLETE CBC W/AUTO DIFF WBC: CPT

## 2019-06-23 PROCEDURE — 80053 COMPREHEN METABOLIC PANEL: CPT

## 2019-06-23 PROCEDURE — 3331090001 HH PPS REVENUE CREDIT

## 2019-06-23 PROCEDURE — 36415 COLL VENOUS BLD VENIPUNCTURE: CPT

## 2019-06-23 PROCEDURE — 99284 EMERGENCY DEPT VISIT MOD MDM: CPT

## 2019-06-23 RX ORDER — MECLIZINE HCL 12.5 MG 12.5 MG/1
25 TABLET ORAL
Status: COMPLETED | OUTPATIENT
Start: 2019-06-23 | End: 2019-06-23

## 2019-06-23 RX ADMIN — MECLIZINE 25 MG: 12.5 TABLET ORAL at 17:37

## 2019-06-23 NOTE — ED PROVIDER NOTES
EMERGENCY DEPARTMENT HISTORY AND PHYSICAL EXAM 
 
 
Date: 6/23/2019 Patient Name: Nilson Salas Patient Age and Sex: 80 y.o. male History of Presenting Illness Chief Complaint Patient presents with  Dizziness  
  wife reports worsened dizziness that has not resolved with meclizine. History Provided By: Patient HPI: Nilson Salas is a 80-year-old male with a history of dementia and peripheral vertigo presenting with vertigo. According to wife, patient described having vertigo earlier today and stated that he was dizzy and felt like the floor looks like waves. Every time he moves his head the vertigo gets worse. Wife gave him a dose of 25 mg of meclizine and then put him to bed. After 1 hour of sleeping patient woke up but stated that the vertigo was worse. Wife states that usually after she gives him meclizine his symptoms improve. Patient denies any unilateral numbness or weakness and wife denies any changes in mental status. She also gave him Flonase as he has had a seasonal allergies recently. Patient states that when he is looking forward he is not vertiginous but when he moves his head around gets very dizzy. Denies feeling like he is going to pass out. There are no other complaints, changes, or physical findings at this time. PCP: Cuate Vale DO No current facility-administered medications on file prior to encounter. Current Outpatient Medications on File Prior to Encounter Medication Sig Dispense Refill  etodolac (LODINE) 400 mg tablet take 1 tablet by mouth once daily if needed for pain 30 Tab 2  
 QUEtiapine (SEROQUEL) 25 mg tablet take 1/2 tablet by mouth at bedtime 30 Tab 11  
 metoprolol tartrate (LOPRESSOR) 25 mg tablet Take 12.5 mg by mouth daily. 0.5 of 25 mg tab daily  benzonatate (TESSALON) 100 mg capsule take 1 capsule by mouth three times a day if needed for cough  0  
  TUSSIN DM  mg/5 mL syrup take 10 milliliters (2 teaspoonfuls) every 6 to 8 hours if needed for cough  0  
 isosorbide mononitrate ER (IMDUR) 30 mg tablet take 1 tablet by mouth once daily 30 Tab 11  
 aspirin (ASPIRIN) 325 mg tablet Take 1 Tab by mouth daily. Indications: myocardial infarction prevention 90 Tab 3  pravastatin (PRAVACHOL) 20 mg tablet take 1 tablet by mouth once daily at bedtime 90 Tab 3  
 FOLBEE 2.5-25-1 mg tablet take 1 tablet by mouth once daily 60 Tab 11  
 metoprolol tartrate (LOPRESSOR) 25 mg tablet take 1/2 tablet once a day  Indications: hypertension (Patient taking differently: Take 12.5 mg by mouth daily. take 1/2 tablet once a day) 30 Tab 11  
 acetaminophen (TYLENOL ARTHRITIS PAIN) 650 mg TbER Take 650 mg by mouth every eight (8) hours.  meclizine (ANTIVERT) 25 mg tablet Take 1 Tab by mouth three (3) times daily as needed for Dizziness. 20 Tab 0  
 GUAIFENESIN (MUCINEX PO) Take 1 Tab by mouth daily.  docusate sodium (COLACE) 100 mg capsule Take 100 mg by mouth daily. Instructed to take daily with plenty of liquid unless otherwise directed- as per 11/7/14 [facility] discharge med list    
 cholecalciferol, vitamin D3, (VITAMIN D3) 2,000 unit tab Take 1 Tab by mouth daily. As per 11/07/14 [facility] discharge med list    
 tamsulosin (FLOMAX) 0.4 mg capsule Take 0.4 mg by mouth daily. For prostate Past History Past Medical History: 
Past Medical History:  
Diagnosis Date  Alzheimer's dementia with behavioral disturbance 04/2018 Dr. Vadim Coleman.  Back pain   
 due to DDD and scoliosis  BPH (benign prostatic hyperplasia) Dr. Brooklyn Bran  CAD (coronary artery disease) 10/30/13  
 30%LAD, 80% ostal stenosis. Dr. Fracisco Guallpa.  Chest pain 10/04/04, 10/30/13 Dr. Latricia Saleh. Negative Stress Cardiolite Test.  Dr. Ulysses More.  Chest pain 12/02/2018 chest pain and tightness  Chronic venous insufficiency 06/15/09 Dr. Mena Ray  Clavicle fracture 1981 Lt due to fall  DDD (degenerative disc disease), lumbar L3-S1  Diverticulosis 02/2004  
 sigmoid. Dr. Deidre Michael  DJD (degenerative joint disease) of cervical spine C 4-5 ;5-6  Dyslipidemia  Fracture 1980  
 facial/nasal  
 Hearing loss   
 wears hearing aid  Heartburn  Hemorrhoids, internal 1987 Dr. Valerie Pereira  Hernia of unspecified site of abdominal cavity without mention of obstruction or gangrene   
 inguinal  Lt  Hypertension  Impotence  Left acoustic neuroma ([address] 10/28/14  
 17 x 9 mm schwannoma.  Pes planus of both feet  PVD (peripheral vascular disease) ([address] 2009  Scoliosis LS  
 Slipped intervertebral disc 1954 chiropractor  SOB (shortness of breath) 08/07/13 Dr. Lui Rust.  Stroke [address] 10/28/2014  
 small Left Thalamus. Dr. Scottie Robbins. Dr. Maxx Pham.  Syncope 12/20/2018  
 pt experienced fainting spells  Urinary incontinence   
 due to BPH. Dr. Christa Rao  Varicose vein 06/15/09 Dr. Mena Ray  Vertigo 2003  
 due to inner ear. Dr. Javad Hearn. Dr. Jaye Melendrez. Dr. Ana Rosa Negrete  Visual loss Dr. Danna Biggs, [facility]. Past Surgical History: 
Past Surgical History:  
Procedure Laterality Date  APPENDECTOMY    
 due to gangrene [facility], 02/2002 Dr. Stan Hernandez. benign.  COLONOSCOPY  02/2004 Dr. Valerie Pereira. due q 10 yrs  CYSTOSCOPY  10/17/03 Dr. Ras Godwin  HX HEART CATHETERIZATION  10/30/13 Dr. Vikas Huang. 80% ostal stenosis. 30% LAD.  HX HEMORRHOIDECTOMY  HX KNEE ARTHROSCOPY  1990 Left , 2007 Right Dr. Keisha Alves  HX LAPAROTOMY  05/14/07 Dr. Danyelle Montague. due to Bowel Obstruction  HX POLYPECTOMY  02/2002 Anal.   
 HX TONSILLECTOMY  MI COLSC FLX W/RMVL OF TUMOR POLYP LESION SNARE TQ  5/23/2011 Dr. Christoph Becker. Family History: 
Family History Problem Relation Age of Onset  Heart Attack Mother  Other Mother   
     brain aneurysm/AAA/varicose veins  Stroke Mother  Heart Attack Sister  Cancer Maternal Grandmother   
     ovarian Social History: 
Social History Tobacco Use  Smoking status: Never Smoker  Smokeless tobacco: Never Used Substance Use Topics  Alcohol use: No  
 Drug use: No  
 
 
Allergies: Allergies Allergen Reactions  Aricept [Donepezil] Nausea and Vomiting Mild depression  Betadine [Povidone-Iodine] Hives  Phenergan [Promethazine] Other (comments)  
  hallucinations  Seafood [Shellfish Containing Products] Rash and Swelling  
  crabmeat  Xylocaine [Lidocaine Hcl] Shortness of Breath Review of Systems Review of Systems Constitutional: Negative for chills and fever. Respiratory: Negative for cough and shortness of breath. Cardiovascular: Negative for chest pain. Gastrointestinal: Negative for constipation, diarrhea, nausea and vomiting. Neurological: Positive for dizziness. Negative for weakness, light-headedness, numbness and headaches. All other systems reviewed and are negative. Physical Exam  
Physical Exam  
Constitutional: He is oriented to person, place, and time. He appears well-developed and well-nourished. HENT:  
Head: Normocephalic and atraumatic. Right Ear: External ear normal.  
Left Ear: External ear normal.  
Eyes: Conjunctivae and EOM are normal.  
Neck: Normal range of motion. Neck supple. Cardiovascular: Normal rate and regular rhythm. Pulmonary/Chest: Effort normal and breath sounds normal. No respiratory distress. Abdominal: Soft. He exhibits no distension. There is no tenderness. Musculoskeletal: Normal range of motion. Neurological: He is alert and oriented to person, place, and time. 5 out of 5 strength in all 4 extremities, cranial nerves II to XII are intact, finger-to-nose intact Skin: Skin is warm and dry. Psychiatric: He has a normal mood and affect. Nursing note and vitals reviewed. Diagnostic Study Results Labs - Recent Results (from the past 12 hour(s)) EKG, 12 LEAD, INITIAL Collection Time: 06/23/19  5:18 PM  
Result Value Ref Range Ventricular Rate 78 BPM  
 Atrial Rate 78 BPM  
 P-R Interval 196 ms QRS Duration 98 ms Q-T Interval 386 ms QTC Calculation (Bezet) 440 ms Calculated P Axis 54 degrees Calculated R Axis -28 degrees Calculated T Axis 6 degrees Diagnosis Normal sinus rhythm Low voltage QRS When compared with ECG of 20-DEC-2018 09:54, No significant change was found CBC WITH AUTOMATED DIFF Collection Time: 06/23/19  5:48 PM  
Result Value Ref Range WBC 6.2 4.1 - 11.1 K/uL  
 RBC 3.96 (L) 4.10 - 5.70 M/uL  
 HGB 12.7 12.1 - 17.0 g/dL HCT 38.0 36.6 - 50.3 % MCV 96.0 80.0 - 99.0 FL  
 MCH 32.1 26.0 - 34.0 PG  
 MCHC 33.4 30.0 - 36.5 g/dL  
 RDW 13.8 11.5 - 14.5 % PLATELET 684 631 - 216 K/uL MPV 10.0 8.9 - 12.9 FL  
 NRBC 0.0 0  WBC ABSOLUTE NRBC 0.00 0.00 - 0.01 K/uL NEUTROPHILS 51 32 - 75 % LYMPHOCYTES 29 12 - 49 % MONOCYTES 13 5 - 13 % EOSINOPHILS 6 0 - 7 % BASOPHILS 1 0 - 1 % IMMATURE GRANULOCYTES 0 0.0 - 0.5 % ABS. NEUTROPHILS 3.2 1.8 - 8.0 K/UL  
 ABS. LYMPHOCYTES 1.8 0.8 - 3.5 K/UL  
 ABS. MONOCYTES 0.8 0.0 - 1.0 K/UL  
 ABS. EOSINOPHILS 0.4 0.0 - 0.4 K/UL  
 ABS. BASOPHILS 0.0 0.0 - 0.1 K/UL  
 ABS. IMM. GRANS. 0.0 0.00 - 0.04 K/UL  
 DF AUTOMATED MAGNESIUM Collection Time: 06/23/19  6:13 PM  
Result Value Ref Range Magnesium 2.0 1.6 - 2.4 mg/dL METABOLIC PANEL, COMPREHENSIVE Collection Time: 06/23/19  6:13 PM  
Result Value Ref Range Sodium 138 136 - 145 mmol/L Potassium 4.7 3.5 - 5.1 mmol/L Chloride 106 97 - 108 mmol/L  
 CO2 29 21 - 32 mmol/L Anion gap 3 (L) 5 - 15 mmol/L Glucose 99 65 - 100 mg/dL BUN 23 (H) 6 - 20 MG/DL  Creatinine 1.28 0.70 - 1.30 MG/DL  
 BUN/Creatinine ratio 18 12 - 20 GFR est AA >60 >60 ml/min/1.73m2 GFR est non-AA 53 (L) >60 ml/min/1.73m2 Calcium 8.8 8.5 - 10.1 MG/DL Bilirubin, total 0.3 0.2 - 1.0 MG/DL  
 ALT (SGPT) 17 12 - 78 U/L  
 AST (SGOT) 15 15 - 37 U/L Alk. phosphatase 72 45 - 117 U/L Protein, total 6.9 6.4 - 8.2 g/dL Albumin 3.4 (L) 3.5 - 5.0 g/dL Globulin 3.5 2.0 - 4.0 g/dL A-G Ratio 1.0 (L) 1.1 - 2.2    
TROPONIN I Collection Time: 06/23/19  6:13 PM  
Result Value Ref Range Troponin-I, Qt. <0.05 <0.05 ng/mL CK W/ REFLX CKMB Collection Time: 06/23/19  6:13 PM  
Result Value Ref Range CK 54 39 - 308 U/L Radiologic Studies -  
CT HEAD WO CONT Final Result IMPRESSION: No acute intracranial hemorrhage, mass or infarct. Stable pattern of  
atrophy and chronic white matter change most compatible with small vessel  
ischemic and/or senescent change. Intracranial atherosclerosis. CT Results  (Last 48 hours) 06/23/19 1835  CT HEAD WO CONT Final result Impression:  IMPRESSION: No acute intracranial hemorrhage, mass or infarct. Stable pattern of  
atrophy and chronic white matter change most compatible with small vessel  
ischemic and/or senescent change. Intracranial atherosclerosis. Narrative:  EXAM: CT HEAD WO CONT INDICATION: Vertigo, episodic, peripheral  
   
COMPARISON: CT 12/16/2017. [place] CONTRAST: None. TECHNIQUE: Unenhanced CT of the head was performed using 5 mm images. Brain and  
bone windows were generated. CT dose reduction was achieved through use of a  
standardized protocol tailored for this examination and automatic exposure  
control for dose modulation. FINDINGS: There is no acute intracranial hemorrhage, mass, mass effect or  
herniation. Ventricles and sulci show no significant change in proportionate and  
symmetric prominence.   There is no significant change in pattern of  
 periventricular white matter hypodensity. The gray-white matter differentiation  
is well-preserved. Atherosclerotic calcifications are seen within the carotid  
siphons and vertebral arteries. The mastoid air cells are well pneumatized. The  
visualized paranasal sinuses are normal.  
   
  
  
 
CXR Results  (Last 48 hours) None Medical Decision Making I am the first provider for this patient. I reviewed the vital signs, available nursing notes, past medical history, past surgical history, family history and social history. Vital Signs-Reviewed the patient's vital signs. Patient Vitals for the past 12 hrs: 
 Temp Pulse Resp BP SpO2  
06/23/19 1915  65 21 141/86 94 % 06/23/19 1908  60 20 144/78 94 % 06/23/19 1815  66 18 134/86 95 % 06/23/19 1708 97.5 °F (36.4 °C) 80 16 169/87 97 % Records Reviewed: Nursing Notes and Old Medical Records Provider Notes (Medical Decision Making):  
Patient presents with isolated vertigo. Most likely peripheral vertigo rather than Central vertigo. DDx: BPPV, acute labyrinthitis, vestibular neuritis, Meniere's dx, otitis media, acoustic neuroma, medication toxicity (aminoglycosides, loop diuretics, aspirin). Unlikely central cause of vertigo such as posterior mass or stroke as there are no associated red flag symptoms including diplopia, dysmetria, dysarthria, ataxia, unilateral numbness or weakness. Leandro Isidro's  results have been reviewed with him. He has been counseled regarding his diagnosis. He verbally conveys understanding and agreement of the signs, symptoms, diagnosis, treatment with Meclizine and prognosis and additionally agrees to follow up as recommended with Vestibular Rehab or Neurology PRN. ED Course:  
Initial assessment performed.  The patients presenting problems have been discussed, and they are in agreement with the care plan formulated and outlined with them. I have encouraged them to ask questions as they arise throughout their visit. ED Course as of Jun 23 2231 Micki Avila Jun 23, 2019  
1850 Patient states he does feel better however whenever he shakes his head starts to feel vertiginous again. Advised him not to shake his head so vigorously and waiting on rest of labs. Advised him to follow-up with vestibular rehab if lab work all looks normal.  
 [JS] ED Course User Index [JS] Jaky Saenz MD  
 
Critical Care Time:  
0 Disposition: 
Discharge Note: The patient has been re-evaluated and is ready for discharge. Reviewed available results with patient. Counseled patient on diagnosis and care plan. Patient has expressed understanding, and all questions have been answered. Patient agrees with plan and agrees to follow up as recommended, or to return to the ED if their symptoms worsen. Discharge instructions have been provided and explained to the patient, along with reasons to return to the ED. PLAN: 
Discharge Medication List as of 6/23/2019  7:19 PM  
  
 
2. Follow-up Information Follow up With Specialties Details Why Contact Info Vestibular Rehab  Schedule an appointment as soon as possible for a visit Alice Ventura MD Otolaryngology Schedule an appointment as soon as possible for a visit  Yaron Montilla [address] [place] [address] 
[phone] 3. Return to ED if worse Diagnosis Clinical Impression: 1. Benign paroxysmal positional vertigo due to bilateral vestibular disorder Attestations: 
 
Jessica Gregg M.D. Please note that this dictation was completed with [employer], the computer voice recognition software. Quite often unanticipated grammatical, syntax, homophones, and other interpretive errors are inadvertently transcribed by the computer software. Please disregard these errors. Please excuse any errors that have escaped final proofreading. Thank you.

## 2019-06-23 NOTE — ED NOTES
Pt resting comfortably on the stretcher in a position of comfort.  Pt in no acute distress at this time.  Call bell within reach.  Side rails x 2.  Cardiac monitor x 4. Pt ANO x 2  Stretcher locked in the lowest position.  Pt aware of plan to await for MD/PA-C/NP assessment, and pt/family verbalizes understanding.  Will continue to monitor

## 2019-06-23 NOTE — DISCHARGE INSTRUCTIONS
You were seen for dizziness and vertigo. I recommended over the counter meclizine or dramamine, and resting in a quiet environment until better. Avoid driving, heights, operating machinery until better. Follow up with Neurology if symptoms do not improve in 1 week. Call if symptoms persist or worsen or you develop new neurologic symptoms.   If meclizine does not improve your symptoms, follow up with Santana Hoffman:    [address] [facility], [address] [facility]  [address]  [phone]    Vestibular Rehab therapists  Carlos Saravia, PT

## 2019-06-23 NOTE — ED NOTES
Discharge instructions given to patient by Dr. Taylor Caraballo. Verbalized understanding of instructions. Patient discharged without difficulty. Patient discharged in stable condition via W/C accompanied by spouse.

## 2019-06-24 ENCOUNTER — HOME CARE VISIT (OUTPATIENT)
Dept: SCHEDULING | Facility: HOME HEALTH | Age: 84
End: 2019-06-24
Payer: MEDICARE

## 2019-06-24 VITALS
DIASTOLIC BLOOD PRESSURE: 70 MMHG | TEMPERATURE: 98.1 F | HEART RATE: 66 BPM | OXYGEN SATURATION: 98 % | SYSTOLIC BLOOD PRESSURE: 130 MMHG

## 2019-06-24 LAB
ATRIAL RATE: 78 BPM
CALCULATED P AXIS, ECG09: 54 DEGREES
CALCULATED R AXIS, ECG10: -28 DEGREES
CALCULATED T AXIS, ECG11: 6 DEGREES
DIAGNOSIS, 93000: NORMAL
P-R INTERVAL, ECG05: 196 MS
Q-T INTERVAL, ECG07: 386 MS
QRS DURATION, ECG06: 98 MS
QTC CALCULATION (BEZET), ECG08: 440 MS
VENTRICULAR RATE, ECG03: 78 BPM

## 2019-06-24 PROCEDURE — 3331090001 HH PPS REVENUE CREDIT

## 2019-06-24 PROCEDURE — 3331090002 HH PPS REVENUE DEBIT

## 2019-06-24 PROCEDURE — G0153 HHCP-SVS OF S/L PATH,EA 15MN: HCPCS

## 2019-06-25 ENCOUNTER — HOME CARE VISIT (OUTPATIENT)
Dept: SCHEDULING | Facility: HOME HEALTH | Age: 84
End: 2019-06-25
Payer: MEDICARE

## 2019-06-25 VITALS
OXYGEN SATURATION: 97 % | SYSTOLIC BLOOD PRESSURE: 112 MMHG | TEMPERATURE: 98.2 F | DIASTOLIC BLOOD PRESSURE: 75 MMHG | HEART RATE: 58 BPM

## 2019-06-25 PROCEDURE — G0157 HHC PT ASSISTANT EA 15: HCPCS

## 2019-06-25 PROCEDURE — 3331090002 HH PPS REVENUE DEBIT

## 2019-06-25 PROCEDURE — 3331090001 HH PPS REVENUE CREDIT

## 2019-06-26 ENCOUNTER — HOME CARE VISIT (OUTPATIENT)
Dept: SCHEDULING | Facility: HOME HEALTH | Age: 84
End: 2019-06-26
Payer: MEDICARE

## 2019-06-26 VITALS
OXYGEN SATURATION: 97 % | SYSTOLIC BLOOD PRESSURE: 140 MMHG | HEART RATE: 61 BPM | TEMPERATURE: 97.5 F | DIASTOLIC BLOOD PRESSURE: 81 MMHG

## 2019-06-26 PROCEDURE — 3331090002 HH PPS REVENUE DEBIT

## 2019-06-26 PROCEDURE — G0153 HHCP-SVS OF S/L PATH,EA 15MN: HCPCS

## 2019-06-26 PROCEDURE — 3331090001 HH PPS REVENUE CREDIT

## 2019-06-26 PROCEDURE — G0157 HHC PT ASSISTANT EA 15: HCPCS

## 2019-06-27 ENCOUNTER — TELEPHONE (OUTPATIENT)
Dept: NEUROLOGY | Age: 84
End: 2019-06-27

## 2019-06-27 VITALS
TEMPERATURE: 98 F | HEART RATE: 59 BPM | SYSTOLIC BLOOD PRESSURE: 122 MMHG | DIASTOLIC BLOOD PRESSURE: 79 MMHG | OXYGEN SATURATION: 98 %

## 2019-06-27 PROCEDURE — 3331090001 HH PPS REVENUE CREDIT

## 2019-06-27 PROCEDURE — 3331090002 HH PPS REVENUE DEBIT

## 2019-06-27 NOTE — TELEPHONE ENCOUNTER
Spoke with Cholo Valdovinos to make sure the patient's order for Home health was okay. She stated the patient is fine and has already started his visits.

## 2019-06-28 ENCOUNTER — OFFICE VISIT (OUTPATIENT)
Dept: FAMILY MEDICINE CLINIC | Age: 84
End: 2019-06-28

## 2019-06-28 VITALS
SYSTOLIC BLOOD PRESSURE: 103 MMHG | BODY MASS INDEX: 27.13 KG/M2 | WEIGHT: 158.9 LBS | RESPIRATION RATE: 18 BRPM | OXYGEN SATURATION: 97 % | HEART RATE: 62 BPM | DIASTOLIC BLOOD PRESSURE: 64 MMHG | HEIGHT: 64 IN | TEMPERATURE: 97.8 F

## 2019-06-28 DIAGNOSIS — I10 ESSENTIAL HYPERTENSION: ICD-10-CM

## 2019-06-28 DIAGNOSIS — E78.5 DYSLIPIDEMIA: ICD-10-CM

## 2019-06-28 DIAGNOSIS — D50.8 OTHER IRON DEFICIENCY ANEMIA: ICD-10-CM

## 2019-06-28 DIAGNOSIS — R73.9 HYPERGLYCEMIA: ICD-10-CM

## 2019-06-28 DIAGNOSIS — I25.10 CORONARY ARTERY DISEASE INVOLVING NATIVE CORONARY ARTERY OF NATIVE HEART WITHOUT ANGINA PECTORIS: ICD-10-CM

## 2019-06-28 DIAGNOSIS — F02.818 EARLY ONSET ALZHEIMER'S DISEASE WITH BEHAVIORAL DISTURBANCE (HCC): ICD-10-CM

## 2019-06-28 DIAGNOSIS — F32.89 OTHER DEPRESSION: Primary | ICD-10-CM

## 2019-06-28 DIAGNOSIS — Z86.73 HISTORY OF STROKE: ICD-10-CM

## 2019-06-28 DIAGNOSIS — F93.0 SEPARATION ANXIETY: ICD-10-CM

## 2019-06-28 DIAGNOSIS — E55.9 VITAMIN D DEFICIENCY: ICD-10-CM

## 2019-06-28 DIAGNOSIS — I73.9 PVD (PERIPHERAL VASCULAR DISEASE) (HCC): ICD-10-CM

## 2019-06-28 DIAGNOSIS — G30.0 EARLY ONSET ALZHEIMER'S DISEASE WITH BEHAVIORAL DISTURBANCE (HCC): ICD-10-CM

## 2019-06-28 DIAGNOSIS — R42 DIZZINESS: ICD-10-CM

## 2019-06-28 DIAGNOSIS — H90.3 SENSORINEURAL HEARING LOSS (SNHL) OF BOTH EARS: ICD-10-CM

## 2019-06-28 PROCEDURE — 3331090001 HH PPS REVENUE CREDIT

## 2019-06-28 PROCEDURE — 3331090002 HH PPS REVENUE DEBIT

## 2019-06-28 RX ORDER — SERTRALINE HYDROCHLORIDE 25 MG/1
25 TABLET, FILM COATED ORAL DAILY
Qty: 30 TAB | Refills: 11 | Status: SHIPPED | OUTPATIENT
Start: 2019-06-28 | End: 2019-01-01 | Stop reason: SDUPTHER

## 2019-06-28 NOTE — PROGRESS NOTES
Betty Lacy  Identified pt with two pt identifiers(name and ). Chief Complaint Patient presents with  Blood Pressure Check Room 14  
 
 
1. Have you been to the ER, urgent care clinic since your last visit? [facility] for vertigo  Hospitalized since your last visit? N 
 
2. Have you seen or consulted any other health care providers outside of the [address] [place] [address] since your last visit? Include any pap smears or colon screening. Ranjit Olivares Advance Care Planning In the event something were to happen to you and you were unable to speak on your behalf, do you have an Advance Directive/ Living Will in place stating your wishes? YES If yes, do we have a copy on file YES If no, would you like information NO Medication reconciliation up to date and corrected with patient at this time. Today's provider has been notified of reason for visit, vitals and flowsheets obtained on patients. Reviewed record in preparation for visit, huddled with provider and have obtained necessary documentation. Health Maintenance Due Topic  GLAUCOMA SCREENING Q2Y Wt Readings from Last 3 Encounters:  
19 158 lb 14.4 oz (72.1 kg) 19 159 lb 6.3 oz (72.3 kg) 19 158 lb (71.7 kg) Temp Readings from Last 3 Encounters:  
19 97.8 °F (36.6 °C) (Oral) 19 97.5 °F (36.4 °C) (Temporal) 19 98 °F (36.7 °C) (Temporal) BP Readings from Last 3 Encounters:  
19 103/64  
19 140/81  
19 122/79 Pulse Readings from Last 3 Encounters:  
19 62  
19 61  
19 (!) 59 Vitals:  
 19 1000 BP: 103/64 Pulse: 62 Resp: 18 Temp: 97.8 °F (36.6 °C) TempSrc: Oral  
SpO2: 97% Weight: 158 lb 14.4 oz (72.1 kg) Height: 5' 4\" (1.626 m) PainSc:   0 - No pain Learning Assessment: 
:  
 
Learning Assessment 2018 2018 2018 10/11/2017 4/3/2015 PRIMARY LEARNER Patient Patient Patient Patient Patient HIGHEST LEVEL OF EDUCATION - PRIMARY LEARNER  > 4 YEARS OF COLLEGE - - - > 4 YEARS OF COLLEGE  
BARRIERS PRIMARY LEARNER NONE - - - NONE  
CO-LEARNER CAREGIVER No - - - - PRIMARY LANGUAGE ENGLISH ENGLISH ENGLISH ENGLISH ENGLISH  
LEARNER PREFERENCE PRIMARY READING DEMONSTRATION DEMONSTRATION DEMONSTRATION DEMONSTRATION  
ANSWERED BY patient and wife patient other patient patient RELATIONSHIP SELF SELF OTHER SELF SELF Depression Screening: 
:  
 
3 most recent PHQ Screens 6/28/2019 Little interest or pleasure in doing things Several days Feeling down, depressed, irritable, or hopeless Several days Total Score PHQ 2 2 No flowsheet data found. Fall Risk Assessment: 
:  
 
Fall Risk Assessment, last 12 mths 6/28/2019 Able to walk? Yes Fall in past 12 months? No  
 
 
Abuse Screening: 
:  
 
Abuse Screening Questionnaire 8/31/2018 Do you ever feel afraid of your partner? Marco Antonio Chapa Are you in a relationship with someone who physically or mentally threatens you? Marco Antonio Chapa Is it safe for you to go home? Y  
 
 
ADL Screening: 
:  
 
ADL Assessment 8/31/2018 Feeding yourself No Help Needed Getting from bed to chair No Help Needed Getting dressed No Help Needed Bathing or showering No Help Needed Walk across the room (includes cane/walker) No Help Needed Using the telphone No Help Needed Taking your medications No Help Needed Preparing meals No Help Needed Managing money (expenses/bills) No Help Needed Moderately strenuous housework (laundry) No Help Needed Shopping for personal items (toiletries/medicines) No Help Needed Shopping for groceries No Help Needed Driving No Help Needed Climbing a flight of stairs No Help Needed Getting to places beyond walking distances No Help Needed BMI: 
Weight Metrics 6/28/2019 6/23/2019 5/17/2019 12/31/2018 12/24/2018 12/20/2018 12/14/2018 Weight 158 lb 14.4 oz 159 lb 6.3 oz 158 lb 158 lb 3.2 oz 139 lb 9.6 oz 162 lb 14.7 oz 154 lb 5.2 oz  
BMI 27.28 kg/m2 25.73 kg/m2 25.5 kg/m2 25.53 kg/m2 22.53 kg/m2 26.3 kg/m2 24.91 kg/m2 Medication reconciliation up to date and corrected with patient at this time.

## 2019-06-28 NOTE — PROGRESS NOTES
HISTORY OF PRESENT ILLNESS Arnav Siddiqui is a 80 y.o. male presents with Blood Pressure Check (Room 14); ED Follow-up; Referral Follow Up; Dizziness; and Sad Agree with nurse note. Pt's wife is here giving part of the hx due to pt's dementia. \"As soon as he walked in the door, he was ready to go. \" Pt has little desire to go to doctors appointments recently, \"  His wife exclaims. This is her pearson at every doctor, urgent care or ER visit. Pt with HTN, dyslipidemia, CAD, hyperglycemia, BL SNHL, hx of stroke, vit d deficiency, PVD, separation anxeity,  presents to the office with a BP of 103/64. 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 He takes Seroquel qPM with relief. He reports he has a good appetite and is able to feed himself. He drinks the water that his wife provides him. Pt shares he is stuttering over his words. When the wife's brother , the pt was not able to process his wife's grief. He responded \"oh, ok\" and walked away after she told him. She expresses that he has never responded like this before. Dr. Marylene Oak saw him on 2019 due to dementia. He recommended home PT and speech therapy through [address] [place] [address]. They each came 2x beginning last week. His wife is expected to do the exercises on the off days. Wife shares pt's vertigo has worsened. He had an infection when he was young that did not get treated immediately. Pt's wife think this caused his chronic ringing of the ears and vertigo. He was getting relief with Meclizine until 2 weeks ago and so she brought him to the ED on 2019. CT scan no sign of new stroke or sinus infection. They dx'd him with benign paroxsymal positional vertigo due to BL vestibular disorder. They recommended he continue with Meclizine and to contact an audiologist, neurologist, or [facility]. His wife shares she has someone calling her from PT about treating the vertigo next week.  Pt has some dizziness today if he turns his head back and forth quickly. The ED checked lab work: HGB 12.7, rbc 3.96, platelet 792, mag 2.0, Na+ 138, K+ 4.7, glucose 99. Pt has a hx of abandonment when he was young. He remembers when his mother and father left him. He is now anxious whenever his wife leaves because he thinks she won't return. This makes her feel bad to leave and take time for herself. Pt reports he feels sad some mornings and tells his wife he wants to cry. This started two weeks ago. He is agreeable with starting a medication. He shares, \"I don't have a happy feeling. \"  He denies abdominal pain, pain with urination, fever, chills, chest pain, or difficulty breathing or other associated symptoms. Written by lakesha Javier, as dictated by Dr. Han Barrow DO. 
ROS Review of Systems negative except as noted above in HPI. ALLERGIES:   
Allergies Allergen Reactions  Aricept [Donepezil] Nausea and Vomiting Mild depression  Betadine [Povidone-Iodine] Hives  Phenergan [Promethazine] Other (comments)  
  hallucinations  Seafood [Shellfish Containing Products] Rash and Swelling  
  crabmeat  Xylocaine [Lidocaine Hcl] Shortness of Breath CURRENT MEDICATIONS:   
Outpatient Medications Marked as Taking for the 6/28/19 encounter (Office Visit) with Dariana Floyd DO Medication Sig Dispense Refill  sertraline (ZOLOFT) 25 mg tablet Take 1 Tab by mouth daily. Indications: Anxiousness associated with Depression 30 Tab 11  
 etodolac (LODINE) 400 mg tablet take 1 tablet by mouth once daily if needed for pain 30 Tab 2  
 QUEtiapine (SEROQUEL) 25 mg tablet take 1/2 tablet by mouth at bedtime 30 Tab 11  
 metoprolol tartrate (LOPRESSOR) 25 mg tablet Take 12.5 mg by mouth daily. 0.5 of 25 mg tab daily  isosorbide mononitrate ER (IMDUR) 30 mg tablet take 1 tablet by mouth once daily 30 Tab 11  
 aspirin (ASPIRIN) 325 mg tablet Take 1 Tab by mouth daily.  Indications: myocardial infarction prevention 90 Tab 3  pravastatin (PRAVACHOL) 20 mg tablet take 1 tablet by mouth once daily at bedtime 90 Tab 3  
 FOLBEE 2.5-25-1 mg tablet take 1 tablet by mouth once daily 60 Tab 11  
 metoprolol tartrate (LOPRESSOR) 25 mg tablet take 1/2 tablet once a day  Indications: hypertension (Patient taking differently: Take 12.5 mg by mouth daily. take 1/2 tablet once a day) 30 Tab 11  
 acetaminophen (TYLENOL ARTHRITIS PAIN) 650 mg TbER Take 650 mg by mouth every eight (8) hours.  meclizine (ANTIVERT) 25 mg tablet Take 1 Tab by mouth three (3) times daily as needed for Dizziness. 20 Tab 0  
 GUAIFENESIN (MUCINEX PO) Take 1 Tab by mouth daily.  docusate sodium (COLACE) 100 mg capsule Take 100 mg by mouth daily. Instructed to take daily with plenty of liquid unless otherwise directed- as per 11/7/14 [facility] discharge med list    
 cholecalciferol, vitamin D3, (VITAMIN D3) 2,000 unit tab Take 1 Tab by mouth daily. As per 11/07/14 [facility] discharge med list    
 tamsulosin (FLOMAX) 0.4 mg capsule Take 0.4 mg by mouth daily. For prostate PAST MEDICAL HISTORY:   
Past Medical History:  
Diagnosis Date  Alzheimer's dementia with behavioral disturbance 04/2018 Dr. Rene Singleton.  Back pain   
 due to DDD and scoliosis  BPH (benign prostatic hyperplasia) Dr. Sathya Joya  CAD (coronary artery disease) 10/30/13  
 30%LAD, 80% ostal stenosis. Dr. Logan Byrnes.  Chest pain 10/04/04, 10/30/13 Dr. Cat Scott. Negative Stress Cardiolite Test.  Dr. Jennifer Zambrano.  Chest pain 12/02/2018 chest pain and tightness  Chronic venous insufficiency 06/15/09 Dr. Regulo Wilson  Clavicle fracture 1981 Lt due to fall  DDD (degenerative disc disease), lumbar L3-S1  Diverticulosis 02/2004  
 sigmoid. Dr. Rory Ghotra  DJD (degenerative joint disease) of cervical spine C 4-5 ;5-6  Dyslipidemia  Fracture 1980  
 facial/nasal  
  Hearing loss   
 wears hearing aid  Heartburn  Hemorrhoids, internal 1987 Dr. Valerie Pereira  Hernia of unspecified site of abdominal cavity without mention of obstruction or gangrene   
 inguinal  Lt  Hypertension  Impotence  Left acoustic neuroma ([facility] 10/28/14  
 17 x 9 mm schwannoma.  Pes planus of both feet  PVD (peripheral vascular disease) ([facility] 2009  Scoliosis LS  
 Slipped intervertebral disc 1954 chiropractor  SOB (shortness of breath) 08/07/13 Dr. Lui Rust.  Stroke ([facility] 10/28/2014  
 small Left Thalamus. Dr. Scottie Robbins. Dr. Maxx Pham.  Syncope 12/20/2018  
 pt experienced fainting spells  Urinary incontinence   
 due to BPH. Dr. Christa Rao  Varicose vein 06/15/09 Dr. Mena Ray  Vertigo 2003  
 due to inner ear. Dr. Javad Hearn. Dr. Jaye Melendrez. Dr. Ana Rosa Negrete  Visual loss Dr. Danna Biggs, [facility]. PAST SURGICAL HISTORY:   
Past Surgical History:  
Procedure Laterality Date  APPENDECTOMY    
 due to gangrene [facility], 02/2002 Dr. Stan Hernandez. benign.  COLONOSCOPY  02/2004 Dr. Valerie Pereira. due q 10 yrs  CYSTOSCOPY  10/17/03 Dr. Ras Godwin  HX HEART CATHETERIZATION  10/30/13 Dr. Vikas Huang. 80% ostal stenosis. 30% LAD.  HX HEMORRHOIDECTOMY  HX KNEE ARTHROSCOPY  1990 Left , 2007 Right Dr. Leonardo Leigh  HX LAPAROTOMY  05/14/07 Dr. Danyelle Montague. due to Bowel Obstruction  HX POLYPECTOMY  02/2002 Anal.   
 HX TONSILLECTOMY  CA COLSC FLX W/RMVL OF TUMOR POLYP LESION SNARE TQ  5/23/2011 Dr. Christoph Becker. FAMILY HISTORY:   
Family History Problem Relation Age of Onset  Heart Attack Mother  Other Mother   
     brain aneurysm/AAA/varicose veins  Stroke Mother  Heart Attack Sister  Cancer Maternal Grandmother   
     ovarian SOCIAL HISTORY:   
Social History Socioeconomic History  Marital status:   
 Spouse name: Not on file  Number of children: Not on file  Years of education: Not on file  Highest education level: Not on file Tobacco Use  Smoking status: Never Smoker  Smokeless tobacco: Never Used Substance and Sexual Activity  Alcohol use: No  
 Drug use: No  
 Sexual activity: Not Currently IMMUNIZATIONS:   
Immunization History Administered Date(s) Administered  (RETIRED) Pneumococcal Vaccine (Unspecified Type) 02/08/2002  Influenza High Dose Vaccine PF 10/29/2013, 09/08/2015, 08/12/2016, 09/18/2018  Influenza Vaccine 10/01/2014, 10/01/2017  Influenza Vaccine Split 10/15/2010, 10/20/2011, 10/08/2012  Influenza Vaccine Whole 10/01/2009  Pneumococcal Conjugate (PCV-13) 06/03/2015  Pneumococcal Polysaccharide (PPSV-23) 07/02/2016  TD Vaccine 04/27/2004  Tdap 07/02/2016  Zoster Recombinant 04/13/2018, 06/12/2018  Zoster Vaccine, Live 09/08/2015 PHYSICAL EXAMINATION Vital Signs Visit Vitals /64 (BP 1 Location: Right arm, BP Patient Position: Sitting) Pulse 62 Temp 97.8 °F (36.6 °C) (Oral) Resp 18 Ht 5' 4\" (1.626 m) Wt 158 lb 14.4 oz (72.1 kg) SpO2 97% BMI 27.28 kg/m² Weight Metrics 6/28/2019 6/23/2019 5/17/2019 12/31/2018 12/24/2018 12/20/2018 12/14/2018 Weight 158 lb 14.4 oz 159 lb 6.3 oz 158 lb 158 lb 3.2 oz 139 lb 9.6 oz 162 lb 14.7 oz 154 lb 5.2 oz  
BMI 27.28 kg/m2 25.73 kg/m2 25.5 kg/m2 25.53 kg/m2 22.53 kg/m2 26.3 kg/m2 24.91 kg/m2 General appearance - Well nourished. Well appearing. Well developed. No acute distress. Overweight. Head - Normocephalic. Atraumatic. Eyes - pupils equal and reactive. Extraocular eye movements intact. Sclera anicteric. Mildly injected sclera. Ears - Hearing is grossly normal bilaterally. Nose - normal and patent. No polyps noted. No erythema. No discharge. Mouth - mucous membranes with adequate moisture.   Posterior pharynx normal with cobblestone appearance. No erythema, white exudate or obstruction. Neck - supple. Midline trachea. No carotid bruits noted bilaterally. No thyromegaly noted. Chest - clear to auscultation bilaterally anteriorly and posteriorly. No wheezes. No rales or rhonchi. Breath sounds are symmetrical bilaterally. Unlabored respirations. Heart - normal rate. Regular rhythm. Normal S1, S2. No murmur noted. No rubs, clicks or gallops noted. Abdomen - soft and distended. No masses or organomegaly. No rebound, rigidity or guarding. Bowel sounds normal x 4 quadrants. No tenderness noted. Neurological - awake, alert and oriented to person, place, and time and event. Cranial nerves II through XII intact. Clear speech. Muscle strength is +5/5 x 4 extremities. Sensation is intact to light touch bilaterally. Choppy slow steady gait. Heme/Lymph - peripheral pulses normal x 4 extremities. Trace of pitting, non-tender BL ankle edema L>R Musculoskeletal - Intact x 4 extremities. Full ROM x 4 extremities. No pain with movement. Back exam - normal range of motion. No pain on palpation of the spinous processes in the cervical, thoracic, lumbar, sacral regions. No CVA tenderness. Skin - no rashes, erythema, ecchymosis, lacerations, abrasions, suspicious moles noted Psychological -   normal behavior, dress and occ wandering thought processes. occ good insight. Good eye contact. Normal affect. Appropriate mood. Stuttered speech. Pleasantly demented. DATA REVIEWED Lab Results Component Value Date/Time WBC 6.2 06/23/2019 05:48 PM  
 Hemoglobin (POC) 12.9 09/15/2016 04:44 PM  
 HGB 12.7 06/23/2019 05:48 PM  
 Hematocrit (POC) 38 09/15/2016 04:44 PM  
 HCT 38.0 06/23/2019 05:48 PM  
 PLATELET 596 60/62/1365 05:48 PM  
 MCV 96.0 06/23/2019 05:48 PM  
 
Lab Results Component Value Date/Time  Sodium 138 06/23/2019 06:13 PM  
 Potassium 4.7 06/23/2019 06:13 PM  
 Chloride 106 06/23/2019 06:13 PM  
 CO2 29 06/23/2019 06:13 PM  
 Anion gap 3 (L) 06/23/2019 06:13 PM  
 Glucose 99 06/23/2019 06:13 PM  
 BUN 23 (H) 06/23/2019 06:13 PM  
 Creatinine 1.28 06/23/2019 06:13 PM  
 BUN/Creatinine ratio 18 06/23/2019 06:13 PM  
 GFR est AA >60 06/23/2019 06:13 PM  
 GFR est non-AA 53 (L) 06/23/2019 06:13 PM  
 Calcium 8.8 06/23/2019 06:13 PM  
 Bilirubin, total 0.3 06/23/2019 06:13 PM  
 AST (SGOT) 15 06/23/2019 06:13 PM  
 Alk. phosphatase 72 06/23/2019 06:13 PM  
 Protein, total 6.9 06/23/2019 06:13 PM  
 Albumin 3.4 (L) 06/23/2019 06:13 PM  
 Globulin 3.5 06/23/2019 06:13 PM  
 A-G Ratio 1.0 (L) 06/23/2019 06:13 PM  
 ALT (SGPT) 17 06/23/2019 06:13 PM  
 
Lab Results Component Value Date/Time Cholesterol, total 137 05/24/2018 12:04 PM  
 HDL Cholesterol 52 05/24/2018 12:04 PM  
 LDL, calculated 68 05/24/2018 12:04 PM  
 VLDL, calculated 17 05/24/2018 12:04 PM  
 Triglyceride 85 05/24/2018 12:04 PM  
 CHOL/HDL Ratio 2.2 10/29/2014 03:45 AM  
 
Lab Results Component Value Date/Time Vitamin D 25-Hydroxy 21.5 (L) 10/31/2014 06:00 AM  
 VITAMIN D, 25-HYDROXY 35.0 05/24/2018 12:04 PM  
   
Lab Results Component Value Date/Time Hemoglobin A1c 5.4 10/11/2017 10:01 AM  
 
Lab Results Component Value Date/Time TSH 2.700 05/24/2018 12:04 PM  
 
 
Lab Results Component Value Date/Time Microalb/Creat ratio (ug/mg creat.) 35.6 (H) 05/24/2018 12:17 PM  
 
 
 
ASSESSMENT and PLAN 
 
  ICD-10-CM ICD-9-CM 1. Other depression F32.89 311 sertraline (ZOLOFT) 25 mg tablet  
 due to Dementia vs childhood memories vs other 2. Essential hypertension I10 401.9 LIPID PANEL MICROALBUMIN, UR, RAND W/ MICROALB/CREAT RATIO  
   URINALYSIS W/ RFLX MICROSCOPIC  
   TSH 3RD GENERATION  
 stable 3. Early onset Alzheimer's disease with behavioral disturbance G30.0 331.0 F02.81 294.11   
 improved with Seroquel q hs and occasional  
4. Dizziness R42 780.4 due to BPPV, intermittently 5. Separation anxiety F93.0 309.21   
6. Dyslipidemia E78.5 272.4 LIPID PANEL 7. Coronary artery disease involving native coronary artery of native heart without angina pectoris I25.10 414.01   
8. Hyperglycemia R73.9 790.29   
9. History of stroke Z86.73 V12.54   
10. Sensorineural hearing loss (SNHL) of both ears H90.3 389.18   
11. Vitamin D deficiency E55.9 268.9 VITAMIN D, 25 HYDROXY  
 stable 12. Other iron deficiency anemia D50.8 280.8   
 stable 13. PVD (peripheral vascular disease) ([facility]) I73.9 443.9 Discussed the patient's BMI with him. The BMI follow up plan is as follows: I have counseled this patient on diet and exercise regimens. Decrease carbohydrates (white foods, sweet foods, sweet drinks and alcohol), increase green leafy vegetables and protein (lean meats and beans) with each meal.  Avoid fried foods. Eat 3-5 small meals daily. Do not skip meals. Increase water intake. Increase physical activity to 30 minutes daily for health benefit as tolerated. Get 7-8 hours uninterrupted sleep nightly. Chart reviewed and updated. No AWV performed due to pt demented state and decreased patience in the office setting. Continue current medications and care. Start taking Zoloft 25 mg qAM. Prescriptions written and sent to pharmacy; medication side effects discussed. Zoloft 25 mg  
Most recent tests reviewed from 6/23/2019 ER, neuro. Pt is going to try home vestibular rehab for vertigo with his current PT. He will visit ENT if it does not improve. Recheck pertinent labs today. Counseled patient on health concerns:  Dementia and caretaker support/burn out, stressors, vertigo. Relevant handouts given and discussed with patient. Immunizations noted. Offered empathy, support, legitimation, prayers, partnership to patient. Praised patient for progress. Follow-up and Dispositions · Return in about 4 months (around 10/28/2019) for blood pressure, depression, referral follow up. Patient was offered a choice/choices in the treatment plan today. Patient expresses understanding of the plan and agrees with recommendations. More than 40 mins spent face to face with patient and more than 50% of this time spent in counseling and coordinating care. Written by lakesha Matias, as dictated by Dr. Zita Patel DO. Documentation True and Accepted by Tim Roman. There are no Patient Instructions on file for this visit.

## 2019-06-29 PROCEDURE — 3331090001 HH PPS REVENUE CREDIT

## 2019-06-29 PROCEDURE — 3331090002 HH PPS REVENUE DEBIT

## 2019-06-30 PROCEDURE — 3331090001 HH PPS REVENUE CREDIT

## 2019-06-30 PROCEDURE — 3331090002 HH PPS REVENUE DEBIT

## 2019-07-01 ENCOUNTER — HOME CARE VISIT (OUTPATIENT)
Dept: SCHEDULING | Facility: HOME HEALTH | Age: 84
End: 2019-07-01
Payer: MEDICARE

## 2019-07-01 VITALS
HEART RATE: 69 BPM | DIASTOLIC BLOOD PRESSURE: 87 MMHG | TEMPERATURE: 98 F | OXYGEN SATURATION: 98 % | SYSTOLIC BLOOD PRESSURE: 144 MMHG

## 2019-07-01 PROCEDURE — G0153 HHCP-SVS OF S/L PATH,EA 15MN: HCPCS

## 2019-07-01 PROCEDURE — 3331090002 HH PPS REVENUE DEBIT

## 2019-07-01 PROCEDURE — 3331090001 HH PPS REVENUE CREDIT

## 2019-07-02 ENCOUNTER — HOME CARE VISIT (OUTPATIENT)
Dept: SCHEDULING | Facility: HOME HEALTH | Age: 84
End: 2019-07-02
Payer: MEDICARE

## 2019-07-02 VITALS
DIASTOLIC BLOOD PRESSURE: 75 MMHG | HEART RATE: 58 BPM | SYSTOLIC BLOOD PRESSURE: 124 MMHG | TEMPERATURE: 98 F | OXYGEN SATURATION: 97 %

## 2019-07-02 PROCEDURE — 3331090002 HH PPS REVENUE DEBIT

## 2019-07-02 PROCEDURE — 3331090001 HH PPS REVENUE CREDIT

## 2019-07-02 PROCEDURE — G0157 HHC PT ASSISTANT EA 15: HCPCS

## 2019-07-03 ENCOUNTER — HOME CARE VISIT (OUTPATIENT)
Dept: SCHEDULING | Facility: HOME HEALTH | Age: 84
End: 2019-07-03
Payer: MEDICARE

## 2019-07-03 VITALS
DIASTOLIC BLOOD PRESSURE: 87 MMHG | OXYGEN SATURATION: 95 % | SYSTOLIC BLOOD PRESSURE: 146 MMHG | TEMPERATURE: 98 F | HEART RATE: 57 BPM

## 2019-07-03 PROCEDURE — 3331090001 HH PPS REVENUE CREDIT

## 2019-07-03 PROCEDURE — 3331090002 HH PPS REVENUE DEBIT

## 2019-07-03 PROCEDURE — G0153 HHCP-SVS OF S/L PATH,EA 15MN: HCPCS

## 2019-07-04 PROCEDURE — 3331090002 HH PPS REVENUE DEBIT

## 2019-07-04 PROCEDURE — 3331090001 HH PPS REVENUE CREDIT

## 2019-07-05 ENCOUNTER — HOME CARE VISIT (OUTPATIENT)
Dept: SCHEDULING | Facility: HOME HEALTH | Age: 84
End: 2019-07-05
Payer: MEDICARE

## 2019-07-05 VITALS — SYSTOLIC BLOOD PRESSURE: 113 MMHG | TEMPERATURE: 98.1 F | DIASTOLIC BLOOD PRESSURE: 74 MMHG | OXYGEN SATURATION: 96 %

## 2019-07-05 PROCEDURE — 3331090002 HH PPS REVENUE DEBIT

## 2019-07-05 PROCEDURE — 3331090001 HH PPS REVENUE CREDIT

## 2019-07-05 PROCEDURE — G0157 HHC PT ASSISTANT EA 15: HCPCS

## 2019-07-06 PROCEDURE — 3331090002 HH PPS REVENUE DEBIT

## 2019-07-06 PROCEDURE — 3331090001 HH PPS REVENUE CREDIT

## 2019-07-07 PROCEDURE — 3331090002 HH PPS REVENUE DEBIT

## 2019-07-07 PROCEDURE — 3331090001 HH PPS REVENUE CREDIT

## 2019-07-08 ENCOUNTER — HOME CARE VISIT (OUTPATIENT)
Dept: SCHEDULING | Facility: HOME HEALTH | Age: 84
End: 2019-07-08
Payer: MEDICARE

## 2019-07-08 VITALS
OXYGEN SATURATION: 98 % | TEMPERATURE: 97.3 F | DIASTOLIC BLOOD PRESSURE: 81 MMHG | HEART RATE: 61 BPM | SYSTOLIC BLOOD PRESSURE: 121 MMHG

## 2019-07-08 PROCEDURE — 3331090002 HH PPS REVENUE DEBIT

## 2019-07-08 PROCEDURE — 3331090001 HH PPS REVENUE CREDIT

## 2019-07-08 PROCEDURE — G0157 HHC PT ASSISTANT EA 15: HCPCS

## 2019-07-09 ENCOUNTER — TELEPHONE (OUTPATIENT)
Dept: NEUROLOGY | Age: 84
End: 2019-07-09

## 2019-07-09 ENCOUNTER — HOME HEALTH ADMISSION (OUTPATIENT)
Dept: HOME HEALTH SERVICES | Facility: HOME HEALTH | Age: 84
End: 2019-07-09

## 2019-07-09 ENCOUNTER — HOME CARE VISIT (OUTPATIENT)
Dept: SCHEDULING | Facility: HOME HEALTH | Age: 84
End: 2019-07-09
Payer: MEDICARE

## 2019-07-09 PROCEDURE — G0153 HHCP-SVS OF S/L PATH,EA 15MN: HCPCS

## 2019-07-09 PROCEDURE — 3331090001 HH PPS REVENUE CREDIT

## 2019-07-09 PROCEDURE — 3331090002 HH PPS REVENUE DEBIT

## 2019-07-09 NOTE — TELEPHONE ENCOUNTER
Received call from Maged Moore 46 with [employer], he stated that he went out with the home health aide to see the patient and is planning on seeing the patient again on Thursday. He said that he believes that the patient has BPPV, and with Dr. Cardoza Prim permission want to assess the patient and treat. He would like a call back please.       [phone] (direct office #)  [phone] (cell #)

## 2019-07-10 VITALS
OXYGEN SATURATION: 97 % | HEART RATE: 62 BPM | SYSTOLIC BLOOD PRESSURE: 123 MMHG | DIASTOLIC BLOOD PRESSURE: 82 MMHG | TEMPERATURE: 98 F

## 2019-07-10 PROCEDURE — 3331090002 HH PPS REVENUE DEBIT

## 2019-07-10 PROCEDURE — 3331090001 HH PPS REVENUE CREDIT

## 2019-07-11 ENCOUNTER — HOME CARE VISIT (OUTPATIENT)
Dept: HOME HEALTH SERVICES | Facility: HOME HEALTH | Age: 84
End: 2019-07-11
Payer: MEDICARE

## 2019-07-11 ENCOUNTER — HOME CARE VISIT (OUTPATIENT)
Dept: SCHEDULING | Facility: HOME HEALTH | Age: 84
End: 2019-07-11
Payer: MEDICARE

## 2019-07-11 ENCOUNTER — TELEPHONE (OUTPATIENT)
Dept: NEUROLOGY | Age: 84
End: 2019-07-11

## 2019-07-11 DIAGNOSIS — G30.9 ALZHEIMER'S DEMENTIA WITHOUT BEHAVIORAL DISTURBANCE, UNSPECIFIED TIMING OF DEMENTIA ONSET: ICD-10-CM

## 2019-07-11 DIAGNOSIS — R41.89 COGNITIVE AND BEHAVIORAL CHANGES: ICD-10-CM

## 2019-07-11 DIAGNOSIS — F02.80 ALZHEIMER'S DEMENTIA WITHOUT BEHAVIORAL DISTURBANCE, UNSPECIFIED TIMING OF DEMENTIA ONSET: ICD-10-CM

## 2019-07-11 DIAGNOSIS — R46.89 COGNITIVE AND BEHAVIORAL CHANGES: ICD-10-CM

## 2019-07-11 PROCEDURE — 3331090002 HH PPS REVENUE DEBIT

## 2019-07-11 PROCEDURE — 3331090001 HH PPS REVENUE CREDIT

## 2019-07-11 PROCEDURE — G0151 HHCP-SERV OF PT,EA 15 MIN: HCPCS

## 2019-07-11 NOTE — TELEPHONE ENCOUNTER
Future Appointments   Date Time Provider Department Center   7/12/2019 To Be Determined CALIXTO Ochoa [facility] [address]   7/15/2019 To Be Determined CALIXTO Clemens [address]   7/16/2019 To Be Determined Gearsha Jenn [place] [address] [facility]   7/17/2019 To Be Determined Val Feliciano SLP [address]   7/18/2019 To Be Determined Miesha Clark PT [address]   11/5/2019 10:00 AM DO PATTI SamuelFP [facility]   5/14/2020 11:40 AM Scar Navarro  [address]                         Last Appointment My Department:  5/17/2019    Would you like to refill below? .    Requested Prescriptions     Pending Prescriptions Disp Refills    folic acid-vit Z9-YRN V01 (FOLBEE) 2.5-25-1 mg tablet 60 Tab 11     Sig: take 1 tablet by mouth once daily

## 2019-07-12 ENCOUNTER — HOME CARE VISIT (OUTPATIENT)
Dept: SCHEDULING | Facility: HOME HEALTH | Age: 84
End: 2019-07-12
Payer: MEDICARE

## 2019-07-12 ENCOUNTER — TELEPHONE (OUTPATIENT)
Dept: NEUROLOGY | Age: 84
End: 2019-07-12

## 2019-07-12 VITALS
DIASTOLIC BLOOD PRESSURE: 72 MMHG | HEART RATE: 72 BPM | SYSTOLIC BLOOD PRESSURE: 128 MMHG | RESPIRATION RATE: 18 BRPM | TEMPERATURE: 97.6 F | OXYGEN SATURATION: 98 %

## 2019-07-12 VITALS
SYSTOLIC BLOOD PRESSURE: 115 MMHG | TEMPERATURE: 98.5 F | OXYGEN SATURATION: 97 % | DIASTOLIC BLOOD PRESSURE: 71 MMHG | HEART RATE: 60 BPM

## 2019-07-12 DIAGNOSIS — R46.89 COGNITIVE AND BEHAVIORAL CHANGES: ICD-10-CM

## 2019-07-12 DIAGNOSIS — G30.9 ALZHEIMER'S DEMENTIA WITHOUT BEHAVIORAL DISTURBANCE, UNSPECIFIED TIMING OF DEMENTIA ONSET: ICD-10-CM

## 2019-07-12 DIAGNOSIS — R41.89 COGNITIVE AND BEHAVIORAL CHANGES: ICD-10-CM

## 2019-07-12 DIAGNOSIS — F02.80 ALZHEIMER'S DEMENTIA WITHOUT BEHAVIORAL DISTURBANCE, UNSPECIFIED TIMING OF DEMENTIA ONSET: ICD-10-CM

## 2019-07-12 PROCEDURE — 3331090002 HH PPS REVENUE DEBIT

## 2019-07-12 PROCEDURE — G0153 HHCP-SVS OF S/L PATH,EA 15MN: HCPCS

## 2019-07-12 PROCEDURE — 3331090001 HH PPS REVENUE CREDIT

## 2019-07-12 NOTE — TELEPHONE ENCOUNTER
Shannon tSeve with [facility] would like to spk with the nurse to provide more info @# [phone]

## 2019-07-12 NOTE — TELEPHONE ENCOUNTER
Future Appointments   Date Time Provider Department Center   7/15/2019 To Be Determined CALIXTO Pickard [address] [facility]   7/16/2019 To Be Determined Nazaningladis Perduend [place] [address]   7/17/2019 To Be Determined CALIXTO Pickard [address] [facility]   7/18/2019 To Be Determined Nazanin Aland [place] [address]   7/23/2019 To Be Determined Nazanin Aland [place] [address]   7/25/2019 To Be Determined Nazanin Aland [place] [facility]   7/30/2019 To Be Determined Nazanin Aland [place] [address]   8/1/2019 To Be Determined Nazanin Aland [place] [facility]   8/5/2019 To Be Determined Nazanin Aland [facility]   8/8/2019 To Be Determined Joseph Nataliia,  [place] [id number]   11/5/2019 10:00 AM Nabor Alvarado DO [address]   5/14/2020 11:40 AM Bernadine Vick MD [address]                         Last Appointment My Department:  5/17/2019    Would you like to refill below?     Requested Prescriptions     Pending Prescriptions Disp Refills    folic acid-vit F0-PQJ I13 (FOLBEE) 2.5-25-1 mg tablet 60 Tab 11     Sig: take 1 tablet by mouth once daily     [facility] sent a request for this refill

## 2019-07-13 PROCEDURE — 3331090001 HH PPS REVENUE CREDIT

## 2019-07-13 PROCEDURE — 3331090002 HH PPS REVENUE DEBIT

## 2019-07-14 PROCEDURE — 3331090002 HH PPS REVENUE DEBIT

## 2019-07-14 PROCEDURE — 3331090001 HH PPS REVENUE CREDIT

## 2019-07-15 ENCOUNTER — HOME CARE VISIT (OUTPATIENT)
Dept: SCHEDULING | Facility: HOME HEALTH | Age: 84
End: 2019-07-15
Payer: MEDICARE

## 2019-07-15 VITALS
DIASTOLIC BLOOD PRESSURE: 79 MMHG | SYSTOLIC BLOOD PRESSURE: 125 MMHG | HEART RATE: 62 BPM | TEMPERATURE: 97.7 F | OXYGEN SATURATION: 98 %

## 2019-07-15 PROCEDURE — 3331090001 HH PPS REVENUE CREDIT

## 2019-07-15 PROCEDURE — G0157 HHC PT ASSISTANT EA 15: HCPCS

## 2019-07-15 PROCEDURE — 3331090002 HH PPS REVENUE DEBIT

## 2019-07-16 ENCOUNTER — HOME CARE VISIT (OUTPATIENT)
Dept: SCHEDULING | Facility: HOME HEALTH | Age: 84
End: 2019-07-16
Payer: MEDICARE

## 2019-07-16 VITALS
SYSTOLIC BLOOD PRESSURE: 106 MMHG | HEART RATE: 62 BPM | OXYGEN SATURATION: 97 % | DIASTOLIC BLOOD PRESSURE: 77 MMHG | TEMPERATURE: 98.1 F

## 2019-07-16 PROCEDURE — 3331090002 HH PPS REVENUE DEBIT

## 2019-07-16 PROCEDURE — G0153 HHCP-SVS OF S/L PATH,EA 15MN: HCPCS

## 2019-07-16 PROCEDURE — 3331090001 HH PPS REVENUE CREDIT

## 2019-07-17 PROCEDURE — 3331090002 HH PPS REVENUE DEBIT

## 2019-07-17 PROCEDURE — 3331090001 HH PPS REVENUE CREDIT

## 2019-07-18 ENCOUNTER — HOME CARE VISIT (OUTPATIENT)
Dept: SCHEDULING | Facility: HOME HEALTH | Age: 84
End: 2019-07-18
Payer: MEDICARE

## 2019-07-18 ENCOUNTER — TELEPHONE (OUTPATIENT)
Dept: NEUROLOGY | Age: 84
End: 2019-07-18

## 2019-07-18 VITALS
SYSTOLIC BLOOD PRESSURE: 118 MMHG | OXYGEN SATURATION: 96 % | DIASTOLIC BLOOD PRESSURE: 75 MMHG | HEART RATE: 58 BPM | TEMPERATURE: 98.1 F

## 2019-07-18 PROCEDURE — G0153 HHCP-SVS OF S/L PATH,EA 15MN: HCPCS

## 2019-07-18 PROCEDURE — 3331090002 HH PPS REVENUE DEBIT

## 2019-07-18 PROCEDURE — 3331090001 HH PPS REVENUE CREDIT

## 2019-07-18 NOTE — TELEPHONE ENCOUNTER
Martha with [facility] has been seeing the patient for speech therapy. She stated that the patient has been progressing well and would like to see lexi for 4 more visits. She would like a call back with the verbal order for the 4 visits. She said that if she doesn't answer to leave a voice message.       [phone]

## 2019-07-19 ENCOUNTER — HOME CARE VISIT (OUTPATIENT)
Dept: SCHEDULING | Facility: HOME HEALTH | Age: 84
End: 2019-07-19
Payer: MEDICARE

## 2019-07-19 PROCEDURE — 3331090002 HH PPS REVENUE DEBIT

## 2019-07-19 PROCEDURE — 3331090001 HH PPS REVENUE CREDIT

## 2019-07-19 PROCEDURE — G0157 HHC PT ASSISTANT EA 15: HCPCS

## 2019-07-20 VITALS
HEART RATE: 61 BPM | SYSTOLIC BLOOD PRESSURE: 128 MMHG | DIASTOLIC BLOOD PRESSURE: 77 MMHG | RESPIRATION RATE: 18 BRPM | OXYGEN SATURATION: 98 % | TEMPERATURE: 98.5 F

## 2019-07-20 PROCEDURE — 3331090001 HH PPS REVENUE CREDIT

## 2019-07-20 PROCEDURE — 3331090002 HH PPS REVENUE DEBIT

## 2019-07-21 PROCEDURE — 3331090002 HH PPS REVENUE DEBIT

## 2019-07-21 PROCEDURE — 3331090001 HH PPS REVENUE CREDIT

## 2019-07-22 PROCEDURE — 3331090001 HH PPS REVENUE CREDIT

## 2019-07-22 PROCEDURE — 3331090002 HH PPS REVENUE DEBIT

## 2019-07-23 ENCOUNTER — HOME CARE VISIT (OUTPATIENT)
Dept: SCHEDULING | Facility: HOME HEALTH | Age: 84
End: 2019-07-23
Payer: MEDICARE

## 2019-07-23 VITALS
HEART RATE: 57 BPM | OXYGEN SATURATION: 97 % | SYSTOLIC BLOOD PRESSURE: 117 MMHG | DIASTOLIC BLOOD PRESSURE: 76 MMHG | TEMPERATURE: 98 F

## 2019-07-23 DIAGNOSIS — E78.5 DYSLIPIDEMIA: ICD-10-CM

## 2019-07-23 PROCEDURE — 3331090001 HH PPS REVENUE CREDIT

## 2019-07-23 PROCEDURE — G0157 HHC PT ASSISTANT EA 15: HCPCS

## 2019-07-23 PROCEDURE — 3331090002 HH PPS REVENUE DEBIT

## 2019-07-24 PROCEDURE — 3331090001 HH PPS REVENUE CREDIT

## 2019-07-24 PROCEDURE — 3331090002 HH PPS REVENUE DEBIT

## 2019-07-24 NOTE — TELEPHONE ENCOUNTER
PCP: Linda Salas DO    Last appt: 6/28/2019  Future Appointments   Date Time Provider Santana Lange   7/26/2019  9:30 AM Ellis Breen [facility]   7/30/2019 To Be Determined Km Elders [facility] [address]   8/1/2019 To Be Determined Km Elders [place] [address]   8/5/2019 To Be Determined Km Elders [facility]   8/8/2019 To Be Determined Marichuy Mckeon, PT [facility] [address]   11/5/2019 10:00 AM [facility]   5/14/2020 11:40 AM Velasquez Cervantes  [address]       Requested Prescriptions     Pending Prescriptions Disp Refills    pravastatin (PRAVACHOL) 20 mg tablet [Pharmacy Med Name: PRAVASTATIN SODIUM 20 MG TAB] 90 Tab 3     Sig: take 1 tablet by mouth once daily at bedtime       Prior labs and Blood pressures:  BP Readings from Last 3 Encounters:   07/23/19 117/76   07/19/19 128/77   07/18/19 118/75     Lab Results   Component Value Date/Time    Sodium 138 06/23/2019 06:13 PM    Potassium 4.7 06/23/2019 06:13 PM    Chloride 106 06/23/2019 06:13 PM    CO2 29 06/23/2019 06:13 PM    Anion gap 3 (L) 06/23/2019 06:13 PM    Glucose 99 06/23/2019 06:13 PM    BUN 23 (H) 06/23/2019 06:13 PM    Creatinine 1.28 06/23/2019 06:13 PM    BUN/Creatinine ratio 18 06/23/2019 06:13 PM    GFR est AA >60 06/23/2019 06:13 PM    GFR est non-AA 53 (L) 06/23/2019 06:13 PM    Calcium 8.8 06/23/2019 06:13 PM     Lab Results   Component Value Date/Time    Hemoglobin A1c 5.4 10/11/2017 10:01 AM     Lab Results   Component Value Date/Time    Cholesterol, total 137 05/24/2018 12:04 PM    HDL Cholesterol 52 05/24/2018 12:04 PM    LDL, calculated 68 05/24/2018 12:04 PM    VLDL, calculated 17 05/24/2018 12:04 PM    Triglyceride 85 05/24/2018 12:04 PM    CHOL/HDL Ratio 2.2 10/29/2014 03:45 AM     Lab Results   Component Value Date/Time    Vitamin D 25-Hydroxy 21.5 (L) 10/31/2014 06:00 AM    VITAMIN D, 25-HYDROXY 35.0 05/24/2018 12:04 PM       Lab Results Component Value Date/Time    TSH 2.700 05/24/2018 12:04 PM

## 2019-07-25 LAB
25(OH)D3+25(OH)D2 SERPL-MCNC: 38.4 NG/ML (ref 30–100)
ALBUMIN/CREAT UR: 41.2 MG/G CREAT (ref 0–30)
APPEARANCE UR: CLEAR
BACTERIA #/AREA URNS HPF: ABNORMAL /[HPF]
BILIRUB UR QL STRIP: NEGATIVE
CASTS URNS MICRO: ABNORMAL
CASTS URNS QL MICRO: PRESENT /LPF
CHOLEST SERPL-MCNC: 139 MG/DL (ref 100–199)
COLOR UR: YELLOW
CREAT UR-MCNC: 253.9 MG/DL
EPI CELLS #/AREA URNS HPF: ABNORMAL /HPF (ref 0–10)
GLUCOSE UR QL: NEGATIVE
HDLC SERPL-MCNC: 48 MG/DL
HGB UR QL STRIP: NEGATIVE
INTERPRETATION, 910389: NORMAL
KETONES UR QL STRIP: NEGATIVE
LDLC SERPL CALC-MCNC: 74 MG/DL (ref 0–99)
LEUKOCYTE ESTERASE UR QL STRIP: NEGATIVE
MICRO URNS: ABNORMAL
MICROALBUMIN UR-MCNC: 104.7 UG/ML
MUCOUS THREADS URNS QL MICRO: PRESENT
NITRITE UR QL STRIP: NEGATIVE
PH UR STRIP: 5.5 [PH] (ref 5–7.5)
PROT UR QL STRIP: ABNORMAL
RBC #/AREA URNS HPF: ABNORMAL /HPF (ref 0–2)
SP GR UR: 1.02 (ref 1–1.03)
TRIGL SERPL-MCNC: 83 MG/DL (ref 0–149)
TSH SERPL DL<=0.005 MIU/L-ACNC: 3.16 UIU/ML (ref 0.45–4.5)
UROBILINOGEN UR STRIP-MCNC: 0.2 MG/DL (ref 0.2–1)
VLDLC SERPL CALC-MCNC: 17 MG/DL (ref 5–40)
WBC #/AREA URNS HPF: ABNORMAL /HPF (ref 0–5)

## 2019-07-25 PROCEDURE — 3331090002 HH PPS REVENUE DEBIT

## 2019-07-25 PROCEDURE — 3331090001 HH PPS REVENUE CREDIT

## 2019-07-25 NOTE — TELEPHONE ENCOUNTER
PCP: Linda Salas DO    Last appt: 7/24/2019  Future Appointments   Date Time Provider Santana Lange   7/26/2019  9:30 AM [facility]   7/30/2019 To Be Determined Km Elders [facility] [address]   8/1/2019 To Be Determined Km Elders [place] [address]   8/5/2019 To Be Determined Km Elders [facility]   8/8/2019 To Be Determined Marichuy Mckeon, PT [facility] [address]   11/5/2019 10:00 AM [facility]   5/14/2020 11:40 AM Velasquez Cervantes  [address]       Requested Prescriptions     Pending Prescriptions Disp Refills    metoprolol tartrate (LOPRESSOR) 25 mg tablet [Pharmacy Med Name: METOPROLOL TARTRATE 25 MG TAB] 45 Tab PRN     Sig: take 1/2 tablet by mouth once daily       Prior labs and Blood pressures:  BP Readings from Last 3 Encounters:   07/23/19 117/76   07/19/19 128/77   07/18/19 118/75     Lab Results   Component Value Date/Time    Sodium 138 06/23/2019 06:13 PM    Potassium 4.7 06/23/2019 06:13 PM    Chloride 106 06/23/2019 06:13 PM    CO2 29 06/23/2019 06:13 PM    Anion gap 3 (L) 06/23/2019 06:13 PM    Glucose 99 06/23/2019 06:13 PM    BUN 23 (H) 06/23/2019 06:13 PM    Creatinine 1.28 06/23/2019 06:13 PM    BUN/Creatinine ratio 18 06/23/2019 06:13 PM    GFR est AA >60 06/23/2019 06:13 PM    GFR est non-AA 53 (L) 06/23/2019 06:13 PM    Calcium 8.8 06/23/2019 06:13 PM     Lab Results   Component Value Date/Time    Hemoglobin A1c 5.4 10/11/2017 10:01 AM     Lab Results   Component Value Date/Time    Cholesterol, total 139 07/24/2019 10:31 AM    HDL Cholesterol 48 07/24/2019 10:31 AM    LDL, calculated 74 07/24/2019 10:31 AM    VLDL, calculated 17 07/24/2019 10:31 AM    Triglyceride 83 07/24/2019 10:31 AM    CHOL/HDL Ratio 2.2 10/29/2014 03:45 AM     Lab Results   Component Value Date/Time    Vitamin D 25-Hydroxy 21.5 (L) 10/31/2014 06:00 AM    VITAMIN D, 25-HYDROXY 38.4 07/24/2019 10:31 AM       Lab Results Component Value Date/Time    TSH 3.160 07/24/2019 10:31 AM

## 2019-07-26 ENCOUNTER — HOME CARE VISIT (OUTPATIENT)
Dept: SCHEDULING | Facility: HOME HEALTH | Age: 84
End: 2019-07-26
Payer: MEDICARE

## 2019-07-26 PROCEDURE — 3331090002 HH PPS REVENUE DEBIT

## 2019-07-26 PROCEDURE — 3331090001 HH PPS REVENUE CREDIT

## 2019-07-26 PROCEDURE — G0157 HHC PT ASSISTANT EA 15: HCPCS

## 2019-07-27 PROCEDURE — 3331090001 HH PPS REVENUE CREDIT

## 2019-07-27 PROCEDURE — 3331090002 HH PPS REVENUE DEBIT

## 2019-07-28 VITALS
OXYGEN SATURATION: 98 % | DIASTOLIC BLOOD PRESSURE: 73 MMHG | HEART RATE: 63 BPM | TEMPERATURE: 98.2 F | SYSTOLIC BLOOD PRESSURE: 106 MMHG

## 2019-07-28 PROCEDURE — 3331090001 HH PPS REVENUE CREDIT

## 2019-07-28 PROCEDURE — 3331090002 HH PPS REVENUE DEBIT

## 2019-07-29 ENCOUNTER — HOME CARE VISIT (OUTPATIENT)
Dept: SCHEDULING | Facility: HOME HEALTH | Age: 84
End: 2019-07-29
Payer: MEDICARE

## 2019-07-29 VITALS
SYSTOLIC BLOOD PRESSURE: 124 MMHG | DIASTOLIC BLOOD PRESSURE: 85 MMHG | HEART RATE: 62 BPM | TEMPERATURE: 98.5 F | OXYGEN SATURATION: 97 %

## 2019-07-29 PROCEDURE — 3331090002 HH PPS REVENUE DEBIT

## 2019-07-29 PROCEDURE — G0157 HHC PT ASSISTANT EA 15: HCPCS

## 2019-07-29 PROCEDURE — 3331090001 HH PPS REVENUE CREDIT

## 2019-07-29 PROCEDURE — G0153 HHCP-SVS OF S/L PATH,EA 15MN: HCPCS

## 2019-07-29 RX ORDER — METOPROLOL TARTRATE 25 MG/1
TABLET, FILM COATED ORAL
Qty: 45 TAB | Status: SHIPPED | OUTPATIENT
Start: 2019-07-29 | End: 2019-01-01 | Stop reason: SDUPTHER

## 2019-07-29 RX ORDER — PRAVASTATIN SODIUM 20 MG/1
TABLET ORAL
Qty: 90 TAB | Refills: 3 | Status: SHIPPED | OUTPATIENT
Start: 2019-07-29 | End: 2020-01-01 | Stop reason: ALTCHOICE

## 2019-07-30 VITALS
HEART RATE: 63 BPM | OXYGEN SATURATION: 97 % | DIASTOLIC BLOOD PRESSURE: 82 MMHG | TEMPERATURE: 98.1 F | SYSTOLIC BLOOD PRESSURE: 128 MMHG

## 2019-07-30 PROCEDURE — 3331090001 HH PPS REVENUE CREDIT

## 2019-07-30 PROCEDURE — 3331090002 HH PPS REVENUE DEBIT

## 2019-07-31 PROCEDURE — 3331090002 HH PPS REVENUE DEBIT

## 2019-07-31 PROCEDURE — 3331090001 HH PPS REVENUE CREDIT

## 2019-08-01 ENCOUNTER — HOME CARE VISIT (OUTPATIENT)
Dept: SCHEDULING | Facility: HOME HEALTH | Age: 84
End: 2019-08-01
Payer: MEDICARE

## 2019-08-01 VITALS
HEART RATE: 62 BPM | DIASTOLIC BLOOD PRESSURE: 86 MMHG | OXYGEN SATURATION: 97 % | TEMPERATURE: 98.1 F | SYSTOLIC BLOOD PRESSURE: 147 MMHG

## 2019-08-01 PROCEDURE — 3331090002 HH PPS REVENUE DEBIT

## 2019-08-01 PROCEDURE — G0153 HHCP-SVS OF S/L PATH,EA 15MN: HCPCS

## 2019-08-01 PROCEDURE — 3331090001 HH PPS REVENUE CREDIT

## 2019-08-02 ENCOUNTER — HOME CARE VISIT (OUTPATIENT)
Dept: SCHEDULING | Facility: HOME HEALTH | Age: 84
End: 2019-08-02
Payer: MEDICARE

## 2019-08-02 PROCEDURE — 3331090001 HH PPS REVENUE CREDIT

## 2019-08-02 PROCEDURE — 3331090002 HH PPS REVENUE DEBIT

## 2019-08-02 PROCEDURE — G0157 HHC PT ASSISTANT EA 15: HCPCS

## 2019-08-03 PROCEDURE — 3331090001 HH PPS REVENUE CREDIT

## 2019-08-03 PROCEDURE — 3331090002 HH PPS REVENUE DEBIT

## 2019-08-04 VITALS — HEART RATE: 80 BPM | DIASTOLIC BLOOD PRESSURE: 82 MMHG | TEMPERATURE: 98.7 F | SYSTOLIC BLOOD PRESSURE: 130 MMHG

## 2019-08-04 PROCEDURE — 3331090001 HH PPS REVENUE CREDIT

## 2019-08-04 PROCEDURE — 3331090002 HH PPS REVENUE DEBIT

## 2019-08-05 ENCOUNTER — HOME CARE VISIT (OUTPATIENT)
Dept: SCHEDULING | Facility: HOME HEALTH | Age: 84
End: 2019-08-05
Payer: MEDICARE

## 2019-08-05 VITALS
HEART RATE: 58 BPM | OXYGEN SATURATION: 97 % | DIASTOLIC BLOOD PRESSURE: 82 MMHG | TEMPERATURE: 98.2 F | SYSTOLIC BLOOD PRESSURE: 133 MMHG

## 2019-08-05 VITALS — HEART RATE: 63 BPM | SYSTOLIC BLOOD PRESSURE: 115 MMHG | TEMPERATURE: 98.3 F | DIASTOLIC BLOOD PRESSURE: 76 MMHG

## 2019-08-05 PROCEDURE — G0153 HHCP-SVS OF S/L PATH,EA 15MN: HCPCS

## 2019-08-05 PROCEDURE — 3331090001 HH PPS REVENUE CREDIT

## 2019-08-05 PROCEDURE — 3331090002 HH PPS REVENUE DEBIT

## 2019-08-05 PROCEDURE — G0157 HHC PT ASSISTANT EA 15: HCPCS

## 2019-08-06 PROCEDURE — 3331090002 HH PPS REVENUE DEBIT

## 2019-08-06 PROCEDURE — 3331090001 HH PPS REVENUE CREDIT

## 2019-08-07 PROCEDURE — 3331090002 HH PPS REVENUE DEBIT

## 2019-08-07 PROCEDURE — 3331090001 HH PPS REVENUE CREDIT

## 2019-08-08 ENCOUNTER — HOME CARE VISIT (OUTPATIENT)
Dept: SCHEDULING | Facility: HOME HEALTH | Age: 84
End: 2019-08-08
Payer: MEDICARE

## 2019-08-08 ENCOUNTER — HOME CARE VISIT (OUTPATIENT)
Dept: HOME HEALTH SERVICES | Facility: HOME HEALTH | Age: 84
End: 2019-08-08
Payer: MEDICARE

## 2019-08-08 PROCEDURE — G0153 HHCP-SVS OF S/L PATH,EA 15MN: HCPCS

## 2019-08-08 PROCEDURE — G0151 HHCP-SERV OF PT,EA 15 MIN: HCPCS

## 2019-08-08 PROCEDURE — 3331090002 HH PPS REVENUE DEBIT

## 2019-08-08 PROCEDURE — 3331090001 HH PPS REVENUE CREDIT

## 2019-08-09 VITALS
SYSTOLIC BLOOD PRESSURE: 126 MMHG | OXYGEN SATURATION: 98 % | HEART RATE: 65 BPM | DIASTOLIC BLOOD PRESSURE: 80 MMHG | TEMPERATURE: 97.9 F

## 2019-08-09 PROCEDURE — 3331090002 HH PPS REVENUE DEBIT

## 2019-08-09 PROCEDURE — 3331090001 HH PPS REVENUE CREDIT

## 2019-08-10 VITALS
HEART RATE: 60 BPM | SYSTOLIC BLOOD PRESSURE: 128 MMHG | DIASTOLIC BLOOD PRESSURE: 78 MMHG | OXYGEN SATURATION: 98 % | TEMPERATURE: 97.6 F | RESPIRATION RATE: 18 BRPM

## 2019-08-10 PROCEDURE — 3331090001 HH PPS REVENUE CREDIT

## 2019-08-10 PROCEDURE — 3331090002 HH PPS REVENUE DEBIT

## 2019-08-11 PROCEDURE — 3331090002 HH PPS REVENUE DEBIT

## 2019-08-11 PROCEDURE — 3331090001 HH PPS REVENUE CREDIT

## 2019-08-12 PROCEDURE — 3331090001 HH PPS REVENUE CREDIT

## 2019-08-12 PROCEDURE — 3331090002 HH PPS REVENUE DEBIT

## 2019-08-13 ENCOUNTER — HOME CARE VISIT (OUTPATIENT)
Dept: SCHEDULING | Facility: HOME HEALTH | Age: 84
End: 2019-08-13
Payer: MEDICARE

## 2019-08-13 VITALS
TEMPERATURE: 97.5 F | RESPIRATION RATE: 18 BRPM | DIASTOLIC BLOOD PRESSURE: 78 MMHG | HEART RATE: 62 BPM | SYSTOLIC BLOOD PRESSURE: 140 MMHG | OXYGEN SATURATION: 96 %

## 2019-08-13 PROCEDURE — 3331090001 HH PPS REVENUE CREDIT

## 2019-08-13 PROCEDURE — G0151 HHCP-SERV OF PT,EA 15 MIN: HCPCS

## 2019-08-13 PROCEDURE — 3331090002 HH PPS REVENUE DEBIT

## 2019-08-14 PROCEDURE — 3331090002 HH PPS REVENUE DEBIT

## 2019-08-14 PROCEDURE — 3331090001 HH PPS REVENUE CREDIT

## 2019-08-15 ENCOUNTER — HOME CARE VISIT (OUTPATIENT)
Dept: SCHEDULING | Facility: HOME HEALTH | Age: 84
End: 2019-08-15
Payer: MEDICARE

## 2019-08-15 PROCEDURE — G0151 HHCP-SERV OF PT,EA 15 MIN: HCPCS

## 2019-08-15 PROCEDURE — 3331090001 HH PPS REVENUE CREDIT

## 2019-08-15 PROCEDURE — 3331090002 HH PPS REVENUE DEBIT

## 2019-08-16 PROCEDURE — 3331090001 HH PPS REVENUE CREDIT

## 2019-08-16 PROCEDURE — 3331090002 HH PPS REVENUE DEBIT

## 2019-08-17 VITALS
HEART RATE: 63 BPM | RESPIRATION RATE: 18 BRPM | DIASTOLIC BLOOD PRESSURE: 70 MMHG | SYSTOLIC BLOOD PRESSURE: 130 MMHG | OXYGEN SATURATION: 100 % | TEMPERATURE: 97.5 F

## 2019-08-17 PROCEDURE — 3331090002 HH PPS REVENUE DEBIT

## 2019-08-17 PROCEDURE — 3331090001 HH PPS REVENUE CREDIT

## 2019-08-18 PROCEDURE — 3331090002 HH PPS REVENUE DEBIT

## 2019-08-18 PROCEDURE — 3331090001 HH PPS REVENUE CREDIT

## 2019-08-18 PROCEDURE — 3331090003 HH PPS REVENUE ADJ

## 2019-08-19 PROCEDURE — 3331090002 HH PPS REVENUE DEBIT

## 2019-08-19 PROCEDURE — 3331090001 HH PPS REVENUE CREDIT

## 2019-08-20 ENCOUNTER — HOME CARE VISIT (OUTPATIENT)
Dept: SCHEDULING | Facility: HOME HEALTH | Age: 84
End: 2019-08-20
Payer: MEDICARE

## 2019-08-20 VITALS
OXYGEN SATURATION: 98 % | TEMPERATURE: 98.9 F | DIASTOLIC BLOOD PRESSURE: 88 MMHG | SYSTOLIC BLOOD PRESSURE: 136 MMHG | HEART RATE: 13 BPM

## 2019-08-20 PROCEDURE — 3331090001 HH PPS REVENUE CREDIT

## 2019-08-20 PROCEDURE — 400014 HH F/U

## 2019-08-20 PROCEDURE — G0157 HHC PT ASSISTANT EA 15: HCPCS

## 2019-08-20 PROCEDURE — 3331090002 HH PPS REVENUE DEBIT

## 2019-08-21 PROCEDURE — 3331090002 HH PPS REVENUE DEBIT

## 2019-08-21 PROCEDURE — 3331090001 HH PPS REVENUE CREDIT

## 2019-08-22 ENCOUNTER — HOME CARE VISIT (OUTPATIENT)
Dept: SCHEDULING | Facility: HOME HEALTH | Age: 84
End: 2019-08-22
Payer: MEDICARE

## 2019-08-22 VITALS
OXYGEN SATURATION: 98 % | SYSTOLIC BLOOD PRESSURE: 117 MMHG | HEART RATE: 60 BPM | DIASTOLIC BLOOD PRESSURE: 77 MMHG | TEMPERATURE: 98 F

## 2019-08-22 PROCEDURE — G0157 HHC PT ASSISTANT EA 15: HCPCS

## 2019-08-22 PROCEDURE — 3331090002 HH PPS REVENUE DEBIT

## 2019-08-22 PROCEDURE — 3331090001 HH PPS REVENUE CREDIT

## 2019-08-23 PROCEDURE — 3331090001 HH PPS REVENUE CREDIT

## 2019-08-23 PROCEDURE — 3331090002 HH PPS REVENUE DEBIT

## 2019-08-24 PROCEDURE — 3331090002 HH PPS REVENUE DEBIT

## 2019-08-24 PROCEDURE — 3331090001 HH PPS REVENUE CREDIT

## 2019-08-25 PROCEDURE — 3331090002 HH PPS REVENUE DEBIT

## 2019-08-25 PROCEDURE — 3331090001 HH PPS REVENUE CREDIT

## 2019-08-26 ENCOUNTER — HOME CARE VISIT (OUTPATIENT)
Dept: SCHEDULING | Facility: HOME HEALTH | Age: 84
End: 2019-08-26
Payer: MEDICARE

## 2019-08-26 VITALS
TEMPERATURE: 98.7 F | SYSTOLIC BLOOD PRESSURE: 121 MMHG | HEART RATE: 63 BPM | DIASTOLIC BLOOD PRESSURE: 78 MMHG | OXYGEN SATURATION: 97 %

## 2019-08-26 PROCEDURE — G0157 HHC PT ASSISTANT EA 15: HCPCS

## 2019-08-26 PROCEDURE — 3331090001 HH PPS REVENUE CREDIT

## 2019-08-26 PROCEDURE — 3331090002 HH PPS REVENUE DEBIT

## 2019-08-27 PROCEDURE — 3331090002 HH PPS REVENUE DEBIT

## 2019-08-27 PROCEDURE — 3331090001 HH PPS REVENUE CREDIT

## 2019-08-28 ENCOUNTER — HOME CARE VISIT (OUTPATIENT)
Dept: SCHEDULING | Facility: HOME HEALTH | Age: 84
End: 2019-08-28
Payer: MEDICARE

## 2019-08-28 VITALS
TEMPERATURE: 98.7 F | DIASTOLIC BLOOD PRESSURE: 64 MMHG | SYSTOLIC BLOOD PRESSURE: 118 MMHG | HEART RATE: 66 BPM | OXYGEN SATURATION: 96 %

## 2019-08-28 PROCEDURE — 3331090001 HH PPS REVENUE CREDIT

## 2019-08-28 PROCEDURE — G0157 HHC PT ASSISTANT EA 15: HCPCS

## 2019-08-28 PROCEDURE — 3331090002 HH PPS REVENUE DEBIT

## 2019-08-29 PROCEDURE — 3331090001 HH PPS REVENUE CREDIT

## 2019-08-29 PROCEDURE — 3331090002 HH PPS REVENUE DEBIT

## 2019-08-30 PROCEDURE — 3331090002 HH PPS REVENUE DEBIT

## 2019-08-30 PROCEDURE — 3331090001 HH PPS REVENUE CREDIT

## 2019-08-31 PROCEDURE — 3331090002 HH PPS REVENUE DEBIT

## 2019-08-31 PROCEDURE — 3331090001 HH PPS REVENUE CREDIT

## 2019-09-01 PROCEDURE — 3331090001 HH PPS REVENUE CREDIT

## 2019-09-01 PROCEDURE — 3331090002 HH PPS REVENUE DEBIT

## 2019-09-02 PROCEDURE — 3331090002 HH PPS REVENUE DEBIT

## 2019-09-02 PROCEDURE — 3331090001 HH PPS REVENUE CREDIT

## 2019-09-03 ENCOUNTER — HOME CARE VISIT (OUTPATIENT)
Dept: SCHEDULING | Facility: HOME HEALTH | Age: 84
End: 2019-09-03
Payer: MEDICARE

## 2019-09-03 VITALS
SYSTOLIC BLOOD PRESSURE: 122 MMHG | OXYGEN SATURATION: 97 % | HEART RATE: 72 BPM | RESPIRATION RATE: 18 BRPM | DIASTOLIC BLOOD PRESSURE: 68 MMHG | TEMPERATURE: 97.6 F

## 2019-09-03 PROCEDURE — 3331090001 HH PPS REVENUE CREDIT

## 2019-09-03 PROCEDURE — 3331090002 HH PPS REVENUE DEBIT

## 2019-09-03 PROCEDURE — G0151 HHCP-SERV OF PT,EA 15 MIN: HCPCS

## 2019-09-04 PROCEDURE — 3331090001 HH PPS REVENUE CREDIT

## 2019-09-04 PROCEDURE — 3331090002 HH PPS REVENUE DEBIT

## 2019-09-05 PROCEDURE — 3331090001 HH PPS REVENUE CREDIT

## 2019-09-05 PROCEDURE — 3331090002 HH PPS REVENUE DEBIT

## 2019-09-06 ENCOUNTER — HOME CARE VISIT (OUTPATIENT)
Dept: SCHEDULING | Facility: HOME HEALTH | Age: 84
End: 2019-09-06
Payer: MEDICARE

## 2019-09-06 PROCEDURE — 3331090002 HH PPS REVENUE DEBIT

## 2019-09-06 PROCEDURE — 3331090001 HH PPS REVENUE CREDIT

## 2019-09-06 PROCEDURE — G0151 HHCP-SERV OF PT,EA 15 MIN: HCPCS

## 2019-09-07 VITALS
RESPIRATION RATE: 18 BRPM | DIASTOLIC BLOOD PRESSURE: 78 MMHG | SYSTOLIC BLOOD PRESSURE: 130 MMHG | HEART RATE: 72 BPM | TEMPERATURE: 97.8 F | OXYGEN SATURATION: 97 %

## 2019-09-07 PROCEDURE — 3331090001 HH PPS REVENUE CREDIT

## 2019-09-07 PROCEDURE — 3331090002 HH PPS REVENUE DEBIT

## 2019-09-08 PROCEDURE — 3331090001 HH PPS REVENUE CREDIT

## 2019-09-08 PROCEDURE — 3331090002 HH PPS REVENUE DEBIT

## 2019-09-09 PROCEDURE — 3331090002 HH PPS REVENUE DEBIT

## 2019-09-09 PROCEDURE — 3331090001 HH PPS REVENUE CREDIT

## 2019-09-10 ENCOUNTER — HOME CARE VISIT (OUTPATIENT)
Dept: SCHEDULING | Facility: HOME HEALTH | Age: 84
End: 2019-09-10
Payer: MEDICARE

## 2019-09-10 VITALS
OXYGEN SATURATION: 97 % | RESPIRATION RATE: 18 BRPM | DIASTOLIC BLOOD PRESSURE: 72 MMHG | HEART RATE: 82 BPM | TEMPERATURE: 97.6 F | SYSTOLIC BLOOD PRESSURE: 128 MMHG

## 2019-09-10 PROCEDURE — G0151 HHCP-SERV OF PT,EA 15 MIN: HCPCS

## 2019-09-10 PROCEDURE — 3331090001 HH PPS REVENUE CREDIT

## 2019-09-10 PROCEDURE — 3331090002 HH PPS REVENUE DEBIT

## 2019-09-11 PROCEDURE — 3331090002 HH PPS REVENUE DEBIT

## 2019-09-11 PROCEDURE — 3331090001 HH PPS REVENUE CREDIT

## 2019-09-12 ENCOUNTER — HOME CARE VISIT (OUTPATIENT)
Dept: SCHEDULING | Facility: HOME HEALTH | Age: 84
End: 2019-09-12
Payer: MEDICARE

## 2019-09-12 VITALS
HEART RATE: 72 BPM | TEMPERATURE: 97.8 F | RESPIRATION RATE: 18 BRPM | SYSTOLIC BLOOD PRESSURE: 132 MMHG | OXYGEN SATURATION: 98 % | DIASTOLIC BLOOD PRESSURE: 68 MMHG

## 2019-09-12 PROCEDURE — 3331090002 HH PPS REVENUE DEBIT

## 2019-09-12 PROCEDURE — 3331090001 HH PPS REVENUE CREDIT

## 2019-09-12 PROCEDURE — G0151 HHCP-SERV OF PT,EA 15 MIN: HCPCS

## 2019-10-14 NOTE — TELEPHONE ENCOUNTER
PCP: Deacon Arellano DO    Last appt: 7/24/2019  Future Appointments   Date Time Provider Santana Lange   11/5/2019 10:00 AM [facility]   5/14/2020 11:40 AM Daniel Cervantes  [address]       Requested Prescriptions     Pending Prescriptions Disp Refills    isosorbide mononitrate ER (IMDUR) 30 mg tablet [Pharmacy Med Name: Aleah Webb ER 30 MG TB] 90 Tab PRN     Sig: take 1 tablet by mouth once daily       Prior labs and Blood pressures:  BP Readings from Last 3 Encounters:   09/12/19 132/68   09/10/19 128/72   09/06/19 130/78     Lab Results   Component Value Date/Time    Sodium 138 06/23/2019 06:13 PM    Potassium 4.7 06/23/2019 06:13 PM    Chloride 106 06/23/2019 06:13 PM    CO2 29 06/23/2019 06:13 PM    Anion gap 3 (L) 06/23/2019 06:13 PM    Glucose 99 06/23/2019 06:13 PM    BUN 23 (H) 06/23/2019 06:13 PM    Creatinine 1.28 06/23/2019 06:13 PM    BUN/Creatinine ratio 18 06/23/2019 06:13 PM    GFR est AA >60 06/23/2019 06:13 PM    GFR est non-AA 53 (L) 06/23/2019 06:13 PM    Calcium 8.8 06/23/2019 06:13 PM     Lab Results   Component Value Date/Time    Hemoglobin A1c 5.4 10/11/2017 10:01 AM     Lab Results   Component Value Date/Time    Cholesterol, total 139 07/24/2019 10:31 AM    HDL Cholesterol 48 07/24/2019 10:31 AM    LDL, calculated 74 07/24/2019 10:31 AM    VLDL, calculated 17 07/24/2019 10:31 AM    Triglyceride 83 07/24/2019 10:31 AM    CHOL/HDL Ratio 2.2 10/29/2014 03:45 AM     Lab Results   Component Value Date/Time    Vitamin D 25-Hydroxy 21.5 (L) 10/31/2014 06:00 AM    VITAMIN D, 25-HYDROXY 38.4 07/24/2019 10:31 AM       Lab Results   Component Value Date/Time    TSH 3.160 07/24/2019 10:31 AM

## 2019-10-15 NOTE — TELEPHONE ENCOUNTER
----- Message from Bala Byers sent at 10/15/2019 10:42 AM EDT -----  Regarding: Dr. Jayjay Daly  Pt wife requesting refill for \"Isosorbide\". She she advised the pharmacy has already sent a request over. She advised the pt has been out for 4 days. [facility].     Best contact [phone]

## 2019-10-16 NOTE — PROGRESS NOTES
CTN Note 
 
- Call from pt's wife re Isosorbide refill request. Requested few days ago. Pharmacy still hasn't received order. Wife reported pt has been w/o this med for a few days now - Per EMR review refill request received 10/12/19. Refill pending PCP approval. CTN advised wife PCP was out of office last week. CTN advised message to be sent to PCP & nurse to inform - Wife verbalized appreciation for assistance

## 2019-10-17 NOTE — TELEPHONE ENCOUNTER
Nurse to notify wife. Med has been sent. I was out of the office when it was requested last week. My apologies.

## 2019-11-05 NOTE — PATIENT INSTRUCTIONS
[facility] [address] [place] [address] Marilu Bahena Phone: [phone] Fax: [phone] [employer]/ 
 
[employer] [employer] 
 
[id number]  
 
[email or web address] Medicare Wellness Visit, Male The best way to live healthy is to have a lifestyle where you eat a well-balanced diet, exercise regularly, limit alcohol use, and quit all forms of tobacco/nicotine, if applicable. Regular preventive services are another way to keep healthy. Preventive services (vaccines, screening tests, monitoring & exams) can help personalize your care plan, which helps you manage your own care. Screening tests can find health problems at the earliest stages, when they are easiest to treat. [facility] follows the current, evidence-based guidelines published by the [facility] ([facility]) when recommending preventive services for our patients. Because we follow these guidelines, sometimes recommendations change over time as research supports it. (For example, a prostate screening blood test is no longer routinely recommended for men with no symptoms). Of course, you and your doctor may decide to screen more often for some diseases, based on your risk and co-morbidities (chronic disease you are already diagnosed with). Preventive services for you include: - Medicare offers their members a free annual wellness visit, which is time for you and your primary care provider to discuss and plan for your preventive service needs. Take advantage of this benefit every year! 
-All adults over age 72 should receive the recommended pneumonia vaccines.  Current USPSTF guidelines recommend a series of two vaccines for the best pneumonia protection.  
-All adults should have a flu vaccine yearly and tetanus vaccine every 10 years. 
-All adults age 48 and older should receive the shingles vaccines (series of two vaccines). -All adults age 38-68 who are overweight should have a diabetes screening test once every three years.  
-Other screening tests & preventive services for persons with diabetes include: an eye exam to screen for diabetic retinopathy, a kidney function test, a foot exam, and stricter control over your cholesterol.  
-Cardiovascular screening for adults with routine risk involves an electrocardiogram (ECG) at intervals determined by the provider.  
-Colorectal cancer screening should be done for adults age 54-65 with no increased risk factors for colorectal cancer. There are a number of acceptable methods of screening for this type of cancer. Each test has its own benefits and drawbacks. Discuss with your provider what is most appropriate for you during your annual wellness visit. The different tests include: colonoscopy (considered the best screening method), a fecal occult blood test, a fecal DNA test, and sigmoidoscopy. 
-All adults born between [facility] should be screened once for Hepatitis C. 
-An Abdominal Aortic Aneurysm (AAA) Screening is recommended for men age 73-68 who has ever smoked in their lifetime. Here is a list of your current Health Maintenance items (your personalized list of preventive services) with a due date: 
Health Maintenance Due Topic Date Due  
 Annual Well Visit  09/01/2019 Beckie Shirley [address] What is a living will? A living will is a legal form you use to write down the kind of care you want at the end of your life. It is used by the health professionals who will treat you if you aren't able to decide for yourself. If you put your wishes in writing, your loved ones and others will know what kind of care you want. They won't need to guess. This can ease your mind and be helpful to others. A living will is not the same as an estate or property will. An estate will explains what you want to happen with your money and property after you die. Is a living will a legal document? A living will is a legal document. Each state has its own laws about living barney. If you move to another state, make sure that your living will is legal in the state where you now live. Or you might use a universal form that has been approved by many states. This kind of form can sometimes be completed and stored online. Your electronic copy will then be available wherever you have a connection to the Internet. In most cases, doctors will respect your wishes even if you have a form from a different state. · You don't need an  to complete a living will. But legal advice can be helpful if your state's laws are unclear, your health history is complicated, or your family can't agree on what should be in your living will. · You can change your living will at any time. Some people find that their wishes about end-of-life care change as their health changes. · In addition to making a living will, think about completing a medical power of  form. This form lets you name the person you want to make end-of-life treatment decisions for you (your \"health care agent\") if you're not able to. Many hospitals and nursing homes will give you the forms you need to complete a living will and a medical power of . · Your living will is used only if you can't make or communicate decisions for yourself anymore. If you become able to make decisions again, you can accept or refuse any treatment, no matter what you wrote in your living will. · Your state may offer an online registry. This is a place where you can store your living will online so the doctors and nurses who need to treat you can find it right away. What should you think about when creating a living will? Talk about your end-of-life wishes with your family members and your doctor. Let them know what you want. That way the people making decisions for you won't be surprised by your choices. Think about these questions as you make your living will: · Do you know enough about life support methods that might be used? If not, talk to your doctor so you know what might be done if you can't breathe on your own, your heart stops, or you're unable to swallow. · What things would you still want to be able to do after you receive life-support methods? Would you want to be able to walk? To speak? To eat on your own? To live without the help of machines? · If you have a choice, where do you want to be cared for? In your home? At a hospital or nursing home? · Do you want certain Voodoo practices performed if you become very ill? · If you have a choice at the end of your life, where would you prefer to die? At home? In a hospital or nursing home? Somewhere else? · Would you prefer to be buried or cremated? · Do you want your organs to be donated after you die? What should you do with your living will? · Make sure that your family members and your health care agent have copies of your living will. · Give your doctor a copy of your living will to keep in your medical record. If you have more than one doctor, make sure that each one has a copy. · You may want to put a copy of your living will where it can be easily found. Where can you learn more? Go to http://diamond-donna.info/. Enter X153 in the search box to learn more about \"Learning About Living Perrocedrick. \" Current as of: August 8, 2016 Content Version: 11.3 © 5834-6220 [employer]. Care instructions adapted under license by [employer] (which disclaims liability or warranty for this information).  If you have questions about a medical condition or this instruction, always ask your healthcare professional. [employer] disclaims any warranty or liability for your use of this information. Preventing Falls: Care Instructions Your Care Instructions Getting around your home safely can be a challenge if you have injuries or health problems that make it easy for you to fall. Loose rugs and furniture in walkways are among the dangers for many older people who have problems walking or who have poor eyesight. People who have conditions such as arthritis, osteoporosis, or dementia also have to be careful not to fall. You can make your home safer with a few simple measures. Follow-up care is a key part of your treatment and safety. Be sure to make and go to all appointments, and call your doctor if you are having problems. It's also a good idea to know your test results and keep a list of the medicines you take. How can you care for yourself at home? Taking care of yourself · You may get dizzy if you do not drink enough water. To prevent dehydration, drink plenty of fluids, enough so that your urine is light yellow or clear like water. Choose water and other caffeine-free clear liquids. If you have kidney, heart, or liver disease and have to limit fluids, talk with your doctor before you increase the amount of fluids you drink. · Exercise regularly to improve your strength, muscle tone, and balance. Walk if you can. Swimming may be a good choice if you cannot walk easily. · Have your vision and hearing checked each year or any time you notice a change. If you have trouble seeing and hearing, you might not be able to avoid objects and could lose your balance. · Know the side effects of the medicines you take. Ask your doctor or pharmacist whether the medicines you take can affect your balance. Sleeping pills or sedatives can affect your balance. · Limit the amount of alcohol you drink. Alcohol can impair your balance and other senses.  
· Ask your doctor whether calluses or corns on your feet need to be removed. If you wear loose-fitting shoes because of calluses or corns, you can lose your balance and fall. · Talk to your doctor if you have numbness in your feet. Preventing falls at home · Remove raised doorway thresholds, throw rugs, and clutter. Repair loose carpet or raised areas in the floor. · Move furniture and electrical cords to keep them out of walking paths. · Use nonskid floor wax, and wipe up spills right away, especially on ceramic tile floors. · If you use a walker or cane, put rubber tips on it. If you use crutches, clean the bottoms of them regularly with an abrasive pad, such as steel wool. · Keep your house well lit, especially [facility], and outside walkways. Use night-lights in areas such as hallways and bathrooms. Add extra light switches or use remote switches (such as switches that go on or off when you clap your hands) to make it easier to turn lights on if you have to get up during the night. · Install sturdy handrails on stairways. · Move items in your cabinets so that the things you use a lot are on the lower shelves (about waist level). · Keep a cordless phone and a flashlight with new batteries by your bed. If possible, put a phone in each of the main rooms of your house, or carry a cell phone in case you fall and cannot reach a phone. Or, you can wear a device around your neck or wrist. You push a button that sends a signal for help. · Wear low-heeled shoes that fit well and give your feet good support. Use footwear with nonskid soles. Check the heels and soles of your shoes for wear. Repair or replace worn heels or soles. · Do not wear socks without shoes on wood floors. · Walk on the grass when the sidewalks are slippery. If you live in an area that gets snow and ice in the winter, sprinkle salt on slippery steps and sidewalks. Preventing falls in the bath · Install grab bars and nonskid mats inside and outside your shower or tub and near the toilet and sinks. · Use shower chairs and bath benches. · Use a hand-held shower head that will allow you to sit while showering. · Get into a tub or shower by putting the weaker leg in first. Get out of a tub or shower with your strong side first. 
· Repair loose toilet seats and consider installing a raised toilet seat to make getting on and off the toilet easier. · Keep your bathroom door unlocked while you are in the shower. Where can you learn more? Go to http://diamond-donna.info/. Enter 0476 [id number] in the search box to learn more about \"Preventing Falls: Care Instructions. \" Current as of: March 16, 2018 Content Version: 11.8 © 3152-1582 [employer]. Care instructions adapted under license by [employer] (which disclaims liability or warranty for this information). If you have questions about a medical condition or this instruction, always ask your healthcare professional. [place] [address] any warranty or liability for your use of this information.

## 2019-11-05 NOTE — PROGRESS NOTES
Identified pt with two pt identifiers(name and ). Reviewed record in preparation for visit and have obtained necessary documentation. Chief Complaint Patient presents with  Hypertension  Depression  Referral Follow Up Health Maintenance Due Topic [address] EXAM   
 
  
Visit Vitals /74 (BP 1 Location: Right arm, BP Patient Position: Sitting) Pulse (!) 56 Temp 97.5 °F (36.4 °C) (Oral) Resp 16 Ht 5' 4\" (1.626 m) Wt 165 lb 6.4 oz (75 kg) SpO2 95% BMI 28.39 kg/m² Pain Scale: 0 - No pain/10 Coordination of Care Questionnaire: 
:  
1. Have you been to the ER, urgent care clinic since your last visit? Hospitalized since your last visit? No 
 
2. Have you seen or consulted any other health care providers outside of the [address] [place] [address] since your last visit? Include any pap smears or colon screening.  No

## 2019-11-05 NOTE — PROGRESS NOTES
This is the Subsequent Medicare Annual Wellness Exam, performed 12 months or more after the Initial AWV or the last Subsequent AWV I have reviewed the patient's medical history in detail and updated the computerized patient record. History Patient Active Problem List  
Diagnosis Code  GERD (gastroesophageal reflux disease) K21.9  Slipped intervertebral disc [id number]  Hemorrhoids, internal K64.8  DJD (degenerative joint disease) of cervical spine M47.812  Scoliosis M41.9  Diverticulosis K57.90  
 BPH (benign prostatic hyperplasia) N40.0  PVD (peripheral vascular disease) ([facility]) I73.9  Varicose vein of leg I83.90  
 History of small bowel obstruction Z87.19  
 [place] (hard of hearing) H91.90  CAD (coronary artery disease) I25.10  Memory disturbance R41.3  Left acoustic neuroma ([facility]) D33.3  Dyslipidemia E78.5  Advance directive in chart Z78.9  
 Essential hypertension I10  Chronic constipation K59.09  
 Bradycardia with 51-60 beats per minute R00.1  Altered mental state R41.82  
 History of stroke Z86.73  
 Syncope R55  Bronchitis J40  DEVAN (acute kidney injury) ([facility] [address] N17.9  Elevated troponin R79.89  
 Alzheimer's dementia with behavioral disturbance ([facility]) G30.9, F02.81 Past Medical History:  
Diagnosis Date  Alzheimer's dementia with behavioral disturbance ([facility] [address] 04/2018 Dr. Amy Pink.  Back pain   
 due to DDD and scoliosis  BPH (benign prostatic hyperplasia) Dr. Mando Mead  CAD (coronary artery disease) 10/30/13  
 30%LAD, 80% ostal stenosis. Dr. Geovani Waters.  Chest pain 10/04/04, 10/30/13 Dr. Pete Sanchez. Negative Stress Cardiolite Test.  Dr. Zahira Mast.  Chest pain 12/02/2018 chest pain and tightness  Chronic venous insufficiency 06/15/09 Dr. Alex Ro  Clavicle fracture 1981 Lt due to fall  DDD (degenerative disc disease), lumbar L3-S1  Diverticulosis 02/2004 sigmoid. Dr. Kamari Bermeo  DJD (degenerative joint disease) of cervical spine C 4-5 ;5-6  Dyslipidemia  Fracture 1980  
 facial/nasal  
 Hearing loss   
 wears hearing aid  Heartburn  Hemorrhoids, internal 1987 Dr. Lloyd Zhao  Hernia of unspecified site of abdominal cavity without mention of obstruction or gangrene   
 inguinal  Lt  Hypertension  Impotence  Left acoustic neuroma ([facility] [address] 10/28/14  
 17 x 9 mm schwannoma.  Pes planus of both feet  PVD (peripheral vascular disease) ([facility] [address] 2009  Scoliosis LS  
 Slipped intervertebral disc 1954 chiropractor  SOB (shortness of breath) 08/07/13 Dr. Kesha Ave.  Stroke ([facility] [address] 10/28/2014  
 small Left [place]. Dr. Alden Montano. Dr. Mariia Harkins.  Syncope 12/20/2018  
 pt experienced fainting spells  Urinary incontinence   
 due to BPH. Dr. Erica Ruiz  Varicose vein 06/15/09 Dr. Magdalene Marquez  Vertigo 2003  
 due to inner ear. Dr. Ilia Shell. Dr. Nico Wong. Dr. Holcomb Alcala  Visual loss Dr. Jeannette Nolan, [facility]. Past Surgical History:  
Procedure Laterality Date  APPENDECTOMY    
 due to gangrene [address] [facility], 02/2002 Dr. Eileen Batista. benign.  COLONOSCOPY  02/2004 Dr. Lloyd Zhao. due q 10 yrs  CYSTOSCOPY  10/17/03 Dr. Sarah Kelly  HX HEART CATHETERIZATION  10/30/13 Dr. Franki Mondragon. 80% ostal stenosis. 30% LAD.  HX HEMORRHOIDECTOMY  HX KNEE ARTHROSCOPY  1990 Left , 2007 Right Dr. Lesli Torre  HX LAPAROTOMY  05/14/07 Dr. Jacinto Fatima. due to Bowel Obstruction  HX POLYPECTOMY  02/2002 Anal.   
 HX TONSILLECTOMY  NC COLSC FLX W/RMVL OF TUMOR POLYP LESION SNARE TQ  5/23/2011 Dr. Jakub Gudino. Current Outpatient Medications Medication Sig Dispense Refill  isosorbide mononitrate ER (IMDUR) 30 mg tablet take 1 tablet by mouth once daily 90 Tab 3  
  pravastatin (PRAVACHOL) 20 mg tablet take 1 tablet by mouth once daily at bedtime 90 Tab 3  
 folic acid-vit B7-ERH Z30 (FOLBEE) 2.5-25-1 mg tablet take 1 tablet by mouth once daily 60 Tab 11  
 sertraline (ZOLOFT) 25 mg tablet Take 1 Tab by mouth daily. Indications: Anxiousness associated with Depression 30 Tab 11  
 QUEtiapine (SEROQUEL) 25 mg tablet take 1/2 tablet by mouth at bedtime 30 Tab 11  
 benzonatate (TESSALON) 100 mg capsule take 1 capsule by mouth three times a day if needed for cough  0  
 aspirin (ASPIRIN) 325 mg tablet Take 1 Tab by mouth daily. Indications: myocardial infarction prevention 90 Tab 3  
 metoprolol tartrate (LOPRESSOR) 25 mg tablet take 1/2 tablet once a day  Indications: hypertension (Patient taking differently: Take 12.5 mg by mouth daily. take 1/2 tablet once a day) 30 Tab 11  
 acetaminophen (TYLENOL ARTHRITIS PAIN) 650 mg TbER Take 650 mg by mouth every eight (8) hours.  meclizine (ANTIVERT) 25 mg tablet Take 1 Tab by mouth three (3) times daily as needed for Dizziness. 20 Tab 0  
 GUAIFENESIN (MUCINEX PO) Take 1 Tab by mouth daily.  docusate sodium (COLACE) 100 mg capsule Take 100 mg by mouth daily. Instructed to take daily with plenty of liquid unless otherwise directed- as per 11/7/14 [facility] discharge med list    
 cholecalciferol, vitamin D3, (VITAMIN D3) 2,000 unit tab Take 1 Tab by mouth daily. As per 11/07/14 [facility] discharge med list    
 tamsulosin (FLOMAX) 0.4 mg capsule Take 0.4 mg by mouth daily. For prostate  celecoxib (CELEBREX) 100 mg capsule   0  
 metoprolol tartrate (LOPRESSOR) 25 mg tablet take 1/2 tablet by mouth once daily 45 Tab PRN  
 etodolac (LODINE) 400 mg tablet take 1 tablet by mouth once daily if needed for pain 30 Tab 2  
 TUSSIN DM  mg/5 mL syrup take 10 milliliters (2 teaspoonfuls) every 6 to 8 hours if needed for cough  0 Allergies Allergen Reactions  Aricept [Donepezil] Nausea and Vomiting Mild depression  Betadine [Povidone-Iodine] Hives  Phenergan [Promethazine] Other (comments)  
  hallucinations  Seafood [Shellfish Containing Products] Rash and Swelling  
  crabmeat  Xylocaine [Lidocaine Hcl] Shortness of Breath Family History Problem Relation Age of Onset  Heart Attack Mother  Other Mother   
     brain aneurysm/AAA/varicose veins  Stroke Mother  Heart Attack Sister  Cancer Maternal Grandmother   
     ovarian Social History Tobacco Use  Smoking status: Never Smoker  Smokeless tobacco: Never Used Substance Use Topics  Alcohol use: No  
 
 
Depression Risk Factor Screening:  
 
3 most recent PHQ Screens 6/28/2019 Little interest or pleasure in doing things Several days Feeling down, depressed, irritable, or hopeless Several days Total Score PHQ 2 2 Alcohol Risk Factor Screening (MALE > 65): Do you average more 1 drink per night or more than 7 drinks a week: No 
 
In the past three months have you have had more than 4 drinks containing alcohol on one occasion: No 
 
 
Functional Ability and Level of Safety:  
Hearing: Hearing is good. The patient wears hearing aids. Activities of Daily Living: The home contains: handrails and grab bars Patient needs help with:  phone, transportation, shopping, preparing meals, laundry, housework, managing medications and managing money Ambulation: with difficulty due to back pain and sciatica and balance issues from dementia Fall Risk: 
Fall Risk Assessment, last 12 mths 11/5/2019 Able to walk? Yes Fall in past 12 months? No  
 
 
Abuse Screen: 
Patient is not abused Cognitive Screening Has your family/caregiver stated any concerns about your memory: yes - wife mentions it is worsening. He sees Dr. Sully Davidson for Dementia. Cognitive Screening: He sees neuro, Dr. Sully Davidson.  
 
Patient Care Team  
Patient Care Team: 
Leonela Mayers DO as PCP - General 
 Stephanie Mortensen DO as PCP - [facility] Empaneled Provider Eduardo Kline MD (Physical Medicine and Rehab) Justin Persaud MD (Urology) Severo Spore, [place] (Physician Assistant) Gwyn Palmer MD (Cardiology) LOVELY BARNETT OD (Optometry) Roldan Negrete MD (Neurology) Lynn Shepherd MD (Physical Medicine and Rehab) Assessment/Plan Education and counseling provided: 
Are appropriate based on today's review and evaluation End-of-Life planning (with patient's consent) Pneumococcal Vaccine Influenza Vaccine Prostate cancer screening tests (PSA, covered annually) Colorectal cancer screening tests Cardiovascular screening blood test 
Screening for glaucoma Diabetes screening test 
 
Diagnoses and all orders for this visit: 
 
1. Medicare annual wellness visit, subsequent 2. Essential hypertension Comments: 
stable on meds Orders: 
-     metoprolol tartrate (LOPRESSOR) 25 mg tablet; take 1/2 tablet once a day  Indications: high blood pressure 3. Dyslipidemia 4. Alzheimer's dementia without behavioral disturbance, unspecified timing of dementia onset ([facility] 5. Other depression Comments: 
due to Dementia vs childhood memories vs other, improving with Zoloft 25 mg Orders: 
-     sertraline (ZOLOFT) 25 mg tablet; Take 1 Tab by mouth daily. Indications: Anxiousness associated with Depression 6. Unsteady gait Comments: 
improving with PT. due to sciatica vs dementia. 7. Hyperglycemia 8. Screening for alcoholism -     [place] ANNUAL ALCOHOL SCREEN 15 MIN 
 
9. Screening for depression 
-     [facility] 10. Other iron deficiency anemia 11. Benign prostatic hyperplasia with lower urinary tract symptoms, symptom details unspecified 12. Vitamin D deficiency Comments: 
stable 13. Memory disturbance Comments: 
stable 14. Separation anxiety Comments: 
improving with Zoloft 15. History of stroke 16. Sensorineural hearing loss (SNHL) of both ears 17. Other urinary incontinence 18. Anxiety with depression 
-     sertraline (ZOLOFT) 25 mg tablet; Take 1 Tab by mouth daily. Indications: Anxiousness associated with Depression -     QUEtiapine (SEROQUEL) 25 mg tablet; take 1/2 tablet by mouth at bedtime  Indications: Repeated Episodes of Anxiety Health Maintenance Due Topic Date Due  MEDICARE YEARLY EXAM  09/01/2019

## 2019-11-05 NOTE — PROGRESS NOTES
HISTORY OF PRESENT ILLNESS Chelsie Barr is a 80 y.o. male presents with Hypertension; Depression; Referral Follow Up; Documentation (DMV); Neurologic Problem; Buttocks pain; Results; Medication Refill; and Medication Evaluation (Zoloft) Agree with nurse note. Pt's wife lovingly accompanies the pt and provides the hx. Pt is hard of hearing and has difficulty understanding. Must repeat questions loudly and with explanation. Pt with Alzheimer's dementia and memory disturbances has difficulty walking due to feeling off-balance. He holds on to objects for balance while walking x 2 months. He has more difficulty with standing up. Dr. Samir Renee referred pt to PT and speech therapy on 5/17/2019 for the difficulty walking. The therapy was helpful but they are waiting two months to have more PT so Medicare will cover. Wife is trying to continue with the exercises herself. Dr. Samir Renee told pt to continue with Aricept for dementia. Wife notes pt started to forget her name x 4 weeks. He gets upset when he forgets. Pt's wife brought in documentation for a handicap DMV Placard for me to complete. Pt with hypertension, dyslipidemia, hyperglycemia, iron deficiency anemia, prostatic hyperplasia with lower urinary tract sxs, vit d deficiency, hx of stroke, SNHL, and urinary incontinence presents to the office with a BP of 148/74. Home BP reading this morning was 121/72. He takes Lopressor 25 mg 1/2 tab for BP; tolerating well. Patient denies vision changes, headaches, dizziness, chest pain, SOB, or swelling. He takes Pravachol 20 mg daily; tolerating well. He takes  mg daily. Pt requests to review labs from 7/24/2019. Urinalysis showed 1+ protein, urine microalbumin 104.7, TSH 3.16, vit d 38.4, LDL 74, HDL 48, trigs 83. Pt saw Dr. Chelsea Jj on 9/25/2019 for the chronic low back pain that extends into the buttocks.  The pain improves with rest. Dx'd with DDD. He rx'd Celebrix 100 mg daily. Pt's wife has not seen any improvement in pain. He can't walk more than 10 minutes. If he uses a shopping cart for support, he can walk for an hour. Dr. Sivan Alan offered Injections if needed, but his injections use lidocaine and pt is allergic to lidocaine so they will reevaluate treatment options at f/u on 11/20/2019. Pt with anxiety with depression and separation anxiety. He started Zoloft 25 mg on 6/28/2019 and continues with Seroquel 25 mg 1/2 tab daily. Wife reports there is no more crying and he is back to baseline emotionally. He still does not like when she leaves but he is much better now. Health Maintenance Subsequent MWV completed today. Pt's wife helped answer some of the questions. Pt's most recent colonoscopy was in 2004 with Dr. Jose Angel Chavis. Pt had an eye exam last year with Dr. Thi Chahal. CT chest on 12/20/2018 showed the aorta is atherosclerotic and has a normal contour with no evidence of 
aneurysm. Written by lakesha Durand, as dictated by Dr. Enzo Naranjo DO. 
 
ROS Review of Systems negative except as noted above in HPI. ALLERGIES:   
Allergies Allergen Reactions  Aricept [Donepezil] Nausea and Vomiting Mild depression  Betadine [Povidone-Iodine] Hives  Phenergan [Promethazine] Other (comments)  
  hallucinations  Seafood [Shellfish Containing Products] Rash and Swelling  
  crabmeat  Xylocaine [Lidocaine Hcl] Shortness of Breath CURRENT MEDICATIONS:   
Outpatient Medications Marked as Taking for the 11/5/19 encounter (Office Visit) with Rose Ragsdale DO Medication Sig Dispense Refill  celecoxib (CELEBREX) 100 mg capsule   0  
 sertraline (ZOLOFT) 25 mg tablet Take 1 Tab by mouth daily.  Indications: Anxiousness associated with Depression 90 Tab 3  
 metoprolol tartrate (LOPRESSOR) 25 mg tablet take 1/2 tablet once a day Indications: high blood pressure 45 Tab 3  
 QUEtiapine (SEROQUEL) 25 mg tablet take 1/2 tablet by mouth at bedtime  Indications: Repeated Episodes of Anxiety 45 Tab 3  
 isosorbide mononitrate ER (IMDUR) 30 mg tablet take 1 tablet by mouth once daily 90 Tab 3  pravastatin (PRAVACHOL) 20 mg tablet take 1 tablet by mouth once daily at bedtime 90 Tab 3  
 folic acid-vit O1-IEX B25 (FOLBEE) 2.5-25-1 mg tablet take 1 tablet by mouth once daily 60 Tab 11  
 aspirin (ASPIRIN) 325 mg tablet Take 1 Tab by mouth daily. Indications: myocardial infarction prevention 90 Tab 3  
 acetaminophen (TYLENOL ARTHRITIS PAIN) 650 mg TbER Take 650 mg by mouth every eight (8) hours.  meclizine (ANTIVERT) 25 mg tablet Take 1 Tab by mouth three (3) times daily as needed for Dizziness. 20 Tab 0  
 GUAIFENESIN (MUCINEX PO) Take 1 Tab by mouth daily.  docusate sodium (COLACE) 100 mg capsule Take 100 mg by mouth daily. Instructed to take daily with plenty of liquid unless otherwise directed- as per 11/7/14 [facility] discharge med list    
 cholecalciferol, vitamin D3, (VITAMIN D3) 2,000 unit tab Take 1 Tab by mouth daily. As per 11/07/14 [facility] discharge med list    
 tamsulosin (FLOMAX) 0.4 mg capsule Take 0.4 mg by mouth daily. For prostate PAST MEDICAL HISTORY:   
Past Medical History:  
Diagnosis Date  [facility] 04[facility]2018 Dr. Madeleine Chappell.  Back pain   
 due to DDD and scoliosis  BPH (benign prostatic hyperplasia) Dr. Laura Berrios  CAD (coronary artery disease) 10/30/13  
 30%LAD, 80% ostal stenosis. Dr. Daxa Mccoy.  Chest pain 10/04/04, 10/30/13 Dr. Rafaela Tesafye. Negative Stress Cardiolite Test.  Dr. Jay Miguel.  Chest pain 12/02/2018 chest pain and tightness  Chronic venous insufficiency 06/15/09 Dr. Gale Pabon  Clavicle fracture 1981 Lt due to fall  DDD (degenerative disc disease), lumbar L3-S1  Diverticulosis 02/2004  
 sigmoid. Dr. Johana Corral  DJD (degenerative joint disease) of cervical spine C 4-5 ;5-6  Dyslipidemia  Fracture 1980  
 facial/nasal  
 Hearing loss   
 wears hearing aid  Heartburn  Hemorrhoids, internal 1987 Dr. Beto Lynn  Hernia of unspecified site of abdominal cavity without mention of obstruction or gangrene   
 inguinal  Lt  Hypertension  Impotence  Left acoustic neuroma ([facility] 10/28/14  
 17 x 9 mm schwannoma.  Pes planus of both feet  PVD (peripheral vascular disease) ([facility] 2009  Scoliosis LS  
 Slipped intervertebral disc 1954 chiropractor  SOB (shortness of breath) 08/07/13 Dr. Army Serrano.  Stroke ([facility] 10/28/2014  
 small Left Thalamus. Dr. Juan Kaplan. Dr. Alondra Santos.  Syncope 12/20/2018  
 pt experienced fainting spells  Urinary incontinence   
 due to BPH. Dr. Hamzah Sesay  Varicose vein 06/15/09 Dr. Amada Hayes  Vertigo 2003  
 due to inner ear. Dr. Tressa William. Dr. Alma Delia Darden. Dr. Carmen Huston  Visual loss Dr. Rusesll Posey, [facility]. PAST SURGICAL HISTORY:   
Past Surgical History:  
Procedure Laterality Date  APPENDECTOMY    
 due to gangrene [address], 02/2002 Dr. Shital Larson. benign.  COLONOSCOPY  02/2004 Dr. Beto Lynn. due q 10 yrs  CYSTOSCOPY  10/17/03 Dr. Wellington Collado  HX HEART CATHETERIZATION  10/30/13 Dr. Criselda Yu. 80% ostal stenosis. 30% LAD.  HX HEMORRHOIDECTOMY  HX KNEE ARTHROSCOPY  1990 Left , 2007 Right Dr. Ximena Ricketts  HX LAPAROTOMY  05/14/07 Dr. Shi Meier. due to Bowel Obstruction  HX POLYPECTOMY  02/2002 Anal.   
 HX TONSILLECTOMY  NV COLSC FLX W/RMVL OF TUMOR POLYP LESION SNARE TQ  5/23/2011 Dr. Santosh Bai. FAMILY HISTORY:   
Family History Problem Relation Age of Onset  Heart Attack Mother  Other Mother   
     brain aneurysm/AAA/varicose veins  Stroke Mother  Heart Attack Sister  Cancer Maternal Grandmother   
     ovarian SOCIAL HISTORY:   
Social History Socioeconomic History  Marital status:  Spouse name: Not on file  Number of children: Not on file  Years of education: Not on file  Highest education level: Not on file Tobacco Use  Smoking status: Never Smoker  Smokeless tobacco: Never Used Substance and Sexual Activity  Alcohol use: No  
 Drug use: No  
 Sexual activity: Not Currently IMMUNIZATIONS:   
Immunization History Administered Date(s) Administered  (RETIRED) Pneumococcal Vaccine (Unspecified Type) 02/08/2002  Influenza High Dose Vaccine PF 10/29/2013, 09/08/2015, 08/12/2016, 09/18/2018, 09/23/2019  Influenza Vaccine 10/01/2014, 10/01/2017  Influenza Vaccine (Tri) Adjuvanted 09/23/2019  Influenza Vaccine Split 10/15/2010, 10/20/2011, 10/08/2012  Influenza Vaccine Whole 10/01/2009  Pneumococcal Conjugate (PCV-13) 06/03/2015  Pneumococcal Polysaccharide (PPSV-23) 07/02/2016  TD Vaccine 04/27/2004  Tdap 07/02/2016  Zoster Recombinant 04/13/2018, 06/12/2018  Zoster Vaccine, Live 09/08/2015 PHYSICAL EXAMINATION Vital Signs Visit Vitals /74 (BP 1 Location: Right arm, BP Patient Position: Sitting) Pulse (!) 56 Temp 97.5 °F (36.4 °C) (Oral) Resp 16 Ht 5' 4\" (1.626 m) Wt 165 lb 6.4 oz (75 kg) SpO2 95% BMI 28.39 kg/m² Weight Metrics 11/5/2019 6/28/2019 6/23/2019 5/17/2019 12/31/2018 12/24/2018 12/20/2018 Weight 165 lb 6.4 oz 158 lb 14.4 oz 159 lb 6.3 oz 158 lb 158 lb 3.2 oz 139 lb 9.6 oz 162 lb 14.7 oz  
BMI 28.39 kg/m2 27.28 kg/m2 25.73 kg/m2 25.5 kg/m2 25.53 kg/m2 22.53 kg/m2 26.3 kg/m2 General appearance - Well nourished. Well appearing. Well developed. No acute distress. Overweight. Head - Normocephalic. Atraumatic. Eyes - pupils equal and reactive. Extraocular eye movements intact.  Sclera anicteric. Mildly injected sclera. Ears - Hearing is grossly normal bilaterally. Nose - normal and patent. No polyps noted. No erythema. No discharge. Mouth - mucous membranes with adequate moisture. Posterior pharynx normal with cobblestone appearance. No erythema, white exudate or obstruction. Neck - supple. Midline trachea. No carotid bruits noted bilaterally. No thyromegaly noted. Chest - clear to auscultation bilaterally anteriorly and posteriorly. No wheezes. No rales or rhonchi. Breath sounds are symmetrical bilaterally. Unlabored respirations. Heart - normal rate. Regular rhythm. Normal S1, S2. No murmur noted. No rubs, clicks or gallops noted. Abdomen - soft and distended. No masses or organomegaly. No rebound, rigidity or guarding. Bowel sounds normal x 4 quadrants. No tenderness noted. Neurological - awake, alert and oriented to person, place, and time and event. Cranial nerves II through XII intact. Clear speech. Muscle strength is +5/5 x 4 extremities. Sensation is intact to light touch bilaterally. Steady gait. Heme/Lymph - peripheral pulses normal x 4 extremities. No peripheral edema is noted. Musculoskeletal - Intact x 4 extremities. Full ROM x 4 extremities. No pain with movement. Back exam - normal range of motion. No pain on palpation of the spinous processes in the cervical, thoracic, lumbar, sacral regions. No CVA tenderness. Skin - no rashes, erythema, ecchymosis, lacerations, abrasions, suspicious moles noted Psychological -   normal behavior, dress and thought processes. Good insight. Good eye contact. Normal affect. Appropriate mood. Normal speech. Pleasantly demented. Witty. DATA REVIEWED Lab Results Component Value Date/Time  WBC 6.2 06/23/2019 05:48 PM  
 Hemoglobin (POC) 12.9 09/15/2016 04:44 PM  
 HGB 12.7 06/23/2019 05:48 PM  
 Hematocrit (POC) 38 09/15/2016 04:44 PM  
 HCT 38.0 06/23/2019 05:48 PM  
 PLATELET 895 46/13/9825 05:48 PM  
 MCV 96.0 06/23/2019 05:48 PM  
 
Lab Results Component Value Date/Time Sodium 138 06/23/2019 06:13 PM  
 Potassium 4.7 06/23/2019 06:13 PM  
 Chloride 106 06/23/2019 06:13 PM  
 CO2 29 06/23/2019 06:13 PM  
 Anion gap 3 (L) 06/23/2019 06:13 PM  
 Glucose 99 06/23/2019 06:13 PM  
 BUN 23 (H) 06/23/2019 06:13 PM  
 Creatinine 1.28 06/23/2019 06:13 PM  
 BUN/Creatinine ratio 18 06/23/2019 06:13 PM  
 GFR est AA >60 06/23/2019 06:13 PM  
 GFR est non-AA 53 (L) 06/23/2019 06:13 PM  
 Calcium 8.8 06/23/2019 06:13 PM  
 Bilirubin, total 0.3 06/23/2019 06:13 PM  
 AST (SGOT) 15 06/23/2019 06:13 PM  
 Alk. phosphatase 72 06/23/2019 06:13 PM  
 Protein, total 6.9 06/23/2019 06:13 PM  
 Albumin 3.4 (L) 06/23/2019 06:13 PM  
 Globulin 3.5 06/23/2019 06:13 PM  
 A-G Ratio 1.0 (L) 06/23/2019 06:13 PM  
 ALT (SGPT) 17 06/23/2019 06:13 PM  
 
Lab Results Component Value Date/Time Cholesterol, total 139 07/24/2019 10:31 AM  
 HDL Cholesterol 48 07/24/2019 10:31 AM  
 LDL, calculated 74 07/24/2019 10:31 AM  
 VLDL, calculated 17 07/24/2019 10:31 AM  
 Triglyceride 83 07/24/2019 10:31 AM  
 CHOL/HDL Ratio 2.2 10/29/2014 03:45 AM  
 
Lab Results Component Value Date/Time Vitamin D 25-Hydroxy 21.5 (L) 10/31/2014 06:00 AM  
 VITAMIN D, 25-HYDROXY 38.4 07/24/2019 10:31 AM  
   
Lab Results Component Value Date/Time Hemoglobin A1c 5.4 10/11/2017 10:01 AM  
 
Lab Results Component Value Date/Time TSH 3.160 07/24/2019 10:31 AM  
 
 
Lab Results Component Value Date/Time Microalb/Creat ratio (ug/mg creat.) 41.2 (H) 07/24/2019 03:40 PM  
 
 
 
ASSESSMENT and PLAN 
 
  ICD-10-CM ICD-9-CM 1. Medicare annual wellness visit, subsequent Z00.00 V70.0 2. Essential hypertension I10 401.9 metoprolol tartrate (LOPRESSOR) 25 mg tablet  
 stable on meds 3. Dyslipidemia E78.5 272.4 4.  Alzheimer's dementia without behavioral disturbance, unspecified timing of dementia onset ([facility] G30.9 331.0 F02.80 294.10   
5. Other depression F32.89 311 sertraline (ZOLOFT) 25 mg tablet  
 due to Dementia vs childhood memories vs other, improving with Zoloft 25 mg  
6. Unsteady gait R26.81 781.2   
 improving with PT. due to sciatica vs dementia. 7. Hyperglycemia R73.9 790.29   
8. Screening for alcoholism Z13.39 V79.1 [place] ANNUAL ALCOHOL SCREEN 15 MIN  
9. Screening for [facility] Z13.31 [facility]79.0 [facility] 10. Other iron deficiency anemia D50.8 280.8 11. Benign prostatic hyperplasia with lower urinary tract symptoms, symptom details unspecified N40.1 600.01   
12. Vitamin D deficiency E55.9 268.9   
 stable 13. Memory disturbance R41.3 780.93   
 stable 14. Separation anxiety F93.0 309.21   
 improving with Zoloft 15. History of stroke Z86.73 V12.54   
16. Sensorineural hearing loss (SNHL) of both ears H90.3 389.18   
17. Other urinary incontinence N39.498 788.39   
18. Anxiety with depression F41.8 300.4 sertraline (ZOLOFT) 25 mg tablet QUEtiapine (SEROQUEL) 25 mg tablet Discussed the patient's BMI with him. The BMI follow up plan is as follows: I have counseled this patient on diet and exercise regimens. Decrease carbohydrates (white foods, sweet foods, sweet drinks and alcohol), increase green leafy vegetables and protein (lean meats and beans) with each meal.  Avoid fried foods. Eat 3-5 small meals daily. Do not skip meals. Increase water intake. Increase physical activity to 30 minutes daily for health benefit, as tolerated. Get 7-8 hours uninterrupted sleep nightly. Chart reviewed and updated. DMV documentation completed, scanned, and returned to pt. Continue current medications and care. Prescriptions written and sent to pharmacy; medication side effects discussed. Lopressor 25 mg, Seroquel 25 mg, Zoloft 25 mg. Most recent tests reviewed from 7/24/2019. Recheck pertinent labs today. Recent office visit notes from Dr. Cricket Jimenez, Dr. Latricia James reviewed. Counseled patient on health concerns:  Memory, gait, stress, low back pain, cholesterol, blood pressure, vit d. Relevant handouts given and discussed with patient. Immunizations noted; Pt had flu shot on 9/23/2019. Offered empathy, support, legitimation, prayers, partnership to patient. Praised patient for progress. Follow-up and Dispositions · Return in about 6 months (around 5/5/2020) for blood pressure, referral follow up. Patient was offered a choice/choices in the treatment plan today. Patient expresses understanding of the plan and agrees with recommendations. More than 40 mins spent face to face with patient and more than 50% of this time spent in counseling and coordinating care. Written by Stevphen Shone, scribe, as dictated by Dr. Milton Pierson DO. Documentation True and Accepted by Ashleigh Reyna. Stephane Santana. Patient Instructions [facility] [address] Phone: [phone] Fax: [phone] [employer]/ 
 
[place] [address] [place] 
 
[phone]  
 
[email or web address] Medicare Wellness Visit, Male The best way to live healthy is to have a lifestyle where you eat a well-balanced diet, exercise regularly, limit alcohol use, and quit all forms of tobacco/nicotine, if applicable. Regular preventive services are another way to keep healthy. Preventive services (vaccines, screening tests, monitoring & exams) can help personalize your care plan, which helps you manage your own care. Screening tests can find health problems at the earliest stages, when they are easiest to treat.   
[facility] follows the current, evidence-based guidelines published by the L[facility] of hospitals in the [place] (USPSTF) when recommending preventive services for our patients. Because we follow these guidelines, sometimes recommendations change over time as research supports it. (For example, a prostate screening blood test is no longer routinely recommended for men with no symptoms). Of course, you and your doctor may decide to screen more often for some diseases, based on your risk and co-morbidities (chronic disease you are already diagnosed with). Preventive services for you include: - Medicare offers their members a free annual wellness visit, which is time for you and your primary care provider to discuss and plan for your preventive service needs. Take advantage of this benefit every year! 
-All adults over age 72 should receive the recommended pneumonia vaccines. Current USPSTF guidelines recommend a series of two vaccines for the best pneumonia protection.  
-All adults should have a flu vaccine yearly and tetanus vaccine every 10 years. 
-All adults age 48 and older should receive the shingles vaccines (series of two vaccines). -All adults age 38-68 who are overweight should have a diabetes screening test once every three years.  
-Other screening tests & preventive services for persons with diabetes include: an eye exam to screen for diabetic retinopathy, a kidney function test, a foot exam, and stricter control over your cholesterol.  
-Cardiovascular screening for adults with routine risk involves an electrocardiogram (ECG) at intervals determined by the provider.  
-Colorectal cancer screening should be done for adults age 54-65 with no increased risk factors for colorectal cancer. There are a number of acceptable methods of screening for this type of cancer. Each test has its own benefits and drawbacks. Discuss with your provider what is most appropriate for you during your annual wellness visit.  The different tests include: colonoscopy (considered the best screening method), a fecal occult blood test, a fecal DNA test, and sigmoidoscopy. 
-All adults born between [facility] should be screened once for Hepatitis C. 
-An Abdominal Aortic Aneurysm (AAA) Screening is recommended for men age 73-68 who has ever smoked in their lifetime. Here is a list of your current Health Maintenance items (your personalized list of preventive services) with a due date: 
Health Maintenance Due Topic Date Due  
 Annual Well Visit  09/01/2019 Beckie Shirley 1721 What is a living will? A living will is a legal form you use to write down the kind of care you want at the end of your life. It is used by the health professionals who will treat you if you aren't able to decide for yourself. If you put your wishes in writing, your loved ones and others will know what kind of care you want. They won't need to guess. This can ease your mind and be helpful to others. A living will is not the same as an estate or property will. An estate will explains what you want to happen with your money and property after you die. Is a living will a legal document? A living will is a legal document. Each state has its own laws about living barney. If you move to another state, make sure that your living will is legal in the state where you now live. Or you might use a universal form that has been approved by many states. This kind of form can sometimes be completed and stored online. Your electronic copy will then be available wherever you have a connection to the Internet. In most cases, doctors will respect your wishes even if you have a form from a different state. · You don't need an  to complete a living will. But legal advice can be helpful if your state's laws are unclear, your health history is complicated, or your family can't agree on what should be in your living will. · You can change your living will at any time. Some people find that their wishes about end-of-life care change as their health changes. · In addition to making a living will, think about completing a medical power of  form. This form lets you name the person you want to make end-of-life treatment decisions for you (your \"health care agent\") if you're not able to. Many hospitals and nursing homes will give you the forms you need to complete a living will and a medical power of . · Your living will is used only if you can't make or communicate decisions for yourself anymore. If you become able to make decisions again, you can accept or refuse any treatment, no matter what you wrote in your living will. · Your state may offer an online registry. This is a place where you can store your living will online so the doctors and nurses who need to treat you can find it right away. What should you think about when creating a living will? Talk about your end-of-life wishes with your family members and your doctor. Let them know what you want. That way the people making decisions for you won't be surprised by your choices. Think about these questions as you make your living will: · Do you know enough about life support methods that might be used? If not, talk to your doctor so you know what might be done if you can't breathe on your own, your heart stops, or you're unable to swallow. · What things would you still want to be able to do after you receive life-support methods? Would you want to be able to walk? To speak? To eat on your own? To live without the help of machines? · If you have a choice, where do you want to be cared for? In your home? At a hospital or nursing home? · Do you want certain Mandaeism practices performed if you become very ill? · If you have a choice at the end of your life, where would you prefer to die? At home? In a hospital or nursing home? Somewhere else? · Would you prefer to be buried or cremated? · Do you want your organs to be donated after you die? What should you do with your living will? · Make sure that your family members and your health care agent have copies of your living will. · Give your doctor a copy of your living will to keep in your medical record. If you have more than one doctor, make sure that each one has a copy. · You may want to put a copy of your living will where it can be easily found. Where can you learn more? Go to http://diamond-donna.info/. Enter G747 in the search box to learn more about \"Learning About Living Perroy. \" Current as of: August 8, 2016 Content Version: 11.3 © 1671-9278 [employer]. Care instructions adapted under license by [employer] (which disclaims liability or warranty for this information). If you have questions about a medical condition or this instruction, always ask your healthcare professional. [place] [address] any warranty or liability for your use of this information. Preventing Falls: Care Instructions Your Care Instructions Getting around your home safely can be a challenge if you have injuries or health problems that make it easy for you to fall. Loose rugs and furniture in walkways are among the dangers for many older people who have problems walking or who have poor eyesight. People who have conditions such as arthritis, osteoporosis, or dementia also have to be careful not to fall. You can make your home safer with a few simple measures. Follow-up care is a key part of your treatment and safety. Be sure to make and go to all appointments, and call your doctor if you are having problems. It's also a good idea to know your test results and keep a list of the medicines you take. How can you care for yourself at home? Taking care of yourself · You may get dizzy if you do not drink enough water.  To prevent dehydration, drink plenty of fluids, enough so that your urine is light yellow or clear like water. Choose water and other caffeine-free clear liquids. If you have kidney, heart, or liver disease and have to limit fluids, talk with your doctor before you increase the amount of fluids you drink. · Exercise regularly to improve your strength, muscle tone, and balance. Walk if you can. Swimming may be a good choice if you cannot walk easily. · Have your vision and hearing checked each year or any time you notice a change. If you have trouble seeing and hearing, you might not be able to avoid objects and could lose your balance. · Know the side effects of the medicines you take. Ask your doctor or pharmacist whether the medicines you take can affect your balance. Sleeping pills or sedatives can affect your balance. · Limit the amount of alcohol you drink. Alcohol can impair your balance and other senses. · Ask your doctor whether calluses or corns on your feet need to be removed. If you wear loose-fitting shoes because of calluses or corns, you can lose your balance and fall. · Talk to your doctor if you have numbness in your feet. Preventing falls at home · Remove raised doorway thresholds, throw rugs, and clutter. Repair loose carpet or raised areas in the floor. · Move furniture and electrical cords to keep them out of walking paths. · Use nonskid floor wax, and wipe up spills right away, especially on ceramic tile floors. · If you use a walker or cane, put rubber tips on it. If you use crutches, clean the bottoms of them regularly with an abrasive pad, such as steel wool. · Keep your house well lit, especially Katharine Nyhan, and outside walkways. Use night-lights in areas such as hallways and bathrooms. Add extra light switches or use remote switches (such as switches that go on or off when you clap your hands) to make it easier to turn lights on if you have to get up during the night. · Install sturdy handrails on stairways. · Move items in your cabinets so that the things you use a lot are on the lower shelves (about waist level). · Keep a cordless phone and a flashlight with new batteries by your bed. If possible, put a phone in each of the main rooms of your house, or carry a cell phone in case you fall and cannot reach a phone. Or, you can wear a device around your neck or wrist. You push a button that sends a signal for help. · Wear low-heeled shoes that fit well and give your feet good support. Use footwear with nonskid soles. Check the heels and soles of your shoes for wear. Repair or replace worn heels or soles. · Do not wear socks without shoes on wood floors. · Walk on the grass when the sidewalks are slippery. If you live in an area that gets snow and ice in the winter, sprinkle salt on slippery steps and sidewalks. Preventing falls in the bath · Install grab bars and nonskid mats inside and outside your shower or tub and near the toilet and sinks. · Use shower chairs and bath benches. · Use a hand-held shower head that will allow you to sit while showering. · Get into a tub or shower by putting the weaker leg in first. Get out of a tub or shower with your strong side first. 
· Repair loose toilet seats and consider installing a raised toilet seat to make getting on and off the toilet easier. · Keep your bathroom door unlocked while you are in the shower. Where can you learn more? Go to http://diamond-donna.info/. Enter 0476 [id number] 69 [id number] in the search box to learn more about \"Preventing Falls: Care Instructions. \" Current as of: March 16, 2018 Content Version: 11.8 © 0652-4314 Healthwise, Incorporated. Care instructions adapted under license by [employer] (which disclaims liability or warranty for this information).  If you have questions about a medical condition or this instruction, always ask your healthcare professional. [employer] disclaims any warranty or liability for your use of this information.

## 2020-01-01 ENCOUNTER — HOME CARE VISIT (OUTPATIENT)
Dept: SCHEDULING | Facility: HOME HEALTH | Age: 85
End: 2020-01-01
Payer: MEDICARE

## 2020-01-01 ENCOUNTER — HOME HEALTH ADMISSION (OUTPATIENT)
Dept: HOME HEALTH SERVICES | Facility: HOME HEALTH | Age: 85
End: 2020-01-01
Payer: MEDICARE

## 2020-01-01 ENCOUNTER — APPOINTMENT (OUTPATIENT)
Dept: GENERAL RADIOLOGY | Age: 85
DRG: 057 | End: 2020-01-01
Attending: INTERNAL MEDICINE
Payer: MEDICARE

## 2020-01-01 ENCOUNTER — APPOINTMENT (OUTPATIENT)
Dept: MRI IMAGING | Age: 85
DRG: 057 | End: 2020-01-01
Attending: HOSPITALIST
Payer: MEDICARE

## 2020-01-01 ENCOUNTER — HOME CARE VISIT (OUTPATIENT)
Dept: HOME HEALTH SERVICES | Facility: HOME HEALTH | Age: 85
End: 2020-01-01
Payer: MEDICARE

## 2020-01-01 ENCOUNTER — HOSPICE ADMISSION (OUTPATIENT)
Dept: HOSPICE | Facility: HOSPICE | Age: 85
End: 2020-01-01
Payer: MEDICARE

## 2020-01-01 ENCOUNTER — HOME CARE VISIT (OUTPATIENT)
Dept: HOSPICE | Facility: HOSPICE | Age: 85
End: 2020-01-01
Payer: MEDICARE

## 2020-01-01 ENCOUNTER — APPOINTMENT (OUTPATIENT)
Dept: CT IMAGING | Age: 85
DRG: 057 | End: 2020-01-01
Attending: EMERGENCY MEDICINE
Payer: MEDICARE

## 2020-01-01 ENCOUNTER — APPOINTMENT (OUTPATIENT)
Dept: GENERAL RADIOLOGY | Age: 85
DRG: 057 | End: 2020-01-01
Attending: EMERGENCY MEDICINE
Payer: MEDICARE

## 2020-01-01 ENCOUNTER — HOSPITAL ENCOUNTER (EMERGENCY)
Age: 85
Discharge: HOME | End: 2020-09-05
Attending: EMERGENCY MEDICINE | Admitting: EMERGENCY MEDICINE
Payer: MEDICARE

## 2020-01-01 ENCOUNTER — APPOINTMENT (OUTPATIENT)
Dept: ULTRASOUND IMAGING | Age: 85
DRG: 057 | End: 2020-01-01
Attending: INTERNAL MEDICINE
Payer: MEDICARE

## 2020-01-01 ENCOUNTER — HOSPITAL ENCOUNTER (INPATIENT)
Age: 85
LOS: 8 days | Discharge: HOSPICE | DRG: 057 | End: 2020-10-09
Attending: EMERGENCY MEDICINE | Admitting: HOSPITALIST
Payer: MEDICARE

## 2020-01-01 ENCOUNTER — TELEPHONE (OUTPATIENT)
Dept: NEUROLOGY | Age: 85
End: 2020-01-01

## 2020-01-01 ENCOUNTER — OFFICE VISIT (OUTPATIENT)
Dept: NEUROLOGY | Age: 85
End: 2020-01-01

## 2020-01-01 VITALS
TEMPERATURE: 99.9 F | RESPIRATION RATE: 18 BRPM | WEIGHT: 172 LBS | BODY MASS INDEX: 30.48 KG/M2 | HEART RATE: 118 BPM | OXYGEN SATURATION: 96 % | SYSTOLIC BLOOD PRESSURE: 159 MMHG | DIASTOLIC BLOOD PRESSURE: 89 MMHG | HEIGHT: 63 IN

## 2020-01-01 VITALS
OXYGEN SATURATION: 96 % | SYSTOLIC BLOOD PRESSURE: 122 MMHG | TEMPERATURE: 97.8 F | RESPIRATION RATE: 16 BRPM | HEART RATE: 59 BPM | DIASTOLIC BLOOD PRESSURE: 78 MMHG

## 2020-01-01 VITALS
SYSTOLIC BLOOD PRESSURE: 130 MMHG | HEART RATE: 62 BPM | TEMPERATURE: 97.8 F | OXYGEN SATURATION: 95 % | DIASTOLIC BLOOD PRESSURE: 82 MMHG

## 2020-01-01 VITALS
DIASTOLIC BLOOD PRESSURE: 78 MMHG | HEART RATE: 96 BPM | RESPIRATION RATE: 16 BRPM | OXYGEN SATURATION: 97 % | TEMPERATURE: 97.9 F | SYSTOLIC BLOOD PRESSURE: 112 MMHG

## 2020-01-01 VITALS
DIASTOLIC BLOOD PRESSURE: 85 MMHG | TEMPERATURE: 98.5 F | OXYGEN SATURATION: 95 % | SYSTOLIC BLOOD PRESSURE: 121 MMHG | HEART RATE: 65 BPM

## 2020-01-01 VITALS
SYSTOLIC BLOOD PRESSURE: 132 MMHG | DIASTOLIC BLOOD PRESSURE: 76 MMHG | HEART RATE: 65 BPM | TEMPERATURE: 97.9 F | RESPIRATION RATE: 16 BRPM | OXYGEN SATURATION: 95 %

## 2020-01-01 VITALS
TEMPERATURE: 97.5 F | DIASTOLIC BLOOD PRESSURE: 70 MMHG | OXYGEN SATURATION: 91 % | RESPIRATION RATE: 16 BRPM | SYSTOLIC BLOOD PRESSURE: 118 MMHG | HEART RATE: 56 BPM

## 2020-01-01 VITALS
SYSTOLIC BLOOD PRESSURE: 116 MMHG | TEMPERATURE: 97.1 F | OXYGEN SATURATION: 96 % | DIASTOLIC BLOOD PRESSURE: 70 MMHG | HEART RATE: 70 BPM | RESPIRATION RATE: 16 BRPM

## 2020-01-01 VITALS
TEMPERATURE: 97.9 F | WEIGHT: 169.31 LBS | DIASTOLIC BLOOD PRESSURE: 98 MMHG | BODY MASS INDEX: 30 KG/M2 | RESPIRATION RATE: 22 BRPM | HEIGHT: 63 IN | SYSTOLIC BLOOD PRESSURE: 165 MMHG | OXYGEN SATURATION: 98 % | HEART RATE: 88 BPM

## 2020-01-01 VITALS
TEMPERATURE: 97.7 F | DIASTOLIC BLOOD PRESSURE: 68 MMHG | HEART RATE: 63 BPM | DIASTOLIC BLOOD PRESSURE: 97 MMHG | HEART RATE: 74 BPM | HEIGHT: 63 IN | OXYGEN SATURATION: 96 % | BODY MASS INDEX: 30 KG/M2 | OXYGEN SATURATION: 94 % | RESPIRATION RATE: 18 BRPM | SYSTOLIC BLOOD PRESSURE: 149 MMHG | SYSTOLIC BLOOD PRESSURE: 108 MMHG | WEIGHT: 169.31 LBS | RESPIRATION RATE: 16 BRPM | TEMPERATURE: 98.7 F

## 2020-01-01 VITALS
TEMPERATURE: 98.1 F | HEART RATE: 59 BPM | OXYGEN SATURATION: 95 % | RESPIRATION RATE: 16 BRPM | DIASTOLIC BLOOD PRESSURE: 71 MMHG | SYSTOLIC BLOOD PRESSURE: 122 MMHG

## 2020-01-01 VITALS
DIASTOLIC BLOOD PRESSURE: 82 MMHG | SYSTOLIC BLOOD PRESSURE: 150 MMHG | OXYGEN SATURATION: 96 % | HEART RATE: 86 BPM | RESPIRATION RATE: 20 BRPM

## 2020-01-01 VITALS
DIASTOLIC BLOOD PRESSURE: 76 MMHG | OXYGEN SATURATION: 94 % | HEART RATE: 84 BPM | SYSTOLIC BLOOD PRESSURE: 119 MMHG | TEMPERATURE: 98 F

## 2020-01-01 VITALS
SYSTOLIC BLOOD PRESSURE: 132 MMHG | DIASTOLIC BLOOD PRESSURE: 70 MMHG | HEART RATE: 61 BPM | TEMPERATURE: 98.1 F | RESPIRATION RATE: 16 BRPM | OXYGEN SATURATION: 97 %

## 2020-01-01 VITALS
SYSTOLIC BLOOD PRESSURE: 105 MMHG | TEMPERATURE: 98.2 F | OXYGEN SATURATION: 97 % | HEART RATE: 60 BPM | DIASTOLIC BLOOD PRESSURE: 65 MMHG

## 2020-01-01 VITALS
OXYGEN SATURATION: 95 % | SYSTOLIC BLOOD PRESSURE: 120 MMHG | DIASTOLIC BLOOD PRESSURE: 78 MMHG | HEART RATE: 68 BPM | TEMPERATURE: 98.2 F | RESPIRATION RATE: 16 BRPM

## 2020-01-01 VITALS
OXYGEN SATURATION: 96 % | HEART RATE: 63 BPM | SYSTOLIC BLOOD PRESSURE: 130 MMHG | DIASTOLIC BLOOD PRESSURE: 78 MMHG | RESPIRATION RATE: 18 BRPM | TEMPERATURE: 97.6 F

## 2020-01-01 VITALS
OXYGEN SATURATION: 96 % | HEART RATE: 62 BPM | TEMPERATURE: 97.5 F | DIASTOLIC BLOOD PRESSURE: 70 MMHG | RESPIRATION RATE: 16 BRPM | SYSTOLIC BLOOD PRESSURE: 112 MMHG

## 2020-01-01 VITALS
RESPIRATION RATE: 16 BRPM | HEART RATE: 60 BPM | DIASTOLIC BLOOD PRESSURE: 72 MMHG | TEMPERATURE: 97.6 F | SYSTOLIC BLOOD PRESSURE: 134 MMHG | OXYGEN SATURATION: 96 %

## 2020-01-01 VITALS
SYSTOLIC BLOOD PRESSURE: 123 MMHG | DIASTOLIC BLOOD PRESSURE: 77 MMHG | OXYGEN SATURATION: 96 % | HEART RATE: 62 BPM | TEMPERATURE: 98.7 F

## 2020-01-01 VITALS
HEART RATE: 61 BPM | DIASTOLIC BLOOD PRESSURE: 68 MMHG | RESPIRATION RATE: 16 BRPM | OXYGEN SATURATION: 94 % | TEMPERATURE: 97.5 F | SYSTOLIC BLOOD PRESSURE: 108 MMHG

## 2020-01-01 VITALS
TEMPERATURE: 98.2 F | HEART RATE: 70 BPM | SYSTOLIC BLOOD PRESSURE: 129 MMHG | DIASTOLIC BLOOD PRESSURE: 31 MMHG | OXYGEN SATURATION: 93 %

## 2020-01-01 VITALS
DIASTOLIC BLOOD PRESSURE: 78 MMHG | HEART RATE: 63 BPM | RESPIRATION RATE: 16 BRPM | TEMPERATURE: 98.8 F | OXYGEN SATURATION: 96 % | SYSTOLIC BLOOD PRESSURE: 126 MMHG

## 2020-01-01 VITALS
DIASTOLIC BLOOD PRESSURE: 74 MMHG | OXYGEN SATURATION: 95 % | TEMPERATURE: 97.8 F | RESPIRATION RATE: 14 BRPM | HEART RATE: 64 BPM | SYSTOLIC BLOOD PRESSURE: 106 MMHG

## 2020-01-01 VITALS
SYSTOLIC BLOOD PRESSURE: 118 MMHG | DIASTOLIC BLOOD PRESSURE: 72 MMHG | TEMPERATURE: 97.2 F | OXYGEN SATURATION: 97 % | HEART RATE: 62 BPM | RESPIRATION RATE: 16 BRPM

## 2020-01-01 VITALS
HEART RATE: 57 BPM | TEMPERATURE: 98 F | SYSTOLIC BLOOD PRESSURE: 126 MMHG | DIASTOLIC BLOOD PRESSURE: 80 MMHG | OXYGEN SATURATION: 96 %

## 2020-01-01 VITALS
SYSTOLIC BLOOD PRESSURE: 130 MMHG | DIASTOLIC BLOOD PRESSURE: 72 MMHG | HEART RATE: 66 BPM | TEMPERATURE: 98.4 F | OXYGEN SATURATION: 99 % | RESPIRATION RATE: 16 BRPM

## 2020-01-01 VITALS
HEART RATE: 66 BPM | SYSTOLIC BLOOD PRESSURE: 132 MMHG | TEMPERATURE: 97.9 F | RESPIRATION RATE: 16 BRPM | DIASTOLIC BLOOD PRESSURE: 68 MMHG | OXYGEN SATURATION: 97 %

## 2020-01-01 VITALS
OXYGEN SATURATION: 97 % | SYSTOLIC BLOOD PRESSURE: 139 MMHG | HEART RATE: 82 BPM | TEMPERATURE: 98.2 F | DIASTOLIC BLOOD PRESSURE: 85 MMHG

## 2020-01-01 VITALS
OXYGEN SATURATION: 94 % | DIASTOLIC BLOOD PRESSURE: 89 MMHG | RESPIRATION RATE: 17 BRPM | SYSTOLIC BLOOD PRESSURE: 122 MMHG | TEMPERATURE: 97.4 F | HEART RATE: 62 BPM

## 2020-01-01 VITALS
HEART RATE: 88 BPM | OXYGEN SATURATION: 97 % | RESPIRATION RATE: 18 BRPM | SYSTOLIC BLOOD PRESSURE: 140 MMHG | DIASTOLIC BLOOD PRESSURE: 80 MMHG

## 2020-01-01 VITALS
HEART RATE: 64 BPM | RESPIRATION RATE: 16 BRPM | SYSTOLIC BLOOD PRESSURE: 114 MMHG | DIASTOLIC BLOOD PRESSURE: 80 MMHG | TEMPERATURE: 97.8 F | OXYGEN SATURATION: 94 %

## 2020-01-01 VITALS — SYSTOLIC BLOOD PRESSURE: 100 MMHG | DIASTOLIC BLOOD PRESSURE: 50 MMHG | HEART RATE: 92 BPM | RESPIRATION RATE: 20 BRPM

## 2020-01-01 VITALS
OXYGEN SATURATION: 93 % | SYSTOLIC BLOOD PRESSURE: 117 MMHG | HEART RATE: 66 BPM | TEMPERATURE: 99.5 F | DIASTOLIC BLOOD PRESSURE: 68 MMHG

## 2020-01-01 VITALS
OXYGEN SATURATION: 92 % | TEMPERATURE: 98.1 F | DIASTOLIC BLOOD PRESSURE: 68 MMHG | RESPIRATION RATE: 16 BRPM | SYSTOLIC BLOOD PRESSURE: 112 MMHG | HEART RATE: 68 BPM

## 2020-01-01 VITALS
TEMPERATURE: 98.2 F | HEART RATE: 69 BPM | RESPIRATION RATE: 16 BRPM | DIASTOLIC BLOOD PRESSURE: 68 MMHG | OXYGEN SATURATION: 97 % | SYSTOLIC BLOOD PRESSURE: 128 MMHG

## 2020-01-01 VITALS
SYSTOLIC BLOOD PRESSURE: 120 MMHG | DIASTOLIC BLOOD PRESSURE: 76 MMHG | RESPIRATION RATE: 16 BRPM | TEMPERATURE: 98.1 F | OXYGEN SATURATION: 96 % | HEART RATE: 63 BPM

## 2020-01-01 VITALS — WEIGHT: 165 LBS | HEIGHT: 64 IN | BODY MASS INDEX: 28.17 KG/M2

## 2020-01-01 DIAGNOSIS — R41.89 COGNITIVE AND BEHAVIORAL CHANGES: ICD-10-CM

## 2020-01-01 DIAGNOSIS — G30.1 LATE ONSET ALZHEIMER'S DISEASE WITH BEHAVIORAL DISTURBANCE (HCC): ICD-10-CM

## 2020-01-01 DIAGNOSIS — R46.89 COGNITIVE AND BEHAVIORAL CHANGES: ICD-10-CM

## 2020-01-01 DIAGNOSIS — Z91.81 HISTORY OF FALL: ICD-10-CM

## 2020-01-01 DIAGNOSIS — I10 ACCELERATED HYPERTENSION: ICD-10-CM

## 2020-01-01 DIAGNOSIS — G30.9 ALZHEIMER'S DEMENTIA WITHOUT BEHAVIORAL DISTURBANCE, UNSPECIFIED TIMING OF DEMENTIA ONSET: ICD-10-CM

## 2020-01-01 DIAGNOSIS — R41.82 ALTERED MENTAL STATUS, UNSPECIFIED ALTERED MENTAL STATUS TYPE: Primary | ICD-10-CM

## 2020-01-01 DIAGNOSIS — F02.818 LATE ONSET ALZHEIMER'S DISEASE WITH BEHAVIORAL DISTURBANCE (HCC): ICD-10-CM

## 2020-01-01 DIAGNOSIS — F41.8 ANXIETY WITH DEPRESSION: ICD-10-CM

## 2020-01-01 DIAGNOSIS — E86.0 MODERATE DEHYDRATION: ICD-10-CM

## 2020-01-01 DIAGNOSIS — F02.80 ALZHEIMER'S DEMENTIA WITHOUT BEHAVIORAL DISTURBANCE, UNSPECIFIED TIMING OF DEMENTIA ONSET: Primary | ICD-10-CM

## 2020-01-01 DIAGNOSIS — N17.9 AKI (ACUTE KIDNEY INJURY) (HCC): Primary | ICD-10-CM

## 2020-01-01 DIAGNOSIS — G30.9 ALZHEIMER'S DEMENTIA WITHOUT BEHAVIORAL DISTURBANCE, UNSPECIFIED TIMING OF DEMENTIA ONSET: Primary | ICD-10-CM

## 2020-01-01 DIAGNOSIS — R46.89 UNUSUAL CHANGE IN BEHAVIOR: ICD-10-CM

## 2020-01-01 DIAGNOSIS — F02.80 ALZHEIMER'S DEMENTIA WITHOUT BEHAVIORAL DISTURBANCE, UNSPECIFIED TIMING OF DEMENTIA ONSET: ICD-10-CM

## 2020-01-01 LAB
ALBUMIN SERPL-MCNC: 3.4 G/DL (ref 3.5–5)
ALBUMIN/GLOB SERPL: 0.9 {RATIO} (ref 1.1–2.2)
ALP SERPL-CCNC: 83 U/L (ref 45–117)
ALT SERPL-CCNC: 18 U/L (ref 12–78)
AMMONIA PLAS-SCNC: 19 UMOL/L
AMPHET UR QL SCN: NEGATIVE
AMPHET UR QL SCN: NEGATIVE
ANION GAP SERPL CALC-SCNC: 2 MMOL/L (ref 5–15)
ANION GAP SERPL CALC-SCNC: 3 MMOL/L (ref 5–15)
ANION GAP SERPL CALC-SCNC: 4 MMOL/L (ref 5–15)
ANION GAP SERPL CALC-SCNC: 6 MMOL/L (ref 5–15)
ANION GAP SERPL CALC-SCNC: 7 MMOL/L (ref 5–15)
ANION GAP SERPL CALC-SCNC: 8 MMOL/L (ref 5–15)
APPEARANCE UR: CLEAR
AST SERPL-CCNC: 13 U/L (ref 15–37)
ATRIAL RATE: 68 BPM
BACTERIA SPEC CULT: NORMAL
BACTERIA SPEC CULT: NORMAL
BACTERIA URNS QL MICRO: NEGATIVE /HPF
BARBITURATES UR QL SCN: NEGATIVE
BARBITURATES UR QL SCN: NEGATIVE
BASOPHILS # BLD: 0 K/UL (ref 0–0.1)
BASOPHILS NFR BLD: 0 % (ref 0–1)
BASOPHILS NFR BLD: 1 % (ref 0–1)
BENZODIAZ UR QL: NEGATIVE
BENZODIAZ UR QL: NEGATIVE
BILIRUB SERPL-MCNC: 0.2 MG/DL (ref 0.2–1)
BILIRUB UR QL CFM: NEGATIVE
BILIRUB UR QL: NEGATIVE
BILIRUB UR QL: NEGATIVE
BUN SERPL-MCNC: 14 MG/DL (ref 6–20)
BUN SERPL-MCNC: 16 MG/DL (ref 6–20)
BUN SERPL-MCNC: 17 MG/DL (ref 6–20)
BUN SERPL-MCNC: 18 MG/DL (ref 6–20)
BUN SERPL-MCNC: 21 MG/DL (ref 6–20)
BUN SERPL-MCNC: 25 MG/DL (ref 6–20)
BUN/CREAT SERPL: 13 (ref 12–20)
BUN/CREAT SERPL: 14 (ref 12–20)
BUN/CREAT SERPL: 15 (ref 12–20)
BUN/CREAT SERPL: 16 (ref 12–20)
BUN/CREAT SERPL: 18 (ref 12–20)
BUN/CREAT SERPL: 19 (ref 12–20)
CALCIUM SERPL-MCNC: 8.8 MG/DL (ref 8.5–10.1)
CALCIUM SERPL-MCNC: 8.9 MG/DL (ref 8.5–10.1)
CALCIUM SERPL-MCNC: 9.1 MG/DL (ref 8.5–10.1)
CALCIUM SERPL-MCNC: 9.2 MG/DL (ref 8.5–10.1)
CALCIUM SERPL-MCNC: 9.4 MG/DL (ref 8.5–10.1)
CALCIUM SERPL-MCNC: 9.4 MG/DL (ref 8.5–10.1)
CALCULATED P AXIS, ECG09: 47 DEGREES
CALCULATED R AXIS, ECG10: -7 DEGREES
CALCULATED T AXIS, ECG11: 48 DEGREES
CANNABINOIDS UR QL SCN: NEGATIVE
CANNABINOIDS UR QL SCN: NEGATIVE
CHLORIDE SERPL-SCNC: 105 MMOL/L (ref 97–108)
CHLORIDE SERPL-SCNC: 106 MMOL/L (ref 97–108)
CHLORIDE SERPL-SCNC: 106 MMOL/L (ref 97–108)
CHLORIDE SERPL-SCNC: 107 MMOL/L (ref 97–108)
CHLORIDE SERPL-SCNC: 108 MMOL/L (ref 97–108)
CHLORIDE SERPL-SCNC: 111 MMOL/L (ref 97–108)
CK SERPL-CCNC: 42 U/L (ref 39–308)
CO2 SERPL-SCNC: 25 MMOL/L (ref 21–32)
CO2 SERPL-SCNC: 26 MMOL/L (ref 21–32)
CO2 SERPL-SCNC: 27 MMOL/L (ref 21–32)
CO2 SERPL-SCNC: 27 MMOL/L (ref 21–32)
CO2 SERPL-SCNC: 29 MMOL/L (ref 21–32)
CO2 SERPL-SCNC: 31 MMOL/L (ref 21–32)
COCAINE UR QL SCN: NEGATIVE
COCAINE UR QL SCN: NEGATIVE
COLOR UR: ABNORMAL
COLOR UR: ABNORMAL
COLOR UR: NORMAL
CREAT SERPL-MCNC: 0.97 MG/DL (ref 0.7–1.3)
CREAT SERPL-MCNC: 0.99 MG/DL (ref 0.7–1.3)
CREAT SERPL-MCNC: 1.06 MG/DL (ref 0.7–1.3)
CREAT SERPL-MCNC: 1.13 MG/DL (ref 0.7–1.3)
CREAT SERPL-MCNC: 1.32 MG/DL (ref 0.7–1.3)
CREAT SERPL-MCNC: 1.47 MG/DL (ref 0.7–1.3)
DIAGNOSIS, 93000: NORMAL
DIFFERENTIAL METHOD BLD: ABNORMAL
DRUG SCRN COMMENT,DRGCM: NORMAL
DRUG SCRN COMMENT,DRGCM: NORMAL
EOSINOPHIL # BLD: 0 K/UL (ref 0–0.4)
EOSINOPHIL # BLD: 0.1 K/UL (ref 0–0.4)
EOSINOPHIL # BLD: 0.3 K/UL (ref 0–0.4)
EOSINOPHIL NFR BLD: 0 % (ref 0–7)
EOSINOPHIL NFR BLD: 1 % (ref 0–7)
EOSINOPHIL NFR BLD: 5 % (ref 0–7)
EPITH CASTS URNS QL MICRO: ABNORMAL /LPF
EPITH CASTS URNS QL MICRO: ABNORMAL /LPF
EPITH CASTS URNS QL MICRO: NORMAL /LPF
ERYTHROCYTE [DISTWIDTH] IN BLOOD BY AUTOMATED COUNT: 13 % (ref 11.5–14.5)
ERYTHROCYTE [DISTWIDTH] IN BLOOD BY AUTOMATED COUNT: 13.1 % (ref 11.5–14.5)
ERYTHROCYTE [DISTWIDTH] IN BLOOD BY AUTOMATED COUNT: 13.3 % (ref 11.5–14.5)
ERYTHROCYTE [DISTWIDTH] IN BLOOD BY AUTOMATED COUNT: 13.5 % (ref 11.5–14.5)
ERYTHROCYTE [DISTWIDTH] IN BLOOD BY AUTOMATED COUNT: 13.6 % (ref 11.5–14.5)
ERYTHROCYTE [DISTWIDTH] IN BLOOD BY AUTOMATED COUNT: 13.6 % (ref 11.5–14.5)
EST. AVERAGE GLUCOSE BLD GHB EST-MCNC: 108 MG/DL
GLOBULIN SER CALC-MCNC: 3.8 G/DL (ref 2–4)
GLUCOSE BLD STRIP.AUTO-MCNC: 105 MG/DL (ref 65–100)
GLUCOSE BLD STRIP.AUTO-MCNC: 111 MG/DL (ref 65–100)
GLUCOSE BLD STRIP.AUTO-MCNC: 140 MG/DL (ref 65–100)
GLUCOSE SERPL-MCNC: 100 MG/DL (ref 65–100)
GLUCOSE SERPL-MCNC: 102 MG/DL (ref 65–100)
GLUCOSE SERPL-MCNC: 107 MG/DL (ref 65–100)
GLUCOSE SERPL-MCNC: 108 MG/DL (ref 65–100)
GLUCOSE SERPL-MCNC: 112 MG/DL (ref 65–100)
GLUCOSE SERPL-MCNC: 125 MG/DL (ref 65–100)
GLUCOSE UR STRIP.AUTO-MCNC: NEGATIVE MG/DL
HBA1C MFR BLD: 5.4 % (ref 4–5.6)
HCT VFR BLD AUTO: 36.8 % (ref 36.6–50.3)
HCT VFR BLD AUTO: 36.9 % (ref 36.6–50.3)
HCT VFR BLD AUTO: 39.7 % (ref 36.6–50.3)
HCT VFR BLD AUTO: 39.8 % (ref 36.6–50.3)
HCT VFR BLD AUTO: 40.8 % (ref 36.6–50.3)
HCT VFR BLD AUTO: 41.8 % (ref 36.6–50.3)
HGB BLD-MCNC: 12.6 G/DL (ref 12.1–17)
HGB BLD-MCNC: 12.6 G/DL (ref 12.1–17)
HGB BLD-MCNC: 13.5 G/DL (ref 12.1–17)
HGB BLD-MCNC: 13.5 G/DL (ref 12.1–17)
HGB BLD-MCNC: 13.6 G/DL (ref 12.1–17)
HGB BLD-MCNC: 14 G/DL (ref 12.1–17)
HGB UR QL STRIP: ABNORMAL
HGB UR QL STRIP: NEGATIVE
HGB UR QL STRIP: NEGATIVE
HYALINE CASTS URNS QL MICRO: ABNORMAL /LPF (ref 0–5)
HYALINE CASTS URNS QL MICRO: ABNORMAL /LPF (ref 0–5)
HYALINE CASTS URNS QL MICRO: NORMAL /LPF (ref 0–5)
IMM GRANULOCYTES # BLD AUTO: 0 K/UL (ref 0–0.04)
IMM GRANULOCYTES # BLD AUTO: 0.1 K/UL (ref 0–0.04)
IMM GRANULOCYTES # BLD AUTO: 0.1 K/UL (ref 0–0.04)
IMM GRANULOCYTES NFR BLD AUTO: 0 % (ref 0–0.5)
IMM GRANULOCYTES NFR BLD AUTO: 1 % (ref 0–0.5)
INR PPP: 1.1 (ref 0.9–1.1)
KETONES UR QL STRIP.AUTO: 40 MG/DL
KETONES UR QL STRIP.AUTO: NEGATIVE MG/DL
KETONES UR QL STRIP.AUTO: NEGATIVE MG/DL
LEUKOCYTE ESTERASE UR QL STRIP.AUTO: NEGATIVE
LYMPHOCYTES # BLD: 1.2 K/UL (ref 0.8–3.5)
LYMPHOCYTES # BLD: 1.3 K/UL (ref 0.8–3.5)
LYMPHOCYTES # BLD: 1.4 K/UL (ref 0.8–3.5)
LYMPHOCYTES NFR BLD: 11 % (ref 12–49)
LYMPHOCYTES NFR BLD: 12 % (ref 12–49)
LYMPHOCYTES NFR BLD: 13 % (ref 12–49)
LYMPHOCYTES NFR BLD: 14 % (ref 12–49)
LYMPHOCYTES NFR BLD: 23 % (ref 12–49)
MCH RBC QN AUTO: 31.7 PG (ref 26–34)
MCH RBC QN AUTO: 32.1 PG (ref 26–34)
MCH RBC QN AUTO: 32.4 PG (ref 26–34)
MCH RBC QN AUTO: 32.5 PG (ref 26–34)
MCHC RBC AUTO-ENTMCNC: 33.1 G/DL (ref 30–36.5)
MCHC RBC AUTO-ENTMCNC: 33.5 G/DL (ref 30–36.5)
MCHC RBC AUTO-ENTMCNC: 34 G/DL (ref 30–36.5)
MCHC RBC AUTO-ENTMCNC: 34.1 G/DL (ref 30–36.5)
MCHC RBC AUTO-ENTMCNC: 34.2 G/DL (ref 30–36.5)
MCHC RBC AUTO-ENTMCNC: 34.2 G/DL (ref 30–36.5)
MCV RBC AUTO: 93.9 FL (ref 80–99)
MCV RBC AUTO: 93.9 FL (ref 80–99)
MCV RBC AUTO: 94.6 FL (ref 80–99)
MCV RBC AUTO: 95.1 FL (ref 80–99)
MCV RBC AUTO: 95.2 FL (ref 80–99)
MCV RBC AUTO: 97.1 FL (ref 80–99)
METHADONE UR QL: NEGATIVE
METHADONE UR QL: NEGATIVE
MONOCYTES # BLD: 0.7 K/UL (ref 0–1)
MONOCYTES # BLD: 1 K/UL (ref 0–1)
MONOCYTES # BLD: 1.4 K/UL (ref 0–1)
MONOCYTES # BLD: 1.6 K/UL (ref 0–1)
MONOCYTES # BLD: 1.7 K/UL (ref 0–1)
MONOCYTES NFR BLD: 12 % (ref 5–13)
MONOCYTES NFR BLD: 12 % (ref 5–13)
MONOCYTES NFR BLD: 13 % (ref 5–13)
MONOCYTES NFR BLD: 13 % (ref 5–13)
MONOCYTES NFR BLD: 16 % (ref 5–13)
NEUTS SEG # BLD: 3.4 K/UL (ref 1.8–8)
NEUTS SEG # BLD: 6.2 K/UL (ref 1.8–8)
NEUTS SEG # BLD: 7.2 K/UL (ref 1.8–8)
NEUTS SEG # BLD: 8.2 K/UL (ref 1.8–8)
NEUTS SEG # BLD: 9.7 K/UL (ref 1.8–8)
NEUTS SEG NFR BLD: 58 % (ref 32–75)
NEUTS SEG NFR BLD: 70 % (ref 32–75)
NEUTS SEG NFR BLD: 72 % (ref 32–75)
NEUTS SEG NFR BLD: 75 % (ref 32–75)
NEUTS SEG NFR BLD: 75 % (ref 32–75)
NITRITE UR QL STRIP.AUTO: NEGATIVE
NRBC # BLD: 0 K/UL (ref 0–0.01)
NRBC BLD-RTO: 0 PER 100 WBC
OPIATES UR QL: NEGATIVE
OPIATES UR QL: NEGATIVE
P-R INTERVAL, ECG05: 188 MS
PCP UR QL: NEGATIVE
PCP UR QL: NEGATIVE
PH UR STRIP: 5.5 [PH] (ref 5–8)
PH UR STRIP: 6 [PH] (ref 5–8)
PH UR STRIP: 6.5 [PH] (ref 5–8)
PLATELET # BLD AUTO: 165 K/UL (ref 150–400)
PLATELET # BLD AUTO: 167 K/UL (ref 150–400)
PLATELET # BLD AUTO: 170 K/UL (ref 150–400)
PLATELET # BLD AUTO: 175 K/UL (ref 150–400)
PLATELET # BLD AUTO: 177 K/UL (ref 150–400)
PLATELET # BLD AUTO: 193 K/UL (ref 150–400)
PMV BLD AUTO: 10.1 FL (ref 8.9–12.9)
PMV BLD AUTO: 10.2 FL (ref 8.9–12.9)
PMV BLD AUTO: 10.2 FL (ref 8.9–12.9)
PMV BLD AUTO: 10.5 FL (ref 8.9–12.9)
PMV BLD AUTO: 10.5 FL (ref 8.9–12.9)
PMV BLD AUTO: 10.9 FL (ref 8.9–12.9)
POTASSIUM SERPL-SCNC: 3.6 MMOL/L (ref 3.5–5.1)
POTASSIUM SERPL-SCNC: 3.7 MMOL/L (ref 3.5–5.1)
POTASSIUM SERPL-SCNC: 4.2 MMOL/L (ref 3.5–5.1)
POTASSIUM SERPL-SCNC: 4.3 MMOL/L (ref 3.5–5.1)
POTASSIUM SERPL-SCNC: 4.3 MMOL/L (ref 3.5–5.1)
POTASSIUM SERPL-SCNC: 4.7 MMOL/L (ref 3.5–5.1)
PROCALCITONIN SERPL-MCNC: 0.06 NG/ML
PROT SERPL-MCNC: 7.2 G/DL (ref 6.4–8.2)
PROT UR STRIP-MCNC: 100 MG/DL
PROT UR STRIP-MCNC: ABNORMAL MG/DL
PROT UR STRIP-MCNC: NEGATIVE MG/DL
PROTHROMBIN TIME: 11.8 SEC (ref 9–11.1)
Q-T INTERVAL, ECG07: 396 MS
QRS DURATION, ECG06: 92 MS
QTC CALCULATION (BEZET), ECG08: 421 MS
RBC # BLD AUTO: 3.88 M/UL (ref 4.1–5.7)
RBC # BLD AUTO: 3.92 M/UL (ref 4.1–5.7)
RBC # BLD AUTO: 4.17 M/UL (ref 4.1–5.7)
RBC # BLD AUTO: 4.2 M/UL (ref 4.1–5.7)
RBC # BLD AUTO: 4.24 M/UL (ref 4.1–5.7)
RBC # BLD AUTO: 4.42 M/UL (ref 4.1–5.7)
RBC #/AREA URNS HPF: ABNORMAL /HPF (ref 0–5)
RBC #/AREA URNS HPF: ABNORMAL /HPF (ref 0–5)
RBC #/AREA URNS HPF: NORMAL /HPF (ref 0–5)
RPR SER QL: NONREACTIVE
SERVICE CMNT-IMP: ABNORMAL
SERVICE CMNT-IMP: NORMAL
SERVICE CMNT-IMP: NORMAL
SODIUM SERPL-SCNC: 137 MMOL/L (ref 136–145)
SODIUM SERPL-SCNC: 138 MMOL/L (ref 136–145)
SODIUM SERPL-SCNC: 138 MMOL/L (ref 136–145)
SODIUM SERPL-SCNC: 140 MMOL/L (ref 136–145)
SODIUM SERPL-SCNC: 141 MMOL/L (ref 136–145)
SODIUM SERPL-SCNC: 144 MMOL/L (ref 136–145)
SP GR UR REFRACTOMETRY: 1.02 (ref 1–1.03)
TROPONIN I SERPL-MCNC: <0.05 NG/ML
TSH SERPL DL<=0.05 MIU/L-ACNC: 6.47 UIU/ML (ref 0.36–3.74)
UA: UC IF INDICATED,UAUC: ABNORMAL
UA: UC IF INDICATED,UAUC: ABNORMAL
UA: UC IF INDICATED,UAUC: NORMAL
UROBILINOGEN UR QL STRIP.AUTO: 0.2 EU/DL (ref 0.2–1)
UROBILINOGEN UR QL STRIP.AUTO: 0.2 EU/DL (ref 0.2–1)
UROBILINOGEN UR QL STRIP.AUTO: 1 EU/DL (ref 0.2–1)
VENTRICULAR RATE, ECG03: 68 BPM
VIT B12 SERPL-MCNC: 1636 PG/ML (ref 193–986)
WBC # BLD AUTO: 10.1 K/UL (ref 4.1–11.1)
WBC # BLD AUTO: 11 K/UL (ref 4.1–11.1)
WBC # BLD AUTO: 12.9 K/UL (ref 4.1–11.1)
WBC # BLD AUTO: 5.8 K/UL (ref 4.1–11.1)
WBC # BLD AUTO: 6.1 K/UL (ref 4.1–11.1)
WBC # BLD AUTO: 8.6 K/UL (ref 4.1–11.1)
WBC URNS QL MICRO: ABNORMAL /HPF (ref 0–4)
WBC URNS QL MICRO: ABNORMAL /HPF (ref 0–4)
WBC URNS QL MICRO: NORMAL /HPF (ref 0–4)

## 2020-01-01 PROCEDURE — 3331090002 HH PPS REVENUE DEBIT

## 2020-01-01 PROCEDURE — 94760 N-INVAS EAR/PLS OXIMETRY 1: CPT

## 2020-01-01 PROCEDURE — 74011250636 HC RX REV CODE- 250/636: Performed by: INTERNAL MEDICINE

## 2020-01-01 PROCEDURE — 97110 THERAPEUTIC EXERCISES: CPT

## 2020-01-01 PROCEDURE — G0152 HHCP-SERV OF OT,EA 15 MIN: HCPCS

## 2020-01-01 PROCEDURE — 3331090001 HH PPS REVENUE CREDIT

## 2020-01-01 PROCEDURE — 99233 SBSQ HOSP IP/OBS HIGH 50: CPT | Performed by: PSYCHIATRY & NEUROLOGY

## 2020-01-01 PROCEDURE — 97162 PT EVAL MOD COMPLEX 30 MIN: CPT | Performed by: PHYSICAL THERAPIST

## 2020-01-01 PROCEDURE — 77010033678 HC OXYGEN DAILY

## 2020-01-01 PROCEDURE — G0153 HHCP-SVS OF S/L PATH,EA 15MN: HCPCS

## 2020-01-01 PROCEDURE — G0151 HHCP-SERV OF PT,EA 15 MIN: HCPCS

## 2020-01-01 PROCEDURE — 74011250637 HC RX REV CODE- 250/637: Performed by: HOSPITALIST

## 2020-01-01 PROCEDURE — 99285 EMERGENCY DEPT VISIT HI MDM: CPT

## 2020-01-01 PROCEDURE — 80307 DRUG TEST PRSMV CHEM ANLYZR: CPT

## 2020-01-01 PROCEDURE — 74011000250 HC RX REV CODE- 250: Performed by: INTERNAL MEDICINE

## 2020-01-01 PROCEDURE — G0299 HHS/HOSPICE OF RN EA 15 MIN: HCPCS

## 2020-01-01 PROCEDURE — 65270000029 HC RM PRIVATE

## 2020-01-01 PROCEDURE — 85025 COMPLETE CBC W/AUTO DIFF WBC: CPT

## 2020-01-01 PROCEDURE — 36415 COLL VENOUS BLD VENIPUNCTURE: CPT

## 2020-01-01 PROCEDURE — 95816 EEG AWAKE AND DROWSY: CPT | Performed by: PSYCHIATRY & NEUROLOGY

## 2020-01-01 PROCEDURE — G0156 HHCP-SVS OF AIDE,EA 15 MIN: HCPCS

## 2020-01-01 PROCEDURE — 84145 PROCALCITONIN (PCT): CPT

## 2020-01-01 PROCEDURE — 82962 GLUCOSE BLOOD TEST: CPT

## 2020-01-01 PROCEDURE — 81001 URINALYSIS AUTO W/SCOPE: CPT

## 2020-01-01 PROCEDURE — 82140 ASSAY OF AMMONIA: CPT

## 2020-01-01 PROCEDURE — 74011250636 HC RX REV CODE- 250/636: Performed by: EMERGENCY MEDICINE

## 2020-01-01 PROCEDURE — 92526 ORAL FUNCTION THERAPY: CPT | Performed by: SPEECH-LANGUAGE PATHOLOGIST

## 2020-01-01 PROCEDURE — 2709999900 HC NON-CHARGEABLE SUPPLY

## 2020-01-01 PROCEDURE — 51798 US URINE CAPACITY MEASURE: CPT

## 2020-01-01 PROCEDURE — 71045 X-RAY EXAM CHEST 1 VIEW: CPT

## 2020-01-01 PROCEDURE — 80048 BASIC METABOLIC PNL TOTAL CA: CPT

## 2020-01-01 PROCEDURE — 99223 1ST HOSP IP/OBS HIGH 75: CPT | Performed by: PSYCHIATRY & NEUROLOGY

## 2020-01-01 PROCEDURE — 0651 HSPC ROUTINE HOME CARE

## 2020-01-01 PROCEDURE — 74011250636 HC RX REV CODE- 250/636: Performed by: HOSPITALIST

## 2020-01-01 PROCEDURE — 82550 ASSAY OF CK (CPK): CPT

## 2020-01-01 PROCEDURE — 82607 VITAMIN B-12: CPT

## 2020-01-01 PROCEDURE — 3336500001 HSPC ELECTION

## 2020-01-01 PROCEDURE — 85610 PROTHROMBIN TIME: CPT

## 2020-01-01 PROCEDURE — G0300 HHS/HOSPICE OF LPN EA 15 MIN: HCPCS

## 2020-01-01 PROCEDURE — 85027 COMPLETE CBC AUTOMATED: CPT

## 2020-01-01 PROCEDURE — 3331090004 HSPC SERVICE INTENSITY ADD-ON

## 2020-01-01 PROCEDURE — 70551 MRI BRAIN STEM W/O DYE: CPT

## 2020-01-01 PROCEDURE — 84484 ASSAY OF TROPONIN QUANT: CPT

## 2020-01-01 PROCEDURE — 77030040831 HC BAG URINE DRNG MDII -A

## 2020-01-01 PROCEDURE — 83036 HEMOGLOBIN GLYCOSYLATED A1C: CPT

## 2020-01-01 PROCEDURE — 74011250637 HC RX REV CODE- 250/637: Performed by: INTERNAL MEDICINE

## 2020-01-01 PROCEDURE — 400013 HH SOC

## 2020-01-01 PROCEDURE — 97165 OT EVAL LOW COMPLEX 30 MIN: CPT | Performed by: OCCUPATIONAL THERAPIST

## 2020-01-01 PROCEDURE — 97535 SELF CARE MNGMENT TRAINING: CPT | Performed by: OCCUPATIONAL THERAPIST

## 2020-01-01 PROCEDURE — 97530 THERAPEUTIC ACTIVITIES: CPT | Performed by: PHYSICAL THERAPIST

## 2020-01-01 PROCEDURE — 96372 THER/PROPH/DIAG INJ SC/IM: CPT

## 2020-01-01 PROCEDURE — 92526 ORAL FUNCTION THERAPY: CPT

## 2020-01-01 PROCEDURE — 80053 COMPREHEN METABOLIC PANEL: CPT

## 2020-01-01 PROCEDURE — 86592 SYPHILIS TEST NON-TREP QUAL: CPT

## 2020-01-01 PROCEDURE — 93005 ELECTROCARDIOGRAM TRACING: CPT

## 2020-01-01 PROCEDURE — 97530 THERAPEUTIC ACTIVITIES: CPT

## 2020-01-01 PROCEDURE — G0161 HHC SLP EA 15 MIN: HCPCS

## 2020-01-01 PROCEDURE — 92610 EVALUATE SWALLOWING FUNCTION: CPT

## 2020-01-01 PROCEDURE — 84443 ASSAY THYROID STIM HORMONE: CPT

## 2020-01-01 PROCEDURE — 74011000250 HC RX REV CODE- 250: Performed by: HOSPITALIST

## 2020-01-01 PROCEDURE — 96374 THER/PROPH/DIAG INJ IV PUSH: CPT

## 2020-01-01 PROCEDURE — 87040 BLOOD CULTURE FOR BACTERIA: CPT

## 2020-01-01 PROCEDURE — G0155 HHCP-SVS OF CSW,EA 15 MIN: HCPCS

## 2020-01-01 PROCEDURE — 70450 CT HEAD/BRAIN W/O DYE: CPT

## 2020-01-01 PROCEDURE — 93970 EXTREMITY STUDY: CPT

## 2020-01-01 PROCEDURE — 97530 THERAPEUTIC ACTIVITIES: CPT | Performed by: OCCUPATIONAL THERAPIST

## 2020-01-01 PROCEDURE — 97535 SELF CARE MNGMENT TRAINING: CPT

## 2020-01-01 RX ORDER — SODIUM CHLORIDE 9 MG/ML
75 INJECTION, SOLUTION INTRAVENOUS CONTINUOUS
Status: DISCONTINUED | OUTPATIENT
Start: 2020-01-01 | End: 2020-01-01

## 2020-01-01 RX ORDER — MORPHINE SULFATE 20 MG/ML
5 SOLUTION ORAL
Status: DISCONTINUED | OUTPATIENT
Start: 2020-01-01 | End: 2020-01-01

## 2020-01-01 RX ORDER — BUSPIRONE HYDROCHLORIDE 5 MG/1
10 TABLET ORAL 2 TIMES DAILY
Status: DISCONTINUED | OUTPATIENT
Start: 2020-01-01 | End: 2020-01-01

## 2020-01-01 RX ORDER — METOPROLOL TARTRATE 25 MG/1
12.5 TABLET, FILM COATED ORAL DAILY
Status: DISCONTINUED | OUTPATIENT
Start: 2020-01-01 | End: 2020-01-01 | Stop reason: HOSPADM

## 2020-01-01 RX ORDER — HALOPERIDOL 2 MG/ML
2 SOLUTION ORAL
Status: DISCONTINUED | OUTPATIENT
Start: 2020-01-01 | End: 2020-01-01 | Stop reason: HOSPADM

## 2020-01-01 RX ORDER — SODIUM CHLORIDE 0.9 % (FLUSH) 0.9 %
5-40 SYRINGE (ML) INJECTION AS NEEDED
Status: DISCONTINUED | OUTPATIENT
Start: 2020-01-01 | End: 2020-01-01 | Stop reason: HOSPADM

## 2020-01-01 RX ORDER — PRAVASTATIN SODIUM 40 MG/1
20 TABLET ORAL DAILY
Status: DISCONTINUED | OUTPATIENT
Start: 2020-01-01 | End: 2020-01-01 | Stop reason: HOSPADM

## 2020-01-01 RX ORDER — ACETAMINOPHEN 325 MG/1
650 TABLET ORAL
Status: DISCONTINUED | OUTPATIENT
Start: 2020-01-01 | End: 2020-01-01 | Stop reason: HOSPADM

## 2020-01-01 RX ORDER — ONDANSETRON 2 MG/ML
4 INJECTION INTRAMUSCULAR; INTRAVENOUS
Status: DISCONTINUED | OUTPATIENT
Start: 2020-01-01 | End: 2020-01-01 | Stop reason: HOSPADM

## 2020-01-01 RX ORDER — DEXTROSE, SODIUM CHLORIDE, AND POTASSIUM CHLORIDE 5; .9; .15 G/100ML; G/100ML; G/100ML
75 INJECTION INTRAVENOUS CONTINUOUS
Status: DISCONTINUED | OUTPATIENT
Start: 2020-01-01 | End: 2020-01-01 | Stop reason: HOSPADM

## 2020-01-01 RX ORDER — HYDRALAZINE HYDROCHLORIDE 20 MG/ML
10 INJECTION INTRAMUSCULAR; INTRAVENOUS
Status: DISCONTINUED | OUTPATIENT
Start: 2020-01-01 | End: 2020-01-01 | Stop reason: HOSPADM

## 2020-01-01 RX ORDER — LORAZEPAM 2 MG/ML
INJECTION INTRAMUSCULAR
Status: DISCONTINUED
Start: 2020-01-01 | End: 2020-01-01 | Stop reason: WASHOUT

## 2020-01-01 RX ORDER — ACETAMINOPHEN 650 MG/1
650 SUPPOSITORY RECTAL
Status: DISCONTINUED | OUTPATIENT
Start: 2020-01-01 | End: 2020-01-01 | Stop reason: SDUPTHER

## 2020-01-01 RX ORDER — POLYETHYLENE GLYCOL 3350 17 G/17G
17 POWDER, FOR SOLUTION ORAL DAILY PRN
Status: DISCONTINUED | OUTPATIENT
Start: 2020-01-01 | End: 2020-01-01 | Stop reason: HOSPADM

## 2020-01-01 RX ORDER — SODIUM CHLORIDE 0.9 % (FLUSH) 0.9 %
10 SYRINGE (ML) INJECTION
Status: ACTIVE | OUTPATIENT
Start: 2020-01-01 | End: 2020-01-01

## 2020-01-01 RX ORDER — HALOPERIDOL 5 MG/ML
5 INJECTION INTRAMUSCULAR
Status: COMPLETED | OUTPATIENT
Start: 2020-01-01 | End: 2020-01-01

## 2020-01-01 RX ORDER — TAMSULOSIN HYDROCHLORIDE 0.4 MG/1
0.4 CAPSULE ORAL DAILY
Status: DISCONTINUED | OUTPATIENT
Start: 2020-01-01 | End: 2020-01-01 | Stop reason: HOSPADM

## 2020-01-01 RX ORDER — ISOSORBIDE MONONITRATE 30 MG/1
30 TABLET, EXTENDED RELEASE ORAL DAILY
Status: DISCONTINUED | OUTPATIENT
Start: 2020-01-01 | End: 2020-01-01 | Stop reason: HOSPADM

## 2020-01-01 RX ORDER — CYANOCOBALAMIN/FOLIC AC/VIT B6 1-2.5-25MG
TABLET ORAL
Qty: 90 TAB | Refills: 3 | Status: SHIPPED | OUTPATIENT
Start: 2020-01-01 | End: 2020-01-01 | Stop reason: ALTCHOICE

## 2020-01-01 RX ORDER — MORPHINE SULFATE 100 MG/5ML
5 SOLUTION ORAL
Status: DISCONTINUED | OUTPATIENT
Start: 2020-01-01 | End: 2020-01-01 | Stop reason: HOSPADM

## 2020-01-01 RX ORDER — LORAZEPAM 2 MG/ML
2 INJECTION INTRAMUSCULAR ONCE
Status: DISCONTINUED | OUTPATIENT
Start: 2020-01-01 | End: 2020-01-01

## 2020-01-01 RX ORDER — SERTRALINE HYDROCHLORIDE 50 MG/1
25 TABLET, FILM COATED ORAL DAILY
Status: DISCONTINUED | OUTPATIENT
Start: 2020-01-01 | End: 2020-01-01 | Stop reason: HOSPADM

## 2020-01-01 RX ORDER — LORAZEPAM 2 MG/ML
2 INJECTION INTRAMUSCULAR ONCE
Status: COMPLETED | OUTPATIENT
Start: 2020-01-01 | End: 2020-01-01

## 2020-01-01 RX ORDER — HALOPERIDOL 5 MG/ML
1 INJECTION INTRAMUSCULAR ONCE
Status: COMPLETED | OUTPATIENT
Start: 2020-01-01 | End: 2020-01-01

## 2020-01-01 RX ORDER — QUETIAPINE FUMARATE 25 MG/1
12.5 TABLET, FILM COATED ORAL 2 TIMES DAILY
Qty: 45 TAB | Refills: 3 | Status: SHIPPED | OUTPATIENT
Start: 2020-01-01 | End: 2020-01-01 | Stop reason: ALTCHOICE

## 2020-01-01 RX ORDER — SODIUM CHLORIDE 0.9 % (FLUSH) 0.9 %
5-40 SYRINGE (ML) INJECTION EVERY 8 HOURS
Status: DISCONTINUED | OUTPATIENT
Start: 2020-01-01 | End: 2020-01-01 | Stop reason: HOSPADM

## 2020-01-01 RX ORDER — QUETIAPINE FUMARATE 25 MG/1
12.5 TABLET, FILM COATED ORAL 2 TIMES DAILY
Status: DISCONTINUED | OUTPATIENT
Start: 2020-01-01 | End: 2020-01-01

## 2020-01-01 RX ORDER — PROMETHAZINE HYDROCHLORIDE 25 MG/1
12.5 TABLET ORAL
Status: DISCONTINUED | OUTPATIENT
Start: 2020-01-01 | End: 2020-01-01 | Stop reason: HOSPADM

## 2020-01-01 RX ORDER — MORPHINE SULFATE 100 MG/5ML
5 SOLUTION ORAL
Status: DISCONTINUED | OUTPATIENT
Start: 2020-01-01 | End: 2020-01-01

## 2020-01-01 RX ORDER — LORAZEPAM 2 MG/ML
2 INJECTION INTRAMUSCULAR
Status: COMPLETED | OUTPATIENT
Start: 2020-01-01 | End: 2020-01-01

## 2020-01-01 RX ORDER — ENOXAPARIN SODIUM 100 MG/ML
40 INJECTION SUBCUTANEOUS DAILY
Status: DISCONTINUED | OUTPATIENT
Start: 2020-01-01 | End: 2020-01-01 | Stop reason: HOSPADM

## 2020-01-01 RX ORDER — ASPIRIN 325 MG
325 TABLET ORAL DAILY
Status: DISCONTINUED | OUTPATIENT
Start: 2020-01-01 | End: 2020-01-01 | Stop reason: HOSPADM

## 2020-01-01 RX ORDER — ACETAMINOPHEN 650 MG/1
650 SUPPOSITORY RECTAL
Status: DISCONTINUED | OUTPATIENT
Start: 2020-01-01 | End: 2020-01-01 | Stop reason: HOSPADM

## 2020-01-01 RX ADMIN — HYDRALAZINE HYDROCHLORIDE 10 MG: 20 INJECTION INTRAMUSCULAR; INTRAVENOUS at 18:43

## 2020-01-01 RX ADMIN — HALOPERIDOL LACTATE 5 MG: 5 INJECTION, SOLUTION INTRAMUSCULAR at 18:14

## 2020-01-01 RX ADMIN — MORPHINE SULFATE 5 MG: 100 SOLUTION ORAL at 14:53

## 2020-01-01 RX ADMIN — ENOXAPARIN SODIUM 40 MG: 40 INJECTION SUBCUTANEOUS at 09:34

## 2020-01-01 RX ADMIN — SODIUM CHLORIDE 10 ML: 9 INJECTION, SOLUTION INTRAMUSCULAR; INTRAVENOUS; SUBCUTANEOUS at 21:35

## 2020-01-01 RX ADMIN — SODIUM CHLORIDE 75 ML/HR: 900 INJECTION, SOLUTION INTRAVENOUS at 12:30

## 2020-01-01 RX ADMIN — HYDRALAZINE HYDROCHLORIDE 10 MG: 20 INJECTION INTRAMUSCULAR; INTRAVENOUS at 15:17

## 2020-01-01 RX ADMIN — HALOPERIDOL LACTATE 1 MG: 5 INJECTION, SOLUTION INTRAMUSCULAR at 17:21

## 2020-01-01 RX ADMIN — ACETAMINOPHEN 650 MG: 650 SUPPOSITORY RECTAL at 00:58

## 2020-01-01 RX ADMIN — SODIUM CHLORIDE 10 ML: 9 INJECTION, SOLUTION INTRAMUSCULAR; INTRAVENOUS; SUBCUTANEOUS at 05:49

## 2020-01-01 RX ADMIN — ENOXAPARIN SODIUM 40 MG: 40 INJECTION SUBCUTANEOUS at 08:54

## 2020-01-01 RX ADMIN — POTASSIUM CHLORIDE, DEXTROSE MONOHYDRATE AND SODIUM CHLORIDE 75 ML/HR: 150; 5; 900 INJECTION, SOLUTION INTRAVENOUS at 01:40

## 2020-01-01 RX ADMIN — HYDRALAZINE HYDROCHLORIDE 10 MG: 20 INJECTION INTRAMUSCULAR; INTRAVENOUS at 00:54

## 2020-01-01 RX ADMIN — POTASSIUM CHLORIDE, DEXTROSE MONOHYDRATE AND SODIUM CHLORIDE 75 ML/HR: 150; 5; 900 INJECTION, SOLUTION INTRAVENOUS at 14:48

## 2020-01-01 RX ADMIN — POTASSIUM CHLORIDE, DEXTROSE MONOHYDRATE AND SODIUM CHLORIDE 75 ML/HR: 150; 5; 900 INJECTION, SOLUTION INTRAVENOUS at 05:58

## 2020-01-01 RX ADMIN — ENOXAPARIN SODIUM 40 MG: 40 INJECTION SUBCUTANEOUS at 11:06

## 2020-01-01 RX ADMIN — POTASSIUM CHLORIDE, DEXTROSE MONOHYDRATE AND SODIUM CHLORIDE 75 ML/HR: 150; 5; 900 INJECTION, SOLUTION INTRAVENOUS at 11:48

## 2020-01-01 RX ADMIN — HYDRALAZINE HYDROCHLORIDE 10 MG: 20 INJECTION INTRAMUSCULAR; INTRAVENOUS at 01:35

## 2020-01-01 RX ADMIN — ACETAMINOPHEN 650 MG: 650 SUPPOSITORY RECTAL at 19:46

## 2020-01-01 RX ADMIN — SODIUM CHLORIDE 10 ML: 9 INJECTION, SOLUTION INTRAMUSCULAR; INTRAVENOUS; SUBCUTANEOUS at 13:09

## 2020-01-01 RX ADMIN — SODIUM CHLORIDE 10 ML: 9 INJECTION, SOLUTION INTRAMUSCULAR; INTRAVENOUS; SUBCUTANEOUS at 22:54

## 2020-01-01 RX ADMIN — SODIUM CHLORIDE 10 ML: 9 INJECTION, SOLUTION INTRAMUSCULAR; INTRAVENOUS; SUBCUTANEOUS at 05:23

## 2020-01-01 RX ADMIN — SODIUM CHLORIDE 10 ML: 9 INJECTION, SOLUTION INTRAMUSCULAR; INTRAVENOUS; SUBCUTANEOUS at 06:44

## 2020-01-01 RX ADMIN — SODIUM CHLORIDE 10 ML: 9 INJECTION, SOLUTION INTRAMUSCULAR; INTRAVENOUS; SUBCUTANEOUS at 05:31

## 2020-01-01 RX ADMIN — POTASSIUM CHLORIDE, DEXTROSE MONOHYDRATE AND SODIUM CHLORIDE 75 ML/HR: 150; 5; 900 INJECTION, SOLUTION INTRAVENOUS at 10:44

## 2020-01-01 RX ADMIN — ENOXAPARIN SODIUM 40 MG: 40 INJECTION SUBCUTANEOUS at 09:28

## 2020-01-01 RX ADMIN — ACETAMINOPHEN 650 MG: 650 SUPPOSITORY RECTAL at 15:22

## 2020-01-01 RX ADMIN — ENOXAPARIN SODIUM 40 MG: 40 INJECTION SUBCUTANEOUS at 15:46

## 2020-01-01 RX ADMIN — SODIUM CHLORIDE 10 ML: 9 INJECTION, SOLUTION INTRAMUSCULAR; INTRAVENOUS; SUBCUTANEOUS at 21:49

## 2020-01-01 RX ADMIN — SODIUM CHLORIDE 75 ML/HR: 900 INJECTION, SOLUTION INTRAVENOUS at 05:31

## 2020-01-01 RX ADMIN — HYDRALAZINE HYDROCHLORIDE 10 MG: 20 INJECTION INTRAMUSCULAR; INTRAVENOUS at 03:32

## 2020-01-01 RX ADMIN — SODIUM CHLORIDE 10 ML: 9 INJECTION, SOLUTION INTRAMUSCULAR; INTRAVENOUS; SUBCUTANEOUS at 06:52

## 2020-01-01 RX ADMIN — SODIUM CHLORIDE 10 ML: 9 INJECTION, SOLUTION INTRAMUSCULAR; INTRAVENOUS; SUBCUTANEOUS at 21:48

## 2020-01-01 RX ADMIN — HYDRALAZINE HYDROCHLORIDE 10 MG: 20 INJECTION INTRAMUSCULAR; INTRAVENOUS at 04:26

## 2020-01-01 RX ADMIN — BUSPIRONE HYDROCHLORIDE 10 MG: 5 TABLET ORAL at 18:22

## 2020-01-01 RX ADMIN — SODIUM CHLORIDE 10 ML: 9 INJECTION, SOLUTION INTRAMUSCULAR; INTRAVENOUS; SUBCUTANEOUS at 22:13

## 2020-01-01 RX ADMIN — SODIUM CHLORIDE 75 ML/HR: 900 INJECTION, SOLUTION INTRAVENOUS at 03:47

## 2020-01-01 RX ADMIN — LORAZEPAM 2 MG: 2 INJECTION INTRAMUSCULAR; INTRAVENOUS at 19:32

## 2020-01-01 RX ADMIN — WATER 5 MG: 1 INJECTION INTRAMUSCULAR; INTRAVENOUS; SUBCUTANEOUS at 23:16

## 2020-01-01 RX ADMIN — SODIUM CHLORIDE 5 ML: 9 INJECTION, SOLUTION INTRAMUSCULAR; INTRAVENOUS; SUBCUTANEOUS at 13:15

## 2020-01-01 RX ADMIN — SODIUM CHLORIDE 10 ML: 9 INJECTION, SOLUTION INTRAMUSCULAR; INTRAVENOUS; SUBCUTANEOUS at 05:18

## 2020-01-01 RX ADMIN — QUETIAPINE FUMARATE 12.5 MG: 25 TABLET ORAL at 18:23

## 2020-01-01 RX ADMIN — WATER 5 MG: 1 INJECTION INTRAMUSCULAR; INTRAVENOUS; SUBCUTANEOUS at 15:39

## 2020-01-01 RX ADMIN — POTASSIUM CHLORIDE, DEXTROSE MONOHYDRATE AND SODIUM CHLORIDE 75 ML/HR: 150; 5; 900 INJECTION, SOLUTION INTRAVENOUS at 16:38

## 2020-01-01 RX ADMIN — ENOXAPARIN SODIUM 40 MG: 40 INJECTION SUBCUTANEOUS at 09:00

## 2020-01-01 RX ADMIN — SODIUM CHLORIDE 10 ML: 9 INJECTION, SOLUTION INTRAMUSCULAR; INTRAVENOUS; SUBCUTANEOUS at 22:10

## 2020-01-01 RX ADMIN — ACETAMINOPHEN 650 MG: 650 SUPPOSITORY RECTAL at 14:41

## 2020-01-01 RX ADMIN — SODIUM CHLORIDE 10 ML: 9 INJECTION, SOLUTION INTRAMUSCULAR; INTRAVENOUS; SUBCUTANEOUS at 17:21

## 2020-01-01 RX ADMIN — SODIUM CHLORIDE 10 ML: 9 INJECTION, SOLUTION INTRAMUSCULAR; INTRAVENOUS; SUBCUTANEOUS at 22:05

## 2020-01-01 RX ADMIN — SODIUM CHLORIDE 10 ML: 9 INJECTION, SOLUTION INTRAMUSCULAR; INTRAVENOUS; SUBCUTANEOUS at 14:49

## 2020-01-01 RX ADMIN — SODIUM CHLORIDE 10 ML: 9 INJECTION, SOLUTION INTRAMUSCULAR; INTRAVENOUS; SUBCUTANEOUS at 05:30

## 2020-01-01 RX ADMIN — LORAZEPAM 2 MG: 2 INJECTION INTRAMUSCULAR; INTRAVENOUS at 01:06

## 2020-01-01 RX ADMIN — WATER 5 MG: 1 INJECTION INTRAMUSCULAR; INTRAVENOUS; SUBCUTANEOUS at 05:21

## 2020-01-01 RX ADMIN — SODIUM CHLORIDE 5 ML: 9 INJECTION, SOLUTION INTRAMUSCULAR; INTRAVENOUS; SUBCUTANEOUS at 13:40

## 2020-01-01 RX ADMIN — HYDRALAZINE HYDROCHLORIDE 10 MG: 20 INJECTION INTRAMUSCULAR; INTRAVENOUS at 11:26

## 2020-01-01 RX ADMIN — SODIUM CHLORIDE 75 ML/HR: 900 INJECTION, SOLUTION INTRAVENOUS at 17:21

## 2020-01-01 RX ADMIN — POTASSIUM CHLORIDE, DEXTROSE MONOHYDRATE AND SODIUM CHLORIDE 75 ML/HR: 150; 5; 900 INJECTION, SOLUTION INTRAVENOUS at 22:10

## 2020-05-20 NOTE — PATIENT INSTRUCTIONS
A Healthy Lifestyle: Care Instructions Your Care Instructions A healthy lifestyle can help you feel good, stay at a healthy weight, and have plenty of energy for both work and play. A healthy lifestyle is something you can share with your whole family. A healthy lifestyle also can lower your risk for serious health problems, such as high blood pressure, heart disease, and diabetes. You can follow a few steps listed below to improve your health and the health of your family. Follow-up care is a key part of your treatment and safety. Be sure to make and go to all appointments, and call your doctor if you are having problems. It's also a good idea to know your test results and keep a list of the medicines you take. How can you care for yourself at home? · Do not eat too much sugar, fat, or fast foods. You can still have dessert and treats now and then. The goal is moderation. · Start small to improve your eating habits. Pay attention to portion sizes, drink less juice and soda pop, and eat more fruits and vegetables. ? Eat a healthy amount of food. A 3-ounce serving of meat, for example, is about the size of a deck of cards. Fill the rest of your plate with vegetables and whole grains. ? Limit the amount of soda and sports drinks you have every day. Drink more water when you are thirsty. ? Eat at least 5 servings of fruits and vegetables every day. It may seem like a lot, but it is not hard to reach this goal. A serving or helping is 1 piece of fruit, 1 cup of vegetables, or 2 cups of leafy, raw vegetables. Have an apple or some carrot sticks as an afternoon snack instead of a candy bar. Try to have fruits and/or vegetables at every meal. 
· Make exercise part of your daily routine. You may want to start with simple activities, such as walking, bicycling, or slow swimming. Try to be active 30 to 60 minutes every day.  You do not need to do all 30 to 60 minutes all at once. For example, you can exercise 3 times a day for 10 or 20 minutes. Moderate exercise is safe for most people, but it is always a good idea to talk to your doctor before starting an exercise program. 
· Keep moving. Ashleigh Harriso the lawn, work in the garden, or [employer]. Take the stairs instead of the elevator at work. · If you smoke, quit. People who smoke have an increased risk for heart attack, stroke, cancer, and other lung illnesses. Quitting is hard, but there are ways to boost your chance of quitting tobacco for good. ? Use nicotine gum, patches, or lozenges. ? Ask your doctor about stop-smoking programs and medicines. ? Keep trying. In addition to reducing your risk of diseases in the future, you will notice some benefits soon after you stop using tobacco. If you have shortness of breath or asthma symptoms, they will likely get better within a few weeks after you quit. · Limit how much alcohol you drink. Moderate amounts of alcohol (up to 2 drinks a day for men, 1 drink a day for women) are okay. But drinking too much can lead to liver problems, high blood pressure, and other health problems. Family health If you have a family, there are many things you can do together to improve your health. · Eat meals together as a family as often as possible. · Eat healthy foods. This includes fruits, vegetables, lean meats and dairy, and whole grains. · Include your family in your fitness plan. Most people think of activities such as jogging or tennis as the way to fitness, but there are many ways you and your family can be more active. Anything that makes you breathe hard and gets your heart pumping is exercise. Here are some tips: 
? Walk to do errands or to take your child to school or the bus. 
? Go for a family bike ride after dinner instead of watching TV. Where can you learn more? Go to http://diamond-donna.info/ Enter X325 in the search box to learn more about \"A Healthy Lifestyle: Care Instructions. \" Current as of: May 28, 2019Content Version: 12.4 © 4286-6275 Healthwise, Incorporated. Care instructions adapted under license by [employer] (which disclaims liability or warranty for this information). If you have questions about a medical condition or this instruction, always ask your healthcare professional. [place] [address] any warranty or liability for your use of this information. Office Policies 
 
o Phone calls/patient messages: Please allow up to 24 hours for someone in the office to contact you about your call or message. Be mindful your provider may be out of the office or your message may require further review. We encourage you to use [employer] for your messages as this is a faster, more efficient way to communicate with our office 
 
o Medication Refills: 
Prescription medications require up to 48 business hours to process. We encourage you to use [employer] for your refills. For controlled medications: Please allow up to 72 business hours to process. Certain medications may require you to  a written prescription at our office. NO narcotic/controlled medications will be prescribed after 4pm Monday through Friday or on weekends 
 
o Form/Paperwork Completion: We ask that you allow 7-14 business days. You may also download your forms to [employer] to have your doctor print off. Continue with everything you are doing to include keeping as active as possible. Continue with fall precautions use of walker and supervision Medications are on seems to be helping keep things under good control from a sundowning and agitation standpoint You are followed by primary care pretty consistently we will just see you back annually and of course sooner if needed

## 2020-05-20 NOTE — PROGRESS NOTES
Philip Owen is a 80 y.o. male who presents today for the following: Chief Complaint Patient presents with  Follow-up  Memory Loss HPI Historical Data Patient is previously been seen in our practice and is generally followed by Dr. santi bhatia. His neurologic diagnosis include the following: 
Alzheimer's dementia Behavior issues Associated diagnosis include the following: 
Long-term debility Interim Data:  
Patient is known to this practice. This is my first time seeing the patient. Chart and history reviewed in detail at today's office visit. Spoke w Wife mostly but pt on the phone as well Getting worse:  
Pain in Rt hip: PT 
Poor balance: using walker now Speech Seroquel: helpful to stay calm just using 1/2 tab along w Zoloft/ buspar: no further crying/ agitated Assistance w ADLs Still continent of B/B 
 
FALs Sleeping mostly during the day No chores Watches some TV Will walk some times Appetite is very good and gained some weight Sleeps well 1 Fall in past 6 months: if turns suddenly or stands up too quickly; no injuries Continues w good sense of humor No longer doing puzzles But will toss ball w wife and does some other exercises that physical therapy has taught him Cannot tolerate Aricept due to N/V Wife does not wish him to be on any further memory medicines. Allergies Allergen Reactions  Aricept [Donepezil] Nausea and Vomiting Mild depression  Betadine [Povidone-Iodine] Hives  Phenergan [Promethazine] Other (comments)  
  hallucinations  Seafood [Shellfish Containing Products] Rash and Swelling  
  crabmeat  Xylocaine [Lidocaine Hcl] Shortness of Breath Current Outpatient Medications Medication Sig  celecoxib (CELEBREX) 100 mg capsule take 1 capsule by mouth once daily  busPIRone (BUSPAR) 10 mg tablet TK 1 T PO BID  polyethylene glycol (MIRALAX) 17 gram/dose powder Take 17 g by mouth as needed for Other (constipation). As needed daily  sertraline (ZOLOFT) 25 mg tablet Take 1 Tab by mouth daily. Indications: Anxiousness associated with Depression (Patient taking differently: Take 25 mg by mouth daily. 1/2 tablet  Indications: anxiousness associated with depression)  metoprolol tartrate (LOPRESSOR) 25 mg tablet take 1/2 tablet once a day  Indications: high blood pressure  QUEtiapine (SEROQUEL) 25 mg tablet take 1/2 tablet by mouth at bedtime  Indications: Repeated Episodes of Anxiety (Patient taking differently: Take 25 mg by mouth nightly. 1/2 tablet by mouth at bedtime  Indications: repeated episodes of anxiety)  isosorbide mononitrate ER (IMDUR) 30 mg tablet take 1 tablet by mouth once daily  pravastatin (PRAVACHOL) 20 mg tablet take 1 tablet by mouth once daily at bedtime  folic acid-vit G1-CZL K21 (FOLBEE) 2.5-25-1 mg tablet take 1 tablet by mouth once daily  aspirin (ASPIRIN) 325 mg tablet Take 1 Tab by mouth daily. Indications: myocardial infarction prevention  acetaminophen (TYLENOL ARTHRITIS PAIN) 650 mg TbER Take 650 mg by mouth every eight (8) hours.  meclizine (ANTIVERT) 25 mg tablet Take 1 Tab by mouth three (3) times daily as needed for Dizziness.  GUAIFENESIN (MUCINEX PO) Take 600 mg by mouth daily.  docusate sodium (COLACE) 100 mg capsule Take 100 mg by mouth daily. Instructed to take daily with plenty of liquid unless otherwise directed- as per 11/7/14 [facility] discharge med list  
 cholecalciferol, vitamin D3, (VITAMIN D3) 2,000 unit tab Take 1 Tab by mouth daily. As per 11/07/14 [facility] discharge med list  
 tamsulosin (FLOMAX) 0.4 mg capsule Take 0.4 mg by mouth daily. For prostate  celecoxib (CELEBREX) 100 mg capsule Take 100 mg by mouth daily. No current facility-administered medications for this visit. Past Surgical History:  
Procedure Laterality Date  APPENDECTOMY    
 due to gangrene [facility], 02/2002 Dr. Kathrin Michaud. benign.  COLONOSCOPY  02/2004 Dr. Kathy Negrete. due q 10 yrs  CYSTOSCOPY  10/17/03 Dr. Javier Rader  HX HEART CATHETERIZATION  10/30/13 Dr. Jovanny Riojas. 80% ostal stenosis. 30% LAD.  HX HEMORRHOIDECTOMY  HX KNEE ARTHROSCOPY  1990 Left , 2007 Right Dr. Luna Jiang  HX LAPAROTOMY  05/14/07 Dr. Erica Pearson. due to Bowel Obstruction  HX POLYPECTOMY  02/2002 Anal.   
 HX TONSILLECTOMY  IN COLSC FLX W/RMVL OF TUMOR POLYP LESION SNARE TQ  5/23/2011 Dr. Karime Juan. Family History Problem Relation Age of Onset  Heart Attack Mother  Other Mother   
     brain aneurysm/AAA/varicose veins  Stroke Mother  Heart Attack Sister  Cancer Maternal Grandmother   
     ovarian Social History Socioeconomic History  Marital status:  Spouse name: Not on file  Number of children: Not on file  Years of education: Not on file  Highest education level: Not on file Tobacco Use  Smoking status: Never Smoker  Smokeless tobacco: Never Used Substance and Sexual Activity  Alcohol use: No  
 Drug use: No  
 Sexual activity: Not Currently ROS Other than what is stated above under HPI there is no new or changing issues related to the following: 
Constitutional: No recent weight change, fever,fatigue, sleep difficulties, or loss of appetite. ENT/Mouth:  No hearing loss, ringing in the ears, chronic sinus problem, nose bleeds sore throat, voice change, hoarseness, swollen glands in neck, or difficulties with chewing and swallowing. Cardiovascular:  No chest pain/angina pectoris, palpitations,swelling of feet/ankles/hands, or calf pain while walking. Respiratory: No chronic or frequent coughs, spitting up blood, shortness of breath, asthma, or wheezing. Gastrointestinal: No abdominal pain, heartburn, nausea, vomiting, constipation, frequent diarrhea, rectal bleeding, or blood in stool. Genitourinary: No frequent urination, burning or painful urination, blood in urine, incontinence or dribbling. Musculoskeletal:   No joint pain, stiffness/swelling, weakness of muscles, muscle pain/cramp, or back pain. Integument:   No rash/itching, change in skin color, change in hair/nails, or change in color/size of moles. Neurological:  No dizziness/vertigo, loss of consciousness, numbness/tingling sensation, tremors, weakness in limbs, difficulty with balance, frequent or recurring headaches, memory loss or confusion. Psychiatric:   No nervousness, depression, hallucinations, paranoia or suspiciousness. Endocrine: No excessive thirst or urination, heat or cold intolerance. Hematologic/Lymphatic: No bleeding/bruising tendency, phlebitis, or past transfusion. EXAMINATION: Within the context and limitations of a telephone encounter General appearance: Not testable Psych/mental health: 
Affect: Appropriate PHQ 
3 most recent PHQ Screens 5/14/2020 PHQ Not Done Active Diagnosis of Depression or Bipolar Disorder Little interest or pleasure in doing things - Feeling down, depressed, irritable, or hopeless - Total Score PHQ 2 -  
 
 
HEENT: Not testable Cardiovascular: Not testable Respiratory: No dyspnea, wheezes or stridors audible through conversation Musculoskeletal: Not testable Integumentary: Not testable Neurological Examination:  
Mental Status: MMSE No flowsheet data found. Formal testing was not completed   
there was nothing concerning on general observation and discussion. Alert oriented and appropriate to general conversation Normal processing on general observation Followed conversation and responded seemingly appropriate throughout the office visit No word finding difficulties noted on casual observation Able to follow directions without difficulty Cranial Nerves:   
Hearing intact to conversation Voice with normal projection Otherwise not testable Motor: Not testable Sensation: Not testable Coordination/Cerebellar: Not testable Gait: Not testable Fall risk assessment Fall Risk Assessment, last 12 mths 5/14/2020 Able to walk? Yes Fall in past 12 months? Yes Fall with injury? No  
Number of falls in past 12 months 1 Fall Risk Score 1 Reflexes: Not testable ASSESSMENT AND PLAN Alzheimer's disease continues to decline he is not on any medications but his wife who is the primary caregiver would prefer it that way. She continues to be interactive with him and gives him good support. At this time there seems to be no significant safety issues other than his risk of falls and they are taking precautions appropriately His behavior is well-managed with the use of Seroquel Zoloft and BuSpar. There are issues related to sundowning crying spells and agitation which per report are relatively well controlled and tolerating these medications well Fall risk: Continue to use walker continue with supervision continue with fall precautions ICD-10-CM ICD-9-CM 1. Alzheimer's dementia without behavioral disturbance, unspecified timing of [facility] G30.9 331.0 F02.80 294.10   
 stable 2. Cognitive and behavioral changes R41.89 799.59 R46.89 312.9   
 improving, stable 3. History of fall Z91.81 V15.88 Additional pertinent medical data reviewed at today's office visit: 
 
  
No visits with results within 3 Month(s) from this visit. Latest known visit with results is:  
Office Visit on 06/28/2019 Component Date Value  Cholesterol, total 07/24/2019 139  Triglyceride 07/24/2019 83   
 HDL Cholesterol 07/24/2019 48  VLDL, calculated 07/24/2019 17  LDL, calculated 07/24/2019 74  Creatinine, urine 07/24/2019 253.9  Microalbumin, urine 07/24/2019 104.7  Microalb/Creat ratio (ug* 07/24/2019 41.2*  Specific Gravity 07/24/2019 1.023   
  pH (UA) 07/24/2019 5.5  Color 07/24/2019 Yellow  Appearance 07/24/2019 Clear  Leukocyte Esterase 07/24/2019 Negative  Protein 07/24/2019 1+*  Glucose 07/24/2019 Negative  Ketone 07/24/2019 Negative  Blood 07/24/2019 Negative  Bilirubin 07/24/2019 Negative  Urobilinogen 07/24/2019 0.2  Nitrites 07/24/2019 Negative  Microscopic Examination 07/24/2019 See additional order  VITAMIN D, 25-HYDROXY 07/24/2019 38.4  TSH 07/24/2019 3.160   
 INTERPRETATION 07/24/2019 Note  WBC 07/24/2019 0-5  RBC 07/24/2019 0-2  Epithelial cells 07/24/2019 0-10  Casts 07/24/2019 Present*  
 Cast type 07/24/2019 Hyaline casts  Mucus 07/24/2019 Present  Bacteria 07/24/2019 Few XR Results (maximum last 3): Results from Hospital Encounter encounter on 12/14/18 XR CHEST PORT Impression IMPRESSION: No acute abnormality. Results from Hospital Encounter encounter on 12/16/17 XR CHEST PORT Impression IMPRESSION: Diminished lung volumes. No evidence of acute cardiopulmonary 
process. Results from Hospital Encounter encounter on 12/04/16 XR HIP RT W OR WO PELV 2-3 VWS Impression IMPRESSION:  Bilateral hip osteoarthritis. No acute abnormality. CT Results (maximum last 3): Results from [facility] Encounter encounter on 06/23/19 CT HEAD WO CONT Impression IMPRESSION: No acute intracranial hemorrhage, mass or infarct. Stable pattern of 
atrophy and chronic white matter change most compatible with small vessel 
ischemic and/or senescent change. Intracranial atherosclerosis. Results from [facility] Encounter encounter on 12/20/18 CT CHEST WO CONT Impression IMPRESSION:  
1. No airspace disease or other acute abnormality. 2. Atherosclerosis with mild coronary artery calcification. Results from Hospital Encounter encounter on 12/16/17 CT CHEST WO CONT  Impression IMPRESSION: 
 Bilateral dependent atelectasis. No evidence of pneumonia or pleural effusion. MRI Results (maximum last 3): Results from Hospital Encounter encounter on 10/23/17 MRI BRAIN WO CONT Impression IMPRESSION:  
Moderate to severe chronic microvascular ischemic change with severe temporal 
predominant cerebral atrophy. No intracranial hemorrhage or evidence of acute infarction. Stable left acoustic schwannoma. Results from Hospital Encounter encounter on 10/28/14 MRA BRAIN WO CONT  
MRI BRAIN WO CONT I attest that the telephone visit was 15 minutes long Pierce Daniels MS, ANP-BC, [facility]

## 2020-08-13 NOTE — TELEPHONE ENCOUNTER
----- Message from Walt Hernandez sent at 8/12/2020  9:34 AM EDT -----  Regarding: Dr. Wilton Del Cid would like a call back regarding getting her  case summary for his health.  Contact is  [phone]

## 2020-08-14 NOTE — TELEPHONE ENCOUNTER
Spoke with patients wife and she was requesting the last OV so that she could take it to the [place] in hopes they will build a ramp on her house for free

## 2020-09-05 NOTE — ED NOTES
Bedside shift change report given to Ty RN (oncoming nurse) by Mery Pierce (offgoing nurse). Report included the following information SBAR, Kardex, ED Summary, Intake/Output, MAR and Recent Results.
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
## 2020-09-05 NOTE — ED NOTES
Bedside shift change report given to TY RN (oncoming nurse) by [facility] RN (offgoing nurse). Report included the following information SBAR, ED Summary, MAR and Recent Results.
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
## 2020-09-05 NOTE — DISCHARGE INSTRUCTIONS
Patient Education        Alzheimer's Disease: Care Instructions  Your Care Instructions     Alzheimer's disease is a type of dementia. It causes memory loss and affects judgment, language, and behavior. You may have trouble making decisions or may get lost in places that you used to know well. Alzheimer's disease is different than mild memory loss that occurs with aging. It is not clear what causes Alzheimer's disease, but it is the most common form of dementia in older adults. Finding out that you have this disease is a shock. You may be afraid and worried about how the condition will change your life. Although there is no cure at this time, medicine in some cases may slow memory loss for a while. Other medicines may be able to help you sleep or cope with depression and behavior changes. Alzheimer's disease is different for everyone. It may take many years to develop. In some cases, people can function well for a long time. In the early stage of the disease, you can do things at home to make life easier and safer. You also can keep doing your hobbies and other activities. Many people find comfort in planning now for their future needs. Follow-up care is a key part of your treatment and safety. Be sure to make and go to all appointments, and call your doctor if you are having problems. It's also a good idea to know your test results and keep a list of the medicines you take. How can you care for yourself at home? Taking care of yourself  · If your doctor gives you medicines, take them exactly as prescribed. Call your doctor if you think you are having a problem with your medicine. You will get more details on the medicines your doctor prescribes. · Eat a balanced diet. Get plenty of whole grains, fruits, and vegetables every day. If you are not hungry at mealtimes, eat snacks at midmorning and in the afternoon.  Try drinks such as Boost, Ensure, or Sustacal if you are having trouble keeping your weight up.  · Stay active. Exercise such as walking may slow the decline of your mental abilities. Try to stay active mentally too. Read and work crossword puzzles if you enjoy these activities. · If you have trouble sleeping, do not nap during the day. Get regular exercise (but not within several hours of bedtime). Drink a glass of warm milk or caffeine-free herbal tea before going to bed. · Ask your doctor about support groups and other resources in your area. They can help people who have Alzheimer's disease and their families. · Be patient. You may find that a task takes you longer than it used to. · If you have not already done so, make a list of advance directives. Advance directives are instructions to your doctor and family members about what kind of care you want if you become unable to speak or express yourself. Talk to a  about making a will, if you do not already have one. Keeping schedules  · Develop a routine. You will feel less frustrated or confused if you have a clear, simple plan of what to do every day. ? Make lists of your medicines and when to take them. ? Write down appointments and other tasks in a calendar. ? Put sticky notes around the house to help you remember events and other things you have to do. ? Schedule activities and tasks for times of the day when you are best able to handle them. Staying safe  · Tell someone when you are going out and where you are going. Let the person know when you will be back. Before you go out alone, write down where you are going, how to get there, and how to get back home. Do this even if you have gone there many times before. Take someone along with you when possible. · Make your home safe. Tack down rugs, put no-slip tape in the tub, use handrails, and put safety switches on stoves and appliances. · Have a family member or other caregiver tell you whether you are driving badly. Deciding to stop driving is very hard for many people.  Driving helps you feel independent. Your state 's license bureau can do a driving test if there is any question. Plan for other means of getting around when you are no longer able to drive. · Use strong lighting, especially at night. Put night-lights in bedrooms, hallways, and bathrooms. · Lower the hot water temperature setting to 120°F or lower to avoid burns. When should you call for help? Call 911 anytime you think you may need emergency care. For example, call if:    · You are lost and do not know whom to call.     · You are injured and do not know whom to call. Call your doctor now or seek immediate medical care if:    · Your symptoms suddenly get much worse. Watch closely for changes in your health, and be sure to contact your doctor if:    · You want more information about how you can take care of yourself. Where can you learn more? Go to http://diamond-donna.info/  Enter Y179 in the search box to learn more about \"Alzheimer's Disease: Care Instructions. \"  Current as of: January 31, 2020               Content Version: 12.6  © 3026-3290 [employer]. Care instructions adapted under license by [employer] (which disclaims liability or warranty for this information). If you have questions about a medical condition or this instruction, always ask your healthcare professional. [address] any warranty or liability for your use of this information.

## 2020-09-30 NOTE — ED PROVIDER NOTES
EMERGENCY DEPARTMENT HISTORY AND PHYSICAL EXAM 
 
 
Please note that this dictation was completed with the assistance of \"Dragon\", the computer voice recognition software. Quite often unanticipated grammatical, syntax, homophones, and other interpretive errors are inadvertently transcribed by the computer software. Please disregard these errors and any errors that have escaped final proofreading. Thank you. Patient Name: Tori Rosario : 10/24/1928 MRN: [id number] History of Presenting Illness Chief Complaint Patient presents with  Altered mental status LKW 1400 History Provided By: Patient and wife, EMS 
 
HPI: Tori Rosario, 80 y.o. male with past medical history as documented below presents to the ED with c/o of acute onset of AMS and unresponsive episode occurring at [facility]. Per wife, last well known normal was 2PM. Pt aphasic at baseline due to prior CVA but wife notes patient more lethargic than usual. Per EMS, no facial droop or unilateral weakness. BS was 154. At CT, pt started to scream and ramble. No seizure like activity. Level 1 code stroke ws called. Per wife, pt has had decreased oral intake. Pt has hx of Alzheimer's dementia and history is limited. History is limited due to dementia and AMS. There are no other complaints, changes or physical findings pertinent to the HPI at this time. PCP: Sharda Morataya MD 
 
Past History Past Medical History: 
Past Medical History:  
Diagnosis Date  Alzheimer's dementia with behavioral disturbance ([facility] [address] 2018 Dr. Latasha Bacon.  Back pain   
 due to DDD and scoliosis  BPH (benign prostatic hyperplasia) Dr. Monico Escamilla  CAD (coronary artery disease) 10/30/13  
 30%LAD, 80% ostal stenosis. Dr. Robert Brown.  Chest pain 10/04/04, 10/30/13 Dr. Elissa Tariq. Negative Stress Cardiolite Test.  Dr. Tamra Copeland.  Chest pain 2018 chest pain and tightness  Chronic venous insufficiency 06/15/09 Dr. Shania Wong  Clavicle fracture 1981 Lt due to fall  DDD (degenerative disc disease), lumbar L3-S1  Depression  Diverticulosis 02/2004  
 sigmoid. Dr. Irvin Miles  DJD (degenerative joint disease) of cervical spine C 4-5 ;5-6  Dyslipidemia  Fracture 1980  
 facial/nasal  
 Hearing loss   
 wears hearing aid  Heartburn  Hemorrhoids, internal 1987 Dr. Venus Bowman  Hernia of unspecified site of abdominal cavity without mention of obstruction or gangrene   
 inguinal  Lt  Hypertension  Impotence  Left acoustic neuroma ([address] 10/28/14  
 17 x 9 mm schwannoma.  Pes planus of both feet  PVD (peripheral vascular disease) ([address] 2009  Scoliosis LS  
 Slipped intervertebral disc 1954 chiropractor  SOB (shortness of breath) 08/07/13 Dr. Marion Arnold.  Stroke ([facility] [address] 10/28/2014  
 small Left Thalamus. Dr. Suzanne Lim. Dr. Abril Orantes.  Syncope 12/20/2018  
 pt experienced fainting spells  Urinary incontinence   
 due to BPH. Dr. Dilia Dominguez  Varicose vein 06/15/09 Dr. Shania Wong  Vertigo 2003  
 due to inner ear. Dr. Cruz Moya. Dr. Stevens Mode. Dr. Deanna Johnson  Visual loss Dr. Giovany Garcia, ophth. Past Surgical History: 
Past Surgical History:  
Procedure Laterality Date  APPENDECTOMY    
 due to gangrene [address], 02/2002 Dr. Mary Ellen Joya. benign.  COLONOSCOPY  02/2004 Dr. Venus Bowman. due q 10 yrs  CYSTOSCOPY  10/17/03 Dr. Melva Boyd  HX HEART CATHETERIZATION  10/30/13 Dr. Ana Gooden. 80% ostal stenosis. 30% LAD.  HX HEMORRHOIDECTOMY  HX KNEE ARTHROSCOPY  1990 Left , 2007 Right Dr. Ursula Dominguez  HX LAPAROTOMY  05/14/07 Dr. Subhash Wallace. due to Bowel Obstruction  HX POLYPECTOMY  02/2002 Anal.   
 HX TONSILLECTOMY  TN COLSC FLX W/RMVL OF TUMOR POLYP LESION SNARE TQ  5/23/2011 Dr. Keyon Osorio. Family History: 
Family History Problem Relation Age of Onset  Heart Attack Mother  Other Mother   
     brain aneurysm/AAA/varicose veins  Stroke Mother  Heart Attack Sister  Cancer Maternal Grandmother   
     ovarian Social History: 
Social History Tobacco Use  Smoking status: Never Smoker  Smokeless tobacco: Never Used Substance Use Topics  Alcohol use: No  
 Drug use: No  
 
 
Allergies: Allergies Allergen Reactions  Aricept [Donepezil] Nausea and Vomiting Mild depression  Betadine [Povidone-Iodine] Hives  Phenergan [Promethazine] Other (comments)  
  hallucinations  Seafood [Shellfish Containing Products] Rash and Swelling  
  crabmeat  Xylocaine [Lidocaine Hcl] Shortness of Breath Current Medications: No current facility-administered medications on file prior to encounter. Current Outpatient Medications on File Prior to Encounter Medication Sig Dispense Refill  QUEtiapine (SEROquel) 25 mg tablet Take 0.5 Tabs by mouth two (2) times a day. take 1/2 tablet by mouth at bedtime  Indications: repeated episodes of anxiety 45 Tab 3  
 folic acid-vit H2-EMO L05 (Folbee) 2.5-25-1 mg tablet TAKE 1 TABLET BY MOUTH ONCE DAILY 90 Tab 3  celecoxib (CELEBREX) 100 mg capsule take 1 capsule by mouth once daily  busPIRone (BUSPAR) 10 mg tablet Take 1 10mg tablet by mouth twice a day  polyethylene glycol (MIRALAX) 17 gram/dose powder Take 17 g by mouth as needed for Other (constipation). As needed daily  celecoxib (CELEBREX) 100 mg capsule Take 100 mg by mouth daily. 0  
 sertraline (ZOLOFT) 25 mg tablet Take 1 Tab by mouth daily.  Indications: Anxiousness associated with Depression (Patient not taking: Reported on 9/16/2020) 90 Tab 3  
 metoprolol tartrate (LOPRESSOR) 25 mg tablet take 1/2 tablet once a day  Indications: high blood pressure 45 Tab 3  
 isosorbide mononitrate ER (IMDUR) 30 mg tablet take 1 tablet by mouth once daily 90 Tab 3  pravastatin (PRAVACHOL) 20 mg tablet take 1 tablet by mouth once daily at bedtime 90 Tab 3  
 aspirin (ASPIRIN) 325 mg tablet Take 1 Tab by mouth daily. Indications: myocardial infarction prevention 90 Tab 3  
 meclizine (ANTIVERT) 25 mg tablet Take 1 Tab by mouth three (3) times daily as needed for Dizziness. 20 Tab 0  
 GUAIFENESIN (MUCINEX PO) Take 600 mg by mouth daily. Take 1 tab  docusate sodium (COLACE) 100 mg capsule Take 100 mg by mouth daily. Instructed to take daily with plenty of liquid unless otherwise directed- as per 11/7/14 [facility] discharge med list    
 cholecalciferol, vitamin D3, (VITAMIN D3) 2,000 unit tab Take 1 Tab by mouth daily. As per 11/07/14 [facility] discharge med list    
 tamsulosin (FLOMAX) 0.4 mg capsule Take 0.4 mg by mouth daily. For prostate Review of Systems Review of Systems Unable to perform ROS: Mental status change Physical Exam  
Physical Exam 
Vitals signs and nursing note reviewed. Constitutional:   
   General: He is in acute distress. Appearance: He is well-developed. He is ill-appearing, toxic-appearing and diaphoretic. HENT:  
   Head: Normocephalic and atraumatic. Comments: Very dry mucous membranes Mouth/Throat:  
   Pharynx: No posterior oropharyngeal erythema. Eyes:  
   Conjunctiva/sclera: Conjunctivae normal.  
   Pupils: Pupils are equal, round, and reactive to light. Neck: Musculoskeletal: Normal range of motion. Cardiovascular:  
   Rate and Rhythm: Normal rate and regular rhythm. Heart sounds: Normal heart sounds. No murmur. No friction rub. No gallop. Pulmonary:  
   Effort: Pulmonary effort is normal. No respiratory distress. Breath sounds: Normal breath sounds. No wheezing or rales. Chest:  
   Chest wall: No tenderness. Abdominal:  
   General: Bowel sounds are normal. There is no distension. Palpations: Abdomen is soft. There is no mass. Tenderness: There is no abdominal tenderness. There is no guarding or rebound. Musculoskeletal: Normal range of motion. General: No tenderness or deformity. Skin: 
   General: Skin is warm. Findings: No rash. Neurological:  
   Mental Status: He is lethargic, disoriented and confused. GCS: GCS eye subscore is 3. GCS verbal subscore is 4. GCS motor subscore is 5. Cranial Nerves: No cranial nerve deficit. Motor: No abnormal muscle tone. Coordination: Coordination normal.  
   Deep Tendon Reflexes: Reflexes normal.  
Psychiatric:     
   Behavior: Behavior is cooperative. Diagnostic Study Results Labs -  
I have personally reviewed and interpreted all laboratory results. Recent Results (from the past 24 hour(s)) EKG, 12 LEAD, INITIAL Collection Time: 09/30/20  5:18 PM  
Result Value Ref Range Ventricular Rate 68 BPM  
 Atrial Rate 68 BPM  
 P-R Interval 188 ms QRS Duration 92 ms Q-T Interval 396 ms QTC Calculation (Bezet) 421 ms Calculated P Axis 47 degrees Calculated R Axis -7 degrees Calculated T Axis 48 degrees Diagnosis Normal sinus rhythm Normal ECG When compared with ECG of 23-JUN-2019 17:18, No significant change was found CBC WITH AUTOMATED DIFF Collection Time: 09/30/20  5:19 PM  
Result Value Ref Range WBC 5.8 4.1 - 11.1 K/uL  
 RBC 4.20 4. 10 - 5.70 M/uL  
 HGB 13.5 12.1 - 17.0 g/dL HCT 40.8 36.6 - 50.3 % MCV 97.1 80.0 - 99.0 FL  
 MCH 32.1 26.0 - 34.0 PG  
 MCHC 33.1 30.0 - 36.5 g/dL  
 RDW 13.5 11.5 - 14.5 % PLATELET 673 839 - 423 K/uL MPV 10.5 8.9 - 12.9 FL  
 NRBC 0.0 0  WBC ABSOLUTE NRBC 0.00 0.00 - 0.01 K/uL NEUTROPHILS 58 32 - 75 % LYMPHOCYTES 23 12 - 49 % MONOCYTES 12 5 - 13 % EOSINOPHILS 5 0 - 7 % BASOPHILS 1 0 - 1 % IMMATURE GRANULOCYTES 1 (H) 0.0 - 0.5 % ABS. NEUTROPHILS 3.4 1.8 - 8.0 K/UL  
 ABS. LYMPHOCYTES 1.3 0.8 - 3.5 K/UL ABS. MONOCYTES 0.7 0.0 - 1.0 K/UL  
 ABS. EOSINOPHILS 0.3 0.0 - 0.4 K/UL  
 ABS. BASOPHILS 0.0 0.0 - 0.1 K/UL  
 ABS. IMM. GRANS. 0.0 0.00 - 0.04 K/UL  
 DF AUTOMATED METABOLIC PANEL, COMPREHENSIVE Collection Time: 09/30/20  5:19 PM  
Result Value Ref Range Sodium 140 136 - 145 mmol/L Potassium 4.3 3.5 - 5.1 mmol/L Chloride 106 97 - 108 mmol/L  
 CO2 31 21 - 32 mmol/L Anion gap 3 (L) 5 - 15 mmol/L Glucose 107 (H) 65 - 100 mg/dL BUN 25 (H) 6 - 20 MG/DL Creatinine 1.32 (H) 0.70 - 1.30 MG/DL  
 BUN/Creatinine ratio 19 12 - 20 GFR est AA >60 >60 ml/min/1.73m2 GFR est non-AA 51 (L) >60 ml/min/1.73m2 Calcium 9.2 8.5 - 10.1 MG/DL Bilirubin, total 0.2 0.2 - 1.0 MG/DL  
 ALT (SGPT) 18 12 - 78 U/L  
 AST (SGOT) 13 (L) 15 - 37 U/L Alk. phosphatase 83 45 - 117 U/L Protein, total 7.2 6.4 - 8.2 g/dL Albumin 3.4 (L) 3.5 - 5.0 g/dL Globulin 3.8 2.0 - 4.0 g/dL A-G Ratio 0.9 (L) 1.1 - 2.2 PROTHROMBIN TIME + INR Collection Time: 09/30/20  5:19 PM  
Result Value Ref Range INR 1.1 0.9 - 1.1 Prothrombin time 11.8 (H) 9.0 - 11.1 sec TROPONIN I Collection Time: 09/30/20  5:19 PM  
Result Value Ref Range Troponin-I, Qt. <0.05 <0.05 ng/mL CK Collection Time: 09/30/20  5:19 PM  
Result Value Ref Range CK 42 39 - 308 U/L  
GLUCOSE, POC Collection Time: 09/30/20  5:29 PM  
Result Value Ref Range Glucose (POC) 111 (H) 65 - 100 mg/dL Performed by Terrance Garrido (EDT) DRUG SCREEN, URINE Collection Time: 09/30/20  6:31 PM  
Result Value Ref Range AMPHETAMINES Negative NEG    
 BARBITURATES Negative NEG BENZODIAZEPINES Negative NEG    
 COCAINE Negative NEG METHADONE Negative NEG    
 OPIATES Negative NEG    
 PCP(PHENCYCLIDINE) Negative NEG    
 THC (TH-CANNABINOL) Negative NEG Drug screen comment (NOTE) URINALYSIS W/ REFLEX CULTURE Collection Time: 09/30/20  6:31 PM  
 Specimen: Urine Result Value Ref Range Color YELLOW/STRAW Appearance CLEAR CLEAR Specific gravity 1.022 1.003 - 1.030    
 pH (UA) 6.0 5.0 - 8.0 Protein Negative NEG mg/dL Glucose Negative NEG mg/dL Ketone Negative NEG mg/dL Bilirubin Negative NEG Blood Negative NEG Urobilinogen 0.2 0.2 - 1.0 EU/dL Nitrites Negative NEG Leukocyte Esterase Negative NEG    
 WBC 0-4 0 - 4 /hpf  
 RBC 0-5 0 - 5 /hpf Epithelial cells FEW FEW /lpf Bacteria Negative NEG /hpf  
 UA:UC IF INDICATED CULTURE NOT INDICATED BY UA RESULT CNI Hyaline cast 0-2 0 - 5 /lpf  
AMMONIA Collection Time: 10/01/20  1:02 AM  
Result Value Ref Range Ammonia 19 <32 UMOL/L  
DRUG SCREEN, URINE Collection Time: 10/01/20  1:02 AM  
Result Value Ref Range AMPHETAMINES Negative NEG    
 BARBITURATES Negative NEG BENZODIAZEPINES Negative NEG    
 COCAINE Negative NEG METHADONE Negative NEG    
 OPIATES Negative NEG    
 PCP(PHENCYCLIDINE) Negative NEG    
 THC (TH-CANNABINOL) Negative NEG Drug screen comment (NOTE) Radiologic Studies -  
I have personally reviewed and interpreted all imaging studies and agree with radiology interpretation and report. XR CHEST PORT Final Result IMPRESSION: No Acute Disease. CT CODE NEURO HEAD WO CONTRAST Final Result IMPRESSION: No CT apparent acute intracranial finding. MRI BRAIN WO CONT    (Results Pending) CT Results  (Last 48 hours) 09/30/20 1640  CT CODE NEURO HEAD WO CONTRAST Final result Impression:  IMPRESSION: No CT apparent acute intracranial finding. Narrative:  EXAM: Head CT without Contrast:  
   
TECHNIQUE: Unenhanced CT Head is performed. CT dose reduction was achieved  
through use of a standardized protocol tailored for this examination and  
automatic exposure control for dose modulation. INDICATION:  Code Stroke FINDINGS: There is no apparent mass, and no bleed, shift, obstructive hydrocephalus or significant extra-axial fluid collection. Regions of  
parenchymal hypodensity are present, nonspecific, but favoring chronic  
microangiopathy-although difficult to exclude concomitant acute ischemia. Bone  
windows are unremarkable. CXR Results  (Last 48 hours) 09/30/20 1946  XR CHEST PORT Final result Impression:  IMPRESSION: No Acute Disease. Narrative:  EXAM: Portable CXR. 1942 hours INDICATION: Stroke, admission. FINDINGS:  
The lungs appear clear. Heart is normal in size. There is no pulmonary edema. There is no evident pneumothorax, adenopathy or pleural effusion. Medical Decision Making I reviewed the vital signs, available nursing notes, past medical history, past surgical history, family history and social history. Vital Signs-Reviewed the patient's vital signs. Patient Vitals for the past 24 hrs: 
 Temp Pulse Resp BP SpO2  
10/01/20 0115 98.9 °F (37.2 °C) 70 16 (!) 129/95 98 % 10/01/20 0050  63 14  98 % 10/01/20 0015  (!) 59 14 (!) 144/77 97 % 09/30/20 2243  71 18  98 % 09/30/20 2230  (!) 59 16 (!) 138/92 98 % 09/30/20 2115  (!) 56 14 (!) 152/84 100 % 09/30/20 2030  65 17 121/68 94 % 09/30/20 1900  69 13 (!) 142/83 95 % 09/30/20 1730  70 16 (!) 150/87 97 % 09/30/20 1715  69 14 (!) 142/83 97 % 09/30/20 1708 98.7 °F (37.1 °C) 72 16 (!) 149/88 95 % Pulse Oximetry Analysis - 95% on RA Cardiac Monitor:  
Rate: 72 bpm 
Rhythm: Normal Sinus Rhythm ED EKG interpretation: 
Rhythm: normal sinus rhythm; and regular . Rate (approx.): 68; Axis: normal; P wave: normal; QRS interval: normal ; ST/T wave: normal; Other findings: normal. This EKG was interpreted by Poornima Woods M.D. Records Reviewed: Nursing Notes, Old Medical Records, Previous electrocardiograms, Previous Radiology Studies and Previous Laboratory Studies Provider Notes (Medical Decision Making):  
 Patient presents with stroke-like symptoms and seen immediately by me on arrival. Spoke with EMS and/or family regarding symptoms, Time of onset, vitals and normal glucose. DDx: ischemic vs. Hemorrhagic stroke, complex migraine, dre's paralysis. Given the history and physical, will get a CT head and Neurology consult to determine if tPA warranted. Will continue to monitor closely in the ED and will ultimately need admission for further w/u. Patient presenting with altered mental status. Pt has stable vitals and POC glucose was checked immediately upon arrival. DDx: medication toxicity, infection, anemia, electrolyte/metabolic anomoly, hypercapnea, stroke/bleed/mass, dehydration, illicit drug intoxication. Will obtain labwork, UA, EKG and CT imaging of the head, chest xray. Will consider adding toxicologic workup if history unclear or warrants further investigation of toxic source. Will continue to monitor and reassess for admission. ED Course:  
I am the first provider for this patient's ED visit today. Initial assessment performed. I discussed presenting problems, concerns and my formulated plan for today's visit with the patient and any available family members at bedside. I encouraged them to ask questions as they arise throughout the visit. HYPERTENSION COUNSELING For 10 minutes, education was provided to the patient today regarding their hypertension. Patient is made aware of their elevated blood pressure and is instructed to follow up this week with their Primary Care for a recheck. Patient is counseled regarding consequences of chronic, uncontrolled hypertension including kidney disease, heart disease, stroke or even death. Patient states their understanding and agrees to follow up this week. Additionally, during their visit, I discussed sodium restriction, maintaining ideal body weight and regular exercise program as physiologic means to achieve blood pressure control.  The patient will strive towards this. I reviewed our electronic medical record system for any past medical records that were available that may contribute to the patient's current condition, the nursing notes and vital signs from today's visit. Zeyad Hernandez MD 
 
ED Orders Placed : 
Orders Placed This Encounter  CVA QUICKLIST INITIATED IN ED (TRACKING; DO NOT DESELECT)  MECHANICAL PROPHYLAXIS IS CONTRAINDICATED Other, please document Already on Anticoagulation  CT CODE NEURO HEAD WO CONTRAST  XR CHEST PORT  
 MRI BRAIN WO CONT  CBC WITH AUTOMATED DIFF  
 METABOLIC PANEL, COMPREHENSIVE  
 PROTHROMBIN TIME + INR  TROPONIN I  
 CK  DRUG SCREEN, URINE  
 URINALYSIS W/ REFLEX CULTURE  
 METABOLIC PANEL, BASIC  CBC W/O DIFF  
 HEMOGLOBIN A1C WITH EAG  
 AMMONIA  DRUG SCREEN, URINE  RPR  TSH 3RD GENERATION  
 VITAMIN B12  
 DIET REGULAR  
 CARDIAC MONITOR - ED ONLY  
 PULSE OXIMETRY CONTINUOUS  
 VITAL SIGNS  
 INITIATE NURSING DYSPHAGIA SCREENING  
 MEASURE HEIGHT  WEIGH PATIENT  NEUROLOGIC STATUS ASSESSMENT  
 NIH STROKE SCALE ASSESSMENT  ELEVATE HEAD OF BED  OXYGEN CANNULA  POC GLUCOSE  
 NOTIFY PROVIDER: SPECIFY Notify provider on pt's arrival to floor ONE TIME STAT  
 INTAKE AND OUTPUT  VITAL SIGNS PER UNIT ROUTINE [address]  ACTIVITY AS TOLERATED W/ASSIST  NOTIFY PROVIDER: SPECIFY Notify physician for pulse less than 50 or greater than 120, respiratory rate less than 12 or greater than 25, oral temperature greater than 101.3 F (33.4 C), systolic BP less than 90 or greater than 395, diastolic BP less. ..  INTAKE AND OUTPUT  
 NURSING-MISCELLANEOUS: Palpate, scan or straight cath and document bladder residual as needed CONTINUOUS  
 DO NOT RESUSCITATE  
 IP CONSULT TO NEUROLOGY  
 OXYGEN CANNULA Liters per minute: 2; Indications for O2 therapy: HYPOXIA PRN Routine  GLUCOSE, POC  EKG, 12 LEAD, INITIAL  
 SALINE LOCK IV ONE TIME STAT  DISCONTD: alteplase (ACTIVASE) 100 mg infusion  iopamidoL (ISOVUE-370) 76 % injection 100 mL  sodium chloride (NS) flush 10 mL  DISCONTD: LORazepam (ATIVAN) injection 2 mg  DISCONTD: LORazepam (ATIVAN) 2 mg/mL injection  haloperidol lactate (HALDOL) injection 5 mg  LORazepam (ATIVAN) injection 2 mg  aspirin tablet 325 mg  
 busPIRone (BUSPAR) tablet 10 mg  
 isosorbide mononitrate ER (IMDUR) tablet 30 mg  
 metoprolol tartrate (LOPRESSOR) tablet 12.5 mg  
 pravastatin (PRAVACHOL) tablet 20 mg  
 QUEtiapine (SEROquel) tablet 12.5 mg  
 sertraline (ZOLOFT) tablet 25 mg  
 tamsulosin (FLOMAX) capsule 0.4 mg  
 sodium chloride (NS) flush 5-40 mL  sodium chloride (NS) flush 5-40 mL  OR Linked Order Group  acetaminophen (TYLENOL) tablet 650 mg  
  acetaminophen (TYLENOL) suppository 650 mg  
 polyethylene glycol (MIRALAX) packet 17 g  OR Linked Order Group  promethazine (PHENERGAN) tablet 12.5 mg  
  ondansetron (ZOFRAN) injection 4 mg  enoxaparin (LOVENOX) injection 40 mg  LORazepam (ATIVAN) injection 2 mg  IP CONSULT TO HOSPITALIST  INITIAL PHYSICIAN ORDER: INPATIENT Medical; 3. Patient receiving treatment that can only be provided in an inpatient setting (further clarification in H&P documentation)  IP CONSULT TO CASE MANAGEMENT  
 
 
ED Medications Administered: 
Medications  
iopamidoL (ISOVUE-370) 76 % injection 100 mL ( IntraVENous Canceled Entry 9/30/20 1640)  
sodium chloride (NS) flush 10 mL ( IntraVENous Canceled Entry 9/30/20 1640) aspirin tablet 325 mg (0 mg Oral Held 10/5/20 0900) isosorbide mononitrate ER (IMDUR) tablet 30 mg (0 mg Oral Held 10/5/20 0900) metoprolol tartrate (LOPRESSOR) tablet 12.5 mg (0 mg Oral Held 10/5/20 0900) pravastatin (PRAVACHOL) tablet 20 mg (0 mg Oral Held 10/5/20 0900)  
sertraline (ZOLOFT) tablet 25 mg (0 mg Oral Held 10/5/20 0900)  
tamsulosin (FLOMAX) capsule 0.4 mg (0 mg Oral Held 10/5/20 0900) sodium chloride (NS) flush 5-40 mL (10 mL IntraVENous Given 10/5/20 2148)  
sodium chloride (NS) flush 5-40 mL (has no administration in time range)  
acetaminophen (TYLENOL) tablet 650 mg ( Oral See Alternative 10/6/20 0058) Or  
acetaminophen (TYLENOL) suppository 650 mg (650 mg Rectal Given 10/6/20 0058) polyethylene glycol (MIRALAX) packet 17 g (has no administration in time range)  
promethazine (PHENERGAN) tablet 12.5 mg (has no administration in time range) Or  
ondansetron (ZOFRAN) injection 4 mg (has no administration in time range)  
enoxaparin (LOVENOX) injection 40 mg (40 mg SubCUTAneous Given 10/5/20 0934) hydrALAZINE (APRESOLINE) 20 mg/mL injection 10 mg (10 mg IntraVENous Given 10/6/20 0054) dextrose 5% - 0.9% NaCl with KCl 20 mEq/L infusion (75 mL/hr IntraVENous New Bag 10/6/20 0140) haloperidol lactate (HALDOL) injection 5 mg (5 mg IntraMUSCular Given 9/30/20 1814) LORazepam (ATIVAN) injection 2 mg (2 mg IntraVENous Given 9/30/20 1932) LORazepam (ATIVAN) injection 2 mg (2 mg IntraVENous Given 10/1/20 0106)  
haloperidol lactate (HALDOL) injection 1 mg (1 mg IntraVENous Given 10/2/20 1721) Progress Note: 
4:30 PM 
Given timeframe of sx's and meeting window for TPA, level 1 code stroke called. Progress Note: 
4:31 PM 
Pt now in CT, screaming but consolable, teleneuro paged. Consult Note: 
4:45 PM 
Yoon Bruno MD spoke with Dr. Ben Roche, Specialty: Tele-Neurologist 
Discussed pt's hx, disposition, and available diagnostic and imaging results. Reviewed care plans. Agree with management and plan thus far. Consultant will evaluate pt. Progress Note: 
Pt back in room, per wife, pt back to baseline. Per Neuro, not TPA candidate, suspect sx's c/w worsening dementia. EKG NSR, CT head negative, will need MRI brain, neuro evaluation. Will give aspirin and monitor. Progress Note: Further discussion with wife, although no seizure activity, will need to r/o seizure disorder given unresponsiveness episode. Will need MRI/EEG/Neuro evaluation. Consult Note: 
Marissa Cannon MD spoke with Dr. Carson Morocho Specialty: Hospitalist 
Discussed pt's hx, disposition, and available diagnostic and imaging results. Reviewed care plans. Agree with management and plan thus far. Consultant will evaluate pt for admission. CRITICAL CARE NOTE : 
IMPENDING DETERIORATION -Cardiovascular, CNS, Metabolic and Renal 
ASSOCIATED RISK FACTORS - Dysrhythmia, Metabolic changes, Dehydration, Vascular Compromise and CNS Decompensation MANAGEMENT- Bedside Assessment and Supervision of Care INTERPRETATION -  Xrays, CT Scan, ECG, Blood Pressure and Cardiac Output Measures INTERVENTIONS - hemodynamic mngmt, Neurologic interventions  and Metobolic interventions CASE REVIEW - Hospitalist, Medical Sub-Specialist, Nursing and Family TREATMENT RESPONSE -Improved PERFORMED BY - Self NOTES   : 
I personally spent 45 minutes of critical care time. This is time spent at this critically ill patient's bedside actively involved in patient care as well as the coordination of care and discussions with the patient's family. This includes time involved in lab review, consultations with specialist, family decision-making, bedside attention and documentation. During this entire length of time I was immediately available to the patient. This does not include any procedural time which has been billed separately. Critical Care: The reason for providing this level of medical care for this critically-ill patient was due to a critical illness that impaired one or more vital organ systems, such that there was a high probability of imminent or life-threatening deterioration in the patient's condition. This care involved the highest level of preparedness to intervene urgently.  This care involved high complexity decision making to assess, manipulate, and support vital system functions, to treat this degree of vital organ system failure, and to prevent further life threatening deterioration of the patients condition requiring frequent assessments and interventions. Valencia Juarez MD 
 
Disposition: Admit Patient is being admitted to the hospital. The results of their tests and reasons for their admission have been discussed with them and/or available family. I have discussed the case with the admitting specialist and expressed my high concern for decompensation if patient is discharged from the ED. The patient and/or available family convey agreement and understanding for the need to be admitted and for their admission diagnosis. Consultation has been made with the inpatient physician specialist for hospitalization. Diagnosis Clinical Impression: 1. Altered mental status, unspecified altered mental status type 2. Late onset Alzheimer's disease with behavioral disturbance ([facility] [address] 3. Moderate dehydration 4. Unusual change in behavior 5. Accelerated hypertension Attestation: 
Cassi Cisneros MD, am the attending of record for this patient. I personally performed the services described in this documentation on this date, 9/30/2020 for patientXenia. I have reviewed the chart and verified that the record is accurate and complete. This note will not be viewable in [address]

## 2020-09-30 NOTE — ED NOTES
Per wife pt had decreased water intake but was eating normally and talking a lot and then went unresponsive. Pt was recently prescribed Depakote to control his behavioral outbursts but she has not filled the prescription yet. Pt has advanced dementia.

## 2020-09-30 NOTE — ED NOTES
1642: Pt taken off CT table and onto ED stretcher and pt started screaming. Pt rambling and difficult to understand. 1650: teleneuro paged 1653: Dr. Robbin Amaro (neurology) responded and at bedside to evaluate pt. 
 
1656: Per Dr. Robbin Amaro pt not a candidate for TPA, no NIH performed, and no further imaging required.

## 2020-09-30 NOTE — ED NOTES
Report received from Danny Mcocrd. Advised of patient's chief complaint, orders that are completed, any outstanding orders that still need to be completed, and the current treatment plan. All questions addressed prior to assumption of patient care at this time.

## 2020-09-30 NOTE — ED NOTES
1635: Pt to CT Pt arrives to ED via EMS unresponsive. Pt LKW was 1400. Pt has hx of previous CVA and is aphasic at baseline. Pt lives at home with wife and per EMS wife is a poor historian. BG en route was 154.

## 2020-09-30 NOTE — ED NOTES
Wife at [facility]. Per wife pt is currently at baseline. Pt A&O to self and rambling. Pt currently laughing and pleasant.

## 2020-10-01 PROBLEM — R46.89 BEHAVIORAL CHANGE: Status: ACTIVE | Noted: 2020-01-01

## 2020-10-01 PROBLEM — F03.90 DEMENTIA (HCC): Status: ACTIVE | Noted: 2020-01-01

## 2020-10-01 PROBLEM — R41.82 AMS (ALTERED MENTAL STATUS): Status: ACTIVE | Noted: 2020-01-01

## 2020-10-01 NOTE — PROGRESS NOTES
Patient was admitted overnight  For change mentals status , HP was reviewed , Neurology consult , MRI was done and show no acute abnormality.

## 2020-10-01 NOTE — HOME CARE
Please note that this patient was open to [facility] - INPATIENT services at the time of hospital admission. If services will be needed at discharge, please order to resume them. Thank you, Clarence Lopes RN, [phone]

## 2020-10-01 NOTE — ED NOTES
Pt asleep in room at this time so RN told the sitter that they didn't need to sit at this time. Pt oxygen sats decreased to 89% on RA while pt is sleeping. Placed pt on 2L nc at this time just while patient is sleeping.
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
## 2020-10-01 NOTE — H&P
Hospitalist Admission Note NAME: Delia Carrizales :  10/24/1928 MRN:  [id number] Date/Time:  10/1/2020 12:29 AM 
 
Patient PCP: Indiana Rowan MD 
_____________________________________________________________________ Given the patient's current clinical presentation, I have a high level of concern for decompensation if discharged from the emergency department. Complex decision making was performed, which includes reviewing the patient's available past medical records, laboratory results, and x-ray films. My assessment of this patient's clinical condition and my plan of care is as follows. Assessment / Plan: 
Pt. With h/o dementia was brought for behavioural changes AMS Behavioural changes likely 2/2 worsening of dementia Per wife pt is currently at baseline. Pt A&O to self and rambling. Pt currently laughing and pleasant. CTH negative Seen by  teleneuro  Dr. Magalis Mendoza (neurology) . Per Dr. Magalis Mendoza pt not a candidate for TPA, no NIH performed, and no further imaging required. UA nagetive EKG NSR 68 NO Acute ischemic injury pattern 
cxr negative Cont. Supportive and symptomatic rx Cont. buspar seroquel and zoloft Check b12 rpr tsh ammonia level utox Ordered MRI brain wo con HTN-imdur and metoprolol Dementia:Continue home meds H/O CVA Cont asa statin Code Status: DNR Surrogate Decision Maker: 
Sindi Medrano  [phone] DVT Prophylaxis: SQ HEP 
GI Prophylaxis: not indicated Baseline: Dependent, lives with wife Subjective: CHIEF COMPLAINT:  Altered mental status LKW 1400 HISTORY OF PRESENT ILLNESS:    
 
Delia Carrizales is a 80 y.o.  male with PMH of HTN, CAD, h/o Stroke and Alzheimer's dementia who arrives to ED via EMS unresponsive. Pt LKW was 1400. Pt lives at home with wife . BG en route was 154. Code S level 1 called  . whil taking to [address] pt started screaming. Pt Was rambling and difficult to understand. Per wife pt had decreased water intake but was eating normally and talking a lot and then went unresponsive. Pt was recently prescribed Depakote to control his behavioral outbursts but she has not filled the prescription yet. Pt has advanced dementia. Pt oxygen sats decreased to 89% on RA while pt is sleeping. Placed pt on 2L nc at this time just while patient is sleeping. There are no other complaints, changes or physical findings pertinent to the HPI at this time. PCP: Indiana Rowan MD 
 
 
 
We were asked to admit for work up and evaluation of the above problems. Vital Signs-Reviewed the patient's vital signs. Patient Vitals for the past 24 hrs: 
  Temp Pulse Resp BP SpO2  
09/30/20 1730  70 16 (!) 150/87 97 % 09/30/20 1715  69 14 (!) 142/83 97 % 09/30/20 1708 98.7 °F (37.1 °C) 72 16 (!) 149/88 95 % Past Medical History:  
Diagnosis Date  Alzheimer's dementia with behavioral disturbance ([facility] [address] 04/2018 Dr. Cindy Anguiano.  Back pain   
 due to DDD and scoliosis  BPH (benign prostatic hyperplasia) Dr. Ayleen Turner  CAD (coronary artery disease) 10/30/13  
 30%LAD, 80% ostal stenosis. Dr. Debra Schroeder.  Chest pain 10/04/04, 10/30/13 Dr. Luz Dillard. Negative Stress Cardiolite Test.  Dr. Madeline Francis.  Chest pain 12/02/2018 chest pain and tightness  Chronic venous insufficiency 06/15/09 Dr. Raquel Motta  Clavicle fracture 1981 Lt due to fall  DDD (degenerative disc disease), lumbar L3-S1  Depression  Diverticulosis 02/2004  
 sigmoid. Dr. Mer Rees  DJD (degenerative joint disease) of cervical spine C 4-5 ;5-6  Dyslipidemia  Fracture 1980  
 facial/nasal  
 Hearing loss   
 wears hearing aid  Heartburn  Hemorrhoids, internal 1987 Dr. Jacque Awan  Hernia of unspecified site of abdominal cavity without mention of obstruction or gangrene   
 inguinal  Lt  Hypertension  Impotence  [facility] ([facility] 10/28/14  
 17 x 9 mm schwannoma.  Pes planus of both feet  PVD (peripheral vascular disease) ([facility] [address] 2009  Scoliosis LS  
 Slipped intervertebral disc 1954 chiropractor  SOB (shortness of breath) 08/07/13 Dr. Jane Tate.  Stroke ([facility] 10/28/2014  
 small Left [place]. Dr. Estella Mckee. Dr. Juliane Makcey.  Syncope 12/20/2018  
 pt experienced fainting spells  Urinary incontinence   
 due to BPH. Dr. Eric Martino  Varicose vein 06/15/09 Dr. Avery Chambers  Vertigo 2003  
 due to inner ear. Dr. Blayne Haskins. Dr. Homero Bynum. Dr. Lozada Don  Visual loss Dr. Kody Calzada ophth. Past Surgical History:  
Procedure Laterality Date  APPENDECTOMY    
 due to gangrene [address], 02/2002 Dr. Bryan Roldan. benign.  [facility]  CYSTOSCOPY  10/17/03 Dr. Rogelio Faulkner  HX HEART CATHETERIZATION  10/30/13 Dr. Banks Labs. 80% ostal stenosis. 30% LAD.  HX HEMORRHOIDECTOMY  HX KNEE ARTHROSCOPY  1990 Left , 2007 Right Dr. Jorge Hale  HX LAPAROTOMY  05/14/07 Dr. Gely Morton. due to Bowel Obstruction  HX POLYPECTOMY  02/2002 Anal.   
 HX TONSILLECTOMY  OH COLSC FLX W/RMVL OF TUMOR POLYP LESION SNARE TQ  5/23/2011 Dr. Jarrod Andersen. Social History Tobacco Use  Smoking status: Never Smoker  Smokeless tobacco: Never Used Substance Use Topics  Alcohol use: No  
  
 
Family History Problem Relation Age of Onset  Heart Attack Mother  Other Mother   
     brain aneurysm/AAA/varicose veins  Stroke Mother  Heart Attack Sister  Cancer Maternal Grandmother   
     ovarian Allergies Allergen Reactions  Aricept [Donepezil] Nausea and Vomiting Mild depression  Betadine [Povidone-Iodine] Hives  Phenergan [Promethazine] Other (comments)  
  hallucinations  Seafood [Shellfish Containing Products] Rash and Swelling  
  crabmeat  Xylocaine [Lidocaine Hcl] Shortness of Breath Prior to Admission medications Medication Sig Start Date End Date Taking? Authorizing Provider QUEtiapine (SEROquel) 25 mg tablet Take 0.5 Tabs by mouth two (2) times a day. take 1/2 tablet by mouth at bedtime  Indications: repeated episodes of anxiety 9/5/20   Gisel Powell MD  
folic acid-vit S6-PBG X21 True Torres) 2.5-25-1 mg tablet TAKE 1 TABLET BY MOUTH ONCE DAILY 6/24/20   Stephani Jackson MD  
celecoxib (CELEBREX) 100 mg capsule take 1 capsule by mouth once daily 1/16/20   Provider, Historical  
busPIRone (BUSPAR) 10 mg tablet Take 1 10mg tablet by mouth twice a day 4/21/20   Provider, Historical  
polyethylene glycol (MIRALAX) 17 gram/dose powder Take 17 g by mouth as needed for Other (constipation). As needed daily    Provider, Historical  
celecoxib (CELEBREX) 100 mg capsule Take 100 mg by mouth daily. 10/19/19   Provider, Historical  
sertraline (ZOLOFT) 25 mg tablet Take 1 Tab by mouth daily. Indications: Anxiousness associated with Depression Patient not taking: Reported on 9/16/2020 11/5/19   Darliss Shoulder R, DO  
metoprolol tartrate (LOPRESSOR) 25 mg tablet take 1/2 tablet once a day  Indications: high blood pressure 11/5/19   Darliss Shoulder R, DO  
isosorbide mononitrate ER (IMDUR) 30 mg tablet take 1 tablet by mouth once daily 10/17/19   Darliss Shoulder R, DO  
pravastatin (PRAVACHOL) 20 mg tablet take 1 tablet by mouth once daily at bedtime 7/29/19   Darliss Shoulder R, DO  
aspirin (ASPIRIN) 325 mg tablet Take 1 Tab by mouth daily. Indications: myocardial infarction prevention 8/31/18   Darliss Shoulder R, DO  
meclizine (ANTIVERT) 25 mg tablet Take 1 Tab by mouth three (3) times daily as needed for Dizziness. 5/29/16   Dylan Nichols MD  
GUAIFENESIN (MUCINEX PO) Take 600 mg by mouth daily.  Take 1 tab    Provider, Historical  
 docusate sodium (COLACE) 100 mg capsule Take 100 mg by mouth daily. Instructed to take daily with plenty of liquid unless otherwise directed- as per 11/7/14 [facility] discharge med list    Provider, Historical  
cholecalciferol, vitamin D3, (VITAMIN D3) 2,000 unit tab Take 1 Tab by mouth daily. As per 11/07/14 [facility] discharge med list    Provider, Historical  
tamsulosin (FLOMAX) 0.4 mg capsule Take 0.4 mg by mouth daily. For prostate     Provider, Historical  
 
 
REVIEW OF SYSTEMS:    
I am not able to complete the review of systems because: The patient is intubated and sedated The patient has altered mental status due to his acute medical problems The patient has baseline aphasia from prior stroke(s) The patient has baseline dementia and is not reliable historian The patient is in acute medical distress and unable to provide information Total of 12 systems reviewed as follows:   
   POSITIVE= underlined text  Negative = text not underlined General:  fever, chills, sweats, generalized weakness, weight loss/gain,  
   loss of appetite Eyes:    blurred vision, eye pain, loss of vision, double vision ENT:    rhinorrhea, pharyngitis Respiratory:   cough, sputum production, SOB, GRECO, wheezing, pleuritic pain  
Cardiology:   chest pain, palpitations, orthopnea, PND, edema, syncope Gastrointestinal:  abdominal pain , N/V, diarrhea, dysphagia, constipation, bleeding Genitourinary:  frequency, urgency, dysuria, hematuria, incontinence Muskuloskeletal :  arthralgia, myalgia, back pain Hematology:  easy bruising, nose or gum bleeding, lymphadenopathy Dermatological: rash, ulceration, pruritis, color change / jaundice Endocrine:   hot flashes or polydipsia Neurological:  headache, dizziness, confusion, focal weakness, paresthesia, Speech difficulties, memory loss, gait difficulty Psychological: Feelings of anxiety, depression, postive  confusion Objective: VITALS:   
Visit Vitals BP (!) 144/77 Pulse (!) 59 Temp 98.7 °F (37.1 °C) Resp 14 Ht 5' 3\" (1.6 m) Wt 76.8 kg (169 lb 5 oz) SpO2 97% BMI 29.99 kg/m² PHYSICAL EXAM: 
 
General:  Pt asleep in room at this time. HEENT: Atraumatic, anicteric sclerae, pink conjunctivae No oral ulcers, mucosa moist, throat clear, dentition fair Neck:  Supple, symmetrical,  thyroid: non tender Lungs:   Clear to auscultation bilaterally. No Wheezing or Rhonchi. No rales. Chest wall:  No tenderness  No Accessory muscle use. Heart:   Regular  rhythm,  No  murmur   No edema Abdomen:   Soft, non-tender. Not distended. Bowel sounds normal 
Extremities: No cyanosis. No clubbing,   
  Skin turgor normal, Capillary refill normal, Radial dial pulse 2+ Skin:     Not pale. Not Jaundiced  No rashes Psych:  Good insight. Not depressed. Not anxious or agitated. Neurologic: EOMs intact. No facial asymmetry. No aphasia or slurred speech. Symmetrical strength, Sensation grossly intact. Alert and oriented X 4.  
 
_______________________________________________________________________ Care Plan discussed with: 
  Comments Patient y Family  y wife RN y   
Care Manager Consultant:  cedrick ed  
_______________________________________________________________________ Expected  Disposition:  
Home with Family y HH/PT/OT/RN   
SNF/LTC   
LENIN   
________________________________________________________________________ TOTAL TIME: 60   Minutes Critical Care Provided     Minutes non procedure based Comments  
 y Reviewed previous records  
>50% of visit spent in counseling and coordination of care y Discussion with patient and/or family and questions answered Given the patient's current clinical presentation, I have a high level of concern for decompensation if discharged from the ED.  Complex decision making was performed which includes reviewing the patient's available past medical records, laboratory results, and Xray films. I have also directly communicated my plan and discussed this case with the involved ED physician.  
 
____________________________________________________________________ Yumiko Thompson MD 
 
Procedures: see electronic medical records for all procedures/Xrays and details which were not copied into this note but were reviewed prior to creation of Plan. LAB DATA REVIEWED:   
Recent Results (from the past 24 hour(s)) EKG, 12 LEAD, INITIAL Collection Time: 09/30/20  5:18 PM  
Result Value Ref Range Ventricular Rate 68 BPM  
 Atrial Rate 68 BPM  
 P-R Interval 188 ms QRS Duration 92 ms Q-T Interval 396 ms QTC Calculation (Bezet) 421 ms Calculated P Axis 47 degrees Calculated R Axis -7 degrees Calculated T Axis 48 degrees Diagnosis Normal sinus rhythm Normal ECG When compared with ECG of 23-JUN-2019 17:18, No significant change was found CBC WITH AUTOMATED DIFF Collection Time: 09/30/20  5:19 PM  
Result Value Ref Range WBC 5.8 4.1 - 11.1 K/uL  
 RBC 4.20 4. 10 - 5.70 M/uL  
 HGB 13.5 12.1 - 17.0 g/dL HCT 40.8 36.6 - 50.3 % MCV 97.1 80.0 - 99.0 FL  
 MCH 32.1 26.0 - 34.0 PG  
 MCHC 33.1 30.0 - 36.5 g/dL  
 RDW 13.5 11.5 - 14.5 % PLATELET 906 904 - 514 K/uL MPV 10.5 8.9 - 12.9 FL  
 NRBC 0.0 0  WBC ABSOLUTE NRBC 0.00 0.00 - 0.01 K/uL NEUTROPHILS 58 32 - 75 % LYMPHOCYTES 23 12 - 49 % MONOCYTES 12 5 - 13 % EOSINOPHILS 5 0 - 7 % BASOPHILS 1 0 - 1 % IMMATURE GRANULOCYTES 1 (H) 0.0 - 0.5 % ABS. NEUTROPHILS 3.4 1.8 - 8.0 K/UL  
 ABS. LYMPHOCYTES 1.3 0.8 - 3.5 K/UL  
 ABS. MONOCYTES 0.7 0.0 - 1.0 K/UL  
 ABS. EOSINOPHILS 0.3 0.0 - 0.4 K/UL  
 ABS. BASOPHILS 0.0 0.0 - 0.1 K/UL  
 ABS. IMM. GRANS. 0.0 0.00 - 0.04 K/UL  
 DF AUTOMATED METABOLIC PANEL, COMPREHENSIVE Collection Time: 09/30/20  5:19 PM  
Result Value Ref Range  Sodium 140 136 - 145 mmol/L  
 Potassium 4.3 3.5 - 5.1 mmol/L Chloride 106 97 - 108 mmol/L  
 CO2 31 21 - 32 mmol/L Anion gap 3 (L) 5 - 15 mmol/L Glucose 107 (H) 65 - 100 mg/dL BUN 25 (H) 6 - 20 MG/DL Creatinine 1.32 (H) 0.70 - 1.30 MG/DL  
 BUN/Creatinine ratio 19 12 - 20 GFR est AA >60 >60 ml/min/1.73m2 GFR est non-AA 51 (L) >60 ml/min/1.73m2 Calcium 9.2 8.5 - 10.1 MG/DL Bilirubin, total 0.2 0.2 - 1.0 MG/DL  
 ALT (SGPT) 18 12 - 78 U/L  
 AST (SGOT) 13 (L) 15 - 37 U/L Alk. phosphatase 83 45 - 117 U/L Protein, total 7.2 6.4 - 8.2 g/dL Albumin 3.4 (L) 3.5 - 5.0 g/dL Globulin 3.8 2.0 - 4.0 g/dL A-G Ratio 0.9 (L) 1.1 - 2.2 PROTHROMBIN TIME + INR Collection Time: 09/30/20  5:19 PM  
Result Value Ref Range INR 1.1 0.9 - 1.1 Prothrombin time 11.8 (H) 9.0 - 11.1 sec TROPONIN I Collection Time: 09/30/20  5:19 PM  
Result Value Ref Range Troponin-I, Qt. <0.05 <0.05 ng/mL CK Collection Time: 09/30/20  5:19 PM  
Result Value Ref Range CK 42 39 - 308 U/L  
GLUCOSE, POC Collection Time: 09/30/20  5:29 PM  
Result Value Ref Range Glucose (POC) 111 (H) 65 - 100 mg/dL Performed by Krys Mendez (EDT) DRUG SCREEN, URINE Collection Time: 09/30/20  6:31 PM  
Result Value Ref Range AMPHETAMINES Negative NEG    
 BARBITURATES Negative NEG BENZODIAZEPINES Negative NEG    
 COCAINE Negative NEG METHADONE Negative NEG    
 OPIATES Negative NEG    
 PCP(PHENCYCLIDINE) Negative NEG    
 THC (TH-CANNABINOL) Negative NEG Drug screen comment (NOTE) URINALYSIS W/ REFLEX CULTURE Collection Time: 09/30/20  6:31 PM  
 Specimen: Urine Result Value Ref Range Color YELLOW/STRAW Appearance CLEAR CLEAR Specific gravity 1.022 1.003 - 1.030    
 pH (UA) 6.0 5.0 - 8.0 Protein Negative NEG mg/dL Glucose Negative NEG mg/dL Ketone Negative NEG mg/dL Bilirubin Negative NEG Blood Negative NEG Urobilinogen 0.2 0.2 - 1.0 EU/dL Nitrites Negative NEG Leukocyte Esterase Negative NEG    
 WBC 0-4 0 - 4 /hpf  
 RBC 0-5 0 - 5 /hpf Epithelial cells FEW FEW /lpf Bacteria Negative NEG /hpf  
 UA:UC IF INDICATED CULTURE NOT INDICATED BY UA RESULT CNI Hyaline cast 0-2 0 - 5 /lpf

## 2020-10-01 NOTE — PROGRESS NOTES
Problem: Falls - Risk of 
Goal: *Absence of Falls Description: Document Mike Pro Fall Risk and appropriate interventions in the flowsheet. Outcome: Progressing Towards Goal 
Note: Fall Risk Interventions: 
Mobility Interventions: Bed/chair exit alarm, Communicate number of staff needed for ambulation/transfer, Patient to call before getting OOB Mentation Interventions: Adequate sleep, hydration, pain control, Bed/chair exit alarm Elimination Interventions: Bed/chair exit alarm, Call light in reach, Patient to call for help with toileting needs, Stay With Me (per policy) Problem: Pressure Injury - Risk of 
Goal: *Prevention of pressure injury Description: Document Grady Scale and appropriate interventions in the flowsheet. Outcome: Progressing Towards Goal 
Note: Pressure Injury Interventions: 
  
 
Moisture Interventions: Absorbent underpads, Apply protective barrier, creams and emollients Activity Interventions: Increase time out of bed Mobility Interventions: Assess need for specialty bed, Pressure redistribution bed/mattress (bed type) Nutrition Interventions: Document food/fluid/supplement intake

## 2020-10-01 NOTE — PROGRESS NOTES
TRANSFER - IN REPORT: 
 
Verbal report received from José Miguel Rocha RN(name) on Silvia Cohen  being received from ED(unit) for routine progression of care Report consisted of patients Situation, Background, Assessment and  
Recommendations(SBAR). Information from the following report(s) SBAR, Kardex and Cardiac Rhythm NSR was reviewed with the receiving nurse. Opportunity for questions and clarification was provided. Assessment completed upon patients arrival to unit and care assumed.

## 2020-10-01 NOTE — ED NOTES
Pt currently asleep after administration of ativan. Will let oncoming nurse know to call family to get MRI screening sheet filled out for tomorrow since MRI is ordered to be completed after 9 am and pt is unable to provide answers to screening questions & there is no family at bedside at this time.

## 2020-10-01 NOTE — PROGRESS NOTES
Problem: Self Care Deficits Care Plan (Adult) Goal: *Acute Goals and Plan of Care (Insert Text) Description:  
 
FUNCTIONAL STATUS PRIOR TO ADMISSION: Per patients wife the patient ambulated with a RW with CGA, was min A for sit to stand transfers, he was total A for dressing, bathing and toileting hygiene, he was min for clothing management in preparation for toileting, he performed self feeding with supervision/setup and he was CGA for standing grooming. Cueing required for attention and sequencing of grooming ADLs. Patient has UE tremors, which have recently worsened. Assist of 2 people is required for patient to go up and down the 3 steps going into his home. HOME SUPPORT: The patient lives with his wife who is his primary caregiver. Patient's wife has 2 friends that provide some assistance PRN. Occupational Therapy Goals Initiated 10/1/2020 1. Patient will perform grooming standing at sink with minimal assistance/contact guard assist within 7 day(s). 2.  Patient will perform self feeding with supervision/set-up within 7 day(s). 3.  Patient will perform toilet transfers with minimal assistance/contact guard assist within 7 day(s). 4.  Patient will perform clothing management in preparation for toileting with minimal assistance/contact guard assist within 7 day(s). Outcome: Progressing Towards Goal 
 
OCCUPATIONAL THERAPY EVALUATION Patient: Kenny Benites (50 y.o. male) Date: 10/1/2020 Primary Diagnosis: AMS (altered mental status) [R41.82] Behavioral change [R46.89] Dementia ([facility] [address] [Y33.52] Precautions:   Fall ASSESSMENT Based on the objective data described below, the patient presents with baseline dementia related cognitive impairments, an acute increase in his confusion, B UE tremors, a fear of falling, GW, decreased activity tolerance and decreased balance which is impairing his functional independence.  The patient is functioning below his above described baseline, now requiring min A for self feeding, mod A for grooming and is total A for toileting. In regards to functional mobility he was ambulatory with a RW prior to admit, but is now requiring mod to max A for functional mobility. The patient will benefit from acute OT intervention and after discharge he will need SNF rehab VS HHOT, PT and 24 hour supervision depending on his progress. Functional Outcome Measure: The patient scored 20/100 on the Barthel Index outcome measure which is indicative of an 80% impairment in function. PLAN : 
Recommendations and Planned Interventions: self care training, functional mobility training, balance training, therapeutic activities, endurance activities, home safety training, and family training/education Frequency/Duration: Patient will be followed by occupational therapy 3 times a week to address goals. Recommendation for discharge: (in order for the patient to meet his/her long term goals) To be determined: SNF rehab VS HHOT, PT and 24 hour supervision depending on his progress. Equipment recommendations for successful discharge (if) home: none OBJECTIVE DATA SUMMARY:  
HISTORY:  
Past Medical History:  
Diagnosis Date Alzheimer's dementia with behavioral disturbance ([facility] [address] 04/2018 Dr. Zari Rowe. Back pain   
 due to DDD and scoliosis BPH (benign prostatic hyperplasia) Dr. Julia Puckett CAD (coronary artery disease) 10/30/13  
 30%LAD, 80% ostal stenosis. Dr. Vandana Downey. Chest pain 10/04/04, 10/30/13 Dr. Parisa Up. Negative Stress Cardiolite Test.  Dr. Kimberlyn Case. Chest pain 12/02/2018 chest pain and tightness Chronic venous insufficiency 06/15/09 Dr. Byrnes Flash Clavicle fracture 1981 Lt due to fall DDD (degenerative disc disease), lumbar L3-S1 Depression Diverticulosis 02/2004  
 sigmoid. Dr. Nehemiah Renteria DJD (degenerative joint disease) of cervical spine C 4-5 ;5-6 Dyslipidemia Fracture 1980  
 facial/nasal  
 Hearing loss   
 wears hearing aid Heartburn Hemorrhoids, internal 1987 Dr. Jocelyn Walker Hernia of unspecified site of abdominal cavity without mention of obstruction or gangrene   
 inguinal  Lt Hypertension Impotence Left acoustic neuroma ([facility] [address] 10/28/14  
 17 x 9 mm schwannoma. Pes planus of both feet PVD (peripheral vascular disease) ([facility] [address] 2009 Scoliosis LS Slipped intervertebral disc 1954 chiropractor SOB (shortness of breath) 08/07/13 Dr. Anneliese Rubin. Stroke ([facility] [address] 10/28/2014  
 small Left Thalamus. Dr. Aaron Sanders. Dr. Jogrito Cassidy. Syncope 12/20/2018  
 pt experienced fainting spells Urinary incontinence   
 due to BPH. Dr. Workman Lakeland Varicose vein 06/15/09 Dr. Paul Smith Vertigo 2003  
 due to inner ear. Dr. Bauer Campus. Dr. Rigoberto Tolentino. Dr. Jami Lao Visual loss Dr. Milton Vargas, ophth. Past Surgical History:  
Procedure Laterality Date APPENDECTOMY    
 due to gangrene BIOPSY PROSTATE  1996, 02/2002 Dr. Jonny Ruvalcaba. benign. COLONOSCOPY  02/2004 Dr. Jocelyn Walker. due q 10 yrs CYSTOSCOPY  10/17/03 Dr. Gabino Verdin HX HEART CATHETERIZATION  10/30/13 Dr. Nuris Bob. 80% ostal stenosis. 30% LAD. HX HEMORRHOIDECTOMY HX KNEE ARTHROSCOPY  1990 Left , 2007 Right Dr. Raquel Culp HX LAPAROTOMY  05/14/07 Dr. Raquel Chapa. due to Bowel Obstruction HX POLYPECTOMY  02/2002 Anal.   
 HX TONSILLECTOMY    
 GA COLSC FLX W/RMVL OF TUMOR POLYP LESION SNARE TQ  5/23/2011 Dr. Vilma Maher. Expanded or extensive additional review of patient history:  
 
Home Situation Home Environment: Private residence # Steps to Enter: 3 Rails to Enter: Yes One/Two Story Residence: One story Living Alone: No 
Support Systems: Family member(s), Friends \ neighbors Patient Expects to be Discharged to[de-identified] Private residence Current DME Used/Available at Home: Grab bars, Walker, rolling Hand dominance: Right EXAMINATION OF PERFORMANCE DEFICITS: 
Cognitive/Behavioral Status: 
Neurologic State: Drowsy; Confused Orientation Level: Oriented to person;Disoriented to place; Disoriented to situation;Disoriented to time(responds to name, but unable to state his name) Cognition: Decreased command following;Decreased attention/concentration; Impaired decision making; Impulsive;Memory loss;Poor safety awareness Perception: Cues to maintain midline in standing; Tactile;Verbal;Visual(Posterior lean in standing) Safety/Judgement: Awareness of environment;Lack of insight into deficits Hearing: Auditory Auditory Impairment: None Vision/Perceptual:   
Acuity: Within Defined Limits Corrective Lenses: Glasses Range of Motion: 
AROM: Generally decreased, functional 
 
Strength: 
Strength: Generally decreased, functional 
 
Coordination: 
Coordination: Grossly decreased, non-functional 
Fine Motor Skills-Upper: Left Impaired;Right Impaired Gross Motor Skills-Upper: Left Impaired;Right Impaired Tone & Sensation: 
  
  
  
 
Balance: 
Sitting: Impaired Sitting - Static: Good (unsupported) Sitting - Dynamic: Fair (occasional) Standing: Impaired(constant posterior lean. ) Standing - Static: Poor Standing - Dynamic : Poor Functional Mobility and Transfers for ADLs: 
Bed Mobility: 
Rolling: Moderate assistance Supine to Sit: Moderate assistance Sit to Supine: Maximum assistance Scooting: Maximum assistance Transfers: 
Sit to Stand: Moderate assistance Stand to Sit: Moderate assistance ADL Assessment: 
Feeding: Minimum assistance Oral Facial Hygiene/Grooming: Moderate assistance Bathing: Total assistance Upper Body Dressing: Total assistance Lower Body Dressing: Total assistance Toileting: Total assistance ADL Intervention and task modifications: Initiated bed mobility, dressing ADL, transfer, grooming and safety training. Functional Measure: 
Barthel Index: 
 
Bathin Bladder: 5 Bowels: 5 Groomin Dressin Feedin Mobility: 0 Stairs: 0 Toilet Use: 0 Transfer (Bed to Chair and Back): 5 Total: 20/100 Percentage of impairment  
0% 1-19% 20-39% 40-59% 60-79% 80-99% 100% Barthel Score 0-100 100 99-80 79-60 59-40 20-39 1-19 
 0 The Barthel ADL Index: Guidelines 1. The index should be used as a record of what a patient does, not as a record of what a patient could do. 2. The main aim is to establish degree of independence from any help, physical or verbal, however minor and for whatever reason. 3. The need for supervision renders the patient not independent. 4. A patient's performance should be established using the best available evidence. Asking the patient, friends/relatives and nurses are the usual sources, but direct observation and common sense are also important. However direct testing is not needed. 5. Usually the patient's performance over the preceding 24-48 hours is important, but occasionally longer periods will be relevant. 6. Middle categories imply that the patient supplies over 50 per cent of the effort. 7. Use of aids to be independent is allowed. Renee Fatima., Barthel, D.W. (8382). Functional evaluation: the Barthel Index. [facility] (14)2. TONYA Marquez, Grabiel Hurd., [address] (). Measuring the change indisability after inpatient rehabilitation; comparison of the responsiveness of the Barthel Index and Functional Howell Measure. Journal of Neurology, Neurosurgery, and Psychiatry, 66(4), 850-118. Carol Ann Arellano, N.J.A, QUINTEN Rodgers.CRYS, & Emerita Zaidi MHennyA. (2004.) Assessment of post-stroke quality of life in cost-effectiveness studies: The usefulness of the Barthel Index and the EuroQoL-5D. [facility], 13, 881-56 Activity Tolerance:  
Poor Please refer to the flowsheet for vital signs taken during this treatment. After treatment patient left in no apparent distress:   
Supine in bed, Call bell within reach, Bed / chair alarm activated, and Caregiver / family present COMMUNICATION/EDUCATION:  
The patients plan of care was discussed with: Registered Nurse. Patient is unable to participate in goal setting and plan of care. This patients plan of care is appropriate for delegation to [facility] [place]. Thank you for this referral. 
Beecher Severin, OTR/L Time Calculation: 28 mins

## 2020-10-01 NOTE — ED NOTES
TRANSFER - OUT REPORT: 
 
Verbal report given to Elise(name) on Chris De Jesus  being transferred to Neuro(unit) for routine progression of care Report consisted of patients Situation, Background, Assessment and  
Recommendations(SBAR). Information from the following report(s) SBAR, Kardex, ED Summary, [facility] and Recent Results was reviewed with the receiving nurse. Lines:  
Peripheral IV 10/01/20 Left Hand (Active) Site Assessment Clean, dry, & intact 10/01/20 0129 Phlebitis Assessment 0 10/01/20 0129 Infiltration Assessment 0 10/01/20 0129 Dressing Status Clean, dry, & intact 10/01/20 0129 Dressing Type Tape;Transparent 10/01/20 0129 Hub Color/Line Status Pink;Flushed;Patent 10/01/20 0129 Opportunity for questions and clarification was provided. Patient transported with: 
 O2 @ 2 liters Tech

## 2020-10-01 NOTE — ED NOTES
Bedside and Verbal report given to [id number] KRIS Clark RN (oncoming nurse) by Archana Mandujano RN (offgoing nurse). Report included the following information SBAR, ED Summary, MAR and Recent Results.

## 2020-10-01 NOTE — PROGRESS NOTES
Speech pathology note Attempted to see patient for swallowing evaluation, however patient too lethargic for PO consideration at this time. Patient did not open his eyes to repositioning, wet washcloth on face, or sternal rub. SLP will follow for swallowing evaluation when alertness improves. Thank you. Addendum [id number]: Second attempt to see patient for swallowing evaluation, however lethargy persists. Patient did not arouse despite max verbal/tactile stimulation from wife. Recommend hold all PO while patient lethargic, and discussed this with wife. SLP will follow up tomorrow. Thank you. Arely Guerrero., CCC-SLP

## 2020-10-01 NOTE — PROGRESS NOTES
* No surgery found * 
* No surgery found * Bedside and Verbal shift change report given to [address] [place] [address] (oncoming nurse) by Jodi Holley (offgoing nurse). Report included the following information SBAR, Kardex and ED Summary. Zone Phone:   [phone] Significant changes during shift:  MRI & EEG completed, pt too lethargic this morning and afternoon to give meds or complete swallow study, OT worked with pt Patient Information Maged Ray 
80 y.o. 
9/30/2020  4:31 PM by Paul Mars MD. Maged Ray was admitted from Home 
 
Problem List 
 
Patient Active Problem List  
 Diagnosis Date Noted  Dementia ([address] 10/01/2020  Behavioral change 10/01/2020  AMS (altered mental status) 10/01/2020  Syncope 12/20/2018  Bronchitis 12/20/2018  DEVAN (acute kidney injury) ([address] 12/20/2018  Elevated troponin 12/20/2018  Alzheimer's dementia with behavioral disturbance ([address] 04/01/2018  History of stroke 12/29/2017  Altered mental state 12/16/2017  Bradycardia with 51-60 beats per minute 05/23/2017  Chronic constipation 06/07/2016  Essential hypertension 04/07/2016  Advance directive in chart 10/07/2015  Dyslipidemia  Left acoustic neuroma ([address] 10/28/2014  Memory disturbance 02/04/2014  CAD (coronary artery disease) 10/30/2013  
 [place] (hard of hearing) 05/09/2012  Varicose vein of leg 04/12/2011  History of small bowel obstruction 04/12/2011  GERD (gastroesophageal reflux disease)  Slipped intervertebral disc  Hemorrhoids, internal   
 DJD (degenerative joint disease) of cervical spine  Scoliosis  BPH (benign prostatic hyperplasia)  PVD (peripheral vascular disease) ([address]  Diverticulosis 02/01/2004 Past Medical History:  
Diagnosis Date  Alzheimer's dementia with behavioral disturbance ([address] 04/2018 Dr. Ezra Rico.  Back pain   
 due to DDD and scoliosis  BPH (benign prostatic hyperplasia) Dr. María Elena Palmer  CAD (coronary artery disease) 10/30/13  
 30%LAD, 80% ostal stenosis. Dr. Zeyad Kennedy.  Chest pain 10/04/04, 10/30/13 Dr. Elysia Garcia. Negative Stress Cardiolite Test.  Dr. Judy Vu.  Chest pain 12/02/2018 chest pain and tightness  Chronic venous insufficiency 06/15/09 Dr. Jonas Tang  Clavicle fracture 1981 Lt due to fall  DDD (degenerative disc disease), lumbar L3-S1  Depression  Diverticulosis 02/2004  
 sigmoid. Dr. Michelle Duke  DJD (degenerative joint disease) of cervical spine C 4-5 ;5-6  Dyslipidemia  Fracture 1980  
 facial/nasal  
 Hearing loss   
 wears hearing aid  Heartburn  Hemorrhoids, internal 1987 Dr. Mallika Bradshaw  Hernia of unspecified site of abdominal cavity without mention of obstruction or gangrene   
 inguinal  Lt  Hypertension  Impotence  Left acoustic neuroma ([address] 10/28/14  
 17 x 9 mm schwannoma.  Pes planus of both feet  PVD (peripheral vascular disease) ([address] 2009  Scoliosis LS  
 Slipped intervertebral disc 1954 chiropractor  SOB (shortness of breath) 08/07/13 Dr. Thompson Gr.  Stroke ([address] 10/28/2014  
 small Left Thalamus. Dr. Nena Aponte. Dr. Priscila Pacheco.  Syncope 12/20/2018  
 pt experienced fainting spells  Urinary incontinence   
 due to BPH. Dr. Dinah Solorio  Varicose vein 06/15/09 Dr. Jonas Tang  Vertigo 2003  
 due to inner ear. Dr. Keily Stuart. Dr. Linus Feliciano. Dr. Wyatt Dakota  Visual loss Dr. Verona Perkins, ophth. Core Measures: CVA: NO Yes CHF:No No 
PNA:No No 
 
 
 
Activity Status: OOB to Chair No 
Ambulated this shift No  
Bed Rest No 
 
Supplemental O2: (If Applicable) NC Yes NRB No 
Venti-mask No 
On 2 Liters/min LINES AND DRAINS: 
 
PIV 
 
DVT prophylaxis: DVT prophylaxis Med- Yes DVT prophylaxis SCD or SHILOH- No  
 
Wounds: (If Applicable) Wounds- Yes Location scars abdomen Patient Safety: 
 
Falls Score Total Score: 3 Safety Level_______ Bed Alarm On? Yes Sitter? No 
 
Plan for upcoming shift: swallow study, COVID for placement? Discharge Plan: Yes TBD SNF? Active Consults: 
IP CONSULT TO HOSPITALIST 
IP CONSULT TO NEUROLOGY

## 2020-10-01 NOTE — PROGRESS NOTES
4:00PM: Spouse feels that [facility] inpatient rehab location will be too far for her to visit; We will discuss other inpt rehab locations that would be a reasonable commute for her to visit on 10/2. ANGELA 
TBD: IPR vs SNF vs home with home health? Patient currently open to [facility] prior to admission. Per spouse patient did receive inpatient rehab at [address] [place] [address] in 2014. She would like for him to go to [address] [place] [address] if he can tolerate the inpat rehab therapy. SNF list of facilities given to spouse for review. Spouse states that patient does not qualify for medicaid based on her recent discussion with SW from [facility] due to the level of income received. She is primary care giver for patient and her desire would be for the patient to ultimately return home with care provided by herself and possibly some private pay caregivers to assist her with care of patient. List of private duty agencies provided to spouse. Spouse reports that recently it has become more challenging for her to provide care needed for patient. Patient currently has order for speech eval;  When patient is medically stable for PT/OT, evals will help with determining options for placement. Please note that if patient is to be discharged to [facility] or SNF; patient will need negative COVID test within 48-72 hours prior to discharge. Patient has some [place] Benefits from the [place] in [facility] and spouse is attempting to get a wheelchair for patient as well as a ramp for their residence. Appointments were scheduled for today to try to secure these 2 items however appointments needed to be cancelled due to patient admission to [id number] [place] [facility] to [facility] in the last 24 hours. DME that patient currently has at home is [facility] and [employer]. Spouse does not know if patient would be eligible to be transferred to the [facility] in [facility] for acute care.   She states that patient has always received care a [employer] facilities in the past.   
 
 Transportation will be arranged via [facility] Preferred Rx is [facility] on [address] IM Letter will need to be given prior to discharge. Reason for Admission:   AMS/behavior change/dementia RUR Score:     12  %  low Plan for utilizing home health:      Currently opened to [facility]; not sure at this point if patient will be able to return to home when he is ready for discharge PCP: First and Last name:  Lexis Garcia MD - sees patient at his house via [facility] Name of Practice:  
 Are you a current patient: Yes/No:   yes Approximate date of last visit: 9/21/20 Can you participate in a virtual visit with your PCP:   
                 
Current Advanced Directive/Advance Care Plan: On chart; patient is DNR; Medical POA is his spouse Latoya Montejo; cell phone is not reliable;  Please use home phone of [phone] Transition of Care Plan:   TBD Care Management Interventions PCP Verified by CM: Yes Last Visit to PCP: 09/21/20 Mode of Transport at Discharge: BLS Transition of Care Consult (CM Consult): Discharge Planning Discharge Durable Medical Equipment: No 
Speech Therapy Consult: Yes Current Support Network: Lives with Caregiver(caregiver is spouse) Confirm Follow Up Transport: Family(spouse can transport him if she has help) Discharge Location Discharge Placement: Skilled nursing facility(possibly SNF ) CM will continue to follow patient's progress and speak with spouse on 10/2 regarding choices of SNF's to have as a back up plan. Cynthia Paez, RN Care Manager Ext [phone]

## 2020-10-02 NOTE — PROGRESS NOTES
Problem: Mobility Impaired (Adult and Pediatric) Goal: *Acute Goals and Plan of Care (Insert Text) Outcome: Progressing Towards Goal 
Note: FUNCTIONAL STATUS PRIOR TO ADMISSION: Patient required moderate assistance for basic and instrumental ADLs. HOME SUPPORT PRIOR TO ADMISSION: The patient lived with wife who assisted as needed. Physical Therapy Goals Initiated 10/2/2020 1. Patient will move from supine to sit and sit to supine  in bed with minimal assistance/contact guard assist within 7 day(s). 2.  Patient will transfer from bed to chair and chair to bed with minimal assistance/contact guard assist using the least restrictive device within 7 day(s). 3.  Patient will perform sit to stand with minimal assistance/contact guard assist within 7 day(s). 4.  Patient will ambulate with minimal assistance/contact guard assist for 15 feet with the least restrictive device within 7 day(s). PHYSICAL THERAPY EVALUATION Patient: Tori Rosario (22 y.o. male) Date: 10/2/2020 Primary Diagnosis: AMS (altered mental status) [R41.82] Behavioral change [R46.89] Dementia ([facility] [address] [W99.51] Precautions:   Fall ASSESSMENT Based on the objective data described below, the patient presents with impaired balance, generalized weakness, difficulty following commands, and impaired functional mobility skills. Patient supine upon arrival with mitts and rollbelt in place. Bed mobility with mod assist x 2. Able to sit EOB with occasional min assist for balance. Sit to stand with mod/max assist x 2. Able to take a few steps to chair then return to bed after a brief rest.  Patient requires tactile cues to initiate mobility. Returned to bed with mitts and rollbelt in place. Prior to admission patient was living with wife and was requiring increased assistance. Recommend rehab at discharge.  
 
Current Level of Function Impacting Discharge (mobility/balance): mod assist x 2 
 
 Functional Outcome Measure: The patient scored 10/100 on the Barthel Index outcome measure which is indicative of dependent mobility/ADL's. Other factors to consider for discharge: difficulty following commands Patient will benefit from skilled therapy intervention to address the above noted impairments. PLAN : 
Recommendations and Planned Interventions: bed mobility training, transfer training, gait training, therapeutic exercises, and therapeutic activities Frequency/Duration: Patient will be followed by physical therapy:  3 times a week to address goals. Recommendation for discharge: (in order for the patient to meet his/her long term goals) Therapy up to 5 days/week in SNF setting This discharge recommendation: 
Has been made in collaboration with the attending provider and/or case management IF patient discharges home will need the following DME: to be determined (TBD) SUBJECTIVE:  
Patient unable to answer questions. OBJECTIVE DATA SUMMARY:  
HISTORY:   
Past Medical History:  
Diagnosis Date  Alzheimer's dementia with behavioral disturbance ([facility] [address] 04/2018 Dr. Ibis Locke.  Back pain   
 due to DDD and scoliosis  BPH (benign prostatic hyperplasia) Dr. Edy Simms  CAD (coronary artery disease) 10/30/13  
 30%LAD, 80% ostal stenosis. Dr. Brunilda Huff.  Chest pain 10/04/04, 10/30/13 Dr. Sona Pires. Negative Stress Cardiolite Test.  Dr. Bohdan Lanes.  Chest pain 12/02/2018 chest pain and tightness  Chronic venous insufficiency 06/15/09 Dr. Wilmar Coello  Clavicle fracture 1981 Lt due to fall  DDD (degenerative disc disease), lumbar L3-S1  Depression  Diverticulosis 02/2004  
 sigmoid. Dr. Elizabeth Hidalgo  DJD (degenerative joint disease) of cervical spine C 4-5 ;5-6  Dyslipidemia  Fracture 1980  
 facial/nasal  
 Hearing loss   
 wears hearing aid  Heartburn  Hemorrhoids, internal 1987 Dr. Enedelia Lancaster  Hernia of unspecified site of abdominal cavity without mention of obstruction or gangrene   
 inguinal  Lt  Hypertension  Impotence  Left acoustic neuroma ([facility] [address] 10/28/14  
 17 x 9 mm schwannoma.  Pes planus of both feet  PVD (peripheral vascular disease) ([facility] [address] 2009  Scoliosis LS  
 Slipped intervertebral disc 1954 chiropractor  SOB (shortness of breath) 08/07/13 Dr. Lisseth Reynolds.  [facility] 10/28/2014  
 small Left Th[place]. Dr. Elizalde Shoulder. Dr. Amrik Cochran.  Syncope 12/20/2018  
 pt experienced fainting spells  Urinary incontinence   
 due to BPH. Dr. Adkins Hopping  Varicose vein 06/15/09 Dr. Mauricio Santana  Vertigo 2003  
 due to inner ear. Dr. Arely Carlton. Dr. Malik Barker. Dr. Samantha Valero  Visual loss Dr. Samira Culp, ophth. Past Surgical History:  
Procedure Laterality Date  APPENDECTOMY    
 due to gangrene [address], 02/2002 Dr. Evelyn Remy. benign.  COLONOSCOPY  02/2004 Dr. Enedelia Lancaster. due q 10 yrs  CYSTOSCOPY  10/17/03 Dr. Nikolas Gonzalez  HX HEART CATHETERIZATION  10/30/13 Dr. Eder Pennington. 80% ostal stenosis. 30% LAD.  HX HEMORRHOIDECTOMY  HX KNEE ARTHROSCOPY  1990 Left , 2007 Right Dr. Janey Tilley  HX LAPAROTOMY  05/14/07 Dr. Ashley Ford. due to Bowel Obstruction  HX POLYPECTOMY  02/2002 Anal.   
 HX TONSILLECTOMY  [place] COLSC FLX W/RMVL OF TUMOR POLYP LESION SNARE TQ  5/23/2011 Dr. Jessica Martinez. Personal factors and/or comorbidities impacting plan of care: recent decline in mobility; safety issues Home Situation Home Environment: Private residence # Steps to Enter: 3 Rails to Enter: Yes One/Two Story Residence: One story Living Alone: No 
Support Systems: Family member(s), Friends \ neighbors Patient Expects to be Discharged to[de-identified] Private residence Current DME Used/Available at Home: Grab bars, Walker, rolling EXAMINATION/PRESENTATION/DECISION MAKING:  
Critical Behavior: 
Neurologic State: Confused Orientation Level: Disoriented X4 Cognition: Poor safety awareness, Decreased command following, Impaired decision making Safety/Judgement: Lack of insight into deficits Hearing: Auditory Auditory Impairment: None Skin:  extremities intact Edema: none noted Range Of Motion: 
AROM: Generally decreased, functional 
  
  
  
PROM: Generally decreased, functional 
  
  
  
Strength:   
Strength: Generally decreased, functional 
  
  
  
  
  
  
Tone & Sensation:  
Tone: Normal 
  
  
  
  
Sensation: (not tested) Coordination: 
Coordination: Generally decreased, functional 
Vision:  
  
Functional Mobility: 
Bed Mobility: 
Rolling: Moderate assistance Supine to Sit: Moderate assistance;Assist x2 Sit to Supine: Maximum assistance;Assist x2 Scooting: Maximum assistance Transfers: 
Sit to Stand: Moderate assistance;Assist x2(initially max assist) Stand to Sit: Moderate assistance;Assist x2 Stand Pivot Transfers: Moderate assistance;Assist x2 Bed to Chair: Moderate assistance;Assist x2 Balance:  
Sitting: Impaired Sitting - Static: Good (unsupported) Sitting - Dynamic: Fair (occasional) Standing: Impaired Standing - Static: Constant support;Good Standing - Dynamic : Constant support;Poor Ambulation/Gait Training: 
Distance (ft): 6 Feet (ft)(3' x 2) Assistive Device: Gait belt Ambulation - Level of Assistance: Moderate assistance;Assist x2 Right Side Weight Bearing: Full Left Side Weight Bearing: Full Speed/Alayna: Shuffled; Slow Step Length: Left shortened;Right shortened Functional Measure: 
Barthel Index: 
 
Bathin Bladder: 0 Bowels: 5 Groomin Dressin Feedin Mobility: 0 Stairs: 0 Toilet Use: 0 Transfer (Bed to Chair and Back): 5 Total: 10/100 The Barthel ADL Index: Guidelines 1. The index should be used as a record of what a patient does, not as a record of what a patient could do. 2. The main aim is to establish degree of independence from any help, physical or verbal, however minor and for whatever reason. 3. The need for supervision renders the patient not independent. 4. A patient's performance should be established using the best available evidence. Asking the patient, friends/relatives and nurses are the usual sources, but direct observation and common sense are also important. However direct testing is not needed. 5. Usually the patient's performance over the preceding 24-48 hours is important, but occasionally longer periods will be relevant. 6. Middle categories imply that the patient supplies over 50 per cent of the effort. 7. Use of aids to be independent is allowed. Toño Barrera., Barthel, D.W. ([id number]). Functional evaluation: the Barthel Index. [address] [facility] (14)2. SAJI NicoleF, [place] [employer] [place], [address] [place] [address] (1999). Measuring the change indisability after inpatient rehabilitation; comparison of the responsiveness of the Barthel Index and Functional Lehr Measure. Journal of Neurology, Neurosurgery, and Psychiatry, 66(4), 310-021. Andre Ewing, N.J.A, QUINTEN Rodgers.CRYS, & Alba Paiz, M.A. (2004.) Assessment of post-stroke quality of life in cost-effectiveness studies: The usefulness of the Barthel Index and the EuroQoL-5D. [facility], 57, 193-94 Physical Therapy Evaluation Charge Determination History Examination Presentation Decision-Making MEDIUM  Complexity : 1-2 comorbidities / personal factors will impact the outcome/ POC  MEDIUM Complexity : 3 Standardized tests and measures addressing body structure, function, activity limitation and / or participation in recreation  MEDIUM Complexity : Evolving with changing characteristics  MEDIUM Complexity : FOTO score of 26-74 Based on the above components, the patient evaluation is determined to be of the following complexity level: MEDIUM Pain Rating: 
None reported Activity Tolerance:  
Fair Please refer to the flowsheet for vital signs taken during this treatment. After treatment patient left in no apparent distress:  
Supine in bed, Call bell within reach, Bed / chair alarm activated, Side rails x 3, and Restraints COMMUNICATION/EDUCATION:  
The patients plan of care was discussed with: Registered nurse. Patient is unable to participate in goal setting and plan of care. Thank you for this referral. 
Esperanza Camopverde, PT Time Calculation: 25 mins

## 2020-10-02 NOTE — PROGRESS NOTES
3:55PM: Spoke with Siobhan in admissions at [facility] and faxed medicals to her. She stated that patient will not need a COVID test prior to admission. She will review medicals and get back with Care Manager. 2:25PM:  Spouse Trevor Moser is at bedside and signed  \"Refusal of transfer to the [facility]" form; Form faxes to On license of [facility]. Spouse prefers for patient to stay at [facility] and use his Medicare A & B as primary and his [employer] health insurance to pay for his hospital stay. Spouse also requested that medicals be sent to [facility] SN[facility] as well as [facility] SN[facility]. Referrals made:  [facility] sent via [employer] and [facility]. ANGELA 
SNF? Therapy is reccomending SNF level of care when discharged. Spoke with spouse Trevor Moser and she will speak with me when she arrives today about the list of SNF's that CM gave her on 10/1;  Referrals will be made to SNF's once CM has met with spouse. Patient will need negative COVID test within 72 hours of discharge. Spouse prefers that patient remain at [facility] for care under his Medicare A&B and his [employer] at this time. Medicals have been faxed to [facility] at the [address] [place] [address] that patient has been admitted. Spouse will sign\"Refusal of transfer to [facility]" form when she arrives at hospital today. Transportation will be arranged via [facility] if patient is discharged to SNF 2nd IM Letter will be needed prior to discharge. Care Manager will continue to follow for discharge needs. Michael Douglass RN Care Manager Ext [phone]

## 2020-10-02 NOTE — PROGRESS NOTES
Problem: Falls - Risk of 
Goal: *Absence of Falls Description: Document Chel Ritchie Fall Risk and appropriate interventions in the flowsheet. Outcome: Progressing Towards Goal 
Note: Fall Risk Interventions: 
Mobility Interventions: Bed/chair exit alarm, Communicate number of staff needed for ambulation/transfer, Patient to call before getting OOB Mentation Interventions: Adequate sleep, hydration, pain control, Bed/chair exit alarm Elimination Interventions: Bed/chair exit alarm, Call light in reach Problem: Patient Education: Go to Patient Education Activity Goal: Patient/Family Education Outcome: Progressing Towards Goal 
  
Problem: Pressure Injury - Risk of 
Goal: *Prevention of pressure injury Description: Document Grady Scale and appropriate interventions in the flowsheet. Outcome: Progressing Towards Goal 
Note: Pressure Injury Interventions: 
Sensory Interventions: Assess changes in LOC Moisture Interventions: Absorbent underpads, Apply protective barrier, creams and emollients, Maintain skin hydration (lotion/cream) Activity Interventions: Pressure redistribution bed/mattress(bed type) Mobility Interventions: HOB 30 degrees or less Nutrition Interventions: Document food/fluid/supplement intake, Offer support with meals,snacks and hydration Friction and Shear Interventions: Apply protective barrier, creams and emollients, HOB 30 degrees or less Problem: Patient Education: Go to Patient Education Activity Goal: Patient/Family Education Outcome: Progressing Towards Goal 
  
Problem: Patient Education: Go to Patient Education Activity Goal: Patient/Family Education Outcome: Progressing Towards Goal 
  
Problem: TIA/CVA Stroke: Day 2 Until Discharge Goal: Off Pathway (Use only if patient is Off Pathway) Outcome: Progressing Towards Goal 
Goal: Activity/Safety Outcome: Progressing Towards Goal 
Goal: Diagnostic Test/Procedures Outcome: Progressing Towards Goal 
Goal: Nutrition/Diet Outcome: Progressing Towards Goal 
Goal: Discharge Planning Outcome: Progressing Towards Goal 
Goal: Medications Outcome: Progressing Towards Goal 
Goal: Respiratory Outcome: Progressing Towards Goal 
Goal: Treatments/Interventions/Procedures Outcome: Progressing Towards Goal 
Goal: Psychosocial 
Outcome: Progressing Towards Goal 
Goal: *Verbalizes anxiety and depression are reduced or absent Outcome: Progressing Towards Goal 
Goal: *Absence of aspiration Outcome: Progressing Towards Goal 
Goal: *Absence of deep venous thrombosis signs and symptoms(Stroke Metric) Outcome: Progressing Towards Goal 
Goal: *Optimal pain control at patient's stated goal 
Outcome: Progressing Towards Goal 
Goal: *Tolerating diet Outcome: Progressing Towards Goal 
Goal: *Ability to perform ADLs and demonstrates progressive mobility and function Outcome: Progressing Towards Goal 
Goal: *Stroke education continued(Stroke Metric) Outcome: Progressing Towards Goal 
  
Problem: Patient Education: Go to Patient Education Activity Goal: Patient/Family Education Outcome: Progressing Towards Goal

## 2020-10-02 NOTE — CONSULTS
Date of Consultation:  October 2, 2020 Referring Physician: Kieth Ormond MD 
 
Reason for Consultation:  Katerina Carcamo Chief Complaint Patient presents with  Altered mental status LKW 1400 History of Present Illness:  
Chris De Jesus is a 80 y.o. male with a history of Alzheimer's disease, hypertension and hyperlipidemia who is currently admitted to the hospital after he had an episode of decreased responsiveness on 9/30/2020 of unclear etiology. Since he has been in the hospital has had no further episodes of decreased responsiveness though he was very somnolent throughout the day yesterday. Interval history No acute events reported overnight. He was noted to be agitated overnight. This morning he is doing better and appeared to be more alert. He was difficult to understand however was able to state his name and that he was in a hospital.  Not remember his wife's name. Past Medical History:  
Diagnosis Date  Alzheimer's dementia with behavioral disturbance ([facility] [address] 04/2018 Dr. Ibis Locke.  Back pain   
 due to DDD and scoliosis  BPH (benign prostatic hyperplasia) Dr. Edy Simms  CAD (coronary artery disease) 10/30/13  
 30%LAD, 80% ostal stenosis. Dr. Brunilda Huff.  Chest pain 10/04/04, 10/30/13 Dr. Sona Pires. Negative Stress Cardiolite Test.  Dr. Bohdan Lanes.  Chest pain 12/02/2018 chest pain and tightness  Chronic venous insufficiency 06/15/09 Dr. Wilmar Coello  Clavicle fracture 1981 Lt due to fall  DDD (degenerative disc disease), lumbar L3-S1  Depression  Diverticulosis 02/2004  
 sigmoid. Dr. Elizabeth Hidalgo  DJD (degenerative joint disease) of cervical spine C 4-5 ;5-6  Dyslipidemia  Fracture 1980  
 facial/nasal  
 Hearing loss   
 wears hearing aid  Heartburn  Hemorrhoids, internal 1987 Dr. Silvina Carrion  Hernia of unspecified site of abdominal cavity without mention of obstruction or gangrene   
 inguinal  Lt  Hypertension  Impotence  Left acoustic neuroma ([facility] [address] 10/28/14  
 17 x 9 mm schwannoma.  Pes planus of both feet  PVD (peripheral vascular disease) ([facility] [address] 2009  Scoliosis LS  
 Slipped intervertebral disc 1954 chiropractor  SOB (shortness of breath) 08/07/13 Dr. Lolita Cochran.  Stroke ([facility] [address] 10/28/2014  
 small Left Thalamus. Dr. Latisha Clancy. Dr. Yazmin Ni.  Syncope 12/20/2018  
 pt experienced fainting spells  Urinary incontinence   
 due to BPH. Dr. Mukesh Lugo  Varicose vein 06/15/09 Dr. Leandro Hernandez  Vertigo 2003  
 due to inner ear. Dr. Shital Pires. Dr. Dc Gavin. Dr. Rahul Koch  Visual loss Dr. Jose Angel Su, ophth. Past Surgical History:  
Procedure Laterality Date  APPENDECTOMY    
 due to gangrene [facility], 02/2002 Dr. Moira Andrews. benign.  COLONOSCOPY  02/2004 Dr. Magnolia Fontana. due q 10 yrs  CYSTOSCOPY  10/17/03 Dr. Dariusz Ashley  HX HEART CATHETERIZATION  10/30/13 Dr. Renee Cox. 80% ostal stenosis. 30% LAD.  HX HEMORRHOIDECTOMY  HX KNEE ARTHROSCOPY  1990 Left , 2007 Right Dr. Estephania Tate  HX LAPAROTOMY  05/14/07 Dr. Azalia Villa. due to Bowel Obstruction  HX POLYPECTOMY  02/2002 Anal.   
 HX TONSILLECTOMY  [place] COLSC FLX W/RMVL OF TUMOR POLYP LESION SNARE TQ  5/23/2011 Dr. Shemar Rocha. Family History Problem Relation Age of Onset  Heart Attack Mother  Other Mother   
     brain aneurysm/AAA/varicose veins  Stroke Mother  Heart Attack Sister  Cancer Maternal Grandmother   
     ovarian Social History Tobacco Use  Smoking status: Never Smoker  Smokeless tobacco: Never Used Substance Use Topics  Alcohol use: No  
  
 
Allergies Allergen Reactions  Aricept [Donepezil] Nausea and Vomiting Mild depression  Betadine [Povidone-Iodine] Hives  Phenergan [Promethazine] Other (comments)  
  hallucinations  Seafood [Shellfish Containing Products] Rash and Swelling  
  crabmeat  Xylocaine [Lidocaine Hcl] Shortness of Breath Prior to Admission medications Medication Sig Start Date End Date Taking? Authorizing Provider QUEtiapine (SEROquel) 25 mg tablet Take 0.5 Tabs by mouth two (2) times a day. take 1/2 tablet by mouth at bedtime  Indications: repeated episodes of anxiety 9/5/20  Yes Katie Smith MD  
folic acid-vit D0-GFD C07 Deepali Estrada) 2.5-25-1 mg tablet TAKE 1 TABLET BY MOUTH ONCE DAILY 6/24/20  Yes Lana Mccrary MD  
celecoxib (CELEBREX) 100 mg capsule take 1 capsule by mouth once daily 1/16/20  Yes Provider, Historical  
busPIRone (BUSPAR) 10 mg tablet Take 1 10mg tablet by mouth twice a day 4/21/20  Yes Provider, Historical  
polyethylene glycol (MIRALAX) 17 gram/dose powder Take 17 g by mouth as needed for Other (constipation). As needed daily   Yes Provider, Historical  
celecoxib (CELEBREX) 100 mg capsule Take 100 mg by mouth daily. 10/19/19  Yes Provider, Historical  
metoprolol tartrate (LOPRESSOR) 25 mg tablet take 1/2 tablet once a day  Indications: high blood pressure 11/5/19  Yes Miguel Pineda DO  
isosorbide mononitrate ER (IMDUR) 30 mg tablet take 1 tablet by mouth once daily 10/17/19  Yes Miguel Pineda DO  
pravastatin (PRAVACHOL) 20 mg tablet take 1 tablet by mouth once daily at bedtime 7/29/19  Yes Miguel Pineda DO  
aspirin (ASPIRIN) 325 mg tablet Take 1 Tab by mouth daily. Indications: myocardial infarction prevention 8/31/18  Yes Miguel Pineda DO  
meclizine (ANTIVERT) 25 mg tablet Take 1 Tab by mouth three (3) times daily as needed for Dizziness. 5/29/16  Yes Daisha Branham MD  
GUAIFENESIN (MUCINEX PO) Take 600 mg by mouth daily. Take 1 tab   Yes Provider, Historical  
docusate sodium (COLACE) 100 mg capsule Take 100 mg by mouth daily. Instructed to take daily with plenty of liquid unless otherwise directed- as per 11/7/14 [facility] discharge med list   Yes Provider, Historical  
cholecalciferol, vitamin D3, (VITAMIN D3) 2,000 unit tab Take 1 Tab by mouth daily. As per 11/07/14 [facility] discharge med list   Yes Provider, Historical  
tamsulosin (FLOMAX) 0.4 mg capsule Take 0.4 mg by mouth daily. For prostate    Yes Provider, Historical  
sertraline (ZOLOFT) 25 mg tablet Take 1 Tab by mouth daily. Indications: Anxiousness associated with Depression Patient not taking: Reported on 9/16/2020 11/5/19   Duane Hines,  Review of Systems: 
 
[x]Unable to obtain  ROS due to  [x]mental status change  []sedated   []intubated PHYSICAL EXAMINATION:  
Visit Vitals BP (!) 141/66 (BP 1 Location: Right arm, BP Patient Position: At rest) Pulse (!) 109 Temp 99 °F (37.2 °C) Comment: Reported to the nurse Resp 20 Ht 5' 3\" (1.6 m) Wt 172 lb (78 kg) SpO2 96% BMI 30.47 kg/m² General:  NAD, sleeping Neck: no carotid bruits Lungs: clear to auscultation Heart:  no murmurs, regular rate and rhythm Lower extremity: no edema Neuro exam:  
Mental status: Wake alert oriented to self and hospital.  He was unable to state the year or the date. He was unable to follow commands name or repeat. Pupils: 2 mm and reactive Unable to do funduscopic exam due to patient participation No obvious facial asymmetry was noted. Motor/Sensory:   
He did not participate in formal strength testing but was at least 4 out of 5 in all 4 extremities. Tone: decreased tone Reflexes: 1+ throughout Plantar response: Withdrew briskly Cerebellar testing:  unable to perform due to patient participation Gait: unable to assess due to cognitive status LAB DATA REVIEWED:   
Lab Results Component Value Date/Time  Hemoglobin A1c 5.4 10/01/2020 01:23 AM  
 Sodium 138 10/02/2020 04:29 AM  
 Potassium 4.2 10/02/2020 04:29 AM  
 Chloride 105 10/02/2020 04:29 AM  
 Glucose 100 10/02/2020 04:29 AM  
 BUN 17 10/02/2020 04:29 AM  
 Creatinine 1.13 10/02/2020 04:29 AM  
 Calcium 9.4 10/02/2020 04:29 AM  
 WBC 8.6 10/02/2020 04:29 AM  
 HCT 41.8 10/02/2020 04:29 AM  
 HGB 14.0 10/02/2020 04:29 AM  
 PLATELET 325 85/56/7223 04:29 AM  
 
 
Stroke workup MRI Brain Independent review revealed no evidence of diffusion restriction. There is significant volume loss of the bilateral hippocampi and temporal lobes which is consistent with Alzheimer's disease. Does have some moderate T2 hyperintensities in the periventricular regions consistent with small vessel ischemic disease. Stroke labs: 
 
HgBA1c Lab Results Component Value Date/Time Hemoglobin A1c 5.4 10/01/2020 01:23 AM  
 
 
LDL Lab Results Component Value Date/Time LDL, calculated 74 07/24/2019 10:31 AM  
 
 
EKG: Normal sinus rhythm Assessment and Plan:  
Lanny Bauer is a 80 y.o. male with a history of Alzheimer's disease, hypertension and hyperlipidemia who is currently admitted to the hospital after he had an episode of decreased responsiveness of unclear etiology. MRI of the brain did not show any evidence of a stroke however did show evidence of significant atrophy of his mesial temporal lobes which does raise a suspicion for possible seizures. Neurological exam is limited though nonfocal. 
 
Altered mental status: Concerning for possible seizure as MRI of the brain did not show any evidence of a stroke. There is significant atrophy in the bilateral mesial temporal lobes which can increase the risk of seizures. EEG showed generalized slowing, there are no epileptiform discharges. - Recommend maintaining SBP goal is less than 140/90 (this is the long term goal) - would recommend to continue 81mg daily for secondary stroke prevention - As EEG did not show any evidence of epileptiform discharges we will hold off on AEDs at this time. If patient has another event would consider starting an AED, especially given his significant atrophy of the mesial temporal lobes seen on MRI. - Seizure precautions were discussed with the wife. Alzheimer's disease: Does appear to be progressive in the last few months especially the last month. 
-I discussed options regarding care after this hospitalization as it does appear that his wife has been having more difficulty caring for him at home. -  has been consulted to review options after discharge. 
-We will need to consider an medication to help with agitation has been ongoing. His primary neurologist recently suggested Depakote however given the side effects his wife did not start this medication. Thank you for the consult, will sign off. Please call with questions. Please arrange for follow-up in the neurology clinic in approximately 4 weeks.  
 
 
Ethan Luke MD

## 2020-10-02 NOTE — PROGRESS NOTES
Physical Therapy PT eval completed; full note to follow. Patient is requiring mod/max assist for mobility and is very unsteady ambulatng. Recommend SNF at discharge.

## 2020-10-02 NOTE — PROGRESS NOTES
Problem: Dysphagia (Adult) Goal: *Acute Goals and Plan of Care (Insert Text) Description: 10/2/2020 Speech path goals 1. patient will participate with reeval of swallowing 2. patient will tolerate meds crushed in applesauce with no overt s/s of aspiration. Outcome: Not Met SPEECH LANGUAGE PATHOLOGY BEDSIDE SWALLOW EVALUATION Patient: Lydia Pang (53 y.o. male) Date: 10/2/2020 Primary Diagnosis: AMS (altered mental status) [R41.82] Behavioral change [R46.89] Dementia ([facility] [address] [D08.03] Precautions:   Fall ASSESSMENT : 
Based on the objective data described below, the patient presents with mod oropharyngeal dysphagia. Patient did not close his mouth on the spoon consistently but downward pressure on his tongue was helpful about 70% of the time. He did not use the straw at all. He took several cup sips well. He cleared his throat/coughed weakly after the swallow inconsistently. He also coughed weakly after purees inconsistently. Suspect he now needs to stay NPO with meds crushed in applesauce as his only po. His voice was mildly hoarse intermittently. Verbalized very little. Confused and OX1. Patient will benefit from skilled intervention to address the above impairments. Patients rehabilitation potential is considered to be Guarded PLAN : 
Recommendations and Planned Interventions: NPO except for meds crushed in applesauce. Checked later with nsg and the patient spit meds out. Frequency/Duration: Patient will be followed by speech-language pathology 3 times a week to address goals. Discharge Recommendations: None SUBJECTIVE:  
Patient stated [facility] are you? . OBJECTIVE:  
 
Past Medical History:  
Diagnosis Date  Alzheimer's dementia with behavioral disturbance ([facility] [address] 04/2018 Dr. Lucas Homans.  Back pain   
 due to DDD and scoliosis  BPH (benign prostatic hyperplasia) Dr. Rao Reece  CAD (coronary artery disease) 10/30/13 30%LAD, 80% ostal stenosis. Dr. Alethea Vora.  Chest pain 10/04/04, 10/30/13 Dr. Lary Dunne. Negative Stress Cardiolite Test.  Dr. Marin.  Chest pain 12/02/2018 chest pain and tightness  Chronic venous insufficiency 06/15/09 Dr. Steven Lucia  Clavicle fracture 1981 Lt due to fall  DDD (degenerative disc disease), lumbar L3-S1  Depression  Diverticulosis 02/2004  
 sigmoid. Dr. Whitney Smoke  DJD (degenerative joint disease) of cervical spine C 4-5 ;5-6  Dyslipidemia  Fracture 1980  
 facial/nasal  
 Hearing loss   
 wears hearing aid  Heartburn  Hemorrhoids, internal 1987 Dr. Franki Galdamez  Hernia of unspecified site of abdominal cavity without mention of obstruction or gangrene   
 inguinal  Lt  Hypertension  Impotence  Left acoustic neuroma ([address] 10/28/14  
 17 x 9 mm schwannoma.  Pes planus of both feet  PVD (peripheral vascular disease) ([address] 2009  Scoliosis LS  
 Slipped intervertebral disc 1954 chiropractor  SOB (shortness of breath) 08/07/13 Dr. Ariadne Palomino.  Stroke ([address] 10/28/2014  
 small Left Thalamus. Dr. Radha Chris. Dr. Jun Enciso.  Syncope 12/20/2018  
 pt experienced fainting spells  Urinary incontinence   
 due to BPH. Dr. Agosto Rim  Varicose vein 06/15/09 Dr. Steven Lucia  Vertigo 2003  
 due to inner ear. Dr. Nathan Ozuna. Dr. Keagan Delgado. Dr. Noguera Counts  Visual loss Dr. Alena Blancas, ophth. Past Surgical History:  
Procedure Laterality Date  APPENDECTOMY    
 due to gangrene [address] [facility], 02/2002 Dr. Sena Cooper. benign.  COLONOSCOPY  02/2004 Dr. Franki Galdamez. due q 10 yrs  CYSTOSCOPY  10/17/03 Dr. Epi Lockhart  HX HEART CATHETERIZATION  10/30/13 Dr. Marin. 80% ostal stenosis. 30% LAD.  HX HEMORRHOIDECTOMY  HX KNEE ARTHROSCOPY  1990 Left , 2007 Right Dr. Laura Sanders  HX LAPAROTOMY  05/14/07 Dr. Verona Pearson. due to Bowel Obstruction  HX POLYPECTOMY  02/2002 Anal.   
 HX TONSILLECTOMY  [place] COLSC FLX W/RMVL OF TUMOR POLYP LESION SNARE TQ  5/23/2011 Dr. Murtaza Ferguson. Prior Level of Function/Home Situation:  
Home Situation Home Environment: Private residence # Steps to Enter: 3 Rails to Enter: Yes One/Two Story Residence: One story Living Alone: No 
Support Systems: Family member(s), Friends \ neighbors Patient Expects to be Discharged to[de-identified] Private residence Current DME Used/Available at Home: Grab bars, Walker, rolling Diet prior to admission:  
Current Diet:    
Cognitive and Communication Status: 
Neurologic State: Confused, Drowsy Orientation Level: Disoriented X4 Cognition: Decreased attention/concentration, Decreased command following, Poor safety awareness, Impaired decision making Perception: Cues to maintain midline in standing, Tactile, Verbal, Visual(Posterior lean in standing) Safety/Judgement: Awareness of environment, Lack of insight into deficits Oral Assessment: 
Oral Assessment Labial: No impairment Dentition: Natural;Limited Lingual: Other (comment) Velum: Unable to visualize Mandible: No impairment P.O. Trials: 
Patient Position: upright in bed Vocal quality prior to P.O.: Hoarse Consistency Presented: Thin liquid;Puree How Presented: Self-fed/presented;Cup/sip;SLP-fed/presented;Spoon Bolus Acceptance: Impaired Bolus Formation/Control: Impaired Type of Impairment: Delayed Propulsion: Delayed (# of seconds) Initiation of Swallow: Delayed (# of seconds) Laryngeal Elevation: Decreased Aspiration Signs/Symptoms: Weak cough(after thins and purees.) Oral Phase Severity: Moderate Pharyngeal Phase Severity : Moderate NOMS:  
The NOMS functional outcome measure was used to quantify this patient's level of swallowing impairment. Based on the NOMS, the patient was determined to be at level 2 for swallow function NOMS Swallowing Levels: 
Level 1 (CN): NPO Level 2 (CM): NPO but takes consistency in therapy Level 3 (CL): Takes less than 50% of nutrition p.o. and continues with nonoral feedings; and/or safe with mod cues; and/or max diet restriction Level 4 (CK): Safe swallow but needs mod cues; and/or mod diet restriction; and/or still requires some nonoral feeding/supplements Level 5 (CJ): Safe swallow with min diet restriction; and/or needs min cues Level 6 (CI): Independent with p.o.; rare cues; usually self cues; may need to avoid some foods or needs extra time Level 7 (CH): Independent for all p.o. [place]. (2003). National Outcomes Measurement System (NOMS): Adult Speech-Language Pathology User's Guide. Pain: 
Pain Scale 1: PAINAD (Advanced Dementia) Pain Intensity 1: 0 After treatment:  
Patient left in no apparent distress in bed COMMUNICATION/EDUCATION:  
 
The patient's plan of care including recommendations, planned interventions, and recommended diet changes were discussed with: Registered nurse. Patient is unable to participate in goal setting and plan of care. Thank you for this referral. 
Luis Mercado, SLP Time Calculation: 10 mins

## 2020-10-02 NOTE — PROGRESS NOTES
Bedside and Verbal shift change report given to Kip Kong RN (oncoming nurse) by [facility] (offgoing nurse). Report included the following information SBAR, Kardex and ED Summary. 
  
Zone Phone:   [phone] 
  
  
Significant changes during shift:  Patient on mitt restrain and IV normal saline infusing at 75ml/hr  
  
  
Patient Information 
  
Omer Ron 
80 y.o. 
9/30/2020  4:31 PM by Collette Smith MD. Omer Ron was admitted from Home 
  
Problem List 
  
    
Patient Active Problem List  
  Diagnosis Date Noted  Dementia ([address] 10/01/2020  Behavioral change 10/01/2020  AMS (altered mental status) 10/01/2020  Syncope 12/20/2018  Bronchitis 12/20/2018  DEVAN (acute kidney injury) ([address] 12/20/2018  Elevated troponin 12/20/2018  Alzheimer's dementia with behavioral disturbance ([address] 04/01/2018  History of stroke 12/29/2017  Altered mental state 12/16/2017  Bradycardia with 51-60 beats per minute 05/23/2017  Chronic constipation 06/07/2016  Essential hypertension 04/07/2016  Advance directive in chart 10/07/2015  Dyslipidemia    
 Left acoustic neuroma ([address] 10/28/2014  Memory disturbance 02/04/2014  CAD (coronary artery disease) 10/30/2013  
 [place] (hard of hearing) 05/09/2012  Varicose vein of leg 04/12/2011  History of small bowel obstruction 04/12/2011  GERD (gastroesophageal reflux disease)    
 Slipped intervertebral disc    
 Hemorrhoids, internal    
 DJD (degenerative joint disease) of cervical spine    
 Scoliosis    
 BPH (benign prostatic hyperplasia)    
 PVD (peripheral vascular disease) ([address]    
 Diverticulosis 02/01/2004  
  
    
Past Medical History:  
Diagnosis Date  Alzheimer's dementia with behavioral disturbance ([address] 04/2018  
  Dr. Tashia Song.  Back pain    
  due to DDD and scoliosis  BPH (benign prostatic hyperplasia)    
  Dr. Monet Cisneros  CAD (coronary artery disease) 10/30/13   30%LAD, 80% ostal stenosis. Dr. Lucas Brownlee.  Chest pain 10/04/04, 10/30/13  
  Dr. Neela Avila. Negative Stress Cardiolite Test.  Dr. Leni Irvin.  Chest pain 12/02/2018  
  chest pain and tightness  Chronic venous insufficiency 06/15/09  
  Dr. Ashanti Remy  Clavicle fracture 1981  
  Lt due to fall  DDD (degenerative disc disease), lumbar    
  L3-S1  Depression    
 Diverticulosis 02/2004  
  sigmoid. Dr. Emperatriz Kern  DJD (degenerative joint disease) of cervical spine    
  C 4-5 ;5-6  Dyslipidemia    
 Fracture 1980  
  facial/nasal  
 Hearing loss    
  wears hearing aid  Heartburn    
 Hemorrhoids, internal Rosil[place] Ellis Dr. Amelia Brewer  Hernia of unspecified site of abdominal cavity without mention of obstruction or gangrene    
  inguinal  Lt  Hypertension    
 Impotence    
 Left acoustic neuroma ([facility]) 10/28/14  
  17 x 9 mm schwannoma.  Pes planus of both feet    
 PVD (peripheral vascular disease) ([address] 2009  Scoliosis    
  LS  Slipped intervertebral disc 1954  
  chiropractor  SOB (shortness of breath) 08/07/13  
  Dr. Lucille Bowen.  Stroke ([facility] [address] 10/28/2014  
  small Left Thalamus. Dr. Debbie Galindo. Dr. Vannessa Clay.  Syncope 12/20/2018  
  pt experienced fainting spells  Urinary incontinence    
  due to BPH. Dr. Mary Garvey  Varicose vein 06/15/09  
  Dr. Ashanti Remy  Vertigo 2003  
  due to inner ear. Dr. Lia Villarreal. Dr. Ysabel Wikles. Dr. Kasey Elias  Visual loss    
  Dr. Tamela Habermann, ophth.  
  
  
  
Core Measures: 
  
CVA: NO Yes CHF:No No 
PNA:No No 
  
  
  
Activity Status: 
  
OOB to Chair No 
Ambulated this shift No  
Bed Rest No 
  
Supplemental O2: (If Applicable) 
  
NC Yes NRB No 
Venti-mask No 
On 2 Liters/min 
  
  
LINES AND DRAINS: 
  
PIV 
  
DVT prophylaxis: 
  
DVT prophylaxis Med- Yes DVT prophylaxis SCD or SHILOH- No  
  
Wounds: (If Applicable) 
  
Wounds- Yes 
  
Location scars abdomen 
  
Patient Safety: 
  
 Falls Score Total Score: 3 Safety Level_______ Bed Alarm On? Yes Sitter?  No 
  
Plan for upcoming shift: COVID  And possible placement?  
  
  
  
Discharge Plan: Yes TBD SNF? 
  
Active Consults: 
IP CONSULT TO HOSPITALIST 
IP CONSULT TO NEUROLOGY

## 2020-10-02 NOTE — PROGRESS NOTES
Patient agitated. Pulling out clothes, and other linens on bed. Wife contacted for use of 24 hour non-violent bilateral hand natalie restraint and roll belt. Wife agreed. Wife requesting that Patient be hydrated via IV because patient has not eaten since yesterday. Tele MD notified. Awaiting response at this time. Dr Grewal Sarah with new orders for NS @ 75ml/hr and mitt 24 hour non-violent restraints.

## 2020-10-03 NOTE — PROGRESS NOTES
Problem: Falls - Risk of 
Goal: *Absence of Falls Description: Document Venkatesh Smith Fall Risk and appropriate interventions in the flowsheet. Outcome: Progressing Towards Goal 
Note: Fall Risk Interventions: 
Mobility Interventions: Bed/chair exit alarm Mentation Interventions: Adequate sleep, hydration, pain control, Bed/chair exit alarm Medication Interventions: Bed/chair exit alarm Elimination Interventions: Bed/chair exit alarm, Call light in reach Problem: Patient Education: Go to Patient Education Activity Goal: Patient/Family Education Outcome: Progressing Towards Goal 
  
Problem: Pressure Injury - Risk of 
Goal: *Prevention of pressure injury Description: Document Grady Scale and appropriate interventions in the flowsheet. Outcome: Progressing Towards Goal 
Note: Pressure Injury Interventions: 
Sensory Interventions: Assess changes in LOC Moisture Interventions: Absorbent underpads, Apply protective barrier, creams and emollients Activity Interventions: Increase time out of bed Mobility Interventions: PT/OT evaluation Nutrition Interventions: Document food/fluid/supplement intake, Offer support with meals,snacks and hydration Friction and Shear Interventions: Minimize layers Problem: Patient Education: Go to Patient Education Activity Goal: Patient/Family Education Outcome: Progressing Towards Goal 
  
Problem: Patient Education: Go to Patient Education Activity Goal: Patient/Family Education Outcome: Progressing Towards Goal 
  
Problem: TIA/CVA Stroke: Day 2 Until Discharge Goal: Off Pathway (Use only if patient is Off Pathway) Outcome: Progressing Towards Goal 
Goal: Activity/Safety Outcome: Progressing Towards Goal 
Goal: Diagnostic Test/Procedures Outcome: Progressing Towards Goal 
Goal: Nutrition/Diet Outcome: Progressing Towards Goal 
Goal: Discharge Planning Outcome: Progressing Towards Goal 
Goal: Medications Outcome: Progressing Towards Goal 
Goal: Respiratory Outcome: Progressing Towards Goal 
Goal: Treatments/Interventions/Procedures Outcome: Progressing Towards Goal 
Goal: Psychosocial 
Outcome: Progressing Towards Goal 
Goal: *Verbalizes anxiety and depression are reduced or absent Outcome: Progressing Towards Goal 
Goal: *Absence of aspiration Outcome: Progressing Towards Goal 
Goal: *Absence of deep venous thrombosis signs and symptoms(Stroke Metric) Outcome: Progressing Towards Goal 
Goal: *Optimal pain control at patient's stated goal 
Outcome: Progressing Towards Goal 
Goal: *Tolerating diet Outcome: Progressing Towards Goal 
Goal: *Ability to perform ADLs and demonstrates progressive mobility and function Outcome: Progressing Towards Goal 
Goal: *Stroke education continued(Stroke Metric) Outcome: Progressing Towards Goal 
  
Problem: Patient Education: Go to Patient Education Activity Goal: Patient/Family Education Outcome: Progressing Towards Goal 
  
Problem: Non-Violent Restraints Goal: *Removal from restraints as soon as assessed to be safe Outcome: Progressing Towards Goal 
Goal: *No harm/injury to patient while restraints in use Outcome: Progressing Towards Goal 
Goal: *Patient's dignity will be maintained Outcome: Progressing Towards Goal 
Goal: *Patient Specific Goal (EDIT GOAL, INSERT TEXT) Outcome: Progressing Towards Goal 
Goal: Non-violent Restaints:Standard Interventions Outcome: Progressing Towards Goal 
Goal: Non-violent Restraints:Patient Interventions Outcome: Progressing Towards Goal 
Goal: Patient/Family Education Outcome: Progressing Towards Goal

## 2020-10-03 NOTE — PROGRESS NOTES
Problem: Falls - Risk of 
Goal: *Absence of Falls Description: Document Jimmy Aguilar Fall Risk and appropriate interventions in the flowsheet. Outcome: Progressing Towards Goal 
Note: Fall Risk Interventions: 
Mobility Interventions: Bed/chair exit alarm, Patient to call before getting OOB, Utilize gait belt for transfers/ambulation Mentation Interventions: Adequate sleep, hydration, pain control, Bed/chair exit alarm, Door open when patient unattended, Family/sitter at bedside, Gait belt with transfers/ambulation, More frequent rounding, Reorient patient, Self-releasing belt, Toileting rounds, Update white board Medication Interventions: Bed/chair exit alarm, Patient to call before getting OOB, Teach patient to arise slowly, Utilize gait belt for transfers/ambulation Elimination Interventions: Bed/chair exit alarm, Call light in reach, Patient to call for help with toileting needs, Toileting schedule/hourly rounds Problem: Patient Education: Go to Patient Education Activity Goal: Patient/Family Education Outcome: Progressing Towards Goal 
  
Problem: Pressure Injury - Risk of 
Goal: *Prevention of pressure injury Description: Document Grady Scale and appropriate interventions in the flowsheet. Outcome: Progressing Towards Goal 
Note: Pressure Injury Interventions: 
Sensory Interventions: Assess changes in LOC, Float heels, Keep linens dry and wrinkle-free, Minimize linen layers Moisture Interventions: Absorbent underpads, Limit adult briefs, Minimize layers Activity Interventions: Increase time out of bed Mobility Interventions: HOB 30 degrees or less, PT/OT evaluation Nutrition Interventions: Document food/fluid/supplement intake Friction and Shear Interventions: Apply protective barrier, creams and emollients, HOB 30 degrees or less, Minimize layers Problem: Patient Education: Go to Patient Education Activity Goal: Patient/Family Education Outcome: Progressing Towards Goal 
  
Problem: Non-Violent Restraints Goal: *Removal from restraints as soon as assessed to be safe Outcome: Progressing Towards Goal 
Goal: *No harm/injury to patient while restraints in use Outcome: Progressing Towards Goal 
Goal: *Patient's dignity will be maintained Outcome: Progressing Towards Goal 
Goal: *Patient Specific Goal (EDIT GOAL, INSERT TEXT) Outcome: Progressing Towards Goal 
Goal: Non-violent Restaints:Standard Interventions Outcome: Progressing Towards Goal 
Goal: Non-violent Restraints:Patient Interventions Outcome: Progressing Towards Goal 
Goal: Patient/Family Education Outcome: Progressing Towards Goal 
  
Problem: Patient Education: Go to Patient Education Activity Goal: Patient/Family Education Outcome: Progressing Towards Goal

## 2020-10-03 NOTE — PROGRESS NOTES
* No surgery found * 
* No surgery found * Bedside and Verbal shift change report given to Braxton Hanson RN (oncoming nurse) by Radha Byrnes RN (offgoing nurse). Report included the following information SBAR, Kardex and ED Summary. Zone Phone:   [phone] Significant changes during shift:  None- restraint renewal required on PM shift at 11pm 
 
 
 
Patient Information Maged Ray 
80 y.o. 
9/30/2020  4:31 PM by Paul Mars MD. Maged Ray was admitted from Home 
 
Problem List 
 
Patient Active Problem List  
 Diagnosis Date Noted  Dementia ([address] 10/01/2020  Behavioral change 10/01/2020  AMS (altered mental status) 10/01/2020  Syncope 12/20/2018  Bronchitis 12/20/2018  DEVAN (acute kidney injury) ([address] 12/20/2018  Elevated troponin 12/20/2018  Alzheimer's dementia with behavioral disturbance ([address] 04/01/2018  History of stroke 12/29/2017  Altered mental state 12/16/2017  Bradycardia with 51-60 beats per minute 05/23/2017  Chronic constipation 06/07/2016  Essential hypertension 04/07/2016  Advance directive in chart 10/07/2015  Dyslipidemia  Left acoustic neuroma ([address] 10/28/2014  Memory disturbance 02/04/2014  CAD (coronary artery disease) 10/30/2013  
 [place] (hard of hearing) 05/09/2012  Varicose vein of leg 04/12/2011  History of small bowel obstruction 04/12/2011  GERD (gastroesophageal reflux disease)  Slipped intervertebral disc  Hemorrhoids, internal   
 DJD (degenerative joint disease) of cervical spine  Scoliosis  BPH (benign prostatic hyperplasia)  PVD (peripheral vascular disease) ([address]  Diverticulosis 02/01/2004 Past Medical History:  
Diagnosis Date  Alzheimer's dementia with behavioral disturbance ([address] 04/2018 Dr. Ezra Rico.  Back pain   
 due to DDD and scoliosis  BPH (benign prostatic hyperplasia) Dr. Lauren Claudio  CAD (coronary artery disease) 10/30/13 30%LAD, 80% ostal stenosis. Dr. Godfrey Gaspar.  Chest pain 10/04/04, 10/30/13 Dr. Nereyda Greer. Negative Stress Cardiolite Test.  Dr. Gwen Perez.  Chest pain 12/02/2018 chest pain and tightness  Chronic venous insufficiency 06/15/09 Dr. Karen Carrizales  Clavicle fracture 1981 Lt due to fall  DDD (degenerative disc disease), lumbar L3-S1  Depression  Diverticulosis 02/2004  
 sigmoid. Dr. Randy Barrios  DJD (degenerative joint disease) of cervical spine C 4-5 ;5-6  Dyslipidemia  Fracture 1980  
 facial/nasal  
 Hearing loss   
 wears hearing aid  Heartburn  Hemorrhoids, internal 1987 Dr. Don Champagne  Hernia of unspecified site of abdominal cavity without mention of obstruction or gangrene   
 inguinal  Lt  Hypertension  Impotence  Left acoustic neuroma ([address] 10/28/14  
 17 x 9 mm schwannoma.  Pes planus of both feet  PVD (peripheral vascular disease) ([address] 2009  Scoliosis LS  
 Slipped intervertebral disc 1954 chiropractor  SOB (shortness of breath) 08/07/13 Dr. Tova Lockhart.  Stroke ([address] 10/28/2014  
 small Left Thalamus. Dr. Radha Cee. Dr. Kaitlin Grossman.  Syncope 12/20/2018  
 pt experienced fainting spells  Urinary incontinence   
 due to BPH. Dr. Castillo Goes  Varicose vein 06/15/09 Dr. Karen Carrizales  Vertigo 2003  
 due to inner ear. Dr. Villalta Other. Dr. Malik Ward. Dr. Jones Pouch  Visual loss Dr. Issac Stevens, ophth. Core Measures: CVA: NO Yes CHF:No No 
PNA:No No 
 
 
 
Activity Status: OOB to Chair No 
Ambulated this shift No  
Bed Rest No 
 
Supplemental O2: (If Applicable) NC Yes NRB No 
Venti-mask No 
On 2 Liters/min LINES AND DRAINS: 
 
PIV 
 
DVT prophylaxis: DVT prophylaxis Med- Yes DVT prophylaxis SCD or SHILOH- No  
 
Wounds: (If Applicable) Wounds- Yes Location scars abdomen Patient Safety: 
 
Falls Score Total Score: 3 Safety Level_______ Bed Alarm On? Yes Sitter? No 
 
Plan for upcoming shift: safety, restraint documentation/monitoring Discharge Plan: Yes TBD SNF? Active Consults: 
IP CONSULT TO HOSPITALIST 
IP CONSULT TO NEUROLOGY

## 2020-10-03 NOTE — PROGRESS NOTES
Hospitalist Progress Note NAME: Kenny Benites :  10/24/1928 MRN:  [id number] Assessment / Plan: AMS Behavioural changes likely 2/2 worsening of dementia 
-MRI negative for acute changes - Neuro consult  
-  EEG showed generalized slowing, there are no epileptiform discharges. -- continue ASA  
--Speech therapy  
-PT and OT  
 
  
HTN-imdur and metoprolol Dementia:Continue home meds H/O CVA Cont asa statin 
  
  
Code Status: DNR Surrogate Decision Maker 30.0 - 39.9 Obese / Body mass index is 30.47 kg/m². Code status: DNR Prophylaxis: Lovenox Recommended Disposition: SNF/LTC Subjective: Chief Complaint / Reason for Physician Visit Sandy Gray Discussed with RN events overnight. More alert today , patient need placement , ready to be d/c Review of Systems: 
Symptom Y/N Comments  Symptom Y/N Comments Fever/Chills    Chest Pain Poor Appetite    Edema Cough    Abdominal Pain Sputum    Joint Pain SOB/GRECO    Pruritis/Rash Nausea/vomit    Tolerating PT/OT Diarrhea    Tolerating Diet Constipation    Other Could NOT obtain due to: Dementia Objective: VITALS:  
Last 24hrs VS reviewed since prior progress note. Most recent are: 
Patient Vitals for the past 24 hrs: 
 Temp Pulse Resp BP SpO2  
10/02/20 1955 98.5 °F (36.9 °C) 86 18 (!) 155/85 98 % 10/02/20 1600 98.5 °F (36.9 °C) 96 20 (!) 150/88 99 % 10/02/20 1158 99 °F (37.2 °C) (!) 109 20 (!) 141/66 96 % 10/02/20 1126    (!) 185/124   
10/02/20 0639 97.6 °F (36.4 °C) 100 18 (!) 153/96 98 % 10/02/20 0404 99 °F (37.2 °C) (!) 101 18 (!) 168/107 94 % 10/02/20 0013 98.3 °F (36.8 °C) 88 22 (!) 154/93 97 % No intake or output data in the 24 hours ending 10/02/20 2043 PHYSICAL EXAM: 
General: WD, WN. Alert, confused EENT:  EOMI. Anicteric sclerae. MMM Resp:  CTA bilaterally, no wheezing or rales. No accessory muscle use CV:  Regular  rhythm,  No edema GI:  Soft, Non distended, Non tender.  +Bowel sounds Neurologic:  Alert Skin:  No rashes. No jaundice Reviewed most current lab test results and cultures  YES Reviewed most current radiology test results   YES Review and summation of old records today    NO Reviewed patient's current orders and MAR    YES 
PMH/SH reviewed - no change compared to H&P 
________________________________________________________________________ Care Plan discussed with: 
  Comments Patient Family  y   
RN y   
Care Manager Consultant  y Multidiciplinary team rounds were held today with , nursing, pharmacist and clinical coordinator. Patient's plan of care was discussed; medications were reviewed and discharge planning was addressed. ________________________________________________________________________ Total NON critical care TIME: 35   Minutes Total CRITICAL CARE TIME Spent:   Minutes non procedure based Comments >50% of visit spent in counseling and coordination of care y   
________________________________________________________________________ Alcides Castillo MD  
 
Procedures: see electronic medical records for all procedures/Xrays and details which were not copied into this note but were reviewed prior to creation of Plan. LABS: 
I reviewed today's most current labs and imaging studies. Pertinent labs include: 
Recent Labs 10/02/20 
0429 09/30/20 
1719 WBC 8.6 5.8 HGB 14.0 13.5 HCT 41.8 40.8  165 Recent Labs 10/02/20 
0429 09/30/20 
1719  140  
K 4.2 4.3  106 CO2 27 31  107* BUN 17 25* CREA 1.13 1.32* CA 9.4 9.2 ALB  --  3.4* TBILI  --  0.2 ALT  --  18 INR  --  1.1 Signed:  Alcides Castillo MD

## 2020-10-03 NOTE — PROGRESS NOTES
Bedside and Verbal shift change report given to Srikanth Powell 137 nurse) ZEYNEP Sun (offgoing nurse). Report included the following information SBAR, Kardex and ED Summary. 
  
Zone Phone:   [phone] 
  
  
Significant changes during shift: Pt is NPO. Continues on IV NS at 75ml/hr.  
  
  
Patient Information 
  
Barry Waite 
80 y.o. 
9/30/2020  4:31 PM by Leona Coffman MD. Randy Isidro was admitted from Home 
  
Problem List 
  
       
Patient Active Problem List  
  Diagnosis Date Noted  Dementia ([address] 10/01/2020  Behavioral change 10/01/2020  AMS (altered mental status) 10/01/2020  Syncope 12/20/2018  Bronchitis 12/20/2018  DEVAN (acute kidney injury) ([address] 12/20/2018  Elevated troponin 12/20/2018  Alzheimer's dementia with behavioral disturbance ([address] 04/01/2018  History of stroke 12/29/2017  Altered mental state 12/16/2017  Bradycardia with 51-60 beats per minute 05/23/2017  Chronic constipation 06/07/2016  Essential hypertension 04/07/2016  Advance directive in chart 10/07/2015  Dyslipidemia    
 Left acoustic neuroma ([address] 10/28/2014  Memory disturbance 02/04/2014  CAD (coronary artery disease) 10/30/2013  
 [place] (hard of hearing) 05/09/2012  Varicose vein of leg 04/12/2011  History of small bowel obstruction 04/12/2011  GERD (gastroesophageal reflux disease)    
 Slipped intervertebral disc    
 Hemorrhoids, internal    
 DJD (degenerative joint disease) of cervical spine    
 Scoliosis    
 BPH (benign prostatic hyperplasia)    
 PVD (peripheral vascular disease) ([address]    
 Diverticulosis 02/01/2004  
  
       
Past Medical History:  
Diagnosis Date  Alzheimer's dementia with behavioral disturbance ([address] 04/2018  
  Dr. Charles Andre.  Back pain    
  due to DDD and scoliosis  BPH (benign prostatic hyperplasia)    
  Dr. Nery Aparicio  CAD (coronary artery disease) 10/30/13   30%LAD, 80% ostal stenosis.  Dr. Shelia Lopez.  Chest pain 10/04/04, 10/30/13  
  Dr. Brenda Natarajan Test.  Dr. Nuris Bob.  Chest pain 12/02/2018  
  chest pain and tightness  Chronic venous insufficiency 06/15/09  
  Dr. Paul Smith  Clavicle fracture 1981  
  Lt due to fall  DDD (degenerative disc disease), lumbar    
  L3-S1  Depression    
 Diverticulosis 02/2004  
  sigmoid.  Dr. Jamshid Soto  DJD (degenerative joint disease) of cervical spine    
  C 4-5 ;5-6  Dyslipidemia    
 Fracture 1980  
  facial/nasal  
 Hearing loss    
  wears hearing aid  Heartburn    
 Hemorrhoids, internal [place] Dr. Jocelyn Walker  Hernia of unspecified site of abdominal cavity without mention of obstruction or gangrene    
  inguinal  Lt  Hypertension    
 Impotence    
 Left acoustic neuroma ([facility]CC) 10/28/14  
  17 x 9 mm schwannoma.    
 Pes planus of both feet    
 PVD (peripheral vascular disease) ([facility] 2009  Scoliosis    
  LS  Slipped intervertebral disc 1954  
  chiropractor  SOB (shortness of breath) 08/07/13  
  Dr. Anneliese Rubin.  Stroke ([facility] 10/28/2014  
  small Left Thalamus.  Dr. Aaron Sanders.  Dr. Jorgito Cassidy.  Syncope 12/20/2018  
  pt experienced fainting spells  Urinary incontinence    
  due to BPH.  Dr. Workman Bruce  Varicose vein 06/15/09  
  Dr. Paul Smith  Vertigo 2003  
  due to inner ear.  Dr. Bauer Fife.  Dr. Rigoberto Tolentino. Dr. Jami Lao  Visual loss    
  Dr. Milton Vargas, ophth.  
  
  
  
Core Measures: 
  
CVA: NO Yes CHF:No No 
PNA:No No 
  
  
  
Activity Status: 
  
OOB to Chair No 
Ambulated this shift No  
Bed Rest No 
  
Supplemental M7: (NH Applicable) 
  
NC Yes NRB No 
Venti-mask No 
On 2 Liters/min 
  
  
LINES AND DRAINS: 
  
PIV 
  
DVT prophylaxis: 
  
DVT prophylaxis Med- Yes DVT prophylaxis SCD or SHILOH- No  
  
Wounds: (If Applicable) 
  
Wounds- Yes 
  
Location scars abdomen 
  
Patient Safety: 
  
 Falls Score Total Score: 3 Safety Level_______ Bed Alarm On? Yes Sitter? No 
  
Plan for upcoming shift: COVID  And possible placement?  
  
  
  
Discharge Plan: Yes TBD SNF? 
  
Active Consults: 
IP CONSULT TO HOSPITALIST 
IP CONSULT TO NEUROLOGY

## 2020-10-03 NOTE — PROGRESS NOTES
Bedside and Verbal shift change report given to [address] [place] [address], (oncoming nurse) by Narinder Brandon (offgoing nurse). Report included the following information SBAR, Kardex and ED Summary. 
  
Zone Phone:   [phone] 
  
  
Significant changes during shift: pt failed swallow study, pt agitated given 1mg haldol IV  
  
  
Patient Information 
  
Delia Carrizales 
80 y.o. 
9/30/2020  4:31 PM by Beau Hussein MD. Delia Carrizales was admitted from Home 
  
Problem List 
  
    
Patient Active Problem List  
  Diagnosis Date Noted  Dementia ([address] 10/01/2020  Behavioral change 10/01/2020  AMS (altered mental status) 10/01/2020  Syncope 12/20/2018  Bronchitis 12/20/2018  DEVAN (acute kidney injury) ([address] 12/20/2018  Elevated troponin 12/20/2018  Alzheimer's dementia with behavioral disturbance ([address] 04/01/2018  History of stroke 12/29/2017  Altered mental state 12/16/2017  Bradycardia with 51-60 beats per minute 05/23/2017  Chronic constipation 06/07/2016  Essential hypertension 04/07/2016  Advance directive in chart 10/07/2015  Dyslipidemia    
 Left acoustic neuroma ([address] 10/28/2014  Memory disturbance 02/04/2014  CAD (coronary artery disease) 10/30/2013  
 [place] (hard of hearing) 05/09/2012  Varicose vein of leg 04/12/2011  History of small bowel obstruction 04/12/2011  GERD (gastroesophageal reflux disease)    
 Slipped intervertebral disc    
 Hemorrhoids, internal    
 DJD (degenerative joint disease) of cervical spine    
 Scoliosis    
 BPH (benign prostatic hyperplasia)    
 PVD (peripheral vascular disease) ([address]    
 Diverticulosis 02/01/2004  
  
    
Past Medical History:  
Diagnosis Date  Alzheimer's dementia with behavioral disturbance ([address] 04/2018  
  Dr. White Ards.  Back pain    
  due to DDD and scoliosis  BPH (benign prostatic hyperplasia)    
  Dr. Ayleen Turner  CAD (coronary artery disease) 10/30/13   30%LAD, 80% ostal stenosis. Dr. Debra Schroeder.  Chest pain 10/04/04, 10/30/13  
  Dr. Luz Dillard. Negative Stress Cardiolite Test.  Dr. Madeline Francis.  Chest pain 12/02/2018  
  chest pain and tightness  Chronic venous insufficiency 06/15/09  
  Dr. Raquel Motta  Clavicle fracture 1981  
  Lt due to fall  DDD (degenerative disc disease), lumbar    
  L3-S1  Depression    
 Diverticulosis 02/2004  
  sigmoid. Dr. Mer Rees  DJD (degenerative joint disease) of cervical spine    
  C 4-5 ;5-6  Dyslipidemia    
 Fracture 1980  
  facial/nasal  
 Hearing loss    
  wears hearing aid  Heartburn    
 Hemorrhoids, internal [place] Dr. [facility]ill[facility]  Hernia of unspecified site of abdominal cavity without mention of obstruction or gangrene    
  inguinal  Lt  Hypertension    
 Impotence    
 Left acoustic neuroma (HCC) 10/28/14  
  17 x 9 mm schwannoma.  Pes planus of both feet    
 PVD (peripheral vascular disease) ([address] 2009  Scoliosis    
  LS  Slipped intervertebral disc 1954  
  chiropractor  SOB (shortness of breath) 08/07/13  
  Dr. Ben Renee.  Stroke ([facility] 10/28/2014  
  small Left Thalamus. Dr. Miguel A Bowie. Dr. Cyn Marie.  Syncope 12/20/2018  
  pt experienced fainting spells  Urinary incontinence    
  due to BPH. Dr. Aburto Huh  Varicose vein 06/15/09  
  Dr. Raquel Motta  Vertigo 2003  
  due to inner ear. Dr. Solange Rucker. Dr. Keily Zuniga. Dr. Lackey Tavia  Visual loss    
  Dr. Kristel Gama, ophth.  
  
  
  
Core Measures: 
  
CVA: NO Yes CHF:No No 
PNA:No No 
  
  
  
Activity Status: 
  
OOB to Chair No 
Ambulated this shift No  
Bed Rest No 
  
Supplemental O2: (If Applicable) 
  
NC Yes NRB No 
Venti-mask No 
On 2 Liters/min 
  
  
LINES AND DRAINS: 
  
PIV 
  
DVT prophylaxis: 
  
DVT prophylaxis Med- Yes DVT prophylaxis SCD or SHILOH- No  
  
Wounds: (If Applicable) 
  
Wounds- Yes 
  
Location scars abdomen 
  
Patient Safety: 
  
 Falls Score Total Score: 3 Safety Level_______ Bed Alarm On? Yes Sitter?  No 
  
Plan for upcoming shift: COVID  And possible placement?  
  
  
  
Discharge Plan: Yes TBD SNF? 
  
Active Consults: 
IP CONSULT TO HOSPITALIST 
IP CONSULT TO NEUROLOGY

## 2020-10-03 NOTE — PROGRESS NOTES
Hospitalist Progress Note NAME: Silvia Cohen :  10/24/1928 MRN:  [id number] Assessment / Plan: AMS Behavioural changes likely 2/2 worsening of dementia 
-MRI negative for acute changes - Neuro consult -  EEG showed generalized slowing, there are no epileptiform discharges. -- continue ASA  
--Speech therapy  
-PT and OT  
  
  
HTN-imdur and metoprolol Dementia:Continue home meds H/O CVA Cont asa statin 
  
  
Code Status: DNR Surrogate Decision Maker 
  
30.0 - 39.9 Obese / Body mass index is 30.47 kg/m². 
  
Code status: DNR Prophylaxis: Lovenox Recommended Disposition: SNF/LTC Subjective: Chief Complaint / Reason for Physician Visit Elke Marion Discussed with RN events overnight. Ready to be d.c waiting for placement Review of Systems: 
Symptom Y/N Comments  Symptom Y/N Comments Fever/Chills    Chest Pain Poor Appetite    Edema Cough    Abdominal Pain Sputum    Joint Pain SOB/GRECO    Pruritis/Rash Nausea/vomit    Tolerating PT/OT Diarrhea    Tolerating Diet Constipation    Other Could NOT obtain due to: Dementia Objective: VITALS:  
Last 24hrs VS reviewed since prior progress note. Most recent are: 
Patient Vitals for the past 24 hrs: 
 Temp Pulse Resp BP SpO2  
10/03/20 0636 98.8 °F (37.1 °C) (!) 113 20 (!) 153/94 96 % 10/03/20 0348 99 °F (37.2 °C) (!) 106 20 (!) 156/87 97 % 10/03/20 0138    (!) 175/96   
10/03/20 0032 98.6 °F (37 °C) (!) 109 20 (!) 176/133 96 % 10/02/20 1955 98.5 °F (36.9 °C) 86 18 (!) 155/85 98 % 10/02/20 1600 98.5 °F (36.9 °C) 96 20 (!) 150/88 99 % 10/02/20 1158 99 °F (37.2 °C) (!) 109 20 (!) 141/66 96 % 10/02/20 1126    (!) 185/124  No intake or output data in the 24 hours ending 10/03/20 0844 PHYSICAL EXAM: 
General: WD, WN. Alert, cooperative, no acute distress   
EENT:  EOMI. Anicteric sclerae. MMM Resp:  CTA bilaterally, no wheezing or rales. No accessory muscle use CV:  Regular  rhythm,  No edema GI:  Soft, Non distended, Non tender.  +Bowel sounds Neurologic:  Alert Skin:  No rashes. No jaundice Reviewed most current lab test results and cultures  YES Reviewed most current radiology test results   YES Review and summation of old records today    NO Reviewed patient's current orders and MAR    YES 
PMH/SH reviewed - no change compared to H&P 
________________________________________________________________________ Care Plan discussed with: 
  Comments Patient Family RN y   
Care Manager Consultant  y Multidiciplinary team rounds were held today with , nursing, pharmacist and clinical coordinator. Patient's plan of care was discussed; medications were reviewed and discharge planning was addressed. ________________________________________________________________________ Total NON critical care TIME:  35   Minutes Total CRITICAL CARE TIME Spent:   Minutes non procedure based Comments >50% of visit spent in counseling and coordination of care y   
________________________________________________________________________ Nivia Mckeon MD  
 
Procedures: see electronic medical records for all procedures/Xrays and details which were not copied into this note but were reviewed prior to creation of Plan. LABS: 
I reviewed today's most current labs and imaging studies. Pertinent labs include: 
Recent Labs 10/03/20 
9401 10/02/20 
0429 09/30/20 
1719 WBC 10.1 8.6 5.8 HGB 13.6 14.0 13.5 HCT 39.8 41.8 40.8  193 165 Recent Labs 10/03/20 
0528 10/02/20 
0429 09/30/20 
1719  138 140  
K 4.3 4.2 4.3  105 106 CO2 27 27 31 * 100 107* BUN 14 17 25* CREA 1.06 1.13 1.32* CA 9.4 9.4 9.2 ALB  --   --  3.4* TBILI  --   --  0.2 ALT  --   --  18 INR  --   --  1.1 Signed:  Nivia Mckeon MD

## 2020-10-04 NOTE — PROGRESS NOTES
Hospitalist Progress Note NAME: Maged Ray :  10/24/1928 MRN:  [id number] Assessment / Plan: AMS Behavioural changes likely 2/2 worsening of dementia 
-MRI negative for acute changes - Neuro consult -  EEG showed generalized slowing, there are no epileptiform discharges. -- continue ASA  
--Speech therapy  
-PT and OT  
  
  
HTN-imdur and metoprolol Dementia:Continue home meds H/O CVA Cont asa statin 
  
  
Code Status: DNR Surrogate Decision Maker 
  
30.0 - 39.9 Obese / Body mass index is 30.47 kg/m². 
  
Code status: DNR Prophylaxis: Lovenox Recommended Disposition: SNF/LTC 
  
 
 
  
 
Subjective: Chief Complaint / Reason for Physician Visit Discussed with RN events overnight. Patient ready to be d/c waiting for placement Review of Systems: 
Symptom Y/N Comments  Symptom Y/N Comments Fever/Chills    Chest Pain Poor Appetite    Edema Cough    Abdominal Pain Sputum    Joint Pain SOB/GRECO    Pruritis/Rash Nausea/vomit    Tolerating PT/OT Diarrhea    Tolerating Diet Constipation    Other Could NOT obtain due to: Lethargic Objective: VITALS:  
Last 24hrs VS reviewed since prior progress note. Most recent are: 
Patient Vitals for the past 24 hrs: 
 Temp Pulse Resp BP SpO2  
10/04/20 1344 98.1 °F (36.7 °C) 62  (!) 181/85   
10/04/20 0634 98.1 °F (36.7 °C) (!) 111 20 (!) 171/93 98 % 10/04/20 0420 (!) 100.7 °F (38.2 °C) (!) 54 20 (!) 167/82 95 % 10/03/20 2252 99.1 °F (37.3 °C) 73 22 (!) 153/88 98 % 10/03/20 1856 98.7 °F (37.1 °C) (!) 53 20 (!) 166/93 98 % 10/03/20 1622  64 20 (!) 176/80 97 % 10/03/20 1525 98.6 °F (37 °C) (!) 53 18 (!) 172/82 98 % 10/03/20 1420  89 20 (!) 177/82 97 % No intake or output data in the 24 hours ending 10/04/20 1357 PHYSICAL EXAM: 
General: WD, WN. Alert, cooperative, no acute distress   
EENT:  EOMI. Anicteric sclerae. MMM Resp: CTA bilaterally, no wheezing or rales. No accessory muscle use CV:  Regular  rhythm,  No edema GI:  Soft, Non distended, Non tender.  +Bowel sounds Neurologic:  Lethargic Skin:  No rashes. No jaundice Reviewed most current lab test results and cultures  YES Reviewed most current radiology test results   YES Review and summation of old records today    NO Reviewed patient's current orders and MAR    YES 
PMH/SH reviewed - no change compared to H&P 
________________________________________________________________________ Care Plan discussed with: 
  Comments Patient Family RN y   
Care Manager y Consultant Multidiciplinary team rounds were held today with , nursing, pharmacist and clinical coordinator. Patient's plan of care was discussed; medications were reviewed and discharge planning was addressed. ________________________________________________________________________ Total NON critical care TIME:  35    Minutes Total CRITICAL CARE TIME Spent:   Minutes non procedure based Comments >50% of visit spent in counseling and coordination of care y   
________________________________________________________________________ Keren Mcclure MD  
 
Procedures: see electronic medical records for all procedures/Xrays and details which were not copied into this note but were reviewed prior to creation of Plan. LABS: 
I reviewed today's most current labs and imaging studies. Pertinent labs include: 
Recent Labs 10/03/20 
0194 10/02/20 
5819 WBC 10.1 8.6 HGB 13.6 14.0  
HCT 39.8 41.8  193 Recent Labs 10/04/20 
2571 10/03/20 
1486 10/02/20 
2394  137 138  
K 3.7 4.3 4.2  106 105 CO2 25 27 27 * 108* 100 BUN 16 14 17 CREA 0.97 1.06 1.13  
CA 8.9 9.4 9.4 Signed:  Keren Mcclure MD

## 2020-10-04 NOTE — PROGRESS NOTES
Bedside and Verbal shift change report given to Mani Box RN (oncoming nurse) by Sandeep Dahl RN (offgoing nurse). Report included the following information SBAR, Kardex, Procedure Summary, Intake/Output, MAR and Recent Results.

## 2020-10-04 NOTE — PROGRESS NOTES
Problem: Falls - Risk of 
Goal: *Absence of Falls Description: Document Bhavin Waite Fall Risk and appropriate interventions in the flowsheet. Outcome: Progressing Towards Goal 
Note: Fall Risk Interventions: 
Mobility Interventions: Bed/chair exit alarm, Communicate number of staff needed for ambulation/transfer, Patient to call before getting OOB, PT Consult for mobility concerns, Strengthening exercises (ROM-active/passive), Utilize walker, cane, or other assistive device, Utilize gait belt for transfers/ambulation Mentation Interventions: Bed/chair exit alarm, Adequate sleep, hydration, pain control, Door open when patient unattended, Familiar objects from home, Gait belt with transfers/ambulation, Room close to nurse's station, Reorient patient, More frequent rounding, Toileting rounds, Update white board Medication Interventions: Evaluate medications/consider consulting pharmacy, Bed/chair exit alarm, Patient to call before getting OOB, Teach patient to arise slowly, Utilize gait belt for transfers/ambulation Elimination Interventions: Bed/chair exit alarm, Call light in reach, Patient to call for help with toileting needs, Toileting schedule/hourly rounds Problem: Pressure Injury - Risk of 
Goal: *Prevention of pressure injury Description: Document Grady Scale and appropriate interventions in the flowsheet. Outcome: Progressing Towards Goal 
Note: Pressure Injury Interventions: 
Sensory Interventions: Assess changes in LOC, Check visual cues for pain, Keep linens dry and wrinkle-free, Maintain/enhance activity level, Minimize linen layers, Float heels Moisture Interventions: Absorbent underpads, Apply protective barrier, creams and emollients, Check for incontinence Q2 hours and as needed, Minimize layers Activity Interventions: Increase time out of bed Mobility Interventions: HOB 30 degrees or less, Pressure redistribution bed/mattress (bed type) Nutrition Interventions: Document food/fluid/supplement intake Friction and Shear Interventions: Apply protective barrier, creams and emollients, Minimize layers, HOB 30 degrees or less

## 2020-10-05 NOTE — PROGRESS NOTES
Problem: Mobility Impaired (Adult and Pediatric) Goal: *Acute Goals and Plan of Care (Insert Text) Description: FUNCTIONAL STATUS PRIOR TO ADMISSION: Patient required moderate assistance for basic and instrumental ADLs. HOME SUPPORT PRIOR TO ADMISSION: The patient lived with wife who assisted as needed. Physical Therapy Goals Initiated 10/2/2020 1. Patient will move from supine to sit and sit to supine  in bed with minimal assistance/contact guard assist within 7 day(s). 2.  Patient will transfer from bed to chair and chair to bed with minimal assistance/contact guard assist using the least restrictive device within 7 day(s). 3.  Patient will perform sit to stand with minimal assistance/contact guard assist within 7 day(s). 4.  Patient will ambulate with minimal assistance/contact guard assist for 15 feet with the least restrictive device within 7 day(s). Outcome: Progressing Towards Goal 
 PHYSICAL THERAPY TREATMENT Patient: Claudetta Simons (13 y.o. male) Date: 10/5/2020 Diagnosis: AMS (altered mental status) [R41.82] Behavioral change [R46.89] Dementia ([facility] [address] [R88.77] <principal problem not specified> Precautions: Fall Chart, physical therapy assessment, plan of care and goals were reviewed. ASSESSMENT Patient continues with skilled PT services and is not progressing towards goals. Pt received supine in bed with bilateral soft mitts in place. Pt followed minimal to no commands and speech was unintelligible during session. Pt required max A x 2 supine to sit EOB and max A for trunk support while seated EOB due to constant posterior trunk leaning. Pt assisted back to supine position and practiced rolling in efforts to change out his brief due to urinary incontinence. Pt performed LE therex with max verbal and tactile cues to carry out. At this time, pt will benefit from SNF placement upon discharge pending progress and medical course. Current Level of Function Impacting Discharge (mobility/balance): max A x 2 supine<>sit, poor seated balance Other factors to consider for discharge: baseline dementia, poor command following PLAN : 
Patient continues to benefit from skilled intervention to address the above impairments. Continue treatment per established plan of care. to address goals. Recommendation for discharge: (in order for the patient to meet his/her long term goals) Therapy up to 5 days/week in SNF setting This discharge recommendation: 
Has been made in collaboration with the attending provider and/or case management SUBJECTIVE:  
Patient stated Ouch.   pt c/o pain at L hip and R shoulder during session--RN aware OBJECTIVE DATA SUMMARY:  
Critical Behavior: 
Neurologic State: Alert, Confused Orientation Level: Disoriented X4 Cognition: Impulsive Safety/Judgement: Lack of insight into deficits Functional Mobility Training: 
Bed Mobility: 
Rolling: Maximum assistance Supine to Sit: Maximum assistance;Assist x2 Sit to Supine: Maximum assistance;Assist x2 Scooting: Maximum assistance Transfers: 
Sit to Stand: (NT) 
  
     
  
     
  
  
  
  
Balance: 
Sitting: Impaired(constant posterior lean) Sitting - Static: Poor (constant support) Sitting - Dynamic: Poor (constant support) Standing: (NT) Ambulation/Gait Training: 
  
  
 
   
 
Therapeutic Exercises: Ankle pumps, knee flexion, hip abduction Pain Rating: 
Pt moaned in pain at L hip and R shoulder Activity Tolerance:  
Fair Please refer to the flowsheet for vital signs taken during this treatment. After treatment patient left in no apparent distress:  
Supine in bed, Call bell within reach, Bed / chair alarm activated, and Side rails x 3 
 
COMMUNICATION/COLLABORATION:  
The patients plan of care was discussed with: Occupational therapist, Speech therapist, and Registered nurse.   
 
Katie Llamas, PT, DPT 
 Time Calculation: 26 mins

## 2020-10-05 NOTE — PROGRESS NOTES
* No surgery found * 
* No surgery found * Bedside and Verbal shift change report given to [facility] AT FREDDY DUFFY, RN (oncoming nurse) by Ashely Coles RN (offgoing nurse). Report included the following information SBAR, Kardex and ED Summary. Zone Phone:   [phone] Significant changes during shift:  None- restraint renewal required on PM shift at 11pm 
 
 
 
Patient Information Silvia Cohen 
80 y.o. 
9/30/2020  4:31 PM by Ishaan Coleman MD. Silvia Cohen was admitted from Home 
 
Problem List 
 
Patient Active Problem List  
 Diagnosis Date Noted  Dementia ([address] 10/01/2020  Behavioral change 10/01/2020  AMS (altered mental status) 10/01/2020  Syncope 12/20/2018  Bronchitis 12/20/2018  DEVAN (acute kidney injury) ([address] 12/20/2018  Elevated troponin 12/20/2018  Alzheimer's dementia with behavioral disturbance ([address] 04/01/2018  History of stroke 12/29/2017  Altered mental state 12/16/2017  Bradycardia with 51-60 beats per minute 05/23/2017  Chronic constipation 06/07/2016  Essential hypertension 04/07/2016  Advance directive in chart 10/07/2015  Dyslipidemia  Left acoustic neuroma ([address] 10/28/2014  Memory disturbance 02/04/2014  CAD (coronary artery disease) 10/30/2013  
 [place] (hard of hearing) 05/09/2012  Varicose vein of leg 04/12/2011  History of small bowel obstruction 04/12/2011  GERD (gastroesophageal reflux disease)  Slipped intervertebral disc  Hemorrhoids, internal   
 DJD (degenerative joint disease) of cervical spine  Scoliosis  BPH (benign prostatic hyperplasia)  PVD (peripheral vascular disease) ([address]  Diverticulosis 02/01/2004 Past Medical History:  
Diagnosis Date  Alzheimer's dementia with behavioral disturbance ([address] 04/2018 Dr. Jose Manuel Nieves.  Back pain   
 due to DDD and scoliosis  BPH (benign prostatic hyperplasia) Dr. Sammie Sifuentes  CAD (coronary artery disease) 10/30/13 30%LAD, 80% ostal stenosis. Dr. Sandra Marte.  Chest pain 10/04/04, 10/30/13 Dr. Julianne Sacks. Negative Stress Cardiolite Test.  Dr. Moustapha Bay.  Chest pain 12/02/2018 chest pain and tightness  Chronic venous insufficiency 06/15/09 Dr. Naveed Ya  Clavicle fracture 1981 Lt due to fall  DDD (degenerative disc disease), lumbar L3-S1  Depression  Diverticulosis 02/2004  
 sigmoid. Dr. Paul Lange  DJD (degenerative joint disease) of cervical spine C 4-5 ;5-6  Dyslipidemia  Fracture 1980  
 facial/nasal  
 Hearing loss   
 wears hearing aid  Heartburn  Hemorrhoids, internal 1987 Dr. Renae Gallegos  Hernia of unspecified site of abdominal cavity without mention of obstruction or gangrene   
 inguinal  Lt  Hypertension  Impotence  Left acoustic neuroma ([address] 10/28/14  
 17 x 9 mm schwannoma.  Pes planus of both feet  PVD (peripheral vascular disease) ([address] 2009  Scoliosis LS  
 Slipped intervertebral disc 1954 chiropractor  SOB (shortness of breath) 08/07/13 Dr. Merritt Veronica.  Stroke ([address] 10/28/2014  
 small Left [place]. Dr. Cecilia Ridley. Dr. Mildred Gonzalez.  Syncope 12/20/2018  
 pt experienced fainting spells  Urinary incontinence   
 due to BPH. Dr. Jorge A Starks  Varicose vein 06/15/09 Dr. Naveed Ya  Vertigo 2003  
 due to inner ear. Dr. Vasquez Began. Dr. Nathaniel Fox. Dr. Toni Mazariegos  Visual loss Dr. Latasha Freire, ophth. Core Measures: CVA: NO Yes CHF:No No 
PNA:No No 
 
 
 
Activity Status: OOB to Chair No 
Ambulated this shift No  
Bed Rest No 
 
Supplemental O2: (If Applicable) NC Yes NRB No 
Venti-mask No 
On 2 Liters/min LINES AND DRAINS: 
 
PIV 
 
DVT prophylaxis: DVT prophylaxis Med- Yes DVT prophylaxis SCD or SHILOH- No  
 
Wounds: (If Applicable) Wounds- Yes Location scars abdomen Patient Safety: 
 
Falls Score Total Score: 3 Safety Level_______ Bed Alarm On? Yes Sitter? No 
 
Plan for upcoming shift: safety, restraint documentation/monitoring Discharge Plan: Yes TBD SNF? Active Consults: 
IP CONSULT TO HOSPITALIST 
IP CONSULT TO NEUROLOGY

## 2020-10-05 NOTE — PROGRESS NOTES
Problem: Pressure Injury - Risk of 
Goal: *Prevention of pressure injury Description: Document Grady Scale and appropriate interventions in the flowsheet. Outcome: Progressing Towards Goal 
Note: Pressure Injury Interventions: 
Sensory Interventions: Assess changes in LOC, Float heels, Keep linens dry and wrinkle-free Moisture Interventions: Absorbent underpads, Check for incontinence Q2 hours and as needed, Minimize layers Activity Interventions: Pressure redistribution bed/mattress(bed type), Assess need for specialty bed Mobility Interventions: Float heels, HOB 30 degrees or less, Pressure redistribution bed/mattress (bed type) Nutrition Interventions: Document food/fluid/supplement intake Friction and Shear Interventions: Apply protective barrier, creams and emollients, HOB 30 degrees or less, Lift sheet, Minimize layers

## 2020-10-05 NOTE — PROGRESS NOTES
Problem: Dysphagia (Adult) Goal: *Acute Goals and Plan of Care (Insert Text) Description: 10/2/2020 Speech path goals 1. patient will participate with reeval of swallowing 2. patient will tolerate meds crushed in applesauce with no overt s/s of aspiration. Outcome: Not Progressing Towards Goal 
 SPEECH LANGUAGE PATHOLOGY DYSPHAGIA TREATMENT Patient: Silvia Cohen (89 y.o. male) Date: 10/5/2020 Diagnosis: AMS (altered mental status) [R41.82] Behavioral change [R46.89] Dementia ([address] [Z96.30] <principal problem not specified> Precautions: aspiration Fall ASSESSMENT: 
Patient with worsened presentation in comparison to 10/2. Patient with acceptance of applesauce from spoon; however, absent posterior propulsion requiring SLP to swab out 100% of bolus from oral cavity. Patient with absent acceptance of liquid via straw. He was fully alert with no object recognition. At this time, cannot safely recommend PO. PLAN: 
Recommendations and Planned Interventions: 
Continue NPO. Given presentation today, do not feel even meds crushed is appropriate Consider palliative consult to discuss goals of care Patient is not ready for d/c given inability to take in PO entire hospitalization 14:15 re-attempted to see today per MD request. Patient now lethargic and not able to arouse sufficiently enough for PO intake. Patient continues to benefit from skilled intervention to address the above impairments. Continue treatment per established plan of care. Discharge Recommendations: To Be Determined SUBJECTIVE:  
Patient alert, little verbalizations, poor command following OBJECTIVE:  
Cognitive and Communication Status: 
Neurologic State: Alert, Confused Orientation Level: Disoriented X4 Cognition: Impulsive Perception: (difficult to assess) Safety/Judgement: Lack of insight into deficits Dysphagia Treatment: P.O. Trials: 
Patient Position: (up in bed) Vocal quality prior to P.O.: Low volume Consistency Presented: Thin liquid;Puree How Presented: Isaac Ortega Bolus Acceptance: Impaired Bolus Formation/Control: Impaired Propulsion: Absent Oral Phase Severity: Severe Pain: 
Pain Scale 1: Adult Nonverbal Pain Scale Pain Intensity 1: 0 After treatment:  
Patient left in no apparent distress in bed, Call bell within reach, and Nursing notified COMMUNICATION/EDUCATION:  
 
The patient's plan of care including recommendations, planned interventions, and recommended diet changes were discussed with: Registered nurse and Physician. CALIXTO Sheehan Time Calculation: 12 mins

## 2020-10-05 NOTE — PROGRESS NOTES
ANGELA: 
SNF Placement 2nd IM Medicare Letter Medical Transport UPDATE: 11:57AM 
 
CM informed that pt was accepted to [facility], and will be able to transition to their facility on 10/6/20. CM aware that pt is in need of COVID test (pending). CM will inform pt's spouse of the following. BELA Olivia, [address] [facility] CM: Tolu Ayala is currently working with pt in the Neuro Unit. CM informed that pt is in need of snf placement at the time of d/c.  CM informed that previous CM sent referrals to two different SNF: [facility] and [address] [place] [address].  CM informed that [address] is unable to accept pt at this time due to pt being in need of locked unit. CM informed that clinicals was also faxed to [facility], and snf coordinator will contact CM once clinicals are reviewed. CM attempted contact with [facility] liaison, however, attempt was unsuccessful and CM left voicemail requesting a return call, to verify acceptance CM spoke with pt's spouse to inform her of the following. Spouse aware that clinicals are under review for [facility] made aware that pt will require medical transport at the time of d/c. 
 
Per previous CM note, [facility] does not need COVID negative test at the time of d/c. CM will verify this information prior to pt's d/c. Pt will require 2nd IM Medicare Letter at the time of d/c. CM will continue to follow. BELA Olivia, [address] [facility]

## 2020-10-05 NOTE — PROGRESS NOTES
Problem: Falls - Risk of 
Goal: *Absence of Falls Description: Document Venkatesh Smith Fall Risk and appropriate interventions in the flowsheet. Outcome: Progressing Towards Goal 
Note: Fall Risk Interventions: 
Mobility Interventions: Bed/chair exit alarm, Patient to call before getting OOB, Utilize gait belt for transfers/ambulation Mentation Interventions: Adequate sleep, hydration, pain control, Bed/chair exit alarm, Door open when patient unattended, Gait belt with transfers/ambulation, More frequent rounding, Reorient patient, Toileting rounds, Update white board Medication Interventions: Bed/chair exit alarm, Patient to call before getting OOB, Teach patient to arise slowly, Utilize gait belt for transfers/ambulation Elimination Interventions: Bed/chair exit alarm, Call light in reach, Patient to call for help with toileting needs, Toileting schedule/hourly rounds Problem: Patient Education: Go to Patient Education Activity Goal: Patient/Family Education Outcome: Progressing Towards Goal 
  
Problem: Pressure Injury - Risk of 
Goal: *Prevention of pressure injury Description: Document Grady Scale and appropriate interventions in the flowsheet. Outcome: Progressing Towards Goal 
Note: Pressure Injury Interventions: 
Sensory Interventions: Float heels, Keep linens dry and wrinkle-free, Minimize linen layers Moisture Interventions: Absorbent underpads, Limit adult briefs, Minimize layers Activity Interventions: Pressure redistribution bed/mattress(bed type) Mobility Interventions: Float heels, HOB 30 degrees or less Nutrition Interventions: Document food/fluid/supplement intake Friction and Shear Interventions: Apply protective barrier, creams and emollients, Feet elevated on foot rest, Minimize layers Problem: Patient Education: Go to Patient Education Activity Goal: Patient/Family Education Outcome: Progressing Towards Goal 
  
 Problem: Patient Education: Go to Patient Education Activity Goal: Patient/Family Education Outcome: Progressing Towards Goal 
  
Problem: TIA/CVA Stroke: Day 2 Until Discharge Goal: Activity/Safety Outcome: Progressing Towards Goal 
Goal: Diagnostic Test/Procedures Outcome: Progressing Towards Goal 
Goal: Nutrition/Diet Outcome: Progressing Towards Goal 
Goal: Discharge Planning Outcome: Progressing Towards Goal 
Goal: Medications Outcome: Progressing Towards Goal 
Goal: Respiratory Outcome: Progressing Towards Goal 
Goal: Treatments/Interventions/Procedures Outcome: Progressing Towards Goal 
Goal: Psychosocial 
Outcome: Progressing Towards Goal 
Goal: *Verbalizes anxiety and depression are reduced or absent Outcome: Progressing Towards Goal 
Goal: *Absence of aspiration Outcome: Progressing Towards Goal 
Goal: *Absence of deep venous thrombosis signs and symptoms(Stroke Metric) Outcome: Progressing Towards Goal 
Goal: *Optimal pain control at patient's stated goal 
Outcome: Progressing Towards Goal 
Goal: *Tolerating diet Outcome: Progressing Towards Goal 
Goal: *Ability to perform ADLs and demonstrates progressive mobility and function Outcome: Progressing Towards Goal 
Goal: *Stroke education continued(Stroke Metric) Outcome: Progressing Towards Goal 
  
Problem: Ischemic Stroke: Discharge Outcomes Goal: *Verbalizes anxiety and depression are reduced or absent Outcome: Progressing Towards Goal 
Goal: *Verbalize understanding of risk factor modification(Stroke Metric) Outcome: Progressing Towards Goal 
Goal: *Hemodynamically stable Outcome: Progressing Towards Goal 
Goal: *Absence of aspiration pneumonia Outcome: Progressing Towards Goal 
Goal: *Aware of needed dietary changes Outcome: Progressing Towards Goal 
Goal: *Verbalize understanding of prescribed medications including anti-coagulants, anti-lipid, and/or anti-platelets(Stroke Metric) Outcome: Progressing Towards Goal 
 Goal: *Tolerating diet Outcome: Progressing Towards Goal 
Goal: *Aware of follow-up diagnostics related to anticoagulants Outcome: Progressing Towards Goal 
Goal: *Ability to perform ADLs and demonstrates progressive mobility and function Outcome: Progressing Towards Goal 
Goal: *Absence of DVT(Stroke Metric) Outcome: Progressing Towards Goal 
Goal: *Absence of aspiration Outcome: Progressing Towards Goal 
Goal: *Optimal pain control at patient's stated goal 
Outcome: Progressing Towards Goal 
Goal: *Home safety concerns addressed Outcome: Progressing Towards Goal 
Goal: *Describes available resources and support systems Outcome: Progressing Towards Goal 
Goal: *Verbalizes understanding of activation of EMS(911) for stroke symptoms(Stroke Metric) Outcome: Progressing Towards Goal 
Goal: *Understands and describes signs and symptoms to report to providers(Stroke Metric) Outcome: Progressing Towards Goal 
Goal: *Neurolgocially stable (absence of additional neurological deficits) Outcome: Progressing Towards Goal 
Goal: *Verbalizes importance of follow-up with primary care physician(Stroke Metric) Outcome: Progressing Towards Goal 
Goal: *Smoking cessation discussed,if applicable(Stroke Metric) Outcome: Progressing Towards Goal 
Goal: *Depression screening completed(Stroke Metric) Outcome: Progressing Towards Goal 
  
Problem: Non-Violent Restraints Goal: *Removal from restraints as soon as assessed to be safe Outcome: Progressing Towards Goal 
Goal: *No harm/injury to patient while restraints in use Outcome: Progressing Towards Goal 
Goal: *Patient's dignity will be maintained Outcome: Progressing Towards Goal 
Goal: *Patient Specific Goal (EDIT GOAL, INSERT TEXT) Outcome: Progressing Towards Goal 
Goal: Non-violent Restaints:Standard Interventions Outcome: Progressing Towards Goal 
Goal: Non-violent Restraints:Patient Interventions Outcome: Progressing Towards Goal 
Goal: Patient/Family Education Outcome: Progressing Towards Goal 
  
Problem: Patient Education: Go to Patient Education Activity Goal: Patient/Family Education Outcome: Progressing Towards Goal

## 2020-10-05 NOTE — PROGRESS NOTES
Problem: Self Care Deficits Care Plan (Adult) Goal: *Acute Goals and Plan of Care (Insert Text) Description:  
 
FUNCTIONAL STATUS PRIOR TO ADMISSION: Per patients wife the patient ambulated with a RW with CGA, was min A for sit to stand transfers, he was total A for dressing, bathing and toileting hygiene, he was min for clothing management in preparation for toileting, he performed self feeding with supervision/setup and he was CGA for standing grooming. Cueing required for attention and sequencing of grooming ADLs. Patient has UE tremors, which have recently worsened. Assist of 2 people is required for patient to go up and down the 3 steps going into his home. HOME SUPPORT: The patient lives with his wife who is his primary caregiver. Patient's wife has 2 friends that provide some assistance PRN. Occupational Therapy Goals Initiated 10/1/2020 1. Patient will perform grooming standing at sink with minimal assistance/contact guard assist within 7 day(s). 2.  Patient will perform self feeding with supervision/set-up within 7 day(s). 3.  Patient will perform toilet transfers with minimal assistance/contact guard assist within 7 day(s). 4.  Patient will perform clothing management in preparation for toileting with minimal assistance/contact guard assist within 7 day(s). Outcome: Progressing Towards Goal 
  
OCCUPATIONAL THERAPY TREATMENT Patient: Ru Paez (34 y.o. male) Date: 10/5/2020 Diagnosis: AMS (altered mental status) [R41.82] Behavioral change [R46.89] Dementia ([facility] [address] [F69.19] <principal problem not specified> Precautions: Fall Chart, occupational therapy assessment, plan of care, and goals were reviewed. ASSESSMENT Patient continues with skilled OT services and is progressing towards goals. Patient received in bed, requiring max assist x2 to come to seated EOB.  Patient very anxious with decreased attention and command following, speech unintelligible throughout session. Patient with consistent posterior lean at EOB, requiring max assist to sustain. Noted patient incontinent of urine - in supine, patient requiring max assist to roll to change protective undergarment and manage linens. Patient able to grasp bed rail once in supine with manual facilitation of reach across body during roll. Encouraged patient to wash face with HOB elevated with patient requiring max multimodal cues to attempt - however, patient with little success and requiring max assist for effective completion. Patient holding hands out when replacing soft mitts - noted improved command following with UE exercises at bed level, completing with verbal/visual cues. Recommend SNF-level rehab at discharge 2/2 patient below baseline and requiring significantly increased physical assist for all mobility and self-care at this time. Current Level of Function Impacting Discharge (ADLs): max assist x2 for mobility; up to max-total assist for self-care Other factors to consider for discharge: dementia at baseline PLAN : 
Patient continues to benefit from skilled intervention to address the above impairments. Continue treatment per established plan of care. to address goals. Recommendation for discharge: (in order for the patient to meet his/her long term goals) Therapy up to 5 days/week in SNF setting; If unable or family refuses, will need 24/7 supervision and assist for all self-care and mobility This discharge recommendation:  
Has been made in collaboration with the attending provider and/or case management IF patient discharges home will need the following DME: TBD SUBJECTIVE:  
Patient stated ow,\" indicating L hip and nodding when asked about pain. OBJECTIVE DATA SUMMARY:  
Cognitive/Behavioral Status: 
Neurologic State: Alert;Confused Orientation Level: Oriented to person Cognition: Impulsive Perception: Tactile;Verbal;Visual 
 Perseveration: No perseveration noted Safety/Judgement: Awareness of environment; Fall prevention;Decreased insight into deficits Functional Mobility and Transfers for ADLs: 
Bed Mobility: 
Rolling: Maximum assistance Supine to Sit: Maximum assistance;Assist x2 Sit to Supine: Maximum assistance;Assist x2 Scooting: Maximum assistance Balance: 
Sitting: Impaired(constant posterior lean) Sitting - Static: Poor (constant support) Sitting - Dynamic: Poor (constant support) ADL Intervention: 
Grooming Grooming Assistance: Maximum assistance Position Performed: (supine with HOB elevated) Washing Face: Maximum assistance Cues: Verbal cues provided; Tactile cues provided;Physical assistance Upper Body Dressing Assistance Dressing Assistance: Total assistance(dependent) Hospital Gown: Total assistance (dependent) Cues: Verbal cues provided; Tactile cues provided;Physical assistance Lower Body Dressing Assistance Dressing Assistance: Total assistance(dependent) Protective Undergarmet: Total assistance (dependent) Position Performed: Supine Cues: Verbal cues provided; Tactile cues provided;Physical assistance;Don;Doff Cognitive Retraining Orientation Retraining: Reorienting;Time;Situation Problem Solving: Awareness of environment Organizing/Sequencing: Breaking task down Safety/Judgement: Awareness of environment; Fall prevention;Decreased insight into deficits Therapeutic Exercises:  
Patient participating in bed level BUE exercises - following commands for shoulder flexion, completing 10 reps x2 sets. Pain: 
Patient reporting pain in L hip - RN notified and aware. Activity Tolerance:  
Fair and requires rest breaks Please refer to the flowsheet for vital signs taken during this treatment. After treatment patient left in no apparent distress:  
Supine in bed, Call bell within reach, Bed / chair alarm activated, Side rails x 3, and Restraints COMMUNICATION/COLLABORATION:  
 The patients plan of care was discussed with: Physical therapist and Registered nurse. Radha Jolly OT Time Calculation: 25 mins

## 2020-10-05 NOTE — PROGRESS NOTES
Patients chart has flagged for sepsis with a score >5. MD has been made aware. No new orders at this time.  
Current vital signs are- Temp: 100.7 orally, BP: 155/87, O2: 93%, RR: 18, HR: 56

## 2020-10-05 NOTE — PROGRESS NOTES
0700  Assumed care of patient. 1900 Provided report to Edy Keith RN.  Report included SBAR, Kardex, [facility]

## 2020-10-06 NOTE — PROGRESS NOTES
Problem: Falls - Risk of 
Goal: *Absence of Falls Description: Document [place] Fall Risk and appropriate interventions in the flowsheet. Outcome: Progressing Towards Goal 
Note: Fall Risk Interventions: 
Mobility Interventions: Bed/chair exit alarm Mentation Interventions: Adequate sleep, hydration, pain control, Bed/chair exit alarm Medication Interventions: Bed/chair exit alarm Elimination Interventions: Bed/chair exit alarm, Call light in reach, Patient to call for help with toileting needs Problem: Patient Education: Go to Patient Education Activity Goal: Patient/Family Education Outcome: Progressing Towards Goal 
  
Problem: Pressure Injury - Risk of 
Goal: *Prevention of pressure injury Description: Document Grady Scale and appropriate interventions in the flowsheet. Outcome: Progressing Towards Goal 
Note: Pressure Injury Interventions: 
Sensory Interventions: Assess changes in LOC Moisture Interventions: Absorbent underpads, Apply protective barrier, creams and emollients Activity Interventions: Pressure redistribution bed/mattress(bed type) Mobility Interventions: Float heels Nutrition Interventions: Document food/fluid/supplement intake, Offer support with meals,snacks and hydration Friction and Shear Interventions: Apply protective barrier, creams and emollients Problem: Patient Education: Go to Patient Education Activity Goal: Patient/Family Education Outcome: Progressing Towards Goal 
  
Problem: Patient Education: Go to Patient Education Activity Goal: Patient/Family Education Outcome: Progressing Towards Goal 
  
Problem: TIA/CVA Stroke: Day 2 Until Discharge Goal: Off Pathway (Use only if patient is Off Pathway) Outcome: Progressing Towards Goal 
Goal: Activity/Safety Outcome: Progressing Towards Goal 
Goal: Diagnostic Test/Procedures Outcome: Progressing Towards Goal 
Goal: Nutrition/Diet Outcome: Progressing Towards Goal 
 Goal: Discharge Planning Outcome: Progressing Towards Goal 
Goal: Medications Outcome: Progressing Towards Goal 
Goal: Respiratory Outcome: Progressing Towards Goal 
Goal: Treatments/Interventions/Procedures Outcome: Progressing Towards Goal 
Goal: Psychosocial 
Outcome: Progressing Towards Goal 
Goal: *Verbalizes anxiety and depression are reduced or absent Outcome: Progressing Towards Goal 
Goal: *Absence of aspiration Outcome: Progressing Towards Goal 
Goal: *Absence of deep venous thrombosis signs and symptoms(Stroke Metric) Outcome: Progressing Towards Goal 
Goal: *Optimal pain control at patient's stated goal 
Outcome: Progressing Towards Goal 
Goal: *Tolerating diet Outcome: Progressing Towards Goal 
Goal: *Ability to perform ADLs and demonstrates progressive mobility and function Outcome: Progressing Towards Goal 
Goal: *Stroke education continued(Stroke Metric) Outcome: Progressing Towards Goal 
  
Problem: Patient Education: Go to Patient Education Activity Goal: Patient/Family Education Outcome: Progressing Towards Goal 
  
Problem: Non-Violent Restraints Goal: *Removal from restraints as soon as assessed to be safe Outcome: Progressing Towards Goal 
Goal: *No harm/injury to patient while restraints in use Outcome: Progressing Towards Goal 
Goal: *Patient's dignity will be maintained Outcome: Progressing Towards Goal 
Goal: *Patient Specific Goal (EDIT GOAL, INSERT TEXT) Outcome: Progressing Towards Goal 
Goal: Non-violent Restaints:Standard Interventions Outcome: Progressing Towards Goal 
Goal: Non-violent Restraints:Patient Interventions Outcome: Progressing Towards Goal 
Goal: Patient/Family Education Outcome: Progressing Towards Goal

## 2020-10-06 NOTE — ACP (ADVANCE CARE PLANNING)
Primary Decision Maker: Zain Velazquez / Karrie Weber - Spouse - [phone] Secondary Decision Maker: Alicia Butts/ Katey Mcmahon - Other Relative - [phone] Advance Care Planning 10/1/2020 Patient's Healthcare Decision Maker is: -  
Primary Decision Maker Name -  
Primary Decision Maker Phone Number -  
Primary Decision Maker Relationship to Patient -  
Confirm Advance Directive None Does the patient have other document types - Patient has both AMD and DNR documents scanned in the chart. His wife and niece are his primary and secondary mPOA. Patient does not want his life prolonged at end of life per AMD. He currently is not decisional, his family will need to make decisions for him. Palliative LCSW has made a phone call to wife Shirin Cantu today regarding Palliative involvement. Awaiting a call back, message left. Patient is DNR.  
 
12:20 pm Wife would like for hospice to meet with her to evaluate patient for hospice at home. She elects for [facility] since patient has received all his care through the [facility].

## 2020-10-06 NOTE — PROGRESS NOTES
Hospitalist Progress Note NAME: Maged Ray :  10/24/1928 MRN:  [id number] Assessment / Plan: AMS Behavioural changes likely 2/2 worsening of dementia 
-MRI negative for acute changes - Neuro consult -  EEG showed generalized slowing, there are no epileptiform discharges. -- continue ASA  
--Speech therapy  
-PT and OT  
  
Dysphagia  
-speech therapy consult  
-patient may need feeding tube  
-NPO  
-IV fluid  
-speech therapy reassess tomorrow Mild fever  
-follow up blood cx  
-check pro calcitonin  
-hold antibiotics for now  
  
HTN-imdur and metoprolol Dementia:Continue home meds H/O CVA Cont asa statin 
  
  
Code Status: DNR Surrogate Decision Maker 
  
30.0 - 39.9 Obese / Body mass index is 30.47 kg/m². 
  
Code status: DNR Prophylaxis: Lovenox Recommended Disposition: SNF/LTC 
  
  
  
 
Subjective: Chief Complaint / Reason for Physician Visit Discussed with RN events overnight. Develop fever , unable to swallow , keep NPO Review of Systems: 
Symptom Y/N Comments  Symptom Y/N Comments Fever/Chills    Chest Pain Poor Appetite    Edema Cough    Abdominal Pain Sputum    Joint Pain SOB/GRECO    Pruritis/Rash Nausea/vomit    Tolerating PT/OT Diarrhea    Tolerating Diet Constipation    Other Could NOT obtain due to: Confused Objective: VITALS:  
Last 24hrs VS reviewed since prior progress note. Most recent are: 
Patient Vitals for the past 24 hrs: 
 Temp Pulse Resp BP SpO2  
10/05/20 1506 99.8 °F (37.7 °C) (!) 58 20 (!) 164/88 94 % 10/05/20 1118 99.9 °F (37.7 °C) (!) 57 20 (!) 160/94 94 % 10/05/20 0907   18  94 % 10/05/20 0719  (!) 56 18 (!) 155/87 93 % 10/05/20 0706 (!) 100.7 °F (38.2 °C) (!) 56 18 (!) 155/87 93 % 10/05/20 0447 99.2 °F (37.3 °C) 66 18 (!) 145/79 95 % 10/05/20 0020 99 °F (37.2 °C) (!) 56 20 137/65 95 % Intake/Output Summary (Last 24 hours) at 10/5/2020 2129 Last data filed at 10/5/2020 1148 Gross per 24 hour Intake 1000 ml Output  Net 1000 ml PHYSICAL EXAM: 
General: WD, WN. Alert, cooperative, no acute distress   
EENT:  EOMI. Anicteric sclerae. MMM Resp:  CTA bilaterally, no wheezing or rales. No accessory muscle use CV:  Regular  rhythm,  No edema GI:  Soft, Non distended, Non tender.  +Bowel sounds Neurologic:  Confused Skin:  No rashes. No jaundice Reviewed most current lab test results and cultures  YES Reviewed most current radiology test results   YES Review and summation of old records today    NO Reviewed patient's current orders and MAR    YES 
PMH/SH reviewed - no change compared to H&P 
________________________________________________________________________ Care Plan discussed with: 
  Comments Patient Family RN y   
Care Manager Consultant  y   
                 y Multidiciplinary team rounds were held today with , nursing, pharmacist and clinical coordinator. Patient's plan of care was discussed; medications were reviewed and discharge planning was addressed. ________________________________________________________________________ Total NON critical care TIME: 35   Minutes Total CRITICAL CARE TIME Spent:   Minutes non procedure based Comments >50% of visit spent in counseling and coordination of care y   
________________________________________________________________________ Nivia Mckeon MD  
 
Procedures: see electronic medical records for all procedures/Xrays and details which were not copied into this note but were reviewed prior to creation of Plan. LABS: 
I reviewed today's most current labs and imaging studies. Pertinent labs include: 
Recent Labs 10/05/20 
1358 10/03/20 
6001 WBC 12.9* 10.1 HGB 12.6 13.6 HCT 36.8 39.8  175 Recent Labs 10/05/20 
9486 10/04/20 
5287 10/03/20 
5192  141 137  
K 3.6 3.7 4.3 * 108 106 CO2 26 25 27 * 112* 108* BUN 18 16 14 CREA 0.99 0.97 1.06  
CA 9.1 8.9 9.4 Signed:  Tuyet Conde MD

## 2020-10-06 NOTE — PROGRESS NOTES
Patient with temp of 100.0. Acetaminophen supp. given at this time. Patient needs martha mitt non-violent restraint renewal at this time. Awaiting response at this time Dr Jacinto Smith with renewal of restraint

## 2020-10-06 NOTE — PROGRESS NOTES
Bedside and Verbal shift change report given to [facility] AT FREDDY DUFFY, RN (oncoming nurse) by Kurt Barnard RN (offgoing nurse). Report included the following information SBAR, Kardex and ED Summary. 
  
Zone Phone:   [phone] 
  
  
Significant changes during shift:  rectal tylenol give, hospice consult, given hydralazinde for high BP, pt agitated given zyprexa, MITT restraints continued  
  
  
  
Patient Information 
  
Floresita Goins 
80 y.o. 
9/30/2020  4:31 PM by Harvinder Renteria MD. Floresita Goins was admitted from Home 
  
Problem List 
  
    
Patient Active Problem List  
  Diagnosis Date Noted  Dementia ([address] 10/01/2020  Behavioral change 10/01/2020  AMS (altered mental status) 10/01/2020  Syncope 12/20/2018  Bronchitis 12/20/2018  DEVAN (acute kidney injury) ([address] 12/20/2018  Elevated troponin 12/20/2018  Alzheimer's dementia with behavioral disturbance ([address] 04/01/2018  History of stroke 12/29/2017  Altered mental state 12/16/2017  Bradycardia with 51-60 beats per minute 05/23/2017  Chronic constipation 06/07/2016  Essential hypertension 04/07/2016  Advance directive in chart 10/07/2015  Dyslipidemia    
 Left acoustic neuroma ([address] 10/28/2014  Memory disturbance 02/04/2014  CAD (coronary artery disease) 10/30/2013  
 [place] (hard of hearing) 05/09/2012  Varicose vein of leg 04/12/2011  History of small bowel obstruction 04/12/2011  GERD (gastroesophageal reflux disease)    
 Slipped intervertebral disc    
 Hemorrhoids, internal    
 DJD (degenerative joint disease) of cervical spine    
 Scoliosis    
 BPH (benign prostatic hyperplasia)    
 PVD (peripheral vascular disease) ([address]    
 Diverticulosis 02/01/2004  
  
    
Past Medical History:  
Diagnosis Date  Alzheimer's dementia with behavioral disturbance ([address] 04/2018  
  Dr. Zari Rowe.  Back pain    
  due to DDD and scoliosis  BPH (benign prostatic hyperplasia)    
  Dr. Julia Puckett  CAD (coronary artery disease) 10/30/13  
  30%LAD, 80% ostal stenosis. Dr. Judie Ricketts.  Chest pain 10/04/04, 10/30/13  
  Dr. Tamara Negrete. Negative Stress Cardiolite Test.  Dr. Davis Abel.  Chest pain 12/02/2018  
  chest pain and tightness  Chronic venous insufficiency 06/15/09  
  Dr. Peña Simmons  Clavicle fracture 1981  
  Lt due to fall  DDD (degenerative disc disease), lumbar    
  L3-S1  Depression    
 Diverticulosis 02/2004  
  sigmoid. Dr. Mary Carrion  DJD (degenerative joint disease) of cervical spine    
  C 4-5 ;5-6  Dyslipidemia    
 Fracture 1980  
  facial/nasal  
 Hearing loss    
  wears hearing aid  Heartburn    
 Hemorrhoids, internal [place] Dr. Saunders Rom  Hernia of unspecified site of abdominal cavity without mention of obstruction or gangrene    
  inguinal  Lt  Hypertension    
 Impotence    
 Left acoustic neuroma (HCC) 10/28/14  
  17 x 9 mm schwannoma.  Pes planus of both feet    
 PVD (peripheral vascular disease) ([address] 2009  Scoliosis    
  LS  Slipped intervertebral disc 1954  
  chiropractor  SOB (shortness of breath) 08/07/13  
  Dr. Gio Taylor.  Stroke ([address] 10/28/2014  
  small Left Thalamus. Dr. Fredi Alexander. Dr. Murtaza Quick.  Syncope 12/20/2018  
  pt experienced fainting spells  Urinary incontinence    
  due to BPH. Dr. Ziyad Paredes  Varicose vein 06/15/09  
  Dr. Peña Simmons  Vertigo 2003  
  due to inner ear. Dr. Luz Valencia. Dr. Todd Buckner. Dr. Nelly Briceño  Visual loss    
  Dr. Phil Quintana, ophth.  
  
  
  
Core Measures: 
  
CVA: NO Yes CHF:No No 
PNA:No No 
  
  
  
Activity Status: 
  
OOB to Chair No 
Ambulated this shift No  
Bed Rest No 
  
Supplemental O2: (If Applicable) 
  
NC Yes NRB No 
Venti-mask No 
On 2 Liters/min 
  
  
LINES AND DRAINS: 
  
PIV 
  
DVT prophylaxis: 
  
DVT prophylaxis Med- Yes DVT prophylaxis SCD or SHILOH- No  
  
Wounds: (If Applicable) 
  
Wounds- Yes 
  
 Location scars abdomen 
  
Patient Safety: 
  
Falls Score Total Score: 3 Safety Level_______ Bed Alarm On? Yes Sitter?  No 
  
Plan for upcoming shift: safety, restraint documentation/monitoring  
  
  
  
Discharge Plan: home with  
  
Active Consults: 
IP CONSULT TO HOSPITALIST 
IP CONSULT TO NEUROLOGY

## 2020-10-06 NOTE — HOSPICE
[facility] Good Help to Those in Need 
[phone] Patient Name: Chris De Jesus YOB: 1928 Age: 80 y.o. [facility] Liaison Note:  
 
Consult received, reviewing chart. Plan to come see patient and reach out to family shortly.

## 2020-10-06 NOTE — PROGRESS NOTES
Hospitalist Progress Note NAME: Kenny Benites :  10/24/1928 MRN:  [id number] Assessment / Plan: AMS Behavioural changes likely 2/2 worsening of dementia 
-MRI negative for acute changes - Neuro consult -  EEG showed generalized slowing, there are no epileptiform discharges. -- continue ASA  
--Speech therapy  
-PT and OT recommended SNF  
 Prn maisha Dysphagia  
-speech therapy consult noted : keep NPO , failed swallow test  
-goals of care clear, discussed with wife , she is clear that her  doesn't want feeding tube and would like him to be transitioned to hospice Mild fever Mild leucocytosis resolved  
-follow up blood cx  NTD  
-pro calcitonin 0.06 UA neg Chest xray wnl  
-hold antibiotics for now Check lower extremity doppler  
 
  
HTN-imdur and metoprolol Dementia:Continue home meds H/O CVA Cont asa statin 
  
  
Code Status: DNR Surrogate Decision Maker Wife Thor Lang / Damian Gonzalez  Spouse  [phone] Discussed with wife at bedside on 10/06 
  
30.0 - 39.9 Obese / Body mass index is 30.47 kg/m². 
  
Code status: DNR Prophylaxis: Lovenox Recommended Disposition: home with hospice on Friday   
  
  
 
Subjective: Chief Complaint / Reason for Physician Visit FU AMS Discussed with RN events overnight and wife at bedside Still NPO , on IVF Review of Systems: 
Symptom Y/N Comments  Symptom Y/N Comments Fever/Chills    Chest Pain Poor Appetite    Edema Cough    Abdominal Pain Sputum    Joint Pain SOB/GRECO    Pruritis/Rash Nausea/vomit    Tolerating PT/OT Diarrhea    Tolerating Diet Constipation    Other Could NOT obtain due to: Confused Objective: VITALS:  
Last 24hrs VS reviewed since prior progress note. Most recent are: 
Patient Vitals for the past 24 hrs: 
 Temp Pulse Resp BP SpO2  
10/06/20 0723 97.8 °F (36.6 °C) (!) 59 16 (!) 140/90 95 % 10/06/20 0250 99.5 °F (37.5 °C) 98 18 (!) 143/66 97 % 10/06/20 0054 100 °F (37.8 °C)      
10/05/20 2230 98.8 °F (37.1 °C) 73 18 (!) 192/87 95 % 10/05/20 2033 99.9 °F (37.7 °C) (!) 55 18 (!) 164/99 95 % 10/05/20 1506 99.8 °F (37.7 °C) (!) 58 20 (!) 164/88 94 % 10/05/20 1118 99.9 °F (37.7 °C) (!) 57 20 (!) 160/94 94 % 10/05/20 0907   18  94 % Intake/Output Summary (Last 24 hours) at 10/6/2020 0825 Last data filed at 10/5/2020 1148 Gross per 24 hour Intake 1000 ml Output  Net 1000 ml PHYSICAL EXAM: 
General: WD, WN. Alert, cooperative, no acute distress   
EENT:  EOMI. Anicteric sclerae. MMM Resp:  CTA bilaterally, no wheezing or rales. No accessory muscle use CV:  Regular  rhythm,  No edema GI:  Soft, Non distended, Non tender.  +Bowel sounds Neurologic:  Confused Skin:  No rashes. No jaundice Reviewed most current lab test results and cultures  YES Reviewed most current radiology test results   YES Review and summation of old records today    NO Reviewed patient's current orders and MAR    YES 
PMH/ reviewed - no change compared to H&P 
________________________________________________________________________ Care Plan discussed with: 
  Comments Patient Family  y Wife RN y   
Care Manager Consultant  y Hospice   
                 y Multidiciplinary team rounds were held today with , nursing, pharmacist and clinical coordinator. Patient's plan of care was discussed; medications were reviewed and discharge planning was addressed. ________________________________________________________________________ Total NON critical care TIME: 35   Minutes Total CRITICAL CARE TIME Spent:   Minutes non procedure based Comments >50% of visit spent in counseling and coordination of care y   
________________________________________________________________________ Auther Bloodgood, MD  
 
 Procedures: see electronic medical records for all procedures/Xrays and details which were not copied into this note but were reviewed prior to creation of Plan. LABS: 
I reviewed today's most current labs and imaging studies. Pertinent labs include: 
Recent Labs 10/05/20 
8574 WBC 12.9* HGB 12.6 HCT 36.8  Recent Labs 10/05/20 
8014 10/04/20 
6801  141  
K 3.6 3.7 * 108 CO2 26 25 * 112* BUN 18 16 CREA 0.99 0.97 CA 9.1 8.9 Signed: Mike Mosqueda MD

## 2020-10-06 NOTE — PROGRESS NOTES
Still awaiting doppler to rule out BLE DVT and noted that pt has a active hospice consult as he has been unable to eat. Will defer, but continue to follow.

## 2020-10-06 NOTE — HOSPICE
[facility] 
[phone] Patient Name: Krystina Caro YOB: 1928 Age: 80 y.o. [facility] Liaison Note:  
 
Met with patient's wife at the bedside, hospice philosophy and medicare benefit explained, questions answered. Hospice informational packet given to wife with our 24/7 number. Wife agrees to home hospice for her , consents signed and faxed to our intake department. Wife states she will have help to remove old bed tomorrow to put in new hospital bed. Wife states she would prefer for patient to come home Friday instead of Thursday because she will have more help that day. Spoke with Dr. Dylan Simms and Care manager Faiza and they state Friday morning should be okay. Admission slot reserved for 1100. If transport could be set up for 0930 or 1000 Friday morning that would be appreciated. Thank you for allowing us to be involved in this patient's care.

## 2020-10-06 NOTE — PROGRESS NOTES
Problem: Falls - Risk of 
Goal: *Absence of Falls Description: Document Thony Lawton Fall Risk and appropriate interventions in the flowsheet. 10/6/2020 0335 by Jose East RN Outcome: Progressing Towards Goal 
Note: Fall Risk Interventions: 
Mobility Interventions: Bed/chair exit alarm Mentation Interventions: Adequate sleep, hydration, pain control, Bed/chair exit alarm Medication Interventions: Bed/chair exit alarm Elimination Interventions: Bed/chair exit alarm 10/5/2020 2043 by Jose East RN Outcome: Progressing Towards Goal 
Note: Fall Risk Interventions: 
Mobility Interventions: Bed/chair exit alarm Mentation Interventions: Adequate sleep, hydration, pain control, Bed/chair exit alarm Medication Interventions: Bed/chair exit alarm Elimination Interventions: Bed/chair exit alarm, Call light in reach, Patient to call for help with toileting needs Problem: Patient Education: Go to Patient Education Activity Goal: Patient/Family Education 10/6/2020 0335 by Jose East RN Outcome: Progressing Towards Goal 
10/5/2020 2043 by Jose East RN Outcome: Progressing Towards Goal 
  
Problem: Pressure Injury - Risk of 
Goal: *Prevention of pressure injury Description: Document Grady Scale and appropriate interventions in the flowsheet. 10/6/2020 0335 by Jose East RN Outcome: Progressing Towards Goal 
Note: Pressure Injury Interventions: 
Sensory Interventions: Assess changes in LOC Moisture Interventions: Absorbent underpads, Apply protective barrier, creams and emollients Activity Interventions: Pressure redistribution bed/mattress(bed type) Mobility Interventions: Float heels Nutrition Interventions: Document food/fluid/supplement intake, Offer support with meals,snacks and hydration Friction and Shear Interventions: Apply protective barrier, creams and emollients 10/5/2020 2043 by Jose Eats RN 
 Outcome: Progressing Towards Goal 
Note: Pressure Injury Interventions: 
Sensory Interventions: Assess changes in LOC Moisture Interventions: Absorbent underpads, Apply protective barrier, creams and emollients Activity Interventions: Pressure redistribution bed/mattress(bed type) Mobility Interventions: Float heels Nutrition Interventions: Document food/fluid/supplement intake, Offer support with meals,snacks and hydration Friction and Shear Interventions: Apply protective barrier, creams and emollients Problem: Patient Education: Go to Patient Education Activity Goal: Patient/Family Education 10/6/2020 0335 by Nereida Falls, RN Outcome: Progressing Towards Goal 
10/5/2020 2043 by Nereida Falls, RN Outcome: Progressing Towards Goal 
  
Problem: Patient Education: Go to Patient Education Activity Goal: Patient/Family Education 10/6/2020 0335 by Nereida Falls, RN Outcome: Progressing Towards Goal 
10/5/2020 2043 by Nereida Falls, RN Outcome: Progressing Towards Goal 
  
Problem: TIA/CVA Stroke: Day 2 Until Discharge Goal: Off Pathway (Use only if patient is Off Pathway) 
10/6/2020 0335 by Nereida Falls, RN Outcome: Progressing Towards Goal 
10/5/2020 2043 by Nereida Falls, RN Outcome: Progressing Towards Goal 
Goal: Activity/Safety 10/6/2020 0335 by Nereida Falls, RN Outcome: Progressing Towards Goal 
10/5/2020 2043 by Nereida Falls, RN Outcome: Progressing Towards Goal 
Goal: Diagnostic Test/Procedures 10/6/2020 0335 by Nereida Falls, RN Outcome: Progressing Towards Goal 
10/5/2020 2043 by Nereida Falls, RN Outcome: Progressing Towards Goal 
Goal: Nutrition/Diet 10/6/2020 0335 by Nereida Falls, RN Outcome: Progressing Towards Goal 
10/5/2020 2043 by Nereida Falls, RN Outcome: Progressing Towards Goal 
Goal: Discharge Planning 10/6/2020 0335 by Nereida Falls, RN Outcome: Progressing Towards Goal 
10/5/2020 2043 by Nereida Falls, RN 
 Outcome: Progressing Towards Goal 
Goal: Medications 10/6/2020 0335 by Db Gutierrez RN Outcome: Progressing Towards Goal 
10/5/2020 2043 by Db Gutierrez RN Outcome: Progressing Towards Goal 
Goal: Respiratory 10/6/2020 0335 by Db Gutierrez RN Outcome: Progressing Towards Goal 
10/5/2020 2043 by Db Gutierrez RN Outcome: Progressing Towards Goal 
Goal: Treatments/Interventions/Procedures 10/6/2020 0335 by Db Gutierrez RN Outcome: Progressing Towards Goal 
10/5/2020 2043 by Db Gutierrez RN Outcome: Progressing Towards Goal 
Goal: Psychosocial 
10/6/2020 0335 by Db Gutierrez RN Outcome: Progressing Towards Goal 
10/5/2020 2043 by Db Gutierrez RN Outcome: Progressing Towards Goal 
Goal: *Verbalizes anxiety and depression are reduced or absent 10/6/2020 0335 by Db Gutierrez RN Outcome: Progressing Towards Goal 
10/5/2020 2043 by Db Gutierrez RN Outcome: Progressing Towards Goal 
Goal: *Absence of aspiration 10/6/2020 0335 by Db Gutierrez RN Outcome: Progressing Towards Goal 
10/5/2020 2043 by Db Gutierrez RN Outcome: Progressing Towards Goal 
Goal: *Absence of deep venous thrombosis signs and symptoms(Stroke Metric) 10/6/2020 0335 by Db Gutierrez RN Outcome: Progressing Towards Goal 
10/5/2020 2043 by Db Gutierrez RN Outcome: Progressing Towards Goal 
Goal: *Optimal pain control at patient's stated goal 
10/6/2020 0335 by Db Gutierrez RN Outcome: Progressing Towards Goal 
10/5/2020 2043 by Db Gutierrez RN Outcome: Progressing Towards Goal 
Goal: *Tolerating diet 10/6/2020 0335 by Db Gutierrez RN Outcome: Progressing Towards Goal 
10/5/2020 2043 by Db Gutierrez RN Outcome: Progressing Towards Goal 
Goal: *Ability to perform ADLs and demonstrates progressive mobility and function 10/6/2020 0335 by Db Gutierrez RN Outcome: Progressing Towards Goal 
10/5/2020 2043 by Db Gutierrez RN Outcome: Progressing Towards Goal 
 Goal: *Stroke education continued(Stroke Metric) 10/6/2020 0335 by Rory Louis RN Outcome: Progressing Towards Goal 
10/5/2020 2043 by Rory Louis RN Outcome: Progressing Towards Goal 
  
Problem: Patient Education: Go to Patient Education Activity Goal: Patient/Family Education 10/6/2020 0335 by Rory Louis RN Outcome: Progressing Towards Goal 
10/5/2020 2043 by Rory Louis RN Outcome: Progressing Towards Goal 
  
Problem: Non-Violent Restraints Goal: *Removal from restraints as soon as assessed to be safe 10/6/2020 0335 by Rory Louis RN Outcome: Progressing Towards Goal 
10/5/2020 2043 by Rory Louis RN Outcome: Progressing Towards Goal 
Goal: *No harm/injury to patient while restraints in use 
10/6/2020 0335 by Rory Louis RN Outcome: Progressing Towards Goal 
10/5/2020 2043 by Rory Louis RN Outcome: Progressing Towards Goal 
Goal: *Patient's dignity will be maintained 10/6/2020 0335 by Rory Louis RN Outcome: Progressing Towards Goal 
10/5/2020 2043 by Rory Louis RN Outcome: Progressing Towards Goal 
Goal: *Patient Specific Goal (EDIT GOAL, INSERT TEXT) 
10/6/2020 0335 by Rory Louis RN Outcome: Progressing Towards Goal 
10/5/2020 2043 by Rory Louis RN Outcome: Progressing Towards Goal 
Goal: Non-violent Restaints:Standard Interventions 10/6/2020 0335 by Rory Louis RN Outcome: Progressing Towards Goal 
10/5/2020 2043 by Rory Louis RN Outcome: Progressing Towards Goal 
Goal: Non-violent Restraints:Patient Interventions 10/6/2020 0335 by Rory Louis RN Outcome: Progressing Towards Goal 
10/5/2020 2043 by Rory Louis RN Outcome: Progressing Towards Goal 
Goal: Patient/Family Education 10/6/2020 0335 by Rory Louis RN Outcome: Progressing Towards Goal 
10/5/2020 2043 by Rory Louis RN Outcome: Progressing Towards Goal

## 2020-10-06 NOTE — PROGRESS NOTES
Spiritual Care Assessment/Progress Note [address] 
 
 
NAME: Ru Paez      MRN: [id number] AGE: 80 y.o. SEX: male Voodoo Affiliation: Fidel Velasco Language: English  
 
10/6/2020     Total Time (in minutes): 5 Spiritual Assessment begun in [facility] NEUROSCIENCE TELEMETRY through conversation with: 
  
    []Patient        [] Family    [] Friend(s) Reason for Consult: Initial/Spiritual assessment, patient floor Spiritual beliefs: (Please include comment if needed) 
   [] Identifies with a kurt tradition:     
   [] Supported by a kurt community:        
   [] Claims no spiritual orientation:       
   [] Seeking spiritual identity:            
   [] Adheres to an individual form of spirituality:       
   [x] Not able to assess:                   
 
    
Identified resources for coping:  
   [] Prayer                           
   [] Music                  [] Guided Imagery 
   [] Family/friends                 [] Pet visits [] Devotional reading                         [x] Unknown 
   [] Other:                                          
 
 
Interventions offered during this visit: (See comments for more details) Patient Interventions: Other (comment)(Pt unavailable) Plan of Care: 
 
 [] Support spiritual and/or cultural needs  
 [] Support AMD and/or advance care planning process    
 [] Support grieving process 
 [] Coordinate Rites and/or Rituals  
 [] Coordination with community clergy [] No spiritual needs identified at this time 
 [] Detailed Plan of Care below (See Comments)  [] Make referral to Music Therapy 
[] Make referral to Pet Therapy    
[] Make referral to Addiction services 
[] Make referral to [facility] 
[] Make referral to Spiritual Care Partner 
[] No future visits requested       
[] Follow up visits as needed Comments: Attempted to visit with patient Lina romero initial spiritual assessment. Patient was unavailable for visit as medical team was in room at time of visit. Chaplains will follow up as able and/or able. Consult with RN. [phone] [facility] Spiritual Care Provider  Paging Service [phone])

## 2020-10-06 NOTE — PROGRESS NOTES
Bedside and Verbal shift change report given to Mando Grey, RN (oncoming nurse) by [facility] AT FREDDY DUFFY RN (offgoing nurse). Report included the following information SBAR, Kardex and ED Summary. 
  
Zone Phone:   [phone] 
  
  
Significant changes during shift:  Continues on mitt non-violent restraint. Elevated temp. Acetaminophen given.  
  
  
  
Patient Information 
  
Marlin Hernandez 
80 y.o. 
9/30/2020  4:31 PM by Wilder Parra MD. Marlin Hernandez was admitted from Home 
  
Problem List 
  
    
Patient Active Problem List  
  Diagnosis Date Noted  Dementia ([address] 10/01/2020  Behavioral change 10/01/2020  AMS (altered mental status) 10/01/2020  Syncope 12/20/2018  Bronchitis 12/20/2018  DEVAN (acute kidney injury) ([address] 12/20/2018  Elevated troponin 12/20/2018  Alzheimer's dementia with behavioral disturbance ([address] 04/01/2018  History of stroke 12/29/2017  Altered mental state 12/16/2017  Bradycardia with 51-60 beats per minute 05/23/2017  Chronic constipation 06/07/2016  Essential hypertension 04/07/2016  Advance directive in chart 10/07/2015  Dyslipidemia    
 Left acoustic neuroma ([address] 10/28/2014  Memory disturbance 02/04/2014  CAD (coronary artery disease) 10/30/2013  
 [place] (hard of hearing) 05/09/2012  Varicose vein of leg 04/12/2011  History of small bowel obstruction 04/12/2011  GERD (gastroesophageal reflux disease)    
 Slipped intervertebral disc    
 Hemorrhoids, internal    
 DJD (degenerative joint disease) of cervical spine    
 Scoliosis    
 BPH (benign prostatic hyperplasia)    
 PVD (peripheral vascular disease) ([address]    
 Diverticulosis 02/01/2004  
  
    
Past Medical History:  
Diagnosis Date  Alzheimer's dementia with behavioral disturbance ([address] 04/2018  
  Dr. Barbara Mora.  Back pain    
  due to DDD and scoliosis  BPH (benign prostatic hyperplasia)    
  Dr. Madeline Ybarra  CAD (coronary artery disease) 10/30/13   30%LAD, 80% ostal stenosis. Dr. Ismael Servin.  Chest pain 10/04/04, 10/30/13  
  Dr. Fredricka Opitz. Negative Stress Cardiolite Test.  Dr. Lynn Pierre.  Chest pain 12/02/2018  
  chest pain and tightness  Chronic venous insufficiency 06/15/09  
  Dr. Katelin Collier  Clavicle fracture 1981  
  Lt due to fall  DDD (degenerative disc disease), lumbar    
  L3-S1  Depression    
 Diverticulosis 02/2004  
  sigmoid. Dr. Mike Ga  DJD (degenerative joint disease) of cervical spine    
  C 4-5 ;5-6  Dyslipidemia    
 Fracture 1980  
  facial/nasal  
 Hearing loss    
  wears hearing aid  Heartburn    
 Hemorrhoids, internal [place] Dr. Glynn   Hernia of unspecified site of abdominal cavity without mention of obstruction or gangrene    
  inguinal  Lt  Hypertension    
 Impotence    
 Left acoustic neuroma (HCC) 10/28/14  
  17 x 9 mm schwannoma.  Pes planus of both feet    
 PVD (peripheral vascular disease) ([address] 2009  Scoliosis    
  LS  Slipped intervertebral disc 1954  
  chiropractor  SOB (shortness of breath) 08/07/13  
  Dr. Franz Gilford.  [facility]roke [address] 10/28/2014  
  small Left Thalamus. Dr. Lulú Sanches. Dr. Nati Gonzalez.  Syncope 12/20/2018  
  pt experienced fainting spells  Urinary incontinence    
  due to BPH. Dr. Citlali Salazar  Varicose vein 06/15/09  
  Dr. Katelin Collier  Vertigo 2003  
  due to inner ear. Dr. Tejas Walls. Dr. Isabella Pena. Dr. Eris Burns  Visual loss    
  Dr. Iliana Mandujano, ophth.  
  
  
  
Core Measures: 
  
CVA: NO Yes CHF:No No 
PNA:No No 
  
  
  
Activity Status: 
  
OOB to Chair No 
Ambulated this shift No  
Bed Rest No 
  
Supplemental O2: (If Applicable) 
  
NC Yes NRB No 
Venti-mask No 
On 2 Liters/min 
  
  
LINES AND DRAINS: 
  
PIV 
  
DVT prophylaxis: 
  
DVT prophylaxis Med- Yes DVT prophylaxis SCD or SHILOH- No  
  
Wounds: (If Applicable) 
  
Wounds- Yes 
  
Location scars abdomen 
  
Patient Safety: 
  
 Falls Score Total Score: 3 Safety Level_______ Bed Alarm On? Yes Sitter?  No 
  
Plan for upcoming shift: safety, restraint documentation/monitoring  
  
  
  
Discharge Plan: Yes TBD SNF? 
  
Active Consults: 
IP CONSULT TO HOSPITALIST 
IP CONSULT TO NEUROLOGY

## 2020-10-06 NOTE — PROGRESS NOTES
[facility]: [phone] ([phone]) [facility]: [phone] ([phone]) Palliative consult received for Chris De Jesus, for [facility]. 81 yo  man with a history of Alzheimer's dementia, now with dysphagia and is NPO. Speech to see patient per chart, will await their evaluation. Other medical issues include:  PMH of HTN, CAD, h/o Stroke. Patient has a DDNR and AMD that is scanned in the chart. His wife Davina Cunningham is his  primary MPOA. Secondary mPOA is nirashid Shafer (address noted as [place]). Telephone message left for wife Kurt South that Palliative team has asked to be involved with patient. (Message left on home number. Cell listed in AMD was answered by someone else, need to get cell #). Spoke briefly to Tolu CHOW regarding patient. Plan was for him to go to rehab, but now he is NPO. Family Meeting with wife Davina Cunningham who was in the room with patient. Patient fast asleep, did not arouse while we were talking. Psychosocial Issues: Kurt South and patient have been  for 16 years. Neither of them have children, but Kurt South shared that she has an \"adopted son\", who helps them out when needed. There is a neighbor who also helps her if needed. She has managed patient's care on her own, until this time. 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 \"He always came first in his age group\" per Kurt South. Patient was a  at [employer] and his Kyle Deter loves were music and running\". Kurt South notes that patient does not like to stay still. Patient is a [place], not 100% connected, Kurt South notes that he applied very late in life for benefits, he was never in combat, she notes \"he only got hearing aids from them\".   
 
Discussion: Kurt South indicated that patient had a stroke in 2014 and he did okay after that, but he has declined significantly over this year. She notes that it has been a huge change. Prior to this hospitalization, patient had a day of restlessness and agitation, she was not able to calm him down, she noted that he ate and then just became lethargic and \"collapsed to the floor - we couldn't wake him up and that's when I called the rescue squad\". Gordon New understands that dementia is a progressive illness. It was explained to her that patient may be approaching the end stages of dementia due to his dysphagia and declines that are apparent. His AMD notes that he would not want his life prolonged at end of life and we discussed a focus on comfort measures. Gordon New has not had hospice experience, but is very open to hospice services. Gordon New would like [facility] for services, she notes patient has only used [facility], hospital etc) and has been very happy with their service. Discussed above with Dr Rojelio Sandoval CM and with [facility]. Wife will be in the room till atleast 3 pm today. She comes to see patient daily. Palliative consulting hospice for follow up.  Thank you for this referral.

## 2020-10-06 NOTE — PROGRESS NOTES
ANGELA: 
 
Palliative Consult UPDATE: 5:07PM 
 
CM informed that pt was accepted to [address] [place] [address]. CM informed that pt's spouse will need assistance with rearranging pt's home, for DME to be delivered and has requested a start of care date on 10/8/20, however, if pt is unable to have clear out space for DME, pt has requested start of care for hospice on 10/9/20. CM will inform [facility] rounding team of the following. BELA Clark, [place] 
' UPDATE: 12:24PM 
 
CM informed that pt's spouse has requested hospice info session, provided by [employer]. CM sent referral to [address] [place] [address] team.  CM will be informed by hospice team on the decision of pt, and to arrange transport. CM will continue to follow. BELA Clark, [address] [place] [address] CLAUDINE informed that today, pt will not be ready for d/c, until goals of care are addressed. Pt is currently NPO and palliative has been consulted. CM informed that pt will be reviewed for possible PEG placement. If pt is not candidate for PEG placement then hospice will be considered. CM will update [address] [place] [address] ([facility]) of the following. CM will continue to follow. BELA Clark, [address] [place] [address]

## 2020-10-06 NOTE — PROGRESS NOTES
Physical Therapy Note Chart reviewed. Patient is pending BLE duplex to rule out DVT. Will defer and continue to follow.  
 
Thank you, 
Leandro Mathew, PT, DPT

## 2020-10-06 NOTE — PROGRESS NOTES
Spoke with hospice whom just finished meeting with pts wife. Pt has been unable to be cleared to eat and has had an advancement in dementia. Pt will now be returning to home under hospice services. Will sign off.

## 2020-10-06 NOTE — PROGRESS NOTES
Attempted x2 this am for treatment. Pt is still pending bilateral LE duplex to rule out DVT. Will defer, but continue to follow.

## 2020-10-06 NOTE — PROGRESS NOTES
Problem: Dysphagia (Adult) Goal: *Acute Goals and Plan of Care (Insert Text) Description: 10/2/2020 Speech path goals 1. patient will participate with reeval of swallowing 2. patient will tolerate meds crushed in applesauce with no overt s/s of aspiration. Outcome: Progressing Towards Goal 
  
SPEECH LANGUAGE PATHOLOGY DYSPHAGIA TREATMENT Patient: Ru Paez (15 y.o. male) Date: 10/6/2020 Diagnosis: AMS (altered mental status) [R41.82] Behavioral change [R46.89] Dementia ([address] [E61.83] <principal problem not specified> Precautions: Aspiration Fall ASSESSMENT: 
Patient alert to voice and tactile cues. Oral care completed however patient inconsistently opening mouth for oral care. While patient more alert this AM and somewhat improved from SLP visit 10/5 patient continues to present with moderate - severe oropharyngeal dysphagia characterized by inconsistent bolus acceptance, delayed to absent oral manipulation and transit, pharyngeal swallow delay, absent pharyngeal swallow at times and double pharyngeal swallow which could indicate pharyngeal residue. Patient unable to sip form straw, demonstrated poor labial closure on spoon and cup and demonstrated anterior spillage with thin and nectar liquids and oral residue with purees. Patient with inconsistent pharyngeal swallow initiation with all consistencies:  pharyngeal swallow initiated 1 of 4 ice chip trials, 3 of 4 thin liquid trials, 5 of 8 pureed trials and 3 of 4 nectar liquid trials. Patient with wet vocal quality x 1 and throat clearing x 1 after thin liquids, and delayed throat clear x 1 after nectar liquids. While patient somewhat improved this date with respect to swallow function safest course remains NPO. Suspect ability to swallow will be variable and depend on patient's alertness and mentation.   While NPO may be safest course it may be considered to allow PO, knowing aspiration and nutritional risks to allow for quality of life and patient/family wishes. Recommend goals of care discussion with family. If goal is to allow PO would consider purees and nectar thickened liquids. Suspect intake would be limited and nutritional and aspiration risks would remain. Suspect limited overall and consistent improvement and variable ability to swallow given nature of dementia. PLAN: 
Recommendations and Planned Interventions: NPO remains safest course Goals of care discussion Patient continues to benefit from skilled intervention to address the above impairments. Continue treatment per established plan of care. Discharge Recommendations: To Be Determined SUBJECTIVE:  
Patient stated Oh boy after drinking nectar thickened water and prior to swallowing. Rn approved visit. Per chart palliative care consulted. OBJECTIVE:  
Cognitive and Communication Status: 
Neurologic State: Confused Orientation Level: (Patient did not say name when asked) Cognition: No command following Perception: Tactile, Verbal, Visual 
Perseveration: No perseveration noted Safety/Judgement: Awareness of environment, Fall prevention, Decreased insight into deficits Dysphagia Treatment: 
Oral Assessment: P.O. Trials: 
Patient Position: Upright in bed Vocal quality prior to P.O.: Low volume; Wet Consistency Presented: Ice chips; Thin liquid; Nectar thick liquid;Puree How Presented: SLP-fed/presented;Cup/sip;Spoon;Straw Bolus Acceptance: Impaired Bolus Formation/Control: Impaired Type of Impairment: Delayed; Incomplete; Anterior;Spillage Propulsion: Delayed (# of seconds); Absent; Discoordination Oral Residue: 10-50% of bolus;Greater than 50% of bolus Initiation of Swallow: Delayed (# of seconds)(Absent at times) Laryngeal Elevation: Decreased Aspiration Signs/Symptoms: Delayed cough/throat clear;Clear throat Pharyngeal Phase Characteristics: Double swallow Oral Phase Severity: Moderate-severe Pharyngeal Phase Severity : Moderate-severe After treatment:  
Patient left in no apparent distress in bed, Call bell within reach, and Nursing notified COMMUNICATION/EDUCATION:  
Patient was educated regarding role of SLP and POC. Patient did not respond. Suspect poor comprehension. The patient's plan of care including recommendations, planned interventions, and recommended diet changes were discussed with: Registered nurse. CALIXTO Garcia Time Calculation: 20 mins

## 2020-10-07 NOTE — PROGRESS NOTES
Bedside and Verbal shift change report given to Rey Hernandez , [address] [facility] nurse) by Eber Aponte RN (offgoing nurse). Report included the following information SBAR, Kardex and ED Summary. 
  
Zone Phone:   [phone] 
  
  
Significant changes during shift:  Agitated. Med x 1  
  
Patient Information 
  
Darlene Rosenberg 
80 y.o. 
9/30/2020  4:31 PM by Wallace Hills MD. Randy Isidro was admitted from Home 
  
Problem List 
  
       
Patient Active Problem List  
  Diagnosis Date Noted  Dementia ([address] 10/01/2020  Behavioral change 10/01/2020  AMS (altered mental status) 10/01/2020  Syncope 12/20/2018  Bronchitis 12/20/2018  DEVAN (acute kidney injury) ([address] 12/20/2018  Elevated troponin 12/20/2018  Alzheimer's dementia with behavioral disturbance ([address] 04/01/2018  History of stroke 12/29/2017  Altered mental state 12/16/2017  Bradycardia with 51-60 beats per minute 05/23/2017  Chronic constipation 06/07/2016  Essential hypertension 04/07/2016  Advance directive in chart 10/07/2015  Dyslipidemia    
 Left acoustic neuroma ([address] 10/28/2014  Memory disturbance 02/04/2014  CAD (coronary artery disease) 10/30/2013  
 [place] (hard of hearing) 05/09/2012  Varicose vein of leg 04/12/2011  History of small bowel obstruction 04/12/2011  GERD (gastroesophageal reflux disease)    
 Slipped intervertebral disc    
 Hemorrhoids, internal    
 DJD (degenerative joint disease) of cervical spine    
 Scoliosis    
 BPH (benign prostatic hyperplasia)    
 PVD (peripheral vascular disease) ([address]    
 Diverticulosis 02/01/2004  
  
       
Past Medical History:  
Diagnosis Date  Alzheimer's dementia with behavioral disturbance ([address] 04/2018  
  Dr. Michelle Theodore.  Back pain    
  due to DDD and scoliosis  BPH (benign prostatic hyperplasia)    
  Dr. Harjeet Villarreal  CAD (coronary artery disease) 10/30/13  
  30%LAD, 80% ostal stenosis.  Dr. Shelia Lopez.  Chest pain 10/04/04, 10/30/13  
  Dr. Ravi Davis Test.  Dr. Nathen Marti.  Chest pain 12/02/2018  
  chest pain and tightness  Chronic venous insufficiency 06/15/09  
  Dr. Radha Chang  Clavicle fracture 1981  
  Lt due to fall  DDD (degenerative disc disease), lumbar    
  L3-S1  Depression    
 Diverticulosis 02/2004  
  sigmoid.  Dr. Joey Benavides  DJD (degenerative joint disease) of cervical spine    
  C 4-5 ;5-6  Dyslipidemia    
 Fracture 1980  
  facial/nasal  
 Hearing loss    
  wears hearing aid  Heartburn    
 Hemorrhoids, internal Shreyas Jodie Dr. Juan Collado  Hernia of unspecified site of abdominal cavity without mention of obstruction or gangrene    
  inguinal  Lt  Hypertension    
 Impotence    
 Left acoustic neuroma (HCC) 10/28/14  
  17 x 9 mm schwannoma.    
 Pes planus of both feet    
 PVD (peripheral vascular disease) ([facility] 2009  Scoliosis    
  LS  Slipped intervertebral disc 1954  
  chiropractor  SOB (shortness of breath) 08/07/13  
  Dr. Lakisha Avina.  Stroke ([facility] 10/28/2014  
  small Left Thalamus.  Dr. Jane Barker.  Dr. Juan José Rawls.  Syncope 12/20/2018  
  pt experienced fainting spells  Urinary incontinence    
  due to BPH.  Dr. Librado Palomo  Varicose vein 06/15/09  
  Dr. Radha Chang  Vertigo 2003  
  due to inner ear.  Dr. Marc Laws.  Dr. Mary Kate Rodriguez. Dr. Jayne Hanks  Visual loss    
  Dr. Jonathan Lopes, ophth.  
  
  
  
Core Measures: 
  
CVA: NO Yes CHF:No No 
PNA:No No 
  
  
  
Activity Status: 
  
OOB to Chair No 
Ambulated this shift No  
Bed Rest No 
  
Supplemental F0: (MY Applicable) 
  
NC Yes NRB No 
Venti-mask No 
On 2 Liters/min 
  
  
LINES AND DRAINS: 
  
PIV 
  
DVT prophylaxis: 
  
DVT prophylaxis Med- Yes DVT prophylaxis SCD or SHILOH- No  
  
Wounds: (If Applicable) 
  
Wounds- Yes 
  
Location scars abdomen 
  
Patient Safety: 
  
Falls Score Total Score: 3 Safety Level_______ Bed Alarm On? Yes Sitter?  No 
  
Plan for upcoming shift: safety, restraint documentation/monitoring  
  
  
  
Discharge Plan: home with  
  
Active Consults: 
IP CONSULT TO HOSPITALIST 
IP CONSULT TO NEUROLOGY

## 2020-10-07 NOTE — PROGRESS NOTES
Comprehensive Nutrition Assessment Type and Reason for Visit: Initial, RD nutrition re-screen/LOS, NPO/clear liquid Nutrition Recommendations/Plan:  
Hospice, comfort feeds if patient/family desire Nutrition Assessment:    
Chart reviewed; patient medically noted for dementia and dysphagia. Chart reviewed for length of stay; NPO x 6 days. Not cleared for PO by SLP; no desire for PEG. Family has decided on taking patient home with hospice. Estimated Daily Nutrient Needs: 
Energy (kcal):  1596 kcal (BMR 1330 x 1. 2AF) Protein (g):  78g (1.0 g/kg bw) Fluid (ml/day):  1600 mL Nutrition Related Findings:      
BM 10/3 Medications: Pravastatin, Zoloft Wounds:   
None Current Nutrition Therapies: DIET NPO Anthropometric Measures: 
· Height:  5' 3\" (160 cm) · Current Body Wt:  78 kg (171 lb 15.3 oz) · BMI Category:  Obese class 1 (BMI 30.0-34. 9) Nutrition Diagnosis:  
· Inadequate protein-energy intake related to cognitive or neurological impairment, swallowing difficulty(advanced dementia) as evidenced by NPO or clear liquid status due to medical condition Nutrition Interventions:  
Food and/or Nutrient Delivery: (comfort care/hospice) Nutrition Education and Counseling: Education not indicated Coordination of Nutrition Care: No recommendation at this time Goals: 
Comfort care/hospice next 5-7 days Nutrition Monitoring and Evaluation:  
Behavioral-Environmental Outcomes:   
Food/Nutrient Intake Outcomes: Food and nutrient intake Physical Signs/Symptoms Outcomes:   
 
Discharge Planning:   
(Comfort feeds if patient/family desire) Electronically signed by Daniela Murdock RD on 10/7/2020 at 2:07 PM 
 
Contact: ext [phone]

## 2020-10-07 NOTE — PROGRESS NOTES
Bedside and Verbal shift change report given to [facility] , [address]) by Pooja Escobar RN (offgoing nurse). Report included the following information SBAR, Kardex and ED Summary. 
  
Zone Phone:   [phone] 
  
  
Significant changes during shift:  elevated BP given hydralyzine, continued mitt restraints  
  
Patient Information 
  
Sonny Mcguire 
80 y.o. 
9/30/2020  4:31 PM by Reese Christiansen MD. Randy Isidro was admitted from Home 
  
Problem List 
  
       
Patient Active Problem List  
  Diagnosis Date Noted  Dementia ([address] 10/01/2020  Behavioral change 10/01/2020  AMS (altered mental status) 10/01/2020  Syncope 12/20/2018  Bronchitis 12/20/2018  DEVAN (acute kidney injury) ([address] 12/20/2018  Elevated troponin 12/20/2018  Alzheimer's dementia with behavioral disturbance ([address] 04/01/2018  History of stroke 12/29/2017  Altered mental state 12/16/2017  Bradycardia with 51-60 beats per minute 05/23/2017  Chronic constipation 06/07/2016  Essential hypertension 04/07/2016  Advance directive in chart 10/07/2015  Dyslipidemia    
 Left acoustic neuroma ([address] 10/28/2014  Memory disturbance 02/04/2014  CAD (coronary artery disease) 10/30/2013  
 [place] (hard of hearing) 05/09/2012  Varicose vein of leg 04/12/2011  History of small bowel obstruction 04/12/2011  GERD (gastroesophageal reflux disease)    
 Slipped intervertebral disc    
 Hemorrhoids, internal    
 DJD (degenerative joint disease) of cervical spine    
 Scoliosis    
 BPH (benign prostatic hyperplasia)    
 PVD (peripheral vascular disease) ([address]    
 Diverticulosis 02/01/2004  
  
       
Past Medical History:  
Diagnosis Date  Alzheimer's dementia with behavioral disturbance ([address] 04/2018  
  Dr. Lino Pro.  Back pain    
  due to DDD and scoliosis  BPH (benign prostatic hyperplasia)    
  Dr. Booker Merlos  CAD (coronary artery disease) 10/30/13   30%LAD, 80% ostal stenosis.  Dr. Milagros Danielson.  Chest pain 10/04/04, 10/30/13  
  Dr. Ingrid Lopez Test.  Dr. Rachel Mcnamara.  Chest pain 12/02/2018  
  chest pain and tightness  Chronic venous insufficiency 06/15/09  
  Dr. Shade Haynes  Clavicle fracture 1981  
  Lt due to fall  DDD (degenerative disc disease), lumbar    
  L3-S1  Depression    
 Diverticulosis 02/2004  
  sigmoid.  Dr. Homero Garcia  DJD (degenerative joint disease) of cervical spine    
  C 4-5 ;5-6  Dyslipidemia    
 Fracture 1980  
  facial/nasal  
 Hearing loss    
  wears hearing aid  Heartburn    
 Hemorrhoids, internal Debarah Filter Dr. Heaven Izquierdo  Hernia of unspecified site of abdominal cavity without mention of obstruction or gangrene    
  inguinal  Lt  Hypertension    
 Impotence    
 Left acoustic neuroma (HCC) 10/28/14  
  17 x 9 mm schwannoma.    
 Pes planus of both feet    
 PVD (peripheral vascular disease) ([address] 2009  Scoliosis    
  LS  Slipped intervertebral disc 1954  
  chiropractor  SOB (shortness of breath) 08/07/13  
  Dr. Katheryn Fournier.  Stroke ([facility] 10/28/2014  
  small Left Thalamus.  Dr. Jose Roberto Paulson.  Dr. Caio Sigala.  Syncope 12/20/2018  
  pt experienced fainting spells  Urinary incontinence    
  due to BPH.  Dr. David Tavares  Varicose vein 06/15/09  
  Dr. Shade Haynes  Vertigo 2003  
  due to inner ear.  Dr. Gary Sigala.  Dr. Semaj Ashraf. Dr. Alisha Rivera  Visual loss    
  Dr. Farheen Mccoy, ophth.  
  
  
  
Core Measures: 
  
CVA: NO Yes CHF:No No 
PNA:No No 
  
  
  
Activity Status: 
  
OOB to Chair No 
Ambulated this shift No  
Bed Rest No 
  
Supplemental G5: (ND Applicable) 
  
NC Yes NRB No 
Venti-mask No 
On 2 Liters/min 
  
  
LINES AND DRAINS: 
  
PIV 
  
DVT prophylaxis: 
  
DVT prophylaxis Med- Yes DVT prophylaxis SCD or SHILOH- No  
  
Wounds: (If Applicable) 
  
Wounds- Yes 
  
Location scars abdomen 
  
Patient Safety: 
  
 Falls Score Total Score: 3 Safety Level_______ Bed Alarm On? Yes Sitter?  No 
  
Plan for upcoming shift: safety, restraint documentation/monitoring  
  
  
  
Discharge Plan: home with  
  
Active Consults: 
IP CONSULT TO HOSPITALIST 
IP CONSULT TO NEUROLOGY

## 2020-10-07 NOTE — HOSPICE
[employer] RN note:  T/C with wife Princess Krishna who is preparing house for DME. Coordinated DME (bed, 0bt, 02) delivery for between 2-6:00pm 10/8/20 per family request through [facility], confirmation [id number]. Hospice able to admit 11:00am Friday 10/10, transportation to be arranged for 10:00am by CM. SRK ordered through [address] for delivery 10/8 pm.  DDNR to go with pt at discharge. Wife Princess Krishna planning to visit hospital Thursday morning between 10-12. Hospice to meet with wife to finalize plans and assure pt is stable for transport. Thank you for the opportunity to care for this pt and family. Please contact hospice at [phone] with any questions or concerns.

## 2020-10-07 NOTE — PROGRESS NOTES
Hospitalist Progress Note NAME: Claudetta Simons :  10/24/1928 MRN:  [id number] Assessment / Plan: AMS Behavioural changes likely 2/2 worsening of dementia 
-MRI negative for acute changes - Neuro consult -  EEG showed generalized slowing, there are no epileptiform discharges. -- continue ASA  
--Speech therapy  
-PT and OT recommended SNF but family will take him home with hospice  
 Prn geodon Dysphagia  
-speech therapy consult noted : keep NPO , failed swallow test  
-goals of care clear, discussed with wife , she is clear that her  doesn't want feeding tube and would like him to be transitioned to hospice Mild fever resolved Mild leucocytosis resolved  
-follow up blood cx  NTD  
-pro calcitonin 0.06 UA neg Chest xray wnl  
-hold antibiotics for now  
lower extremity doppler negative  
 
  
HTN-imdur and metoprolol Dementia:Continue home meds H/O CVA Cont asa statin 
  
  
Code Status: DNR Surrogate Decision Maker Wife Chaka Pratt / Flora Rees  Spouse  [phone] Discussed with wife at bedside on 10/06 
  
30.0 - 39.9 Obese / Body mass index is 30.47 kg/m². 
  
Code status: DNR Prophylaxis: Lovenox Recommended Disposition: home with hospice on Friday   
  
  
 
Subjective: Chief Complaint / Reason for Physician Visit FU AMS Discussed with RN events overnight and wife at bedside Still NPO , on IVF Review of Systems: 
Symptom Y/N Comments  Symptom Y/N Comments Fever/Chills    Chest Pain Poor Appetite    Edema Cough    Abdominal Pain Sputum    Joint Pain SOB/GRECO    Pruritis/Rash Nausea/vomit    Tolerating PT/OT Diarrhea    Tolerating Diet Constipation    Other Could NOT obtain due to: Confused Objective: VITALS:  
Last 24hrs VS reviewed since prior progress note. Most recent are: 
Patient Vitals for the past 24 hrs: 
 Temp Pulse Resp BP SpO2 10/07/20 0253 99 °F (37.2 °C) 60 18 (!) 155/105 96 % 10/07/20 0022 98.7 °F (37.1 °C) 98 18 (!) 164/106 95 % 10/06/20 1832 98.3 °F (36.8 °C) 90 18 125/78 92 % 10/06/20 1743 99.1 °F (37.3 °C)      
10/06/20 1645    135/84   
10/06/20 1517    (!) 174/96   
10/06/20 1446 98.3 °F (36.8 °C) (!) 56 16 (!) 177/91   
10/06/20 1211  (!) 55  (!) 150/105   
10/06/20 1049 98.8 °F (37.1 °C) 72 15 (!) 178/95 96 % No intake or output data in the 24 hours ending 10/07/20 0755 PHYSICAL EXAM: 
General: WD, WN. Alert, cooperative, no acute distress   
EENT:  EOMI. Anicteric sclerae. MMM Resp:  CTA bilaterally, no wheezing or rales. No accessory muscle use CV:  Regular  rhythm,  No edema GI:  Soft, Non distended, Non tender.  +Bowel sounds Neurologic:  Confused Skin:  No rashes. No jaundice Reviewed most current lab test results and cultures  YES Reviewed most current radiology test results   YES Review and summation of old records today    NO Reviewed patient's current orders and MAR    YES 
PMH/ reviewed - no change compared to H&P 
________________________________________________________________________ Care Plan discussed with: 
  Comments Patient Family  y   
RN y   
Care Manager Consultant  y Hospice   
                 y Multidiciplinary team rounds were held today with , nursing, pharmacist and clinical coordinator. Patient's plan of care was discussed; medications were reviewed and discharge planning was addressed. ________________________________________________________________________ Total NON critical care TIME: 35   Minutes Total CRITICAL CARE TIME Spent:   Minutes non procedure based Comments >50% of visit spent in counseling and coordination of care y   
________________________________________________________________________ Milton Taylor MD  
 
Procedures: see electronic medical records for all procedures/Xrays and details which were not copied into this note but were reviewed prior to creation of Plan. LABS: 
I reviewed today's most current labs and imaging studies. Pertinent labs include: 
Recent Labs 10/06/20 
1538 10/05/20 
8843 WBC 11.0 12.9* HGB 12.6 12.6 HCT 36.9 36.8  170 Recent Labs 10/05/20 
3510   
K 3.6 * CO2 26 * BUN 18 CREA 0.99 CA 9.1 Signed: aKte Berkowitz MD

## 2020-10-07 NOTE — PROGRESS NOTES
Problem: Falls - Risk of 
Goal: *Absence of Falls Description: Document Merly Joseph Fall Risk and appropriate interventions in the flowsheet. Outcome: Progressing Towards Goal 
Note: Fall Risk Interventions: 
Mobility Interventions: Bed/chair exit alarm Mentation Interventions: Adequate sleep, hydration, pain control, Bed/chair exit alarm Medication Interventions: Bed/chair exit alarm Elimination Interventions: Bed/chair exit alarm, Call light in reach Problem: Patient Education: Go to Patient Education Activity Goal: Patient/Family Education Outcome: Progressing Towards Goal 
  
Problem: Pressure Injury - Risk of 
Goal: *Prevention of pressure injury Description: Document Grady Scale and appropriate interventions in the flowsheet. Outcome: Progressing Towards Goal 
Note: Pressure Injury Interventions: 
Sensory Interventions: Assess changes in LOC Moisture Interventions: Absorbent underpads, Apply protective barrier, creams and emollients Activity Interventions: Pressure redistribution bed/mattress(bed type), PT/OT evaluation Mobility Interventions: PT/OT evaluation Nutrition Interventions: Document food/fluid/supplement intake, Offer support with meals,snacks and hydration Friction and Shear Interventions: Apply protective barrier, creams and emollients, Lift sheet Problem: Patient Education: Go to Patient Education Activity Goal: Patient/Family Education Outcome: Progressing Towards Goal 
  
Problem: Patient Education: Go to Patient Education Activity Goal: Patient/Family Education Outcome: Progressing Towards Goal 
  
Problem: TIA/CVA Stroke: Day 2 Until Discharge Goal: Off Pathway (Use only if patient is Off Pathway) Outcome: Progressing Towards Goal 
Goal: Activity/Safety Outcome: Progressing Towards Goal 
Goal: Diagnostic Test/Procedures Outcome: Progressing Towards Goal 
Goal: Nutrition/Diet Outcome: Progressing Towards Goal 
 Goal: Discharge Planning Outcome: Progressing Towards Goal 
Goal: Medications Outcome: Progressing Towards Goal 
Goal: Respiratory Outcome: Progressing Towards Goal 
Goal: Treatments/Interventions/Procedures Outcome: Progressing Towards Goal 
Goal: Psychosocial 
Outcome: Progressing Towards Goal 
Goal: *Verbalizes anxiety and depression are reduced or absent Outcome: Progressing Towards Goal 
Goal: *Absence of aspiration Outcome: Progressing Towards Goal 
Goal: *Absence of deep venous thrombosis signs and symptoms(Stroke Metric) Outcome: Progressing Towards Goal 
Goal: *Optimal pain control at patient's stated goal 
Outcome: Progressing Towards Goal 
Goal: *Tolerating diet Outcome: Progressing Towards Goal 
Goal: *Ability to perform ADLs and demonstrates progressive mobility and function Outcome: Progressing Towards Goal 
Goal: *Stroke education continued(Stroke Metric) Outcome: Progressing Towards Goal 
  
Problem: TIA/CVA Stroke: Day 2 Until Discharge Goal: Off Pathway (Use only if patient is Off Pathway) Outcome: Progressing Towards Goal 
Goal: Activity/Safety Outcome: Progressing Towards Goal 
Goal: Diagnostic Test/Procedures Outcome: Progressing Towards Goal 
Goal: Nutrition/Diet Outcome: Progressing Towards Goal 
Goal: Discharge Planning Outcome: Progressing Towards Goal 
Goal: Medications Outcome: Progressing Towards Goal 
Goal: Respiratory Outcome: Progressing Towards Goal 
Goal: Treatments/Interventions/Procedures Outcome: Progressing Towards Goal 
Goal: Psychosocial 
Outcome: Progressing Towards Goal 
Goal: *Verbalizes anxiety and depression are reduced or absent Outcome: Progressing Towards Goal 
Goal: *Absence of aspiration Outcome: Progressing Towards Goal 
Goal: *Absence of deep venous thrombosis signs and symptoms(Stroke Metric) Outcome: Progressing Towards Goal 
Goal: *Optimal pain control at patient's stated goal 
Outcome: Progressing Towards Goal 
Goal: *Tolerating diet Outcome: Progressing Towards Goal 
Goal: *Ability to perform ADLs and demonstrates progressive mobility and function Outcome: Progressing Towards Goal 
Goal: *Stroke education continued(Stroke Metric) Outcome: Progressing Towards Goal 
  
Problem: Ischemic Stroke: Discharge Outcomes Goal: *Verbalizes anxiety and depression are reduced or absent Outcome: Progressing Towards Goal 
Goal: *Verbalize understanding of risk factor modification(Stroke Metric) Outcome: Progressing Towards Goal 
Goal: *Hemodynamically stable Outcome: Progressing Towards Goal 
Goal: *Absence of aspiration pneumonia Outcome: Progressing Towards Goal 
Goal: *Aware of needed dietary changes Outcome: Progressing Towards Goal 
Goal: *Verbalize understanding of prescribed medications including anti-coagulants, anti-lipid, and/or anti-platelets(Stroke Metric) Outcome: Progressing Towards Goal 
Goal: *Tolerating diet Outcome: Progressing Towards Goal 
Goal: *Aware of follow-up diagnostics related to anticoagulants Outcome: Progressing Towards Goal 
Goal: *Ability to perform ADLs and demonstrates progressive mobility and function Outcome: Progressing Towards Goal 
Goal: *Absence of DVT(Stroke Metric) Outcome: Progressing Towards Goal 
Goal: *Absence of aspiration Outcome: Progressing Towards Goal 
Goal: *Optimal pain control at patient's stated goal 
Outcome: Progressing Towards Goal 
Goal: *Home safety concerns addressed Outcome: Progressing Towards Goal 
Goal: *Describes available resources and support systems Outcome: Progressing Towards Goal 
Goal: *Verbalizes understanding of activation of EMS(911) for stroke symptoms(Stroke Metric) Outcome: Progressing Towards Goal 
Goal: *Understands and describes signs and symptoms to report to providers(Stroke Metric) Outcome: Progressing Towards Goal 
Goal: *Neurolgocially stable (absence of additional neurological deficits) Outcome: Progressing Towards Goal 
 Goal: *Verbalizes importance of follow-up with primary care physician(Stroke Metric) Outcome: Progressing Towards Goal 
Goal: *Smoking cessation discussed,if applicable(Stroke Metric) Outcome: Progressing Towards Goal 
Goal: *Depression screening completed(Stroke Metric) Outcome: Progressing Towards Goal 
  
Problem: Patient Education: Go to Patient Education Activity Goal: Patient/Family Education Outcome: Progressing Towards Goal 
  
Problem: Non-Violent Restraints Goal: *Removal from restraints as soon as assessed to be safe Outcome: Progressing Towards Goal 
Goal: *No harm/injury to patient while restraints in use Outcome: Progressing Towards Goal 
Goal: *Patient's dignity will be maintained Outcome: Progressing Towards Goal 
Goal: *Patient Specific Goal (EDIT GOAL, INSERT TEXT) Outcome: Progressing Towards Goal 
Goal: Non-violent Restaints:Standard Interventions Outcome: Progressing Towards Goal 
Goal: Non-violent Restraints:Patient Interventions Outcome: Progressing Towards Goal 
Goal: Patient/Family Education Outcome: Progressing Towards Goal

## 2020-10-07 NOTE — PROGRESS NOTES
Speech pathology Chart reviewed and note plan is for Hospice. While NPO remains safest course, consider offering PO for pleasure/comfort acknowledging aspiration risk. If MD/family opts to offer PO, would consider puree and nectar thick liquids as this may provide more \"comfort\" than thins. SLP will complete orders.   
Thanks, Montgomery Krabbe M.S. CCC-SLP

## 2020-10-08 NOTE — PROGRESS NOTES
Hospitalist Progress Note NAME: Sonny Mcguire :  10/24/1928 MRN:  [id number] Assessment / Plan: AMS Behavioural changes likely 2/2 worsening of dementia 
-MRI negative for acute changes - Neuro consult -  EEG showed generalized slowing, there are no epileptiform discharges. -- continue ASA  
--Speech therapy  
-PT and OT recommended SNF but family will take him home with hospice  
 discussed with hospice who is requesting pt to be liquid morphine and liquid haldol Dysphagia  
-speech therapy consult noted : keep NPO , failed swallow test  
-goals of care clear, discussed with wife , she is clear that her  doesn't want feeding tube and would like him to be transitioned to hospice Plan hospice on Friday Mild fever resolved Mild leucocytosis resolved  
-follow up blood cx  NTD  
-pro calcitonin 0.06 UA neg Chest xray wnl  
-hold antibiotics for now  
lower extremity doppler negative  
 
  
HTN-imdur and metoprolol Dementia:Continue home meds H/O CVA Cont asa statin 
  
  
Code Status: DNR Surrogate Decision Maker Wife Andie Garcia / Da Samson  Spouse  [phone] Discussed with wife at bedside on 10/06, 10/08 
  
30.0 - 39.9 Obese / Body mass index is 30.47 kg/m². 
  
Code status: DNR Prophylaxis: Lovenox Recommended Disposition: home with hospice on Friday   
  
  
 
Subjective: Chief Complaint / Reason for Physician Visit FU AMS Discussed with RN events overnight and wife at bedside Still NPO , on IVF . No acute issues Review of Systems: 
Symptom Y/N Comments  Symptom Y/N Comments Fever/Chills    Chest Pain Poor Appetite    Edema Cough    Abdominal Pain Sputum    Joint Pain SOB/GRECO    Pruritis/Rash Nausea/vomit    Tolerating PT/OT Diarrhea    Tolerating Diet Constipation    Other Could NOT obtain due to: Confused Objective: VITALS:  
 Last 24hrs VS reviewed since prior progress note. Most recent are: 
Patient Vitals for the past 24 hrs: 
 Temp Pulse Resp BP SpO2  
10/08/20 0651 98.9 °F (37.2 °C) 98 18 (!) 128/99 99 % 10/08/20 0329 98.4 °F (36.9 °C) 66 18 (!) 175/88 100 % 10/07/20 2305 98.8 °F (37.1 °C) 97 18 (!) 153/65 99 % 10/07/20 1839 99.3 °F (37.4 °C) 62 17 (!) 182/100 99 % 10/07/20 1649 98.5 °F (36.9 °C) (!) 57 16 (!) 154/98 94 % 10/07/20 1238 98.4 °F (36.9 °C) 63 17 (!) 151/98 98 % 10/07/20 1141    (!) 151/108  No intake or output data in the 24 hours ending 10/08/20 0906 PHYSICAL EXAM: 
General: WD, WN. Alert, cooperative, no acute distress   
EENT:  EOMI. Anicteric sclerae. MMM Resp:  CTA bilaterally, no wheezing or rales. No accessory muscle use CV:  Regular  rhythm,  No edema GI:  Soft, Non distended, Non tender.  +Bowel sounds Neurologic:  Confused Skin:  No rashes. No jaundice Reviewed most current lab test results and cultures  YES Reviewed most current radiology test results   YES Review and summation of old records today    NO Reviewed patient's current orders and MAR    YES 
PMH/ reviewed - no change compared to H&P 
________________________________________________________________________ Care Plan discussed with: 
  Comments Patient Family  y   
RN y   
Care Manager Consultant     
                 y Multidiciplinary team rounds were held today with , nursing, pharmacist and clinical coordinator. Patient's plan of care was discussed; medications were reviewed and discharge planning was addressed. ________________________________________________________________________ Total NON critical care TIME: 30  Minutes Total CRITICAL CARE TIME Spent:   Minutes non procedure based Comments >50% of visit spent in counseling and coordination of care y   
________________________________________________________________________ Shiloh Copeland MD  
 
 Procedures: see electronic medical records for all procedures/Xrays and details which were not copied into this note but were reviewed prior to creation of Plan. LABS: 
I reviewed today's most current labs and imaging studies. Pertinent labs include: 
Recent Labs 10/06/20 
5638 WBC 11.0 HGB 12.6 HCT 36.9  No results for input(s): NA, K, CL, CO2, GLU, BUN, CREA, CA, MG, PHOS, ALB, TBIL, TBILI, ALT, INR, INREXT, INREXT in the last 72 hours. No lab exists for component: SGOT Signed: Attila Michaels MD

## 2020-10-08 NOTE — PROGRESS NOTES
Bedside and Verbal shift change report given to , RN (oncoming nurse) Ej Saenz RN (offgoing nurse). Report included the following information SBAR, Kardex and ED Summary. 
  
Zone Phone:   [phone] 
  
  
Significant changes during shift:  mitt restraints continued, pt now has pain meds sublingual  
 
Patient Information 
  
Floresita Goins 
80 y.o. 
9/30/2020  4:31 PM by Harvinder Renteria MD. Randy Isidro was admitted from Home 
  
Problem List 
  
       
Patient Active Problem List  
  Diagnosis Date Noted  Dementia ([address] 10/01/2020  Behavioral change 10/01/2020  AMS (altered mental status) 10/01/2020  Syncope 12/20/2018  Bronchitis 12/20/2018  DEVAN (acute kidney injury) ([address] 12/20/2018  Elevated troponin 12/20/2018  Alzheimer's dementia with behavioral disturbance ([address] 04/01/2018  History of stroke 12/29/2017  Altered mental state 12/16/2017  Bradycardia with 51-60 beats per minute 05/23/2017  Chronic constipation 06/07/2016  Essential hypertension 04/07/2016  Advance directive in chart 10/07/2015  Dyslipidemia    
 Left acoustic neuroma ([address] 10/28/2014  Memory disturbance 02/04/2014  CAD (coronary artery disease) 10/30/2013  
 [place] (hard of hearing) 05/09/2012  Varicose vein of leg 04/12/2011  History of small bowel obstruction 04/12/2011  GERD (gastroesophageal reflux disease)    
 Slipped intervertebral disc    
 Hemorrhoids, internal    
 DJD (degenerative joint disease) of cervical spine    
 Scoliosis    
 BPH (benign prostatic hyperplasia)    
 PVD (peripheral vascular disease) ([address]    
 Diverticulosis 02/01/2004  
  
       
Past Medical History:  
Diagnosis Date  Alzheimer's dementia with behavioral disturbance ([address] 04/2018  
  Dr. Zari Rowe.  Back pain    
  due to DDD and scoliosis  BPH (benign prostatic hyperplasia)    
  Dr. Julia Puckett  CAD (coronary artery disease) 10/30/13   30%LAD, 80% ostal stenosis.  Dr. Semaj Chin.  Chest pain 10/04/04, 10/30/13  
  Dr. Marla Clay.  Dr. Ana Gooden.  Chest pain 12/02/2018  
  chest pain and tightness  Chronic venous insufficiency 06/15/09  
  Dr. Shania Wong  Clavicle fracture 1981  
  Lt due to fall  DDD (degenerative disc disease), lumbar    
  L3-S1  Depression    
 Diverticulosis 02/2004  
  sigmoid.  Dr. Jean Dietz  DJD (degenerative joint disease) of cervical spine    
  C 4-5 ;5-6  Dyslipidemia    
 Fracture 1980  
  facial/nasal  
 Hearing loss    
  wears hearing aid  Heartburn    
 Hemorrhoids, internal Yaneth Peabody Dr. Donovan Muskrparvin  Hernia of unspecified site of abdominal cavity without mention of obstruction or gangrene    
  inguinal  Lt  Hypertension    
 Impotence    
 Left acoustic neuroma ([facility]) 10/28/14  
  17 x 9 mm schwannoma.    
 Pes planus of both feet    
 PVD (peripheral vascular disease) ([facility] 2009  Scoliosis    
  LS  Slipped intervertebral disc 1954  
  chiropractor  SOB (shortness of breath) 08/07/13  
  Dr. Marion Arnold.  Stroke ([facility] 10/28/2014  
  small Left [place].  Dr. Suzanne Lim.  Dr. Abril Orantes.  Syncope 12/20/2018  
  pt experienced fainting spells  Urinary incontinence    
  due to BPH.  Dr. Dilia Dominguez  Varicose vein 06/15/09  
  Dr. Shania Wong  Vertigo 2003  
  due to inner ear.  Dr. Cruz Moya.  Dr. Stevens Mode. Dr. Deanna Johnson  Visual loss    
  Dr. Giovany Garcia, ophth.  
  
  
  
Core Measures: 
  
CVA: NO Yes CHF:No No 
PNA:No No 
  
  
  
Activity Status: 
  
OOB to Chair No 
Ambulated this shift No  
Bed Rest No 
  
Supplemental X9: (OI Applicable) 
  
NC Yes NRB No 
Venti-mask No 
On 2 Liters/min 
  
  
LINES AND DRAINS: 
  
PIV 
  
DVT prophylaxis: 
  
DVT prophylaxis Med- Yes DVT prophylaxis SCD or SHILOH- No  
  
Wounds: (If Applicable) 
  
Wounds- Yes 
  
Location scars abdomen 
  
Patient Safety: 
  
 Falls Score Total Score: 3 Safety Level_______ Bed Alarm On? Yes Sitter?  No 
  
Plan for upcoming shift: safety, restraint documentation/monitoring  
  
  
  
Discharge Plan: home with  
  
Active Consults: 
IP CONSULT TO HOSPITALIST 
IP CONSULT TO NEUROLOGY

## 2020-10-08 NOTE — PROGRESS NOTES
Telemedicine Paged re: Rn request for renewal of wrist restraints. Orders reviewed - pt with mitten restraints. Spoke with RN - she is requesting mittens, not wrist restraints. Order placed.

## 2020-10-08 NOTE — PROGRESS NOTES
Problem: Falls - Risk of 
Goal: *Absence of Falls Description: Document Chap[place] Click Fall Risk and appropriate interventions in the flowsheet. Outcome: Progressing Towards Goal 
Note: Fall Risk Interventions: 
Mobility Interventions: Bed/chair exit alarm Mentation Interventions: Bed/chair exit alarm Medication Interventions: Bed/chair exit alarm Elimination Interventions: Bed/chair exit alarm, Call light in reach Problem: Patient Education: Go to Patient Education Activity Goal: Patient/Family Education Outcome: Progressing Towards Goal 
  
Problem: Pressure Injury - Risk of 
Goal: *Prevention of pressure injury Description: Document Grady Scale and appropriate interventions in the flowsheet. Outcome: Progressing Towards Goal 
Note: Pressure Injury Interventions: 
Sensory Interventions: Assess changes in LOC Moisture Interventions: Absorbent underpads, Apply protective barrier, creams and emollients Activity Interventions: Chair cushion, Pressure redistribution bed/mattress(bed type) Mobility Interventions: Pressure redistribution bed/mattress (bed type), PT/OT evaluation Nutrition Interventions: Document food/fluid/supplement intake, Offer support with meals,snacks and hydration Friction and Shear Interventions: Apply protective barrier, creams and emollients Problem: Patient Education: Go to Patient Education Activity Goal: Patient/Family Education Outcome: Progressing Towards Goal 
  
Problem: Patient Education: Go to Patient Education Activity Goal: Patient/Family Education Outcome: Progressing Towards Goal 
  
Problem: TIA/CVA Stroke: Day 2 Until Discharge Goal: Off Pathway (Use only if patient is Off Pathway) Outcome: Progressing Towards Goal 
Goal: Activity/Safety Outcome: Progressing Towards Goal 
Goal: Diagnostic Test/Procedures Outcome: Progressing Towards Goal 
Goal: Nutrition/Diet Outcome: Progressing Towards Goal 
Goal: Discharge Planning Outcome: Progressing Towards Goal 
Goal: Medications Outcome: Progressing Towards Goal 
Goal: Respiratory Outcome: Progressing Towards Goal 
Goal: Treatments/Interventions/Procedures Outcome: Progressing Towards Goal 
Goal: Psychosocial 
Outcome: Progressing Towards Goal 
Goal: *Verbalizes anxiety and depression are reduced or absent Outcome: Progressing Towards Goal 
Goal: *Absence of aspiration Outcome: Progressing Towards Goal 
Goal: *Absence of deep venous thrombosis signs and symptoms(Stroke Metric) Outcome: Progressing Towards Goal 
Goal: *Optimal pain control at patient's stated goal 
Outcome: Progressing Towards Goal 
Goal: *Tolerating diet Outcome: Progressing Towards Goal 
Goal: *Ability to perform ADLs and demonstrates progressive mobility and function Outcome: Progressing Towards Goal 
Goal: *Stroke education continued(Stroke Metric) Outcome: Progressing Towards Goal 
  
Problem: Patient Education: Go to Patient Education Activity Goal: Patient/Family Education Outcome: Progressing Towards Goal 
  
Problem: Non-Violent Restraints Goal: *Removal from restraints as soon as assessed to be safe Outcome: Progressing Towards Goal 
Goal: *No harm/injury to patient while restraints in use Outcome: Progressing Towards Goal 
Goal: *Patient's dignity will be maintained Outcome: Progressing Towards Goal 
Goal: *Patient Specific Goal (EDIT GOAL, INSERT TEXT) Outcome: Progressing Towards Goal 
Goal: Non-violent Restaints:Standard Interventions Outcome: Progressing Towards Goal 
Goal: Non-violent Restraints:Patient Interventions Outcome: Progressing Towards Goal 
Goal: Patient/Family Education Outcome: Progressing Towards Goal

## 2020-10-08 NOTE — HOSPICE
[facility] Good Help to Those in Need 
[phone] Patient Name: Dorien Gaucher YOB: 1928 Age: 80 y.o. [facility] RN Note:  Hospice consult received, reviewing chart. Will follow up with Unit Nurse and Care Manager to discuss plan of care, patient status and discharge disposition within the hour. Seen this morning w/ hospice MD present. Patient appears to be uncomfortable: moaning, brows furrowed. Only medication given was Zyprexa. Discussed plan of care w/ wife. Requested to start SL Morphine 5 q2hr and Haldol 2mg to optimize symptom relief prior to DC home tomorrow. Hospice to re-eval this afternoon to ensure adequate symptom relief w/ addition of new meds. Also requested bladder scan to check for retention, if patient is retaining we can insert brower if wife agreeable. She was hesitant upon discussion but did agree to bladder check Equipment will be arriving this afternoon. CM updated and will set up transport for 10am 10/08. If patient continues to decline and require IV medication we can admit to [facility] LOC Thank you for the opportunity to be of service to this patient.

## 2020-10-08 NOTE — PROGRESS NOTES
ANGELA: 
 
Hospice CM informed that pt hospice DME will be delivered on today. Pt will begin start of care on 10/9/20. CM was requested to arrange transport on 10/9/20 at 10:00AM (completed). CM placed transport packet in pt's chart. CM informed pt of the following and 2nd  Medicare Letter (verbal consent) placed in chart. CM completed the needs of the pt at this time. BELA Polk, [address] [facility]

## 2020-10-08 NOTE — PROGRESS NOTES
1921 Bedside report, Pt awake attempting to pull out IV and take gown off. 
 
2308 Pt resting in bed, no acute changes noted. 0108 Pt restless, agitated, attempting to pull out IV. 0715 Bedside and Verbal shift change report given to [address] [place] [address] (oncoming nurse) by Cherelle Schroeder (offgoing nurse). Report included the following information SBAR, Kardex, Intake/Output, MAR, Accordion, Recent Results, Med Rec Status and Quality Measures.

## 2020-10-08 NOTE — PROGRESS NOTES
Bedside and Verbal shift change report given to [facility], [address]) by Selene RN (offgoing nurse). Report included the following information SBAR, Kardex and ED Summary. 
  
Zone Phone:   [phone] 
  
  
Significant changes during shift:  Continued mitt restraints. PRN Hydralazine give and zyprexa 
  
Patient Information 
  
Delia Gathers 
80 y.o. 
9/30/2020  4:31 PM by Beau Hussein MD. Randy Isidro was admitted from Home 
  
Problem List 
  
       
Patient Active Problem List  
  Diagnosis Date Noted  Dementia ([address] 10/01/2020  Behavioral change 10/01/2020  AMS (altered mental status) 10/01/2020  Syncope 12/20/2018  Bronchitis 12/20/2018  DEVAN (acute kidney injury) ([address] 12/20/2018  Elevated troponin 12/20/2018  Alzheimer's dementia with behavioral disturbance ([address] 04/01/2018  History of stroke 12/29/2017  Altered mental state 12/16/2017  Bradycardia with 51-60 beats per minute 05/23/2017  Chronic constipation 06/07/2016  Essential hypertension 04/07/2016  Advance directive in chart 10/07/2015  Dyslipidemia    
 Left acoustic neuroma ([address] 10/28/2014  Memory disturbance 02/04/2014  CAD (coronary artery disease) 10/30/2013  
 [place] (hard of hearing) 05/09/2012  Varicose vein of leg 04/12/2011  History of small bowel obstruction 04/12/2011  GERD (gastroesophageal reflux disease)    
 Slipped intervertebral disc    
 Hemorrhoids, internal    
 DJD (degenerative joint disease) of cervical spine    
 Scoliosis    
 BPH (benign prostatic hyperplasia)    
 PVD (peripheral vascular disease) ([address]    
 Diverticulosis 02/01/2004  
  
       
Past Medical History:  
Diagnosis Date  Alzheimer's dementia with behavioral disturbance ([address] 04/2018  
  Dr. White Ards.  Back pain    
  due to DDD and scoliosis  BPH (benign prostatic hyperplasia)    
  Dr. Ayleen Turner  CAD (coronary artery disease) 10/30/13   30%LAD, 80% ostal stenosis.  Dr. Robert Brown.  Chest pain 10/04/04, 10/30/13  
  Dr. Fredy Rosa Test.  Dr. Tamra Copeland.  Chest pain 12/02/2018  
  chest pain and tightness  Chronic venous insufficiency 06/15/09  
  Dr. Brown American  Clavicle fracture 1981  
  Lt due to fall  DDD (degenerative disc disease), lumbar    
  L3-S1  Depression    
 Diverticulosis 02/2004  
  sigmoid.  Dr. David Chavarria  DJD (degenerative joint disease) of cervical spine    
  C 4-5 ;5-6  Dyslipidemia    
 Fracture 1980  
  facial/nasal  
 Hearing loss    
  wears hearing aid  Heartburn    
 Hemorrhoids, internal Chante Daub Dr. Miriam Hernandez  Hernia of unspecified site of abdominal cavity without mention of obstruction or gangrene    
  inguinal  Lt  Hypertension    
 Impotence    
 Left acoustic neuroma ([facility]) 10/28/14  
  17 x 9 mm schwannoma.    
 Pes planus of both feet    
 PVD (peripheral vascular disease) ([address] 2009  Scoliosis    
  LS  Slipped intervertebral disc 1954  
  chiropractor  SOB (shortness of breath) 08/07/13  
  Dr. Monie Cutler.  Stroke [facility] 10/28/2014  
  small Left [place].  Dr. Jaylin Lowery.  Dr. Dariana Avery.  Syncope 12/20/2018  
  pt experienced fainting spells  Urinary incontinence    
  due to BPH.  Dr. Hinojosa Alert  Varicose vein 06/15/09  
  Dr. Stephanie Nichols  Vertigo 2003  
  due to inner ear.  Dr. Ellen De Luna.  Dr. Rayne Knutson. Dr. Jonel Cain  Visual loss    
  Dr. Darryn Floers, ophth.  
  
  
  
Core Measures: 
  
CVA: NO Yes CHF:No No 
PNA:No No 
  
  
  
Activity Status: 
  
OOB to Chair No 
Ambulated this shift No  
Bed Rest No 
  
Supplemental M0: (KI Applicable) 
  
NC Yes NRB No 
Venti-mask No 
On 2 Liters/min 
  
  
LINES AND DRAINS: 
  
PIV 
  
DVT prophylaxis: 
  
DVT prophylaxis Med- Yes DVT prophylaxis SCD or SHILOH- No  
  
Wounds: (If Applicable) 
  
Wounds- Yes 
  
Location scars abdomen 
  
Patient Safety: 
  
 Falls Score Total Score: 3 Safety Level_______ Bed Alarm On? Yes Sitter?  No 
  
Plan for upcoming shift: safety, restraint documentation/monitoring  
  
  
  
Discharge Plan: home with  
  
Active Consults: 
IP CONSULT TO HOSPITALIST 
IP CONSULT TO NEUROLOGY

## 2020-10-09 NOTE — DISCHARGE SUMMARY
Hospitalist Discharge Summary Patient ID: 
Karin Ramirez [id number] 
15 y.o. 
10/24/1928 
9/30/2020 PCP on record: Brendon Myers MD 
 
Admit date: 9/30/2020 Discharge date and time: 10/9/2020 DISCHARGE DIAGNOSIS: 
 AMS Behavioural changes likely 2/2 worsening of dementia Dysphagia Mild fever resolved Mild leucocytosis resolved HTN- 
Dementia: 
H/O CVA CONSULTATIONS: 
IP CONSULT TO HOSPITALIST 
IP CONSULT TO NEUROLOGY 
IP CONSULT TO PALLIATIVE CARE - PROVIDER Excerpted HPI from H&P of Heriberto Preciado MD: 
Karin Ramirez is a 80 y.o.  male with PMH of HTN, CAD, h/o Stroke and Alzheimer's dementia who arrives to ED via EMS unresponsive. Pt LKW was 1400. Pt lives at home with wife . BG en route was 154.  Code S level 1 called  . whil taking to [facility] pt started screaming. Pt Was rambling and difficult to understand.  
  
Per wife pt had decreased water intake but was eating normally and talking a lot and then went unresponsive. Pt was recently prescribed Depakote to control his behavioral outbursts but she has not filled the prescription yet. Pt has advanced dementia.   
  
Pt oxygen sats decreased to 89% on RA while pt is sleeping. Placed pt on 2L nc at this time just while patient is sleeping.  
  
  
There are no other complaints, changes or physical findings pertinent to the HPI at this time. 
  
Keagan Barbour MD 
  
  
  
We were asked to admit for work up and evaluation of the above problems.  
  
 
______________________________________________________________________ DISCHARGE SUMMARY/HOSPITAL COURSE:  for full details see H&P, daily progress notes, labs, consult notes. AMS Behavioural changes likely 2/2 worsening of dementia 
-MRI negative for acute changes - Neuro consult -  EEG showed generalized slowing, there are no epileptiform discharges. -- continue ASA  
--Speech therapy -PT and OT recommended SNF but family will take him home with hospice  
 discussed with hospice who is requesting pt to be liquid morphine and liquid haldol while in hospital  
  Dysphagia  
-speech therapy consult noted : keep NPO , failed swallow test  
-goals of care clear, discussed with wife , she is clear that her  doesn't want feeding tube and would like him to be transitioned to hospice . While on hospice , consider offering PO for pleasure/comfort acknowledging aspiration risk. If MD/family opts to offer PO, would consider puree and nectar thick liquids as this may provide more \"comfort\" than thins. 
  
Mild fever resolved Mild leucocytosis resolved  
-follow up blood cx  NTD  
-pro calcitonin 0.06 UA neg Chest xray wnl  
-hold antibiotics for now  
lower extremity doppler negative  
  
  
HTN-imdur and metoprolol Dementia:Continue home meds H/O CVA Cont asa statin 
 
 
 
 
_______________________________________________________________________ Patient seen and examined by me on discharge day. Pertinent Findings: 
Gen:    Not in distress Chest: Clear lungs CVS:   Regular rhythm. No edema Abd:  Soft, not distended, not tender Neuro:  Alert, orientedx0 
_______________________________________________________________________ DISCHARGE MEDICATIONS:  
Current Discharge Medication List  
  
CONTINUE these medications which have NOT CHANGED Details QUEtiapine (SEROquel) 25 mg tablet Take 0.5 Tabs by mouth two (2) times a day. take 1/2 tablet by mouth at bedtime  Indications: repeated episodes of anxiety Qty: 45 Tab, Refills: 3 Associated Diagnoses: Anxiety with depression  
  
folic acid-vit X2-OGG L54 ([place]) 2.5-25-1 mg tablet TAKE 1 TABLET BY MOUTH ONCE DAILY Qty: 90 Tab, Refills: 3 Associated Diagnoses: Cognitive and behavioral changes; Alzheimer's dementia without behavioral disturbance, unspecified timing of dementia onset ([facility] !! celecoxib (CELEBREX) 100 mg capsule take 1 capsule by mouth once daily  
  
busPIRone (BUSPAR) 10 mg tablet Take 1 10mg tablet by mouth twice a day  
  
polyethylene glycol (MIRALAX) 17 gram/dose powder Take 17 g by mouth as needed for Other (constipation). As needed daily  
  
!! celecoxib (CELEBREX) 100 mg capsule Take 100 mg by mouth daily. Refills: 0  
  
metoprolol tartrate (LOPRESSOR) 25 mg tablet take 1/2 tablet once a day  Indications: high blood pressure Qty: 45 Tab, Refills: 3 Associated Diagnoses: Essential hypertension  
  
isosorbide mononitrate ER (IMDUR) 30 mg tablet take 1 tablet by mouth once daily 
Qty: 90 Tab, Refills: 3  
  
pravastatin (PRAVACHOL) 20 mg tablet take 1 tablet by mouth once daily at bedtime 
Qty: 90 Tab, Refills: 3 Associated Diagnoses: Dyslipidemia  
  
aspirin (ASPIRIN) 325 mg tablet Take 1 Tab by mouth daily. Indications: myocardial infarction prevention 
Qty: 90 Tab, Refills: 3 Associated Diagnoses: Coronary artery disease involving native coronary artery of native heart without angina pectoris; History of stroke GUAIFENESIN (MUCINEX PO) Take 600 mg by mouth daily. Take 1 tab Associated Diagnoses: Chest congestion  
  
docusate sodium (COLACE) 100 mg capsule Take 100 mg by mouth daily. Instructed to take daily with plenty of liquid unless otherwise directed- as per 11/7/14 [facility] discharge med list  
  
cholecalciferol, vitamin D3, (VITAMIN D3) 2,000 unit tab Take 1 Tab by mouth daily. As per 11/07/14 [facility] discharge med list  
  
tamsulosin (FLOMAX) 0.4 mg capsule Take 0.4 mg by mouth daily. For prostate   
  
sertraline (ZOLOFT) 25 mg tablet Take 1 Tab by mouth daily. Indications: Anxiousness associated with Depression 
Qty: 90 Tab, Refills: 3 Associated Diagnoses: Other depression; Anxiety with depression !! - Potential duplicate medications found. Please discuss with provider. STOP taking these medications meclizine (ANTIVERT) 25 mg tablet Comments:  
Reason for Stopping:   
   
  
 
 
 
Patient Follow Up Instructions: Activity: Bedrest 
Diet: Comfort feeding Wound Care: None needed Follow-up Information Follow up With Specialties Details Why Contact Info Evette Tinoco [address] [facility] 7 
[phone] Jone Arreola, [facility]   14 [facility] [address] [place], [place] [id number] 
[phone] 
  
  
 
________________________________________________________________ Risk of deterioration: High 
 
Condition at Discharge:  Stable 
__________________________________________________________________ Disposition Home with hospice services 
 
____________________________________________________________________ Code Status: DNR/DNI 
___________________________________________________________________ Total time in minutes spent coordinating this discharge (includes going over instructions, follow-up, prescriptions, and preparing report for sign off to her PCP) :  >30 minutes Signed: 
Attila Michaels MD

## 2020-10-09 NOTE — DISCHARGE INSTRUCTIONS
DISCHARGE DIAGNOSIS:  AMS   Behavioural changes likely 2/2 worsening of dementia  Dysphagia   Mild fever resolved   Mild leucocytosis resolved   HTN-  Dementia:  H/O CVA        MEDICATIONS:  · It is important that you take the medication exactly as they are prescribed. · Keep your medication in the bottles provided by the pharmacist and keep a list of the medication names, dosages, and times to be taken in your wallet. · Do not take other medications without consulting your doctor. Pain Management: per above medications    What to do at Home    Recommended diet:  Comfort feeding  consider offering PO for pleasure/comfort acknowledging aspiration risk. If MD/family opts to offer PO, would consider puree and nectar thick liquids as this may provide more \"comfort\" than thins. Recommended activity: Activity as tolerated    If you have questions regarding the hospital related prescriptions or hospital related issues please call [address] [place] [address] at . You can always direct your questions to your primary care doctor if you are unable to reach your hospital physician; your PCP works as an extension of your hospital doctor just like your hospital doctor is an extension of your PCP for your time at the hospital [facility]).     If you experience any of the following symptoms then please call your primary care physician or return to the emergency room if you cannot get hold of your doctor:  Fever, chills, nausea, vomiting, diarrhea, change in mentation, falling, bleeding, shortness of breath

## 2020-10-09 NOTE — PROGRESS NOTES
Called wife to discuss instructions for discharge and gave her eta of pt transport to hospital. Wife verbalizes understanding

## 2020-10-09 NOTE — HOSPICE
[employer] RN note:   T/C to wife to confirm plans for discharge, DME and medications are in place. DDNR printed to go with pt and remain in the home. Report to be given to admission RN Linda before discharge. [employer] Good Help to Those in Need 
[phone] Inpatient Nursing PRE Admission Patient Name: Mary Liz YOB: 1928 Age: 80 y.o. Date of PLANNED Hospice Admission: 10/9/20 Hospice Attending: Dr Kari De Leon Primary Care Physician: Ricardo Ferro MD 
  
Home Hospice Zip Code: 
Expected  (if patient transferred to [facility]): Mau Chaudhary ADVANCE CARE PLANNING Code Status: DNR Durable DNR: [x]  Yes  []  No 
Advance Care Planning 10/1/2020 Patient's Healthcare Decision Maker is: -  
Primary Decision Maker Name -  
Primary Decision Maker Phone Number -  
Primary Decision Maker Relationship to Patient -  
Confirm Advance Directive None Does the patient have other document types - [facility]: [facility]  Home: TBD HOSPICE SUMMARY  
ER Visits/ Hospitalizations in past year:  
Hospice Diagnosis: 
Onset Date of Hospice Diagnosis:  
Summary of Disease Progression Leading to Hospice Diagnosis: per Dr Rey Sanchez in ED:  Mary Liz is a 80 y.o.  male with PMH of HTN, CAD, h/o Stroke and Alzheimer's dementia who arrives to ED via EMS unresponsive. Pt LKW was 1400. Pt lives at home with wife . BG en route was 154.  Code S level 1 called  . whil taking to [facility] pt started screaming. Pt Was rambling and difficult to understand.  
  
Per wife pt had decreased water intake but was eating normally and talking a lot and then went unresponsive. Pt was recently prescribed Depakote to control his behavioral outbursts but she has not filled the prescription yet. Pt has advanced dementia.   
  
Pt oxygen sats decreased to 89% on RA while pt is sleeping. Placed pt on 2L nc at this time just while patient is sleeping. Co-Morbidities:  
Patient Active Problem List  
Diagnosis Code  GERD (gastroesophageal reflux disease) K21.9  Slipped intervertebral disc [id number]  Hemorrhoids, internal K64.8  DJD (degenerative joint disease) of cervical spine M47.812  Scoliosis M41.9  Diverticulosis K57.90  
 BPH (benign prostatic hyperplasia) N40.0  PVD (peripheral vascular disease) ([facility]) I73.9  Varicose vein of leg I83.90  
 History of small bowel obstruction Z87.19  
 [place] (hard of hearing) H91.90  CAD (coronary artery disease) I25.10  Memory disturbance R41.3  Left acoustic neuroma ([facility]) D33.3  Dyslipidemia E78.5  Advance directive in chart Z78.9  
 Essential hypertension I10  Chronic constipation K59.09  
 Bradycardia with 51-60 beats per minute R00.1  Altered mental state R41.82  
 History of stroke Z86.73  
 Syncope R55  Bronchitis J40  DEVAN (acute kidney injury) ([facility] N17.9  Elevated troponin R77.8  Alzheimer's dementia with behavioral disturbance ([facility]) G30.9, F02.81  
 Dementia ([facility] F03.90  Behavioral change R46.89  
 AMS (altered mental status) R41.82 Diagnoses RELATED to the terminal prognosis: Alzheimers Other Diagnoses: see above Rationale for a prognosis of life expectancy of 6 months or less if the disease follows its normal course (Disease Specific History): Gio Murray is a 80 y.o. who was admitted to [facility]. The patient's principle diagnosis of AMS R/T ES Alzheimers Dx has resulted in current hospitalization. Functionally, the patient's Palliative Performance Scale has declined over a period of  Weeks and is estimated at 20%. Objective information that support this patients limited prognosis includes: speech ordered NPO diet due to dysphagia. Pt is increasingly less responsive and consequently less agitated, requiring 1 PRN dose of sl morphine yesterday and nothing since.    The patient/family chose comfort measures with the support of Hospice. Verbal certification of terminal diagnosis with life expectancy of 6 months or less received from: to be obtained on admission Prognosis estimated based on 10/09/20 clinical assessment is:  
[] Few to Many Hours [] Hours to Days  
[] Few to Many Days [x] Days to Weeks  
[] Few to Many Weeks  
[] Weeks to Months  
[] Few to Many Months CLINICAL INFORMATION Allergies: Allergies Allergen Reactions  Aricept [Donepezil] Nausea and Vomiting Mild depression  Betadine [Povidone-Iodine] Hives  Phenergan [Promethazine] Other (comments)  
  hallucinations  Seafood [Shellfish Containing Products] Rash and Swelling  
  crabmeat  Xylocaine [Lidocaine Hcl] Shortness of Breath Wt Readings from Last 3 Encounters:  
10/01/20 78 kg (172 lb) 09/05/20 76.8 kg (169 lb 5 oz) 05/20/20 74.8 kg (165 lb) Ht Readings from Last 3 Encounters:  
10/07/20 5' 3\" (1.6 m)  
09/05/20 5' 3\" (1.6 m)  
05/20/20 5' 4\" (1.626 m) Body mass index is 30.47 kg/m². Visit Vitals BP (!) 159/89 Pulse (!) 118 Temp 99.9 °F (37.7 °C) Resp 18 Ht 5' 3\" (1.6 m) Wt 78 kg (172 lb) SpO2 96% BMI 30.47 kg/m² LAB VALUES No results found for this visit on 09/30/20 (from the past 12 hour(s)). No results found for this visit on 09/30/20 (from the past 6 hour(s)). Lab Results Component Value Date/Time Protein, total 7.2 09/30/2020 05:19 PM  
 Albumin 3.4 (L) 09/30/2020 05:19 PM  
 
 
Currently this patient has: 
[x] Supplemental O2 [] Peripheral IV  [] PICC    [] PORT  
[] Crook Catheter (discussed, wife reluctant) [] NG Tube   [] PEG Tube [] Ostomy   
[] AICD: Has ICD been deactivated? [] Yes [] No:______ Progression to DEPENDENCE WITH ADLs (include time frame): dependent for all ADL's 
-x Dependent for bathing: personal hygiene and grooming  
- xDependent for dressing: dressing and undressing -x Dependent for transferring: movement and mobility -x Dependent for toileting: continence-related tasks including control and hygiene - Dependent for eating: preparing food and feeding ASSESSMENT & PLAN 1. Symptom Issues Identified: agitation 2. Spiritual Issues Identified: to be evaluated by hospice chaplain 3. Psych/ Social/ Emotional Issues Identified: to be evaluated by hospice social worker 4. Care Coordination:  
Transfer to: home Report given to: Yeni Blevins Transportation by:KELSI Scheduled for 10:00am 
 
Medications Needs: SRK in home. Allergy to promethazine (hallucinations) which might impact use of compazine if needed. DME: in home, delivered yesterday by [facility] Supplies: TBD

## 2020-10-09 NOTE — PROGRESS NOTES
Problem: Falls - Risk of 
Goal: *Absence of Falls Description: Document Elana Olsonann Fall Risk and appropriate interventions in the flowsheet. Outcome: Progressing Towards Goal 
Note: Fall Risk Interventions: 
Mobility Interventions: Bed/chair exit alarm, Communicate number of staff needed for ambulation/transfer, Patient to call before getting OOB Mentation Interventions: Adequate sleep, hydration, pain control, Bed/chair exit alarm, Door open when patient unattended, More frequent rounding, Room close to nurse's station, Toileting rounds, Update white board Medication Interventions: Bed/chair exit alarm, Patient to call before getting OOB, Teach patient to arise slowly Elimination Interventions: Bed/chair exit alarm, Call light in reach, Patient to call for help with toileting needs, Stay With Me (per policy), Toilet paper/wipes in reach, Toileting schedule/hourly rounds, Urinal in reach Problem: Patient Education: Go to Patient Education Activity Goal: Patient/Family Education Outcome: Progressing Towards Goal 
  
Problem: Pressure Injury - Risk of 
Goal: *Prevention of pressure injury Description: Document Grady Scale and appropriate interventions in the flowsheet. Outcome: Progressing Towards Goal 
Note: Pressure Injury Interventions: 
Sensory Interventions: Assess changes in LOC, Assess need for specialty bed, Avoid rigorous massage over bony prominences, Check visual cues for pain, Keep linens dry and wrinkle-free, Minimize linen layers, Pressure redistribution bed/mattress (bed type), Turn and reposition approx. every two hours (pillows and wedges if needed) Moisture Interventions: Absorbent underpads, Apply protective barrier, creams and emollients, Check for incontinence Q2 hours and as needed, Minimize layers, Moisture barrier, Offer toileting Q_hr Activity Interventions: Assess need for specialty bed, Pressure redistribution bed/mattress(bed type) Mobility Interventions: Assess need for specialty bed, HOB 30 degrees or less, Pressure redistribution bed/mattress (bed type), Turn and reposition approx. every two hours(pillow and wedges) Nutrition Interventions: Document food/fluid/supplement intake Friction and Shear Interventions: Apply protective barrier, creams and emollients, Feet elevated on foot rest, HOB 30 degrees or less Problem: Patient Education: Go to Patient Education Activity Goal: Patient/Family Education Outcome: Progressing Towards Goal 
  
Problem: Patient Education: Go to Patient Education Activity Goal: Patient/Family Education Outcome: Progressing Towards Goal 
  
Problem: TIA/CVA Stroke: Day 2 Until Discharge Goal: Off Pathway (Use only if patient is Off Pathway) Outcome: Progressing Towards Goal 
Goal: Activity/Safety Outcome: Progressing Towards Goal 
Goal: Diagnostic Test/Procedures Outcome: Progressing Towards Goal 
Goal: Nutrition/Diet Outcome: Progressing Towards Goal 
Goal: Discharge Planning Outcome: Progressing Towards Goal 
Goal: Medications Outcome: Progressing Towards Goal 
Goal: Respiratory Outcome: Progressing Towards Goal 
Goal: Treatments/Interventions/Procedures Outcome: Progressing Towards Goal 
Goal: Psychosocial 
Outcome: Progressing Towards Goal 
Goal: *Verbalizes anxiety and depression are reduced or absent Outcome: Progressing Towards Goal 
Goal: *Absence of aspiration Outcome: Progressing Towards Goal 
Goal: *Absence of deep venous thrombosis signs and symptoms(Stroke Metric) Outcome: Progressing Towards Goal 
Goal: *Optimal pain control at patient's stated goal 
Outcome: Progressing Towards Goal 
Goal: *Tolerating diet Outcome: Progressing Towards Goal 
Goal: *Ability to perform ADLs and demonstrates progressive mobility and function Outcome: Progressing Towards Goal 
Goal: *Stroke education continued(Stroke Metric) Outcome: Progressing Towards Goal 
  
 Problem: Non-Violent Restraints Goal: *Removal from restraints as soon as assessed to be safe Outcome: Progressing Towards Goal 
Goal: *No harm/injury to patient while restraints in use Outcome: Progressing Towards Goal 
Goal: *Patient's dignity will be maintained Outcome: Progressing Towards Goal 
Goal: *Patient Specific Goal (EDIT GOAL, INSERT TEXT) Outcome: Progressing Towards Goal 
Goal: Non-violent Restaints:Standard Interventions Outcome: Progressing Towards Goal 
Goal: Non-violent Restraints:Patient Interventions Outcome: Progressing Towards Goal 
Goal: Patient/Family Education Outcome: Progressing Towards Goal 
  
Problem: Patient Education: Go to Patient Education Activity Goal: Patient/Family Education Outcome: Progressing Towards Goal

## 2020-10-13 PROCEDURE — 3331090002 HH PPS REVENUE DEBIT

## 2020-10-13 PROCEDURE — 3331090001 HH PPS REVENUE CREDIT

## 2020-10-14 PROCEDURE — 3331090002 HH PPS REVENUE DEBIT

## 2020-10-14 PROCEDURE — 3331090001 HH PPS REVENUE CREDIT

## 2020-10-15 ENCOUNTER — HOME CARE VISIT (OUTPATIENT)
Dept: HOSPICE | Facility: HOSPICE | Age: 85
End: 2020-10-15
Payer: MEDICARE

## 2020-10-15 PROCEDURE — 3331090002 HH PPS REVENUE DEBIT

## 2020-10-15 PROCEDURE — 3331090001 HH PPS REVENUE CREDIT

## 2023-10-25 NOTE — H&P
Hospitalist Admission Note    NAME: Graham Severin   :  10/24/1928   MRN:  [id number]     Date/Time:  2017 7:17 PM    Patient PCP: Jorge Hinkle, DO  ________________________________________________________________________    My assessment of this patient's clinical condition and my plan of care is as follows. Assessment / Plan:  Acute encephalopathy likely due to viral illness and dehydration  -sore throat and congestion, CT chest without any infiltrate, does have productive cough, BUN Cr ratio increased  -CT head negative  -continue IV fluids  -continue azithromycin empirically   -nebs  -mucinex  -check B12, folate, ammonia, if mental status worsens consider neurology evaluation  -PT/OT given weakness    HTN  -imdur and metoprolol    Dementia  -usually not oriented to time but oriented to person and birthdate  -wife is primary caregiver  -usually active uses cane    Code Status: DNR  Surrogate Decision Maker: wife    DVT Prophylaxis: SCD  Baseline: usually walks with cane        Subjective:   CHIEF COMPLAINT:   Chief Complaint   Patient presents with    Altered mental status     New onset upon waking up this morning       HISTORY OF PRESENT ILLNESS:     Graham Severin is a 80 y.o.  male with history of CAD, stroke, HTN who presents with one day history of altered mental status. Patient has not been feeling well, had gone to [facility] for a sore throat where he was given amoxicillin. Wife noted he has a dry cough, no SOB. This morning he was very lethargic and could not take his pills very well. He also did not eat much and she had to feed him. He was unable to walk on his own. Usually he is more alert and talkative and walks with a cane. He is usually oriented to himself and birthday but today he has not been oriented at all. No fevers or chills. No sick contacts. We were asked to admit for work up and evaluation of the above problems.      Past Medical History:   Diagnosis Date    Back pain     due to DDD and scoliosis    BPH (benign prostatic hyperplasia)     Dr. Cristina Kimble CAD (coronary artery disease) 10/30/13    30%LAD, 80% ostal stenosis. Dr. Gay Bailon.  Chest pain 10/04/04, 10/30/13    Dr. Mario Li. Negative Stress Cardiolite Test.  Dr. Emma Wood.  Chronic venous insufficiency 06/15/09    Dr. Hopkins Neighbours due to fall    DDD (degenerative disc disease), lumbar     L3-S1    Diverticulosis 02/2004    sigmoid. Dr. Nichol Kruger DJD (degenerative joint disease) of cervical spine     C 4-5 ;5-6    Dyslipidemia     Fracture 1980    facial/nasal    Hearing loss     wears hearing aid    Heartburn     Hemorrhoids, internal 1987    Dr. Antolin Maxwell of unspecified site of abdominal cavity without mention of obstruction or gangrene     inguinal  Lt    Hypertension     Impotence     Left acoustic neuroma ([address] 10/28/14    17 x 9 mm schwannoma.  Pes planus of both feet     PVD (peripheral vascular disease) ([address] 2009    Scoliosis     LS    Slipped intervertebral disc 1954    chiropractor    SOB (shortness of breath) 08/07/13    Dr. Paige Powers.  Stroke ([address] 10/28/2014    small Left Thalamus. Dr. Chris Rowe. Dr. Vicenta Montero.  Urinary incontinence     due to BPH. Dr. Trevor Huerta    Varicose vein 06/15/09    Dr. Erica Stephens Vertigo 2003    due to inner ear. Dr. Terry Suarez. Dr. Jayme Celis. Dr. David Nuno Visual loss     Dr. José Manuel Louie, ophth. Past Surgical History:   Procedure Laterality Date    APPENDECTOMY      due to gangrene   [facility], 02/2002    Dr. Emily Nagy. benign.  COLONOSCOPY  02/2004    Dr. Shreyas Barcenas. due q 10 yrs    CYSTOSCOPY  10/17/03    Dr. Simpson Fails  10/30/13    Dr. Emma Wood. 80% ostal stenosis. 30% LAD.       HX HEMORRHOIDECTOMY      HX KNEE ARTHROSCOPY  1990 Left , 2007 Right     Jereetta Freeze    HX LAPAROTOMY  05/14/07    Dr. Azalia Villa. due to Bowel Obstruction    HX POLYPECTOMY  02/2002    Anal.     HX TONSILLECTOMY      DC COLSC FLX W/RMVL OF TUMOR POLYP LESION SNARE TQ  5/23/2011    Dr. Shemar Rocha. Social History   Substance Use Topics    Smoking status: Never Smoker    Smokeless tobacco: Never Used    Alcohol use No        Family History   Problem Relation Age of Onset    Heart Attack Mother     Other Mother      brain aneurysm/AAA/varicose veins    Stroke Mother     Heart Attack Sister     Cancer Maternal Grandmother      ovarian     Allergies   Allergen Reactions    Betadine [Povidone-Iodine] Hives    Phenergan [Promethazine] Other (comments)     hallucinations    Seafood [Shellfish Containing Products] Rash and Swelling     crabmeat    Xylocaine [Lidocaine Hcl] Shortness of Breath        Prior to Admission medications    Medication Sig Start Date End Date Taking? Authorizing Provider   AMOXICILLIN PO Take  by mouth. Yes Kera Wood MD   DEXTROMETHORPHAN HBR (DELSYM PO) Take  by mouth. Yes Kera Wood MD   isosorbide mononitrate ER (IMDUR) 30 mg tablet take 1 tablet by mouth once daily 10/27/17  Yes Miguel Hay DO   vit N25-ydaiboqrhyim calcium-vit B6 (FOLTX) 2-1.13-25 mg tablet Take 1 Tab by mouth daily. Indications: Vitamin Deficiency 10/11/17  Yes Joe Kelly MD   pravastatin (PRAVACHOL) 20 mg tablet take 1 tablet by mouth at bedtime 7/28/17  Yes Miguel Hay DO   metoprolol tartrate (LOPRESSOR) 25 mg tablet take 1/2 tablet twice a day 6/28/17  Yes Miguel Hay DO   docusate sodium (COLACE) 100 mg capsule Take 100 mg by mouth daily. Instructed to take daily with plenty of liquid unless otherwise directed- as per 11/7/14 [facility] discharge med list   Yes Historical Provider   cholecalciferol, vitamin D3, (VITAMIN D3) 2,000 unit tab Take 1 Tab by mouth daily.  As per 11/07/14 [facility] discharge med list   Yes Historical Provider   aspirin (ASPIRIN) 325 mg tablet Take 1 tablet by mouth daily. 10/30/14  Yes Yokasta Barros MD   tamsulosin (FLOMAX) 0.4 mg capsule Take 0.4 mg by mouth daily. For prostate    Yes Historical Provider   meclizine (ANTIVERT) 25 mg tablet Take 1 Tab by mouth three (3) times daily as needed for Dizziness. 5/29/16   Adelbert Jeans., MD   GUAIFENESIN (MUCINEX PO) Take 1 Tab by mouth daily. Historical Provider       REVIEW OF SYSTEMS:     I am not able to complete the review of systems because: The patient is intubated and sedated   x The patient has altered mental status due to his acute medical problems    The patient has baseline aphasia from prior stroke(s)   x The patient has baseline dementia and is not reliable historian    The patient is in acute medical distress and unable to provide information           Objective:   VITALS:    Patient Vitals for the past 12 hrs:   Temp Pulse Resp BP SpO2   12/16/17 1730 - 80 21 - 94 %   12/16/17 1700 - 73 19 122/69 95 %   12/16/17 1630 - 73 21 123/66 94 %   12/16/17 1600 - 74 22 115/67 93 %   12/16/17 1530 - 75 21 118/63 93 %   12/16/17 1528 - 76 17 - 94 %   12/16/17 1527 - - - 119/68 -   12/16/17 1500 - 73 25 103/68 94 %   12/16/17 1453 98 °F (36.7 °C) 74 24 123/70 95 %         PHYSICAL EXAM:    General:    Alert, minimally cooperative, no distress, appears stated age. HEENT: Atraumatic, anicteric sclerae, pink conjunctivae     Congested, No oral ulcers, oral mucosa moist, throat clear, dentition fair  Neck:  Supple, symmetrical  Lungs:   Clear but some mucous sounds, no wheezing or rales  Chest wall:  No tenderness, no accessory muscle use. Heart:   Regular rhythm, no murmur, no edema  Abdomen:   Soft, non-tender, not distended, bowel sounds normal  Extremities: No cyanosis, no clubbing, warm, well perfused, radial pulse 2+  Skin:     Not pale, not jaundiced, no rashes   Psych:  Poor insight, not depressed, not anxious or agitated.   Neurologic: EOMs intact, face symmetric, not answering questions, appears confused, not oriented to person or place, moving extremities spontaneously     _______________________________________________________________________  Care Plan discussed with:    Comments   Patient x    Family  x    RN x    Care Manager                    Consultant:      _______________________________________________________________________  Expected  Disposition:   Home with Family    HH/PT/OT/RN x   SNF/LTC    LENIN    ________________________________________________________________________  TOTAL TIME:  61 Minutes    Critical Care Provided     Minutes non procedure based      Comments     Reviewed previous records   >50% of visit spent in counseling and coordination of care  Discussion with patient and/or family and questions answered       ________________________________________________________________________  Jesse Dias MD    Procedures: see electronic medical records for all procedures/Xrays and details which were not copied into this note but were reviewed prior to creation of Plan. LAB DATA REVIEWED:    Recent Results (from the past 24 hour(s))   CBC WITH AUTOMATED DIFF    Collection Time: 12/16/17  3:51 PM   Result Value Ref Range    WBC 11.2 (H) 4.1 - 11.1 K/uL    RBC 4.10 4. 10 - 5.70 M/uL    HGB 12.7 12.1 - 17.0 g/dL    HCT 38.0 36.6 - 50.3 %    MCV 92.7 80.0 - 99.0 FL    MCH 31.0 26.0 - 34.0 PG    MCHC 33.4 30.0 - 36.5 g/dL    RDW 13.9 11.5 - 14.5 %    PLATELET 506 142 - 167 K/uL    NEUTROPHILS 81 (H) 32 - 75 %    LYMPHOCYTES 7 (L) 12 - 49 %    MONOCYTES 12 5 - 13 %    EOSINOPHILS 0 0 - 7 %    BASOPHILS 0 0 - 1 %    ABS. NEUTROPHILS 9.0 (H) 1.8 - 8.0 K/UL    ABS. LYMPHOCYTES 0.8 0.8 - 3.5 K/UL    ABS. MONOCYTES 1.3 (H) 0.0 - 1.0 K/UL    ABS. EOSINOPHILS 0.0 0.0 - 0.4 K/UL    ABS.  BASOPHILS 0.0 0.0 - 0.1 K/UL   METABOLIC PANEL, COMPREHENSIVE    Collection Time: 12/16/17  3:51 PM   Result Value Ref Range    Sodium 137 136 - 145 mmol/L    Potassium 4.5 3.5 - 5.1 mmol/L    Chloride 102 97 - 108 mmol/L    CO2 28 21 - 32 mmol/L    Anion gap 7 5 - 15 mmol/L    Glucose 117 (H) 65 - 100 mg/dL    BUN 17 6 - 20 MG/DL    Creatinine 1.26 0.70 - 1.30 MG/DL    BUN/Creatinine ratio 13 12 - 20      GFR est AA >60 >60 ml/min/1.73m2    GFR est non-AA 54 (L) >60 ml/min/1.73m2    Calcium 8.7 8.5 - 10.1 MG/DL    Bilirubin, total 0.6 0.2 - 1.0 MG/DL    ALT (SGPT) 19 12 - 78 U/L    AST (SGOT) 25 15 - 37 U/L    Alk.  phosphatase 91 45 - 117 U/L    Protein, total 7.1 6.4 - 8.2 g/dL    Albumin 3.5 3.5 - 5.0 g/dL    Globulin 3.6 2.0 - 4.0 g/dL    A-G Ratio 1.0 (L) 1.1 - 2.2     PROTHROMBIN TIME + INR    Collection Time: 12/16/17  3:51 PM   Result Value Ref Range    INR 1.2 (H) 0.9 - 1.1      Prothrombin time 12.5 (H) 9.0 - 11.1 sec   LACTIC ACID    Collection Time: 12/16/17  3:51 PM   Result Value Ref Range    Lactic acid 1.4 0.4 - 2.0 MMOL/L   INFLUENZA A & B AG (RAPID TEST)    Collection Time: 12/16/17  3:51 PM   Result Value Ref Range    Influenza A Antigen NEGATIVE  NEG      Influenza B Antigen NEGATIVE  NEG     NT-PRO BNP    Collection Time: 12/16/17  3:51 PM   Result Value Ref Range    NT pro- (H) 0 - 450 PG/ML   URINALYSIS W/ REFLEX CULTURE    Collection Time: 12/16/17  4:56 PM   Result Value Ref Range    Color YELLOW/STRAW      Appearance CLEAR CLEAR      Specific gravity 1.022 1.003 - 1.030      pH (UA) 5.0 5.0 - 8.0      Protein 30 (A) NEG mg/dL    Glucose NEGATIVE  NEG mg/dL    Ketone NEGATIVE  NEG mg/dL    Bilirubin NEGATIVE  NEG      Blood NEGATIVE  NEG      Urobilinogen 0.2 0.2 - 1.0 EU/dL    Nitrites NEGATIVE  NEG      Leukocyte Esterase NEGATIVE  NEG      WBC 0-4 0 - 4 /hpf    RBC 0-5 0 - 5 /hpf    Epithelial cells FEW FEW /lpf    Bacteria NEGATIVE  NEG /hpf    UA:UC IF INDICATED CULTURE NOT INDICATED BY UA RESULT CNI      Hyaline cast 0-2 0 - 5 /lpf   DRUG SCREEN, URINE    Collection Time: 12/16/17  4:56 PM   Result Value Ref Range    AMPHETAMINES NEGATIVE  NEG BARBITURATES NEGATIVE  NEG      BENZODIAZEPINES NEGATIVE  NEG      COCAINE NEGATIVE  NEG      METHADONE NEGATIVE  NEG      OPIATES NEGATIVE  NEG      PCP(PHENCYCLIDINE) NEGATIVE  NEG      THC (TH-CANNABINOL) NEGATIVE  NEG      Drug screen comment (NOTE) Partially impaired: cannot see medication labels or newsprint, but can see obstacles in path, and the surrounding layout; can count fingers at arm's length
